# Patient Record
Sex: FEMALE | Race: OTHER | NOT HISPANIC OR LATINO
[De-identification: names, ages, dates, MRNs, and addresses within clinical notes are randomized per-mention and may not be internally consistent; named-entity substitution may affect disease eponyms.]

---

## 2021-07-09 PROBLEM — Z00.00 ENCOUNTER FOR PREVENTIVE HEALTH EXAMINATION: Status: ACTIVE | Noted: 2021-07-09

## 2021-07-13 ENCOUNTER — APPOINTMENT (OUTPATIENT)
Dept: NEUROSURGERY | Facility: CLINIC | Age: 41
End: 2021-07-13
Payer: MEDICAID

## 2021-07-13 VITALS
HEIGHT: 60 IN | HEART RATE: 80 BPM | TEMPERATURE: 97.2 F | BODY MASS INDEX: 23.95 KG/M2 | DIASTOLIC BLOOD PRESSURE: 85 MMHG | WEIGHT: 122 LBS | SYSTOLIC BLOOD PRESSURE: 121 MMHG

## 2021-07-13 PROCEDURE — 99205 OFFICE O/P NEW HI 60 MIN: CPT

## 2021-07-13 NOTE — ASSESSMENT
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 I explained the indications for observation, conservative management, medical management, physical therapy, pain management approaches and surgery. I explained the different types and surgical approaches including anterior and posterior approaches as well as decompression only procedures and instrumented fusions. I discussed the risks, benefits, possible complications and expected outcome related to each treatment option. The risks of surgery were discussed in detail including but not limited to postoperative infection at the surgical site, hospital acquired pneumonia, hospital acquired urinary tract infection, postoperative meningitis, wound dehiscence, CSF leak, stroke (ischemic and hemorrhagic), postoperative seizures, worsening motor function due to spinal cord or nerve injury, postoperative visual deficit which could be permanent (blindness) when surgery is performed in a prone position, cardiovascular complications (MI, PE, DVT) and I also explained that some of these complications could lead to sepsis, coma or even death. I discussed the fact that some of these complications may require subsequent surgical procedure(s) to correct them.\par \par In the end, I recommend she get a CT thoracic spine non contrast, Flex/ext cervical spine, full standing xray scoliosis protocol, flex/ext xray of lumbosacral region. I also mentioned her case will be discussed at the spine conference. She will follow up in our clinic with updated imaging in two weeks.\par \par The patient understands the plan of care and is in agreement.  All questions answered to patient satisfaction.\par \par \par \par

## 2021-07-13 NOTE — HISTORY OF PRESENT ILLNESS
[de-identified] : 42 yo F with no pmhx, presents to neurosurgery clinic due to numbness in b/l extremities that began last month. Patient recently gave birth on June 14th, 2021 and when she initially got pregnant nine months ago, began developing numbness/tingling in extremities and developing an unsteady gait. After she gave birth last month, her numbness in extremities got worse. She went called her obgyn who referred her to visit the ED and when she went there, they got an MRI cervical/thoracic spine which showed severe kyphosis and cord compression specifically at the level of T3/T4. She denies headaches, chest pain, shortness of breath, weakness in extremities.\par \par no allergies\par no pmhx\par no medications\par no AC/antiplatelet\par \par Social hx: lives alone, not , She denies alcohol use, drug use, and tobacco smoking\par \par

## 2021-07-13 NOTE — END OF VISIT
[FreeTextEntry3] : I have seen the patient and reviewed the case together with PA and I agree with the final recommendations and plan of care.\par \par Frank Ponce MD\par Neurosurgery\par \par  [Time Spent: ___ minutes] : I have spent [unfilled] minutes of time on the encounter. [>50% of the face to face encounter time was spent on counseling and/or coordination of care for ___] : Greater than 50% of the face to face encounter time was spent on counseling and/or coordination of care for [unfilled]

## 2021-07-13 NOTE — PHYSICAL EXAM
[General Appearance - Alert] : alert [General Appearance - In No Acute Distress] : in no acute distress [Oriented To Time, Place, And Person] : oriented to person, place, and time [Impaired Insight] : insight and judgment were intact [Affect] : the affect was normal [Person] : oriented to person [Place] : oriented to place [Time] : oriented to time [Short Term Intact] : short term memory intact [Remote Intact] : remote memory intact [Span Intact] : the attention span was normal [Concentration Intact] : normal concentrating ability [Fluency] : fluency intact [Comprehension] : comprehension intact [Current Events] : adequate knowledge of current events [Past History] : adequate knowledge of personal past history [Vocabulary] : adequate range of vocabulary [Cranial Nerves Optic (II)] : visual acuity intact bilaterally,  pupils equal round and reactive to light [Cranial Nerves Oculomotor (III)] : extraocular motion intact [Cranial Nerves Trigeminal (V)] : facial sensation intact symmetrically [Cranial Nerves Facial (VII)] : face symmetrical [Cranial Nerves Vestibulocochlear (VIII)] : hearing was intact bilaterally [Cranial Nerves Glossopharyngeal (IX)] : tongue and palate midline [Cranial Nerves Accessory (XI - Cranial And Spinal)] : head turning and shoulder shrug symmetric [Cranial Nerves Hypoglossal (XII)] : there was no tongue deviation with protrusion [Motor Tone] : muscle tone was normal in all four extremities [Motor Strength] : muscle strength was normal in all four extremities [No Muscle Atrophy] : normal bulk in all four extremities [Sensation Tactile Decrease] : light touch was intact [Past-pointing] : there was no past-pointing [Tremor] : no tremor present [FreeTextEntry8] : unsteady, spastic gait requiring use of rolling walker

## 2021-07-26 ENCOUNTER — RESULT REVIEW (OUTPATIENT)
Age: 41
End: 2021-07-26

## 2021-07-28 ENCOUNTER — APPOINTMENT (OUTPATIENT)
Dept: NEUROSURGERY | Facility: CLINIC | Age: 41
End: 2021-07-28
Payer: MEDICAID

## 2021-07-28 PROCEDURE — 99215 OFFICE O/P EST HI 40 MIN: CPT

## 2021-07-28 NOTE — REASON FOR VISIT
[FreeTextEntry1] : 7/28/2021: Ms. Dial returns to neurosurgery clinic as a two week follow up on updated imaging: CT thoracic spine non contrast, Flex/ext cervical spine, full standing xray scoliosis protocol, flex/ext xray of lumbosacral region. \par \par 7/13/2021: 42 yo F with no pmhx, presents to neurosurgery clinic due to numbness in b/l extremities that began last month. Patient recently gave birth on June 14th, 2021 and when she initially got pregnant nine months ago, began developing numbness/tingling in extremities and developing an unsteady gait. After she gave birth last month, her numbness in extremities got worse. She went called her obgyn who referred her to visit the ED and when she went there, they got an MRI cervical/thoracic spine which showed severe kyphosis and cord compression specifically at the level of T3/T4. She denies headaches, chest pain, shortness of breath, weakness in extremities.\par \par no allergies\par no pmhx\par no medications\par no AC/antiplatelet\par \par Social hx: lives alone, not , She denies alcohol use, drug use, and tobacco smoking\par \par

## 2021-07-28 NOTE — PHYSICAL EXAM
[Oriented To Time, Place, And Person] : oriented to person, place, and time [Impaired Insight] : insight and judgment were intact [Affect] : the affect was normal [Person] : oriented to person [Place] : oriented to place [Time] : oriented to time [Short Term Intact] : short term memory intact [Remote Intact] : remote memory intact [Span Intact] : the attention span was normal [Concentration Intact] : normal concentrating ability [Fluency] : fluency intact [Comprehension] : comprehension intact [Current Events] : adequate knowledge of current events [Past History] : adequate knowledge of personal past history [Vocabulary] : adequate range of vocabulary [Cranial Nerves Optic (II)] : visual acuity intact bilaterally,  pupils equal round and reactive to light [Cranial Nerves Oculomotor (III)] : extraocular motion intact [Cranial Nerves Trigeminal (V)] : facial sensation intact symmetrically [Cranial Nerves Facial (VII)] : face symmetrical [Cranial Nerves Vestibulocochlear (VIII)] : hearing was intact bilaterally [Cranial Nerves Glossopharyngeal (IX)] : tongue and palate midline [Cranial Nerves Accessory (XI - Cranial And Spinal)] : head turning and shoulder shrug symmetric [Cranial Nerves Hypoglossal (XII)] : there was no tongue deviation with protrusion [Motor Tone] : muscle tone was normal in all four extremities [Motor Strength] : muscle strength was normal in all four extremities [No Muscle Atrophy] : normal bulk in all four extremities [Sensation Tactile Decrease] : light touch was intact [FreeTextEntry1] : unsteady, spastic gait requiring use of rolling walker.  \par  [Tremor] : no tremor present

## 2021-07-28 NOTE — ASSESSMENT
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 I explained the indications for observation, conservative management, medical management, physical therapy, pain management approaches and surgery. I explained the different types and surgical approaches including anterior and posterior approaches as well as decompression only procedures and instrumented fusions. I discussed the risks, benefits, possible complications and expected outcome related to each treatment option. The risks of surgery were discussed in detail including but not limited to postoperative infection at the surgical site, hospital acquired pneumonia, hospital acquired urinary tract infection, postoperative meningitis, wound dehiscence, CSF leak, stroke (ischemic and hemorrhagic), postoperative seizures, worsening motor function due to spinal cord or nerve injury, postoperative visual deficit which could be permanent (blindness) when surgery is performed in a prone position, cardiovascular complications (MI, PE, DVT) and I also explained that some of these complications could lead to sepsis, coma or even death. I discussed the fact that some of these complications may require subsequent surgical procedure(s) to correct them.\par \par In the end, I recommend that she is a surgical candidate. I provided her with requisitions to get standing xrays of her thoracic and lumbar spine. I also spoke with Dr. Vieira at [facility] who will be following up with her in clinic to discuss surgical planning.\par \par The patient understands the plan of care and is in agreement. All questions answered to patient satisfaction.\par

## 2021-07-29 ENCOUNTER — APPOINTMENT (OUTPATIENT)
Dept: SPINE | Facility: CLINIC | Age: 41
End: 2021-07-29
Payer: MEDICAID

## 2021-07-29 PROCEDURE — 99203 OFFICE O/P NEW LOW 30 MIN: CPT | Mod: 95

## 2021-07-29 NOTE — REASON FOR VISIT
[Home] : at home, [unfilled] , at the time of the visit. [Medical Office: ([facility])___] : at the medical office located in  [Verbal consent obtained from patient] : the patient, [unfilled] [Initial Visit] : an initial visit for [Cervical Myelopathy/Myopathy] : cervical myelopathy/myopathy [Neck Pain] : neck pain

## 2021-07-29 NOTE — DISCUSSION/SUMMARY
[de-identified] : I had a long discussion with her regarding the natural history of thoracic myelopathy.  The surgical correction for this is very involved would require 3 to 6 months recovery and be associated with a high risk of intraoperative and postoperative paraplegia.'s very important meet her in person the able to examine her.  She has some social issues which are complicating the issue but I would like to see her soon as possible

## 2021-08-05 ENCOUNTER — NON-APPOINTMENT (OUTPATIENT)
Age: 41
End: 2021-08-05

## 2021-08-05 ENCOUNTER — APPOINTMENT (OUTPATIENT)
Dept: SPINE | Facility: CLINIC | Age: 41
End: 2021-08-05
Payer: MEDICAID

## 2021-08-05 VITALS
DIASTOLIC BLOOD PRESSURE: 71 MMHG | HEART RATE: 66 BPM | HEIGHT: 60 IN | SYSTOLIC BLOOD PRESSURE: 122 MMHG | WEIGHT: 122 LBS | OXYGEN SATURATION: 98 % | TEMPERATURE: 98 F | BODY MASS INDEX: 23.95 KG/M2 | RESPIRATION RATE: 18 BRPM

## 2021-08-05 DIAGNOSIS — M40.209 UNSPECIFIED KYPHOSIS, SITE UNSPECIFIED: ICD-10-CM

## 2021-08-05 DIAGNOSIS — M48.04 SPINAL STENOSIS, THORACIC REGION: ICD-10-CM

## 2021-08-05 DIAGNOSIS — S22.009A UNSPECIFIED FRACTURE OF UNSPECIFIED THORACIC VERTEBRA, INITIAL ENCOUNTER FOR CLOSED FRACTURE: ICD-10-CM

## 2021-08-05 PROCEDURE — 99215 OFFICE O/P EST HI 40 MIN: CPT

## 2021-08-05 NOTE — DISCUSSION/SUMMARY
[de-identified] : This patient is a very bad problem.  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  She requires a multilevel vertebral column resection with reconstruction and posterior instrumentation explained to her the risk benefits and alternatives to surgery.  I explained to her that the risk of a complication is 100% should be in the hospital for minimum 10 to 14 days and likely will require stay in rehab after surgery.  I quoted her a 25 to 50% chance of new weakness or even paralysis after surgery not for mechanical complications but from vascular or stretch from the spinal cord.  We will continue everything we can to try and facilitate getting her  here from [place] as they have a new baby and we also will work on getting a 3D model of her spine including a CT of her cervical thoracic and lumbar Feroz protocol this will require a significant amount of effort and so I will book her for two consecutive days September 27 the 29th to be done in the operating room here with a stay in the ICU afterwards for strict blood pressure control.  She and her family had several excellent questions all which I answered to the best my abilities.  She wishes to proceed.

## 2021-08-05 NOTE — PHYSICAL EXAM
[Wide-Based] : wide-based [UE/LE] : Sensory: Intact in bilateral upper & lower extremities [2+] : left brachioradialis 2+ [Plantar Reflex Right Only] : present on the right [Plantar Reflex Left Only] : present on the left [DTR Reflexes Clonus Of Right Ankle (___ Beats)] : present on the right [DTR Reflexes Clonus Of Left Ankle (___ Beats)] : present on the left [3+] : left abdominal skin reflexes 3+

## 2023-03-28 ENCOUNTER — NON-APPOINTMENT (OUTPATIENT)
Age: 43
End: 2023-03-28

## 2023-03-28 DIAGNOSIS — G95.9 DISEASE OF SPINAL CORD, UNSPECIFIED: ICD-10-CM

## 2023-03-28 NOTE — REASON FOR VISIT
[Cervical Myelopathy/Myopathy] : cervical myelopathy/myopathy [Spinal Stenosis] : spinal stenosis [FreeTextEntry2] : severe cervicothoracic junctional kyphosis with spinal cord compression

## 2023-03-28 NOTE — ASSESSMENT
[FreeTextEntry1] : Given worsening and new neuro deficits, need updated images prior to large complex spinal reconstruction surgery from cervical to lumbar level.\par \par PLAN\par - MRI C/T/L spine wo\par - CT C/T/L spine wo\par - Xray scoliosis\par - RTC after images to discuss with Dr. Vieira

## 2023-03-28 NOTE — HISTORY OF PRESENT ILLNESS
[de-identified] : 44 yo F with known history of severe cervicothoracic kyphosis with cord compression, thoracic myelopathy. Of last visit in 2021, surgical intervention was recommended for complex spinal reconstruction but she was unable to proceed due to family issue.\par Today she returns c/o worsening back pain with balance problem and past one year of urinary incontinence.\par Pain is described as severe constant on her mid to lower back worsening by walking and balance problem causing multiple near falls. \par \par \par INITIAL history\par 40 yo F with no pmhx, presents to neurosurgery clinic due to numbness in b/l extremities that began last month. Patient recently gave birth on June 14th, 2021 and when she initially got pregnant nine months ago, began developing numbness/tingling in extremities and developing an unsteady gait. After she gave birth last month, her numbness in extremities got worse. She went called her obgyn who referred her to visit the ED and when she went there, they got an MRI cervical/thoracic spine which showed severe kyphosis and cord compression specifically at the level of T3/T4. She denies headaches, chest pain, shortness of breath, weakness in extremities.\par

## 2023-03-29 ENCOUNTER — APPOINTMENT (OUTPATIENT)
Dept: SPINE | Facility: CLINIC | Age: 43
End: 2023-03-29
Payer: MEDICAID

## 2023-03-29 PROCEDURE — XXXXX: CPT | Mod: 1L

## 2023-05-27 ENCOUNTER — APPOINTMENT (OUTPATIENT)
Dept: MRI IMAGING | Facility: CLINIC | Age: 43
End: 2023-05-27
Payer: MEDICAID

## 2023-05-27 ENCOUNTER — TRANSCRIPTION ENCOUNTER (OUTPATIENT)
Age: 43
End: 2023-05-27

## 2023-05-27 ENCOUNTER — OUTPATIENT (OUTPATIENT)
Dept: OUTPATIENT SERVICES | Facility: HOSPITAL | Age: 43
LOS: 1 days | End: 2023-05-27

## 2023-05-27 ENCOUNTER — APPOINTMENT (OUTPATIENT)
Dept: CT IMAGING | Facility: CLINIC | Age: 43
End: 2023-05-27
Payer: MEDICAID

## 2023-05-27 PROCEDURE — 72141 MRI NECK SPINE W/O DYE: CPT | Mod: 26

## 2023-05-27 PROCEDURE — 72128 CT CHEST SPINE W/O DYE: CPT | Mod: 26

## 2023-05-27 PROCEDURE — 72148 MRI LUMBAR SPINE W/O DYE: CPT | Mod: 26

## 2023-05-27 PROCEDURE — 72146 MRI CHEST SPINE W/O DYE: CPT | Mod: 26

## 2023-07-24 ENCOUNTER — OUTPATIENT (OUTPATIENT)
Dept: OUTPATIENT SERVICES | Facility: HOSPITAL | Age: 43
LOS: 1 days | End: 2023-07-24
Payer: COMMERCIAL

## 2023-07-24 ENCOUNTER — APPOINTMENT (OUTPATIENT)
Dept: CT IMAGING | Facility: HOSPITAL | Age: 43
End: 2023-07-24

## 2023-07-24 PROCEDURE — 72131 CT LUMBAR SPINE W/O DYE: CPT

## 2023-07-24 PROCEDURE — 72131 CT LUMBAR SPINE W/O DYE: CPT | Mod: 26

## 2023-07-27 ENCOUNTER — NON-APPOINTMENT (OUTPATIENT)
Age: 43
End: 2023-07-27

## 2023-08-08 ENCOUNTER — APPOINTMENT (OUTPATIENT)
Dept: SPINE | Facility: CLINIC | Age: 43
End: 2023-08-08
Payer: MEDICAID

## 2023-08-08 DIAGNOSIS — G95.20 UNSPECIFIED CORD COMPRESSION: ICD-10-CM

## 2023-08-08 DIAGNOSIS — M40.202 UNSPECIFIED KYPHOSIS, CERVICAL REGION: ICD-10-CM

## 2023-08-08 PROCEDURE — 99215 OFFICE O/P EST HI 40 MIN: CPT | Mod: 95

## 2023-08-16 ENCOUNTER — APPOINTMENT (OUTPATIENT)
Dept: CT IMAGING | Facility: CLINIC | Age: 43
End: 2023-08-16

## 2023-08-21 ENCOUNTER — OUTPATIENT (OUTPATIENT)
Dept: OUTPATIENT SERVICES | Facility: HOSPITAL | Age: 43
LOS: 1 days | End: 2023-08-21

## 2023-08-21 ENCOUNTER — APPOINTMENT (OUTPATIENT)
Dept: CT IMAGING | Facility: CLINIC | Age: 43
End: 2023-08-21
Payer: MEDICAID

## 2023-08-21 PROCEDURE — 72125 CT NECK SPINE W/O DYE: CPT | Mod: 26

## 2023-09-13 PROBLEM — M40.202 CERVICAL KYPHOSIS: Status: ACTIVE | Noted: 2021-08-05

## 2023-09-13 PROBLEM — G95.20 CERVICAL CORD COMPRESSION WITH MYELOPATHY: Status: ACTIVE | Noted: 2023-09-13

## 2023-09-22 VITALS
HEART RATE: 91 BPM | OXYGEN SATURATION: 98 % | HEIGHT: 60 IN | RESPIRATION RATE: 16 BRPM | DIASTOLIC BLOOD PRESSURE: 75 MMHG | SYSTOLIC BLOOD PRESSURE: 114 MMHG | TEMPERATURE: 98 F | WEIGHT: 151.24 LBS

## 2023-09-22 RX ORDER — INFLUENZA VIRUS VACCINE 15; 15; 15; 15 UG/.5ML; UG/.5ML; UG/.5ML; UG/.5ML
0.5 SUSPENSION INTRAMUSCULAR ONCE
Refills: 0 | Status: DISCONTINUED | OUTPATIENT
Start: 2023-09-25 | End: 2023-10-20

## 2023-09-22 NOTE — PATIENT PROFILE ADULT - FLU SEASON?
Identified pt with two pt identifiers(name and ). Reviewed record in preparation for visit and have obtained necessary documentation. Chief Complaint   Patient presents with    Fatty Liver     FS 3month f/u with April      Vitals:    22 1144   BP: (!) 151/79   Pulse: 79   Temp: 97.2 °F (36.2 °C)   TempSrc: Temporal   SpO2: 99%   Weight: 182 lb (82.6 kg)   Height: 5' 7\" (1.702 m)   PainSc:   0 - No pain       Health Maintenance Review: Patient reminded of \"due or due soon\" health maintenance. I have asked the patient to contact his/her primary care provider (PCP) for follow-up on his/her health maintenance. Coordination of Care Questionnaire:  :   1) Have you been to an emergency room, urgent care, or hospitalized since your last visit? If yes, where when, and reason for visit? no       2. Have seen or consulted any other health care provider since your last visit? If yes, where when, and reason for visit? NO      Patient is accompanied by self I have received verbal consent from Raegan Jaramillo to discuss any/all medical information while they are present in the room. Yes...

## 2023-09-24 ENCOUNTER — TRANSCRIPTION ENCOUNTER (OUTPATIENT)
Age: 43
End: 2023-09-24

## 2023-09-25 ENCOUNTER — INPATIENT (INPATIENT)
Facility: HOSPITAL | Age: 43
LOS: 24 days | Discharge: NURSING, REHAB OR LONG-TERM CARE | DRG: 457 | End: 2023-10-20
Attending: NEUROLOGICAL SURGERY | Admitting: NEUROLOGICAL SURGERY
Payer: COMMERCIAL

## 2023-09-25 ENCOUNTER — TRANSCRIPTION ENCOUNTER (OUTPATIENT)
Age: 43
End: 2023-09-25

## 2023-09-25 DIAGNOSIS — E86.1 HYPOVOLEMIA: ICD-10-CM

## 2023-09-25 DIAGNOSIS — E87.1 HYPO-OSMOLALITY AND HYPONATREMIA: ICD-10-CM

## 2023-09-25 DIAGNOSIS — T84.226A DISPLACEMENT OF INTERNAL FIXATION DEVICE OF VERTEBRAE, INITIAL ENCOUNTER: ICD-10-CM
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
## 2023-09-25 DIAGNOSIS — R32 UNSPECIFIED URINARY INCONTINENCE: ICD-10-CM

## 2023-09-25 DIAGNOSIS — M48.04 SPINAL STENOSIS, THORACIC REGION: ICD-10-CM

## 2023-09-25 DIAGNOSIS — G97.41 ACCIDENTAL PUNCTURE OR LACERATION OF DURA DURING A PROCEDURE: ICD-10-CM

## 2023-09-25 DIAGNOSIS — I95.89 OTHER HYPOTENSION: ICD-10-CM

## 2023-09-25 DIAGNOSIS — I47.10 SUPRAVENTRICULAR TACHYCARDIA, UNSPECIFIED: ICD-10-CM

## 2023-09-25 DIAGNOSIS — M40.204 UNSPECIFIED KYPHOSIS, THORACIC REGION: ICD-10-CM
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
## 2023-09-25 DIAGNOSIS — S22.009A UNSPECIFIED FRACTURE OF UNSPECIFIED THORACIC VERTEBRA, INITIAL ENCOUNTER FOR CLOSED FRACTURE: ICD-10-CM

## 2023-09-25 DIAGNOSIS — S14.109A UNSPECIFIED INJURY AT UNSPECIFIED LEVEL OF CERVICAL SPINAL CORD, INITIAL ENCOUNTER: ICD-10-CM

## 2023-09-25 DIAGNOSIS — M40.294 OTHER KYPHOSIS, THORACIC REGION: ICD-10-CM
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
## 2023-09-25 DIAGNOSIS — Z91.81 HISTORY OF FALLING: ICD-10-CM
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
## 2023-09-25 DIAGNOSIS — R82.71 BACTERIURIA: ICD-10-CM

## 2023-09-25 DIAGNOSIS — E88.2 LIPOMATOSIS, NOT ELSEWHERE CLASSIFIED: ICD-10-CM

## 2023-09-25 DIAGNOSIS — R15.9 FULL INCONTINENCE OF FECES: ICD-10-CM

## 2023-09-25 DIAGNOSIS — D72.829 ELEVATED WHITE BLOOD CELL COUNT, UNSPECIFIED: ICD-10-CM

## 2023-09-25 DIAGNOSIS — I80.8 PHLEBITIS AND THROMBOPHLEBITIS OF OTHER SITES: ICD-10-CM

## 2023-09-25 DIAGNOSIS — R50.82 POSTPROCEDURAL FEVER: ICD-10-CM

## 2023-09-25 DIAGNOSIS — D62 ACUTE POSTHEMORRHAGIC ANEMIA: ICD-10-CM
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
## 2023-09-25 DIAGNOSIS — G99.2 MYELOPATHY IN DISEASES CLASSIFIED ELSEWHERE: ICD-10-CM

## 2023-09-25 DIAGNOSIS — R79.89 OTHER SPECIFIED ABNORMAL FINDINGS OF BLOOD CHEMISTRY: ICD-10-CM

## 2023-09-25 DIAGNOSIS — J90 PLEURAL EFFUSION, NOT ELSEWHERE CLASSIFIED: ICD-10-CM

## 2023-09-25 DIAGNOSIS — Z78.1 PHYSICAL RESTRAINT STATUS: ICD-10-CM

## 2023-09-25 LAB
ANION GAP SERPL CALC-SCNC: 9 MMOL/L — SIGNIFICANT CHANGE UP (ref 5–17)
APTT BLD: 27.4 SEC — SIGNIFICANT CHANGE UP (ref 24.5–35.6)
APTT BLD: 35.6 SEC — SIGNIFICANT CHANGE UP (ref 24.5–35.6)
BASE EXCESS BLDA CALC-SCNC: -1.5 MMOL/L — SIGNIFICANT CHANGE UP (ref -2–3)
BASE EXCESS BLDA CALC-SCNC: -2.5 MMOL/L — LOW (ref -2–3)
BASE EXCESS BLDA CALC-SCNC: -4.7 MMOL/L — LOW (ref -2–3)
BASE EXCESS BLDA CALC-SCNC: -6.6 MMOL/L — LOW (ref -2–3)
BASE EXCESS BLDA CALC-SCNC: -7.2 MMOL/L — LOW (ref -2–3)
BASE EXCESS BLDA CALC-SCNC: -7.8 MMOL/L — LOW (ref -2–3)
BASOPHILS # BLD AUTO: 0 K/UL — SIGNIFICANT CHANGE UP (ref 0–0.2)
BASOPHILS NFR BLD AUTO: 0 % — SIGNIFICANT CHANGE UP (ref 0–2)
BLD GP AB SCN SERPL QL: NEGATIVE — SIGNIFICANT CHANGE UP
BUN SERPL-MCNC: 7 MG/DL — SIGNIFICANT CHANGE UP (ref 7–23)
CA-I BLDA-SCNC: 1.09 MMOL/L — LOW (ref 1.15–1.33)
CA-I BLDA-SCNC: 1.11 MMOL/L — LOW (ref 1.15–1.33)
CA-I BLDA-SCNC: 1.12 MMOL/L — LOW (ref 1.15–1.33)
CA-I BLDA-SCNC: 1.15 MMOL/L — SIGNIFICANT CHANGE UP (ref 1.15–1.33)
CA-I BLDA-SCNC: 1.25 MMOL/L — SIGNIFICANT CHANGE UP (ref 1.15–1.33)
CALCIUM SERPL-MCNC: 7.6 MG/DL — LOW (ref 8.4–10.5)
CHLORIDE SERPL-SCNC: 108 MMOL/L — SIGNIFICANT CHANGE UP (ref 96–108)
CO2 BLDA-SCNC: 18 MMOL/L — LOW (ref 19–24)
CO2 BLDA-SCNC: 19 MMOL/L — SIGNIFICANT CHANGE UP (ref 19–24)
CO2 BLDA-SCNC: 19 MMOL/L — SIGNIFICANT CHANGE UP (ref 19–24)
CO2 BLDA-SCNC: 22 MMOL/L — SIGNIFICANT CHANGE UP (ref 19–24)
CO2 BLDA-SCNC: 22 MMOL/L — SIGNIFICANT CHANGE UP (ref 19–24)
CO2 BLDA-SCNC: 24 MMOL/L — SIGNIFICANT CHANGE UP (ref 19–24)
CO2 SERPL-SCNC: 20 MMOL/L — LOW (ref 22–31)
COHGB MFR BLDA: 0 % — SIGNIFICANT CHANGE UP
COHGB MFR BLDA: 0.7 % — SIGNIFICANT CHANGE UP
COHGB MFR BLDA: 1.2 % — SIGNIFICANT CHANGE UP
COHGB MFR BLDA: 1.3 % — SIGNIFICANT CHANGE UP
COHGB MFR BLDA: 1.3 % — SIGNIFICANT CHANGE UP
COHGB MFR BLDA: 1.7 % — SIGNIFICANT CHANGE UP
CREAT SERPL-MCNC: 0.56 MG/DL — SIGNIFICANT CHANGE UP (ref 0.5–1.3)
EGFR: 118 ML/MIN/1.73M2 — SIGNIFICANT CHANGE UP
EOSINOPHIL # BLD AUTO: 0 K/UL — SIGNIFICANT CHANGE UP (ref 0–0.5)
EOSINOPHIL NFR BLD AUTO: 0 % — SIGNIFICANT CHANGE UP (ref 0–6)
GLUCOSE BLDA-MCNC: 100 MG/DL — HIGH (ref 70–99)
GLUCOSE BLDA-MCNC: 101 MG/DL — HIGH (ref 70–99)
GLUCOSE BLDA-MCNC: 104 MG/DL — HIGH (ref 70–99)
GLUCOSE BLDA-MCNC: 116 MG/DL — HIGH (ref 70–99)
GLUCOSE BLDA-MCNC: 118 MG/DL — HIGH (ref 70–99)
GLUCOSE BLDC GLUCOMTR-MCNC: 141 MG/DL — HIGH (ref 70–99)
GLUCOSE SERPL-MCNC: 141 MG/DL — HIGH (ref 70–99)
HCO3 BLDA-SCNC: 18 MMOL/L — LOW (ref 21–28)
HCO3 BLDA-SCNC: 20 MMOL/L — LOW (ref 21–28)
HCO3 BLDA-SCNC: 21 MMOL/L — SIGNIFICANT CHANGE UP (ref 21–28)
HCO3 BLDA-SCNC: 23 MMOL/L — SIGNIFICANT CHANGE UP (ref 21–28)
HCT VFR BLD CALC: 21 % — CRITICAL LOW (ref 34.5–45)
HGB BLD-MCNC: 6.8 G/DL — CRITICAL LOW (ref 11.5–15.5)
HGB BLDA-MCNC: 10.6 G/DL — LOW (ref 11.7–16.1)
HGB BLDA-MCNC: 10.6 G/DL — LOW (ref 11.7–16.1)
HGB BLDA-MCNC: 7.3 G/DL — LOW (ref 11.7–16.1)
HGB BLDA-MCNC: 7.8 G/DL — LOW (ref 11.7–16.1)
HGB BLDA-MCNC: 8.3 G/DL — LOW (ref 11.7–16.1)
HGB BLDA-MCNC: 8.8 G/DL — LOW (ref 11.7–16.1)
INR BLD: 1.27 — HIGH (ref 0.85–1.18)
LACTATE SERPL-SCNC: 0.8 MMOL/L — SIGNIFICANT CHANGE UP (ref 0.5–2)
LYMPHOCYTES # BLD AUTO: 0.08 K/UL — LOW (ref 1–3.3)
LYMPHOCYTES # BLD AUTO: 0.9 % — LOW (ref 13–44)
MAGNESIUM SERPL-MCNC: 1.2 MG/DL — LOW (ref 1.6–2.6)
MCHC RBC-ENTMCNC: 27.2 PG — SIGNIFICANT CHANGE UP (ref 27–34)
MCHC RBC-ENTMCNC: 32.4 GM/DL — SIGNIFICANT CHANGE UP (ref 32–36)
MCV RBC AUTO: 84 FL — SIGNIFICANT CHANGE UP (ref 80–100)
METHGB MFR BLDA: 0.7 % — SIGNIFICANT CHANGE UP
METHGB MFR BLDA: 0.7 % — SIGNIFICANT CHANGE UP
METHGB MFR BLDA: 0.8 % — SIGNIFICANT CHANGE UP
MONOCYTES # BLD AUTO: 0 K/UL — SIGNIFICANT CHANGE UP (ref 0–0.9)
MONOCYTES NFR BLD AUTO: 0 % — LOW (ref 2–14)
NEUTROPHILS # BLD AUTO: 8.31 K/UL — HIGH (ref 1.8–7.4)
NEUTROPHILS NFR BLD AUTO: 98.2 % — HIGH (ref 43–77)
OXYHGB MFR BLDA: 97.6 % — HIGH (ref 90–95)
OXYHGB MFR BLDA: 97.9 % — HIGH (ref 90–95)
OXYHGB MFR BLDA: 97.9 % — HIGH (ref 90–95)
OXYHGB MFR BLDA: 98 % — HIGH (ref 90–95)
OXYHGB MFR BLDA: 98.3 % — HIGH (ref 90–95)
OXYHGB MFR BLDA: 99 % — HIGH (ref 90–95)
PCO2 BLDA: 32 MMHG — SIGNIFICANT CHANGE UP (ref 32–45)
PCO2 BLDA: 32 MMHG — SIGNIFICANT CHANGE UP (ref 32–45)
PCO2 BLDA: 33 MMHG — SIGNIFICANT CHANGE UP (ref 32–45)
PCO2 BLDA: 34 MMHG — SIGNIFICANT CHANGE UP (ref 32–45)
PCO2 BLDA: 35 MMHG — SIGNIFICANT CHANGE UP (ref 32–45)
PCO2 BLDA: 37 MMHG — SIGNIFICANT CHANGE UP (ref 32–45)
PH BLDA: 7.32 — LOW (ref 7.35–7.45)
PH BLDA: 7.35 — SIGNIFICANT CHANGE UP (ref 7.35–7.45)
PH BLDA: 7.35 — SIGNIFICANT CHANGE UP (ref 7.35–7.45)
PH BLDA: 7.36 — SIGNIFICANT CHANGE UP (ref 7.35–7.45)
PH BLDA: 7.42 — SIGNIFICANT CHANGE UP (ref 7.35–7.45)
PH BLDA: 7.42 — SIGNIFICANT CHANGE UP (ref 7.35–7.45)
PHOSPHATE SERPL-MCNC: 3.2 MG/DL — SIGNIFICANT CHANGE UP (ref 2.5–4.5)
PLATELET # BLD AUTO: 85 K/UL — LOW (ref 150–400)
PO2 BLDA: 275 MMHG — HIGH (ref 83–108)
PO2 BLDA: 285 MMHG — HIGH (ref 83–108)
PO2 BLDA: 314 MMHG — HIGH (ref 83–108)
PO2 BLDA: 323 MMHG — HIGH (ref 83–108)
PO2 BLDA: 329 MMHG — HIGH (ref 83–108)
PO2 BLDA: 335 MMHG — HIGH (ref 83–108)
POTASSIUM BLDA-SCNC: 3.3 MMOL/L — LOW (ref 3.5–5.1)
POTASSIUM BLDA-SCNC: 3.4 MMOL/L — LOW (ref 3.5–5.1)
POTASSIUM BLDA-SCNC: 3.4 MMOL/L — LOW (ref 3.5–5.1)
POTASSIUM BLDA-SCNC: 3.8 MMOL/L — SIGNIFICANT CHANGE UP (ref 3.5–5.1)
POTASSIUM BLDA-SCNC: 3.8 MMOL/L — SIGNIFICANT CHANGE UP (ref 3.5–5.1)
POTASSIUM SERPL-MCNC: 4.1 MMOL/L — SIGNIFICANT CHANGE UP (ref 3.5–5.3)
POTASSIUM SERPL-SCNC: 4.1 MMOL/L — SIGNIFICANT CHANGE UP (ref 3.5–5.3)
PROTHROM AB SERPL-ACNC: 14.4 SEC — HIGH (ref 9.5–13)
RBC # BLD: 2.5 M/UL — LOW (ref 3.8–5.2)
RBC # FLD: 13.5 % — SIGNIFICANT CHANGE UP (ref 10.3–14.5)
RH IG SCN BLD-IMP: POSITIVE — SIGNIFICANT CHANGE UP
SAO2 % BLDA: 100 % — HIGH (ref 94–98)
SAO2 % BLDA: 99.7 % — HIGH (ref 94–98)
SAO2 % BLDA: 99.8 % — HIGH (ref 94–98)
SODIUM BLDA-SCNC: 128 MMOL/L — LOW (ref 136–145)
SODIUM BLDA-SCNC: 130 MMOL/L — LOW (ref 136–145)
SODIUM BLDA-SCNC: 131 MMOL/L — LOW (ref 136–145)
SODIUM BLDA-SCNC: 135 MMOL/L — LOW (ref 136–145)
SODIUM BLDA-SCNC: 138 MMOL/L — SIGNIFICANT CHANGE UP (ref 136–145)
SODIUM SERPL-SCNC: 137 MMOL/L — SIGNIFICANT CHANGE UP (ref 135–145)
WBC # BLD: 8.46 K/UL — SIGNIFICANT CHANGE UP (ref 3.8–10.5)
WBC # FLD AUTO: 8.46 K/UL — SIGNIFICANT CHANGE UP (ref 3.8–10.5)

## 2023-09-25 PROCEDURE — 99291 CRITICAL CARE FIRST HOUR: CPT

## 2023-09-25 PROCEDURE — 61783 SCAN PROC SPINAL: CPT

## 2023-09-25 PROCEDURE — 63101 REMOVE VERT BODY DCMPRN THRC: CPT

## 2023-09-25 PROCEDURE — 63103 REMOVE VERTEBRAL BODY ADD-ON: CPT | Mod: 1L

## 2023-09-25 PROCEDURE — 22810 ARTHRD ANT DFRM 4-7 VRT SGM: CPT

## 2023-09-25 PROCEDURE — 71045 X-RAY EXAM CHEST 1 VIEW: CPT | Mod: 26

## 2023-09-25 PROCEDURE — 22854 INSJ BIOMECHANICAL DEVICE: CPT

## 2023-09-25 PROCEDURE — 22844 INSERT SPINE FIXATION DEVICE: CPT

## 2023-09-25 DEVICE — SET SCREW EVEREST CUSTOM: Type: IMPLANTABLE DEVICE | Status: FUNCTIONAL

## 2023-09-25 DEVICE — FLOSEAL NT 5ML: Type: IMPLANTABLE DEVICE | Status: FUNCTIONAL

## 2023-09-25 DEVICE — CELLERATE SURGICAL POWDER RX 5GM: Type: IMPLANTABLE DEVICE | Status: FUNCTIONAL

## 2023-09-25 DEVICE — IMPLANTABLE DEVICE: Type: IMPLANTABLE DEVICE | Status: FUNCTIONAL

## 2023-09-25 DEVICE — SURGIFLO HEMOSTATIC MATRIX KIT: Type: IMPLANTABLE DEVICE | Status: FUNCTIONAL

## 2023-09-25 DEVICE — SCREW EVEREST POLY 6.5X40MM: Type: IMPLANTABLE DEVICE | Status: FUNCTIONAL

## 2023-09-25 DEVICE — SCREW POLY 6.5X45MM: Type: IMPLANTABLE DEVICE | Status: FUNCTIONAL

## 2023-09-25 DEVICE — ROD STR 5.5X500MM: Type: IMPLANTABLE DEVICE | Status: FUNCTIONAL

## 2023-09-25 DEVICE — SURGIFOAM PAD 8CM X 12.5CM X 10MM (100): Type: IMPLANTABLE DEVICE | Status: FUNCTIONAL

## 2023-09-25 DEVICE — GRAFT VITOSIS BIMODAL 20CC 25X100X8MM: Type: IMPLANTABLE DEVICE | Status: FUNCTIONAL

## 2023-09-25 DEVICE — MAYFIELD SKULL PIN ADULT PLASTIC: Type: IMPLANTABLE DEVICE | Status: FUNCTIONAL

## 2023-09-25 RX ORDER — DEXTROSE 50 % IN WATER 50 %
25 SYRINGE (ML) INTRAVENOUS ONCE
Refills: 0 | Status: DISCONTINUED | OUTPATIENT
Start: 2023-09-25 | End: 2023-09-26

## 2023-09-25 RX ORDER — SODIUM CHLORIDE 9 MG/ML
1000 INJECTION INTRAMUSCULAR; INTRAVENOUS; SUBCUTANEOUS
Refills: 0 | Status: DISCONTINUED | OUTPATIENT
Start: 2023-09-25 | End: 2023-09-26

## 2023-09-25 RX ORDER — SODIUM CHLORIDE 9 MG/ML
1000 INJECTION, SOLUTION INTRAVENOUS
Refills: 0 | Status: DISCONTINUED | OUTPATIENT
Start: 2023-09-25 | End: 2023-09-26

## 2023-09-25 RX ORDER — CHLORHEXIDINE GLUCONATE 213 G/1000ML
1 SOLUTION TOPICAL
Refills: 0 | Status: DISCONTINUED | OUTPATIENT
Start: 2023-09-25 | End: 2023-09-29

## 2023-09-25 RX ORDER — SODIUM CHLORIDE 9 MG/ML
10 INJECTION INTRAMUSCULAR; INTRAVENOUS; SUBCUTANEOUS
Refills: 0 | Status: DISCONTINUED | OUTPATIENT
Start: 2023-09-25 | End: 2023-10-06

## 2023-09-25 RX ORDER — CEFAZOLIN SODIUM 1 G
2000 VIAL (EA) INJECTION EVERY 8 HOURS
Refills: 0 | Status: COMPLETED | OUTPATIENT
Start: 2023-09-26 | End: 2023-09-26

## 2023-09-25 RX ORDER — GLUCAGON INJECTION, SOLUTION 0.5 MG/.1ML
1 INJECTION, SOLUTION SUBCUTANEOUS ONCE
Refills: 0 | Status: DISCONTINUED | OUTPATIENT
Start: 2023-09-25 | End: 2023-09-26

## 2023-09-25 RX ORDER — INSULIN LISPRO 100/ML
VIAL (ML) SUBCUTANEOUS EVERY 6 HOURS
Refills: 0 | Status: DISCONTINUED | OUTPATIENT
Start: 2023-09-25 | End: 2023-09-26

## 2023-09-25 RX ORDER — ACETAMINOPHEN 500 MG
1000 TABLET ORAL ONCE
Refills: 0 | Status: COMPLETED | OUTPATIENT
Start: 2023-09-25 | End: 2023-09-25

## 2023-09-25 RX ORDER — POVIDONE-IODINE 5 %
1 AEROSOL (ML) TOPICAL ONCE
Refills: 0 | Status: COMPLETED | OUTPATIENT
Start: 2023-09-25 | End: 2023-09-25

## 2023-09-25 RX ORDER — OXYCODONE HYDROCHLORIDE 5 MG/1
5 TABLET ORAL EVERY 4 HOURS
Refills: 0 | Status: DISCONTINUED | OUTPATIENT
Start: 2023-09-25 | End: 2023-09-26

## 2023-09-25 RX ORDER — DEXTROSE 50 % IN WATER 50 %
12.5 SYRINGE (ML) INTRAVENOUS ONCE
Refills: 0 | Status: DISCONTINUED | OUTPATIENT
Start: 2023-09-25 | End: 2023-09-26

## 2023-09-25 RX ORDER — POLYETHYLENE GLYCOL 3350 17 G/17G
17 POWDER, FOR SOLUTION ORAL DAILY
Refills: 0 | Status: DISCONTINUED | OUTPATIENT
Start: 2023-09-25 | End: 2023-09-28

## 2023-09-25 RX ORDER — SENNA PLUS 8.6 MG/1
2 TABLET ORAL AT BEDTIME
Refills: 0 | Status: DISCONTINUED | OUTPATIENT
Start: 2023-09-25 | End: 2023-10-06

## 2023-09-25 RX ORDER — DEXTROSE 50 % IN WATER 50 %
15 SYRINGE (ML) INTRAVENOUS ONCE
Refills: 0 | Status: DISCONTINUED | OUTPATIENT
Start: 2023-09-25 | End: 2023-09-26

## 2023-09-25 RX ORDER — PANTOPRAZOLE SODIUM 20 MG/1
40 TABLET, DELAYED RELEASE ORAL DAILY
Refills: 0 | Status: DISCONTINUED | OUTPATIENT
Start: 2023-09-25 | End: 2023-09-26

## 2023-09-25 RX ORDER — ACETAMINOPHEN 500 MG
1000 TABLET ORAL ONCE
Refills: 0 | Status: DISCONTINUED | OUTPATIENT
Start: 2023-09-25 | End: 2023-09-25

## 2023-09-25 RX ORDER — ONDANSETRON 8 MG/1
4 TABLET, FILM COATED ORAL EVERY 6 HOURS
Refills: 0 | Status: COMPLETED | OUTPATIENT
Start: 2023-09-25 | End: 2023-09-27

## 2023-09-25 RX ORDER — ACETAMINOPHEN 500 MG
1000 TABLET ORAL EVERY 8 HOURS
Refills: 0 | Status: DISCONTINUED | OUTPATIENT
Start: 2023-09-25 | End: 2023-09-27

## 2023-09-25 RX ORDER — MAGNESIUM SULFATE 500 MG/ML
4 VIAL (ML) INJECTION ONCE
Refills: 0 | Status: COMPLETED | OUTPATIENT
Start: 2023-09-25 | End: 2023-09-25

## 2023-09-25 RX ORDER — APREPITANT 80 MG/1
40 CAPSULE ORAL ONCE
Refills: 0 | Status: DISCONTINUED | OUTPATIENT
Start: 2023-09-25 | End: 2023-09-25

## 2023-09-25 RX ORDER — METHOCARBAMOL 500 MG/1
500 TABLET, FILM COATED ORAL EVERY 8 HOURS
Refills: 0 | Status: DISCONTINUED | OUTPATIENT
Start: 2023-09-25 | End: 2023-09-27

## 2023-09-25 RX ORDER — CELECOXIB 200 MG/1
200 CAPSULE ORAL ONCE
Refills: 0 | Status: DISCONTINUED | OUTPATIENT
Start: 2023-09-25 | End: 2023-09-25

## 2023-09-25 RX ORDER — CHLORHEXIDINE GLUCONATE 213 G/1000ML
1 SOLUTION TOPICAL ONCE
Refills: 0 | Status: COMPLETED | OUTPATIENT
Start: 2023-09-25 | End: 2023-09-25

## 2023-09-25 RX ORDER — DEXMEDETOMIDINE HYDROCHLORIDE IN 0.9% SODIUM CHLORIDE 4 UG/ML
0.2 INJECTION INTRAVENOUS
Qty: 400 | Refills: 0 | Status: DISCONTINUED | OUTPATIENT
Start: 2023-09-25 | End: 2023-09-26

## 2023-09-25 RX ADMIN — Medication 400 MILLIGRAM(S): at 23:15

## 2023-09-25 RX ADMIN — Medication 1000 MILLIGRAM(S): at 23:22

## 2023-09-25 RX ADMIN — Medication 25 GRAM(S): at 23:27

## 2023-09-25 NOTE — H&P ADULT - NSHPSOURCEINFORD_GEN_ALL_CORE
Patient continues to slowly lose weight.  He is now down to 114.9 kg which is 253 and last time we saw him it was 254.13.  Organ have him just keep working on what he is doing.  
Patient utilizing vitamin D 5000 international units daily.  He is done a great job getting this under control.  His vitamin D level is up to 37.  We will have him continue at that dose long-term.  
This is a chronic health issue for this patient we had a long discussion regarding the fact that his LDL is slightly high all of the time.  His HDL came up a point which is fabulous but he needs to get his LDL less than 100.  His total cholesterol is 184, triglycerides 88, HDL 41 .  He has hypertension which puts him at a higher risk for heart disease.  Were going to start rosuvastatin 5 mg daily and recheck in 3 months.  
This is a chronic health problem that is well controlled with current medications and lifestyle measures.  Patient's blood pressure was initially slightly elevated at 140/82 but after 5 minutes of sitting in the room it was down to 122/78.  His meds have him well controlled.  
Chart(s)/Patient

## 2023-09-25 NOTE — H&P ADULT - HISTORY OF PRESENT ILLNESS
Taken from outpatient neurosurgery note on 9/13/2023 " The patient, a long-standing patient of my practice, reports a history of spinal issues that dates back to a childhood injury. Over the years she's experienced developing issues with walking in a straight line, overactive reflexes in her lower extremities, and unique difficulty with her lower extremities referred to as cloneness. Recent upturn in these symptoms has limited her ability to carry out her daily routines. The patient also reports no significant improvement in her condition since our last clinical encounter two years ago.    Objective: An examination of the patient's most recent scans from May 27, 2023, revealed 6 damaged vertebrae that have resulted in a 90 degree kyphosis in her thoracic spine. Identifying the vertebrae based on the pedicles, this complication involves T3-T8. Her physical evaluation, including her inability to perform tandem gait, hyperreflexia and instances of clonus confirms neurological dysfunction that is clinically aligned with thoracic myelopathy.    Assessment: Given the degree of curvature, the underlying cause of her inability to move straight and other neurological dysfunction is thoracic myelopathy. There is an urgent need to reduce the level of spinal cord compression in order to alleviate the ongoing neurological degeneration.    Plan: The proposed course of treatment involves undertaking a vertebral column resection in order to ease the spinal cord compression. Implementing C2 to L1 instrumented fusion will be required for the restructuring and stability of the spine post surgery. This is a complex procedure with multiple adjustments needed but it's essential as the patient's condition has worsened across a two-year span. Paperwork has been submitted for the procedure to be carried out on September 25th. Risks, benefit and alternative to the surgery was discussed in detail with the patient. She verbalizes understanding and would like to proceed surgery. "    44 y/o female with no PMHx >>> Taken from outpatient neurosurgery note on 9/13/2023 " The patient, a long-standing patient of my practice, reports a history of spinal issues that dates back to a childhood injury. Over the years she's experienced developing issues with walking in a straight line, overactive reflexes in her lower extremities, and unique difficulty with her lower extremities referred to as cloneness. Recent upturn in these symptoms has limited her ability to carry out her daily routines. The patient also reports no significant improvement in her condition since our last clinical encounter two years ago."    44 y/o female with no PMHx or PSHx presents for surgery today.  She reports ambulating with walker or baby stroller at home for years.  She reports intermittent falls.  She has right greater than left leg tingling, low back and mid back pain.  She denies arm or leg pain.  She reports urinary incontinence since having a baby 2 years ago where if she doesn't get to the bathroom fast enough, she has incontinence, but she is able to hold it for a short while.  She takes no pain medications.  She has taken ibuprofen in the past.  She denies saddle anesthesia, bowel incontinence.

## 2023-09-25 NOTE — BRIEF OPERATIVE NOTE - OPERATION/FINDINGS
T3-8 VCR, C7-L1 fusion, correction of deformity, placement of anterior cage.  STAGE 1. plastics closure.

## 2023-09-25 NOTE — H&P ADULT - NSHPSOCIALHISTORY_GEN_ALL_CORE
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

## 2023-09-25 NOTE — H&P ADULT - ASSESSMENT
43F w/ hx of 6 damaged vertebrae that have resulted in a 90 degree kyphosis in her thoracic spine.     Plan: multilevel VCR with posterior fusion.    43F with no PMHx or PSHx, w/ hx of 6 damaged vertebrae that have resulted in a 90 degree kyphosis in her thoracic spine.     Plan: multilevel VCR with posterior fusion and plastics closure.   - npo  - spine eras  - perioperative antibiotics  - scds  - admit to [facility] icu postop    All above d/w Dr. Vieira who formed above plan.

## 2023-09-25 NOTE — PROGRESS NOTE ADULT - SUBJECTIVE AND OBJECTIVE BOX
NEUROSURGERY POST OP NOTE:    POD# 0 s/p C7-L1 fusion, T3-8 vertebral column resection, placement of anterior cage     S: Pt seen and examined at bedside in NSICU. Pt slow to wake up.      T(C): 36.4 (09-25-23 @ 20:45), Max: 36.7 (09-25-23 @ 07:32)  HR: 90 (09-25-23 @ 21:15) (81 - 91)  BP: 103/65 (09-25-23 @ 21:00) (103/65 - 114/75)  RR: 15 (09-25-23 @ 21:15) (15 - 17)  SpO2: 100% (09-25-23 @ 21:15) (98% - 100%)        acetaminophen     Tablet .. 1000 milliGRAM(s) Oral every 8 hours  chlorhexidine 2% Cloths 1 Application(s) Topical once  dexMEDEtomidine Infusion 0.2 MICROgram(s)/kG/Hr IV Continuous <Continuous>  dextrose 5%. 1000 milliLiter(s) IV Continuous <Continuous>  dextrose 5%. 1000 milliLiter(s) IV Continuous <Continuous>  dextrose 50% Injectable 25 Gram(s) IV Push once  dextrose 50% Injectable 25 Gram(s) IV Push once  dextrose 50% Injectable 12.5 Gram(s) IV Push once  dextrose Oral Gel 15 Gram(s) Oral once PRN  glucagon  Injectable 1 milliGRAM(s) IntraMuscular once  influenza   Vaccine 0.5 milliLiter(s) IntraMuscular once  insulin lispro (ADMELOG) corrective regimen sliding scale   SubCutaneous Before meals and at bedtime  methocarbamol 500 milliGRAM(s) Oral every 8 hours  ondansetron   Disintegrating Tablet 4 milliGRAM(s) Oral every 6 hours  oxyCODONE    IR 5 milliGRAM(s) Oral every 4 hours PRN  pantoprazole  Injectable 40 milliGRAM(s) IV Push daily  polyethylene glycol 3350 17 Gram(s) Oral daily  povidone iodine 5% Nasal Swab 1 Application(s) Both Nostrils once  pregabalin 50 milliGRAM(s) Oral three times a day  senna 2 Tablet(s) Oral at bedtime  sodium chloride 0.9%. 1000 milliLiter(s) IV Continuous <Continuous>      RADIOLOGY:     Exam:  General: NAD, pt is comfortably sitting up in bed, intubated  Cardiovascular: RRR, normal S1 and S2   Respiratory: lungs CTAB, no wheezing, rhonchi, or crackles   GI: normoactive BS to auscultation, abd soft, NTND   Neuro: OE to noxious itermittently, not FC  CNII-CXII: PERRL 3mm briskly reactive, unable to assess EOM and facial symmetry  Motor: trace withdrawal to noxious x4 extremities  Grimaces to painful stimuli to all 4 extremities  Extremities: distal pulses 2+ x4 symmetric  Wound/incision: back incision site C/D/I with aquacel dressing in place    Lines/Drains:  [ ] coronado (9/25- )  [ ] olimpia (9/25- )  [ ] HMX 1 deep (9/25- )  [ ] DANIELE x1 super (9/25- )    Assessment: 43F no PMHx suffered childhood injury resulting in spine injury and residual gait disturbance, hyperreflexia of BL LE. She has right greater than left leg tingling, low back and mid back pain. 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 S/p C7-L1 fusion, T3-8 vertebral column resection, placement of anterior cage (9/25/23).       Plan:  NEURO:  - Neuro/spine checks/vitals q1hrs  - Pain control with spine ERAS  - Post-op CT full spine pending  - DANIELE x1 superficial and HMV x1 deep - monitor outputs  - precedex RASS 0 to -1    Cardio:  - SBP goal 100-140, MAP>65    Pulm:  - intubated- full vent support 40/400/12/5  - f/u CXR    GI:  - NPO for now  - Bowel regimen   - PPI while intubated    Renal:  - IVF while NPO  - + Coronado, DC in AM    Endo:  - F/U A1c  - ISS while NPO    Heme:  - SCDs for DVT ppx  - 1.5L EBL, 1.5L albumin and cell saver intraop    ID:  - Post-op Ancef  - Afebrile     Discussed with Dr. Vieira and Dr. Franco

## 2023-09-25 NOTE — PROGRESS NOTE ADULT - SUBJECTIVE AND OBJECTIVE BOX
=================================  NEUROCRITICAL CARE ATTENDING NOTE  =================================    NGUYEN GROVER   MRN-[id number]  Summary:  43y/F  with no PMH, traumatic spinal fracture --> resulted in severe thoracic kyphosis with progressing myelopathy. Baseline exam difficulty ambulating, but with good strength on all 4s.  Now admitted for elective spine surgery.    Taken from outpatient neurosurgery note on 9/13/2023 " The patient, a long-standing patient of my practice, reports a history of spinal issues that dates back to a childhood injury. Over the years she's experienced developing issues with walking in a straight line, overactive reflexes in her lower extremities, and unique difficulty with her lower extremities referred to as cloneness. Recent upturn in these symptoms has limited her ability to carry out her daily routines. The patient also reports no significant improvement in her condition since our last clinical encounter two years ago."    44 y/o female with no PMHx or PSHx presents for surgery today.  She reports ambulating with walker or baby stroller at home for years.  She reports intermittent falls.  She has right greater than left leg tingling, low back and mid back pain.  She denies arm or leg pain.  She reports urinary incontinence since having a baby 2 years ago where if she doesn't get to the bathroom fast enough, she has incontinence, but she is able to hold it for a short while.  She takes no pain medications.  She has taken ibuprofen in the past.  She denies saddle anesthesia, bowel incontinence.   (25 Sep 2023 06:51)      COURSE IN THE HOSPITAL:  09/25 POD#0 5-level vertebral column resection with fusion of C7-L1, put in a cage; stage 1; stable introp monitoring; pleura violated, repaired;     Past Medical History: No pertinent past medical history History of acute illness  Allergies:  No Known Allergies  Home meds:     PHYSICAL EXAMINATION  T(C): 36.7 (09-25 @ 07:32), Max: 36.7 (09-25 @ 07:32) HR: 91 (09-25 @ 07:32) (91 - 91) BP: 114/75 (09-25 @ 07:32) (114/75 - 114/75) RR: 16 (09-25 @ 07:32) (16 - 16) SpO2: 98% (09-25 @ 07:32) (98% - 98%)    NEUROLOGIC EXAMINATION:  Patient still waking up from sedation, not FC, not OE, trace movements B UE/LE  GENERAL:  40% 12 +5  EENT:  anicteric  CARDIOVASCULAR: (+) S1 S2, normal rate and regular rhythm  PULMONARY: clear to auscultation bilaterally  ABDOMEN: soft, nontender with normoactive bowel sounds  EXTREMITIES: no edema  SKIN: no rash    LABS: pending    Bacteriology:  CSF studies:  EEG:  Neuroimaging:  Other imaging:    MEDICATIONS: 09-25    ·	acetaminophen     Tablet .. 1000 Oral every 8 hours  ·	aprepitant 40 Oral once  ·	celecoxib 200 Oral once  ·	methocarbamol 500 Oral every 8 hours  ·	ondansetron   Disintegrating Tablet 4 Oral every 6 hours  ·	pregabalin 50 Oral three times a day  ·	pantoprazole  Injectable 40 IV Push daily  ·	polyethylene glycol 3350 17 Oral daily  ·	senna 2 Oral at bedtime  ·	oxyCODONE    IR 5 Oral every 4 hours PRN    IV FLUIDS: IVL  DRIPS:   DIET: NPO  Lines: R IJ, Seth  Drains:   Chin  Wounds:    CODE STATUS:  Full Code                       GOALS OF CARE:  aggressive                      DISPOSITION:  ICU

## 2023-09-25 NOTE — H&P ADULT - NSHPPHYSICALEXAM_GEN_ALL_CORE
Constitutional:  42 y/o female awake, alert in no acute distress.  Eyes:  Sclera anicteric, conjunctiva noninjected.  ENMT: Oropharyngeal mucosa moist, pink. Tongue midline.    Neck: Neck supple, FROM.  No appreciable lymphadenopathy.  Back:  No pain to palpation/percussion of low back. No CVA tenderness.  Respiratory: Clear to auscultation bilaterally.  No rales, rhonchi, wheezes.  Cardiovascular: Regular rate and rhythm.  S1, S2 heard.  Gastrointestinal:  Soft, nontender, nondistended.  +BS.  Genitourinary:  Deferred.  Rectal: Deferred.  Vascular: Extremities warm, no ulcers, no discoloration of skin.   Neurological: Gen: AA&O x 3, conversant, appropriate.      CN II-XII grossly intact.    Motor: BARRAGAN x 4, 5/5 throughout UE/LE.    Sens: Sensation intact to light touch throughout.    DTRs: 3+ symmetric throughout LE.    Hoffmans negative bilaterally.  Plantar UPgoing bilaterally.  B/L 3 beat clonus.      No pronator drift, no dysmetria.  Skin: Warm, dry, no erythema. Constitutional:  44 y/o female awake, alert in no acute distress.  Eyes:  Sclera anicteric, conjunctiva noninjected.  ENMT: Oropharyngeal mucosa moist, pink. Tongue midline.    Neck: Neck supple, FROM.  No appreciable lymphadenopathy.  Back:  No pain to palpation/percussion of low back. No CVA tenderness.  Respiratory: Clear to auscultation bilaterally.  No rales, rhonchi, wheezes.  Cardiovascular: Regular rate and rhythm.  S1, S2 heard.  Gastrointestinal:  Soft, nontender, nondistended.  +BS.  Genitourinary:  Deferred.  Rectal: Deferred.  Vascular: Extremities warm, no ulcers, no discoloration of skin.   Neurological: Gen: AA&O x 3, conversant, appropriate.      CN II-XII grossly intact.    Motor: BARRAGAN x 4, 5/5 throughout UE. 4/5 throughout LE.    Sens: Sensation intact to light touch throughout.    DTRs: 3+ symmetric throughout LE. 2+ UE throughout.    Hoffmans negative bilaterally.  Plantar UPgoing bilaterally.  B/L 1 beat clonus.      No pronator drift, no dysmetria.  Skin: Warm, dry, no erythema.

## 2023-09-25 NOTE — H&P ADULT - VTE RISK ASSESSMENT
1545  Patient still somnolent with minimal responsiveness with stimulation. Anesthesiologist aware. Dr. Jame Pérez present and aware as well. VSS.    1605  Patient evaluated by Anesthesiologist and was given small amount of narcan. Patient following commands but still not alert. Will continue to monitor. 1610  Patient alert and looking around room. Moving all extremities. Reports pain. VSS    1630  Family updated on patient's status. Will update again if patient is not transferred to floor in 30 minutes. <<--- Click to launch

## 2023-09-25 NOTE — PROGRESS NOTE ADULT - ASSESSMENT
43y/F with h/o traumatic spinal cord fracture, severe thoracic kyphosis, s/p 5-level vertebral resection, fusion C7-L1 (09/25/2023, Dr. Vieira)    PLAN:   NEURO: spine checks q1h, PRN pain meds tylenol, switch propofol to precedex, ERAS protocol  spine ERAS protocol  CTL spine CT scan  REHAB:  physical therapy evaluation and management    EARLY MOB:  HOB up, bed rest    PULM:  full vent support, wean to extubate once more awake; CXR now   CARDIO:  MAP 65, -140mm Hg; central line  ENDO:  Blood sugar goals 140-180 mg/dL, continue insulin sliding scale  GI:  PPI for GI prophylaxis  DIET: NPO  RENAL:  NS@70cc/hr  HEM/ONC: repeat CBC - given 700cc cell saver, 1.5 L EBL; 1500 5% albumin, given TXA   VTE Prophylaxis: SCDs, no DVT chemoprophylaxis for now as patient is high risk for bleed (fresh pos-0op)  ID: afebrile, 3 doses given intraoperatively  Social: will update family    Active issues:  What's keeping patient in the ICU? close neurochecks and still intubated.  What is this patient's dispo plan? stepdown once extubated and spine checks stable    ATTENDING ATTESTATION:  I was physically present for the key portions of the evaluation and management (E/M) service provided.  I agree with the above history, physical and plan, which I have reviewed and edited where appropriate.    Patient at high risk for neurological deterioration or death due to:  ICU delirium, aspiration PNA, DVT / PE.  Critical care time:  I have personally provided 60 minutes of critical care time, excluding time spent on separate procedures.      Plan discussed with RN, house staff. 43y/F with h/o traumatic spinal cord fracture, severe thoracic kyphosis, s/p 5-level vertebral resection, fusion C7-L1 (09/25/2023, Dr. Vieira)    PLAN:   NEURO: spine checks q1h, PRN pain meds tylenol, switch propofol to precedex, ERAS protocol  spine ERAS protocol  CTL spine CT scan  REHAB:  physical therapy evaluation and management    EARLY MOB:  HOB up, bed rest    PULM:  full vent support, wean to extubate once more awake; CXR now   CARDIO:  MAP 65, -140mm Hg; central line  ENDO:  Blood sugar goals 140-180 mg/dL, continue insulin sliding scale  GI:  PPI for GI prophylaxis  DIET: NPO  RENAL:  NS@70cc/hr  HEM/ONC: repeat CBC - given 700cc cell saver, 1.5 L EBL; 1500 5% albumin, given TXA;  post-op Hb 6.8 - give 2 units pRBC, repeat CBC in AM  VTE Prophylaxis: SCDs, no DVT chemoprophylaxis for now as patient is high risk for bleed (fresh pos-0op)  ID: afebrile, 3 doses given intraoperatively  Social: will update family    Active issues:  What's keeping patient in the ICU? close neurochecks and still intubated.  What is this patient's dispo plan? stepdown once extubated and spine checks stable    ATTENDING ATTESTATION:  I was physically present for the key portions of the evaluation and management (E/M) service provided.  I agree with the above history, physical and plan, which I have reviewed and edited where appropriate.    Patient at high risk for neurological deterioration or death due to:  ICU delirium, aspiration PNA, DVT / PE.  Critical care time:  I have personally provided 60 minutes of critical care time, excluding time spent on separate procedures.      Plan discussed with RN, house staff. 43y/F with h/o traumatic spinal cord fracture, severe thoracic kyphosis, s/p 5-level vertebral resection, fusion C7-L1 (09/25/2023, Dr. Vieira)    PLAN:   NEURO: spine checks q1h, PRN pain meds tylenol, switch propofol to precedex, ERAS protocol  spine ERAS protocol  CTL spine CT scan  REHAB:  physical therapy evaluation and management    EARLY MOB:  HOB up, bed rest    PULM:  full vent support, wean to extubate once more awake; CXR now   CARDIO:  MAP 65, -140mm Hg; central line  ENDO:  Blood sugar goals 140-180 mg/dL, continue insulin sliding scale  GI:  PPI for GI prophylaxis  DIET: NPO  RENAL:  NS@70cc/hr  HEM/ONC: repeat CBC - given 700cc cell saver, 1.5 L EBL; 1500 5% albumin, given TXA;  post-op Hb 6.8 - give 2 units pRBC, repeat CBC in AM  VTE Prophylaxis: SCDs, no DVT chemoprophylaxis for now as patient is high risk for bleed (fresh pos-0op)  ID: afebrile, 3 doses given intraoperatively  Social: will update family    Active issues:  What's keeping patient in the ICU? close neurochecks and still intubated.  What is this patient's dispo plan? stepdown once extubated and spine checks stable    ATTENDING ATTESTATION:  I was physically present for the key portions of the evaluation and management (E/M) service provided.  I agree with the above history, physical and plan, which I have reviewed and edited where appropriate.    Patient at high risk for neurological deterioration or death due to:  ICU delirium, aspiration PNA, DVT / PE.  Critical care time:  I have personally provided 60 minutes of critical care time, excluding time spent on separate procedures.      Plan discussed with RN, house staff.    ADDENDUM:  patient extubated once more fully awake; on serial exams, patient awake, alert, oriented, [place], moving B UE with good strength, R>L; but B LE with only trace withdrawal to noxious; neurosurgery informed, MAP goals kept at 100mm Hg, given 2 units pRBC for Hb 6.8; started on neosynephrine

## 2023-09-26 LAB
A1C WITH ESTIMATED AVERAGE GLUCOSE RESULT: 5.6 % — SIGNIFICANT CHANGE UP (ref 4–5.6)
ALBUMIN SERPL ELPH-MCNC: 4 G/DL — SIGNIFICANT CHANGE UP (ref 3.3–5)
ALP SERPL-CCNC: 48 U/L — SIGNIFICANT CHANGE UP (ref 40–120)
ALT FLD-CCNC: 52 U/L — HIGH (ref 10–45)
ANION GAP SERPL CALC-SCNC: 10 MMOL/L — SIGNIFICANT CHANGE UP (ref 5–17)
ANION GAP SERPL CALC-SCNC: 11 MMOL/L — SIGNIFICANT CHANGE UP (ref 5–17)
AST SERPL-CCNC: 110 U/L — HIGH (ref 10–40)
BILIRUB SERPL-MCNC: 0.7 MG/DL — SIGNIFICANT CHANGE UP (ref 0.2–1.2)
BUN SERPL-MCNC: 5 MG/DL — LOW (ref 7–23)
BUN SERPL-MCNC: 6 MG/DL — LOW (ref 7–23)
CALCIUM SERPL-MCNC: 7.2 MG/DL — LOW (ref 8.4–10.5)
CALCIUM SERPL-MCNC: 7.3 MG/DL — LOW (ref 8.4–10.5)
CALCIUM SERPL-MCNC: 7.9 MG/DL — LOW (ref 8.4–10.5)
CALCIUM SERPL-MCNC: 8.1 MG/DL — LOW (ref 8.4–10.5)
CHLORIDE SERPL-SCNC: 108 MMOL/L — SIGNIFICANT CHANGE UP (ref 96–108)
CHLORIDE SERPL-SCNC: 110 MMOL/L — HIGH (ref 96–108)
CHLORIDE SERPL-SCNC: 111 MMOL/L — HIGH (ref 96–108)
CHLORIDE SERPL-SCNC: 111 MMOL/L — HIGH (ref 96–108)
CO2 SERPL-SCNC: 19 MMOL/L — LOW (ref 22–31)
CO2 SERPL-SCNC: 20 MMOL/L — LOW (ref 22–31)
CO2 SERPL-SCNC: 20 MMOL/L — LOW (ref 22–31)
CO2 SERPL-SCNC: 21 MMOL/L — LOW (ref 22–31)
CREAT SERPL-MCNC: 0.43 MG/DL — LOW (ref 0.5–1.3)
CREAT SERPL-MCNC: 0.45 MG/DL — LOW (ref 0.5–1.3)
CREAT SERPL-MCNC: 0.45 MG/DL — LOW (ref 0.5–1.3)
CREAT SERPL-MCNC: 0.49 MG/DL — LOW (ref 0.5–1.3)
EGFR: 121 ML/MIN/1.73M2 — SIGNIFICANT CHANGE UP
EGFR: 124 ML/MIN/1.73M2 — SIGNIFICANT CHANGE UP
EGFR: 124 ML/MIN/1.73M2 — SIGNIFICANT CHANGE UP
EGFR: 125 ML/MIN/1.73M2 — SIGNIFICANT CHANGE UP
ESTIMATED AVERAGE GLUCOSE: 114 MG/DL — SIGNIFICANT CHANGE UP (ref 68–114)
GLUCOSE BLDC GLUCOMTR-MCNC: 105 MG/DL — HIGH (ref 70–99)
GLUCOSE BLDC GLUCOMTR-MCNC: 119 MG/DL — HIGH (ref 70–99)
GLUCOSE BLDC GLUCOMTR-MCNC: 95 MG/DL — SIGNIFICANT CHANGE UP (ref 70–99)
GLUCOSE SERPL-MCNC: 103 MG/DL — HIGH (ref 70–99)
GLUCOSE SERPL-MCNC: 110 MG/DL — HIGH (ref 70–99)
GLUCOSE SERPL-MCNC: 122 MG/DL — HIGH (ref 70–99)
GLUCOSE SERPL-MCNC: 144 MG/DL — HIGH (ref 70–99)
HCT VFR BLD CALC: 30.2 % — LOW (ref 34.5–45)
HCT VFR BLD CALC: 32.6 % — LOW (ref 34.5–45)
HGB BLD-MCNC: 10.4 G/DL — LOW (ref 11.5–15.5)
HGB BLD-MCNC: 11.1 G/DL — LOW (ref 11.5–15.5)
LACTATE SERPL-SCNC: 1.2 MMOL/L — SIGNIFICANT CHANGE UP (ref 0.5–2)
MAGNESIUM SERPL-MCNC: 3.6 MG/DL — HIGH (ref 1.6–2.6)
MCHC RBC-ENTMCNC: 27.5 PG — SIGNIFICANT CHANGE UP (ref 27–34)
MCHC RBC-ENTMCNC: 29 PG — SIGNIFICANT CHANGE UP (ref 27–34)
MCHC RBC-ENTMCNC: 34 GM/DL — SIGNIFICANT CHANGE UP (ref 32–36)
MCHC RBC-ENTMCNC: 34.4 GM/DL — SIGNIFICANT CHANGE UP (ref 32–36)
MCV RBC AUTO: 80.7 FL — SIGNIFICANT CHANGE UP (ref 80–100)
MCV RBC AUTO: 84.1 FL — SIGNIFICANT CHANGE UP (ref 80–100)
NRBC # BLD: 0 /100 WBCS — SIGNIFICANT CHANGE UP (ref 0–0)
NRBC # BLD: 0 /100 WBCS — SIGNIFICANT CHANGE UP (ref 0–0)
NT-PROBNP SERPL-SCNC: 489 PG/ML — HIGH (ref 0–300)
OSMOLALITY SERPL: 285 MOSM/KG — SIGNIFICANT CHANGE UP (ref 275–300)
PHOSPHATE SERPL-MCNC: 3.1 MG/DL — SIGNIFICANT CHANGE UP (ref 2.5–4.5)
PLATELET # BLD AUTO: 131 K/UL — LOW (ref 150–400)
PLATELET # BLD AUTO: 153 K/UL — SIGNIFICANT CHANGE UP (ref 150–400)
POTASSIUM SERPL-MCNC: 3.2 MMOL/L — LOW (ref 3.5–5.3)
POTASSIUM SERPL-MCNC: 3.3 MMOL/L — LOW (ref 3.5–5.3)
POTASSIUM SERPL-MCNC: 4.1 MMOL/L — SIGNIFICANT CHANGE UP (ref 3.5–5.3)
POTASSIUM SERPL-MCNC: 4.4 MMOL/L — SIGNIFICANT CHANGE UP (ref 3.5–5.3)
POTASSIUM SERPL-SCNC: 3.2 MMOL/L — LOW (ref 3.5–5.3)
POTASSIUM SERPL-SCNC: 3.3 MMOL/L — LOW (ref 3.5–5.3)
POTASSIUM SERPL-SCNC: 4.1 MMOL/L — SIGNIFICANT CHANGE UP (ref 3.5–5.3)
POTASSIUM SERPL-SCNC: 4.4 MMOL/L — SIGNIFICANT CHANGE UP (ref 3.5–5.3)
PROT SERPL-MCNC: 6.7 G/DL — SIGNIFICANT CHANGE UP (ref 6–8.3)
RBC # BLD: 3.59 M/UL — LOW (ref 3.8–5.2)
RBC # BLD: 4.04 M/UL — SIGNIFICANT CHANGE UP (ref 3.8–5.2)
RBC # FLD: 13.6 % — SIGNIFICANT CHANGE UP (ref 10.3–14.5)
RBC # FLD: 13.8 % — SIGNIFICANT CHANGE UP (ref 10.3–14.5)
SODIUM SERPL-SCNC: 140 MMOL/L — SIGNIFICANT CHANGE UP (ref 135–145)
SODIUM SERPL-SCNC: 141 MMOL/L — SIGNIFICANT CHANGE UP (ref 135–145)
SP GR SPEC: 1.01 — SIGNIFICANT CHANGE UP (ref 1–1.03)
SP GR SPEC: 1.01 — SIGNIFICANT CHANGE UP (ref 1–1.03)
T4 AB SER-ACNC: 6.65 UG/DL — SIGNIFICANT CHANGE UP (ref 4.5–11.7)
TROPONIN T, HIGH SENSITIVITY RESULT: 16 NG/L — SIGNIFICANT CHANGE UP (ref 0–51)
TSH SERPL-MCNC: 4.09 UIU/ML — SIGNIFICANT CHANGE UP (ref 0.27–4.2)
WBC # BLD: 16.12 K/UL — HIGH (ref 3.8–10.5)
WBC # BLD: 19.17 K/UL — HIGH (ref 3.8–10.5)
WBC # FLD AUTO: 16.12 K/UL — HIGH (ref 3.8–10.5)
WBC # FLD AUTO: 19.17 K/UL — HIGH (ref 3.8–10.5)

## 2023-09-26 PROCEDURE — 99292 CRITICAL CARE ADDL 30 MIN: CPT

## 2023-09-26 PROCEDURE — 99291 CRITICAL CARE FIRST HOUR: CPT

## 2023-09-26 PROCEDURE — 72128 CT CHEST SPINE W/O DYE: CPT | Mod: 26

## 2023-09-26 PROCEDURE — 72131 CT LUMBAR SPINE W/O DYE: CPT | Mod: 26

## 2023-09-26 PROCEDURE — 72125 CT NECK SPINE W/O DYE: CPT | Mod: 26

## 2023-09-26 RX ORDER — NOREPINEPHRINE BITARTRATE/D5W 8 MG/250ML
0.05 PLASTIC BAG, INJECTION (ML) INTRAVENOUS
Qty: 8 | Refills: 0 | Status: DISCONTINUED | OUTPATIENT
Start: 2023-09-26 | End: 2023-09-26

## 2023-09-26 RX ORDER — SODIUM CHLORIDE 9 MG/ML
1000 INJECTION INTRAMUSCULAR; INTRAVENOUS; SUBCUTANEOUS ONCE
Refills: 0 | Status: COMPLETED | OUTPATIENT
Start: 2023-09-26 | End: 2023-09-26

## 2023-09-26 RX ORDER — HYDROMORPHONE HYDROCHLORIDE 2 MG/ML
0.5 INJECTION INTRAMUSCULAR; INTRAVENOUS; SUBCUTANEOUS ONCE
Refills: 0 | Status: DISCONTINUED | OUTPATIENT
Start: 2023-09-26 | End: 2023-09-26

## 2023-09-26 RX ORDER — PHENYLEPHRINE HYDROCHLORIDE 10 MG/ML
0.2 INJECTION INTRAVENOUS
Qty: 40 | Refills: 0 | Status: DISCONTINUED | OUTPATIENT
Start: 2023-09-26 | End: 2023-09-27

## 2023-09-26 RX ORDER — OXYCODONE HYDROCHLORIDE 5 MG/1
10 TABLET ORAL ONCE
Refills: 0 | Status: DISCONTINUED | OUTPATIENT
Start: 2023-09-26 | End: 2023-09-26

## 2023-09-26 RX ORDER — SODIUM CHLORIDE 9 MG/ML
500 INJECTION INTRAMUSCULAR; INTRAVENOUS; SUBCUTANEOUS ONCE
Refills: 0 | Status: COMPLETED | OUTPATIENT
Start: 2023-09-26 | End: 2023-09-26

## 2023-09-26 RX ORDER — HYDROMORPHONE HYDROCHLORIDE 2 MG/ML
0.5 INJECTION INTRAMUSCULAR; INTRAVENOUS; SUBCUTANEOUS ONCE
Refills: 0 | Status: DISCONTINUED | OUTPATIENT
Start: 2023-09-26 | End: 2023-09-27

## 2023-09-26 RX ORDER — LANOLIN ALCOHOL/MO/W.PET/CERES
5 CREAM (GRAM) TOPICAL AT BEDTIME
Refills: 0 | Status: DISCONTINUED | OUTPATIENT
Start: 2023-09-26 | End: 2023-10-06

## 2023-09-26 RX ORDER — POTASSIUM CHLORIDE 20 MEQ
40 PACKET (EA) ORAL EVERY 4 HOURS
Refills: 0 | Status: COMPLETED | OUTPATIENT
Start: 2023-09-26 | End: 2023-09-26

## 2023-09-26 RX ORDER — OXYCODONE HYDROCHLORIDE 5 MG/1
10 TABLET ORAL EVERY 4 HOURS
Refills: 0 | Status: DISCONTINUED | OUTPATIENT
Start: 2023-09-26 | End: 2023-09-27

## 2023-09-26 RX ORDER — OXYCODONE HYDROCHLORIDE 5 MG/1
5 TABLET ORAL EVERY 4 HOURS
Refills: 0 | Status: DISCONTINUED | OUTPATIENT
Start: 2023-09-26 | End: 2023-09-27

## 2023-09-26 RX ADMIN — POLYETHYLENE GLYCOL 3350 17 GRAM(S): 17 POWDER, FOR SOLUTION ORAL at 12:32

## 2023-09-26 RX ADMIN — OXYCODONE HYDROCHLORIDE 10 MILLIGRAM(S): 5 TABLET ORAL at 19:41

## 2023-09-26 RX ADMIN — OXYCODONE HYDROCHLORIDE 5 MILLIGRAM(S): 5 TABLET ORAL at 11:15

## 2023-09-26 RX ADMIN — Medication 100 MILLIGRAM(S): at 01:20

## 2023-09-26 RX ADMIN — HYDROMORPHONE HYDROCHLORIDE 0.5 MILLIGRAM(S): 2 INJECTION INTRAMUSCULAR; INTRAVENOUS; SUBCUTANEOUS at 07:01

## 2023-09-26 RX ADMIN — Medication 100 MILLIGRAM(S): at 09:53

## 2023-09-26 RX ADMIN — Medication 1000 MILLIGRAM(S): at 21:24

## 2023-09-26 RX ADMIN — OXYCODONE HYDROCHLORIDE 10 MILLIGRAM(S): 5 TABLET ORAL at 22:44

## 2023-09-26 RX ADMIN — METHOCARBAMOL 500 MILLIGRAM(S): 500 TABLET, FILM COATED ORAL at 21:12

## 2023-09-26 RX ADMIN — CHLORHEXIDINE GLUCONATE 1 APPLICATION(S): 213 SOLUTION TOPICAL at 06:53

## 2023-09-26 RX ADMIN — METHOCARBAMOL 500 MILLIGRAM(S): 500 TABLET, FILM COATED ORAL at 13:04

## 2023-09-26 RX ADMIN — OXYCODONE HYDROCHLORIDE 5 MILLIGRAM(S): 5 TABLET ORAL at 10:44

## 2023-09-26 RX ADMIN — Medication 1000 MILLIGRAM(S): at 21:12

## 2023-09-26 RX ADMIN — SODIUM CHLORIDE 1000 MILLILITER(S): 9 INJECTION INTRAMUSCULAR; INTRAVENOUS; SUBCUTANEOUS at 03:02

## 2023-09-26 RX ADMIN — Medication 1000 MILLIGRAM(S): at 13:04

## 2023-09-26 RX ADMIN — OXYCODONE HYDROCHLORIDE 10 MILLIGRAM(S): 5 TABLET ORAL at 19:48

## 2023-09-26 RX ADMIN — Medication 1000 MILLIGRAM(S): at 13:55

## 2023-09-26 RX ADMIN — HYDROMORPHONE HYDROCHLORIDE 0.5 MILLIGRAM(S): 2 INJECTION INTRAMUSCULAR; INTRAVENOUS; SUBCUTANEOUS at 20:42

## 2023-09-26 RX ADMIN — SODIUM CHLORIDE 1000 MILLILITER(S): 9 INJECTION INTRAMUSCULAR; INTRAVENOUS; SUBCUTANEOUS at 16:32

## 2023-09-26 RX ADMIN — HYDROMORPHONE HYDROCHLORIDE 0.5 MILLIGRAM(S): 2 INJECTION INTRAMUSCULAR; INTRAVENOUS; SUBCUTANEOUS at 02:40

## 2023-09-26 RX ADMIN — HYDROMORPHONE HYDROCHLORIDE 0.5 MILLIGRAM(S): 2 INJECTION INTRAMUSCULAR; INTRAVENOUS; SUBCUTANEOUS at 02:21

## 2023-09-26 RX ADMIN — OXYCODONE HYDROCHLORIDE 5 MILLIGRAM(S): 5 TABLET ORAL at 18:56

## 2023-09-26 RX ADMIN — PHENYLEPHRINE HYDROCHLORIDE 5.15 MICROGRAM(S)/KG/MIN: 10 INJECTION INTRAVENOUS at 15:38

## 2023-09-26 RX ADMIN — HYDROMORPHONE HYDROCHLORIDE 0.5 MILLIGRAM(S): 2 INJECTION INTRAMUSCULAR; INTRAVENOUS; SUBCUTANEOUS at 20:43

## 2023-09-26 RX ADMIN — Medication 50 MILLIGRAM(S): at 13:04

## 2023-09-26 RX ADMIN — SENNA PLUS 2 TABLET(S): 8.6 TABLET ORAL at 21:12

## 2023-09-26 RX ADMIN — OXYCODONE HYDROCHLORIDE 10 MILLIGRAM(S): 5 TABLET ORAL at 16:15

## 2023-09-26 RX ADMIN — OXYCODONE HYDROCHLORIDE 5 MILLIGRAM(S): 5 TABLET ORAL at 17:31

## 2023-09-26 RX ADMIN — Medication 100 MILLIGRAM(S): at 21:12

## 2023-09-26 RX ADMIN — Medication 40 MILLIEQUIVALENT(S): at 21:12

## 2023-09-26 RX ADMIN — OXYCODONE HYDROCHLORIDE 10 MILLIGRAM(S): 5 TABLET ORAL at 23:50

## 2023-09-26 RX ADMIN — SODIUM CHLORIDE 500 MILLILITER(S): 9 INJECTION INTRAMUSCULAR; INTRAVENOUS; SUBCUTANEOUS at 17:49

## 2023-09-26 RX ADMIN — OXYCODONE HYDROCHLORIDE 10 MILLIGRAM(S): 5 TABLET ORAL at 15:38

## 2023-09-26 RX ADMIN — PHENYLEPHRINE HYDROCHLORIDE 5.15 MICROGRAM(S)/KG/MIN: 10 INJECTION INTRAVENOUS at 01:49

## 2023-09-26 RX ADMIN — ONDANSETRON 4 MILLIGRAM(S): 8 TABLET, FILM COATED ORAL at 12:32

## 2023-09-26 RX ADMIN — Medication 40 MILLIEQUIVALENT(S): at 19:42

## 2023-09-26 RX ADMIN — Medication 5 MILLIGRAM(S): at 21:17

## 2023-09-26 NOTE — PROGRESS NOTE ADULT - SUBJECTIVE AND OBJECTIVE BOX
=================================  NEUROCRITICAL CARE ATTENDING NOTE  =================================    NGUYEN GROVER   MRN-[id number]  Summary:  43y/F  with no PMH, traumatic spinal fracture --> resulted in severe thoracic kyphosis with progressing myelopathy. Baseline exam difficulty ambulating, but with good strength on all 4s.  Now admitted for elective spine surgery.    Taken from outpatient neurosurgery note on 9/13/2023 " The patient, a long-standing patient of my practice, reports a history of spinal issues that dates back to a childhood injury. Over the years she's experienced developing issues with walking in a straight line, overactive reflexes in her lower extremities, and unique difficulty with her lower extremities referred to as cloneness. Recent upturn in these symptoms has limited her ability to carry out her daily routines. The patient also reports no significant improvement in her condition since our last clinical encounter two years ago."    44 y/o female with no PMHx or PSHx presents for surgery today.  She reports ambulating with walker or baby stroller at home for years.  She reports intermittent falls.  She has right greater than left leg tingling, low back and mid back pain.  She denies arm or leg pain.  She reports urinary incontinence since having a baby 2 years ago where if she doesn't get to the bathroom fast enough, she has incontinence, but she is able to hold it for a short while.  She takes no pain medications.  She has taken ibuprofen in the past.  She denies saddle anesthesia, bowel incontinence.   (25 Sep 2023 06:51)      COURSE IN THE HOSPITAL:  09/25 POD#0 5-level vertebral column resection with fusion of C7-L1, put in a cage; stage 1; stable introp monitoring; pleura violated, repaired; extubated  09/26 POD#1     Past Medical History: No pertinent past medical history History of acute illness  Allergies:  No Known Allergies  Home meds:     PHYSICAL EXAMINATION  T(C): 37.2 (09-26 @ 18:00), Max: 37.2 (09-26 @ 18:00) HR: 61 (09-26 @ 19:00) (47 - 90) BP: 113/78 (09-25 @ 21:30) (103/65 - 113/78) RR: 18 (09-26 @ 19:00) (12 - 27) SpO2: 100% (09-26 @ 19:00) (99% - 100%)  NEUROLOGIC EXAMINATION:  Patient    GENERAL:    EENT:  anicteric  CARDIOVASCULAR: (+) S1 S2, normal rate and regular rhythm  PULMONARY: clear to auscultation bilaterally  ABDOMEN: soft, nontender with normoactive bowel sounds  EXTREMITIES: no edema  SKIN: no rash    LABS: 09-26  CAPILLARY BLOOD GLUCOSE 105 95 119 141    (16.12) 11.1 (10.4)  19.17 )-----------( 153  (131)    ( 26 Sep 2023 14:28 )             32.6     140  |  110<H>  |  5<L>  ----------------------------<  110<H>  3.3<L>   |  20<L>  |  0.45<L>    Ca    7.3<L>      26 Sep 2023 17:24  Phos  3.1     09-26  Mg     3.6     09-26 09-25 @ 07:01  -  09-26 @ 07:00  --------------------------------------------------------  IN: 2620.6 mL / OUT: 3160 mL / NET: -539.4 mL    09-26 @ 07:01  -  09-26 @ 19:32  --------------------------------------------------------  IN: 2758 mL / OUT: 4895 mL / NET: -2137 mL    Bacteriology:  CSF studies:  EEG:  Neuroimaging:  Other imaging:    MEDICATIONS: 09-26    ·	acetaminophen     Tablet .. 1000 Oral every 8 hours  ·	methocarbamol 500 Oral every 8 hours  ·	ondansetron   Disintegrating Tablet 4 Oral every 6 hours  ·	pregabalin 100 Oral every 8 hours  ·	polyethylene glycol 3350 17 Oral daily  ·	senna 2 Oral at bedtime  ·	insulin lispro (ADMELOG) corrective regimen sliding scale  SubCutaneous every 6 hours  ·	oxyCODONE    IR 10 Oral every 4 hours PRN  ·	oxyCODONE    IR 5 Oral every 4 hours PRN    IV FLUIDS: IVL  DRIPS:   ·	phenylephrine    Infusion 0.2 MICROgram(s)/kG/Min IV Continuous <Continuous>  DIET: NPO  Lines: Rosa Maria RICHARDS  Drains:   Chin  Wounds:    CODE STATUS:  Full Code                       GOALS OF CARE:  aggressive                      DISPOSITION:  ICU =================================  NEUROCRITICAL CARE ATTENDING NOTE  =================================    NGUYEN GROVER   MRN-[id number]  Summary:  43y/F  with no PMH, traumatic spinal fracture --> resulted in severe thoracic kyphosis with progressing myelopathy. Baseline exam difficulty ambulating, but with good strength on all 4s.  Now admitted for elective spine surgery.    Taken from outpatient neurosurgery note on 9/13/2023 " The patient, a long-standing patient of my practice, reports a history of spinal issues that dates back to a childhood injury. Over the years she's experienced developing issues with walking in a straight line, overactive reflexes in her lower extremities, and unique difficulty with her lower extremities referred to as cloneness. Recent upturn in these symptoms has limited her ability to carry out her daily routines. The patient also reports no significant improvement in her condition since our last clinical encounter two years ago."    44 y/o female with no PMHx or PSHx presents for surgery today.  She reports ambulating with walker or baby stroller at home for years.  She reports intermittent falls.  She has right greater than left leg tingling, low back and mid back pain.  She denies arm or leg pain.  She reports urinary incontinence since having a baby 2 years ago where if she doesn't get to the bathroom fast enough, she has incontinence, but she is able to hold it for a short while.  She takes no pain medications.  She has taken ibuprofen in the past.  She denies saddle anesthesia, bowel incontinence.   (25 Sep 2023 06:51)    COURSE IN THE HOSPITAL:  09/25 POD#0 5-level vertebral column resection with fusion of C7-L1, put in a cage; stage 1; stable introp monitoring; pleura violated, repaired; extubated  09/26 POD#1     Past Medical History: No pertinent past medical history History of acute illness  Allergies:  No Known Allergies  Home meds:     PHYSICAL EXAMINATION  T(C): 37.2 (09-26 @ 18:00), Max: 37.2 (09-26 @ 18:00) HR: 61 (09-26 @ 19:00) (47 - 90) BP: 113/78 (09-25 @ 21:30) (103/65 - 113/78) RR: 18 (09-26 @ 19:00) (12 - 27) SpO2: 100% (09-26 @ 19:00) (99% - 100%)  NEUROLOGIC EXAMINATION:  Patient is awake alert oriented x3, pupils equal and reactive, B UE 5/5 B LE 1/5 trace contraction R>L, sensory decreased T10 down worse on R  GENERAL:  not intubated  EENT:  anicteric  CARDIOVASCULAR: (+) S1 S2, normal rate and regular rhythm  PULMONARY: clear to auscultation bilaterally  ABDOMEN: soft, nontender with normoactive bowel sounds  EXTREMITIES: no edema  SKIN: no rash    LABS: 09-26  CAPILLARY BLOOD GLUCOSE 105 95 119 141    (16.12) 11.1 (10.4)  19.17 )-----------( 153  (131)    ( 26 Sep 2023 14:28 )             32.6     140  |  110<H>  |  5<L>  ----------------------------<  110<H>  3.3<L>   |  20<L>  |  0.45<L>    Ca    7.3<L>      26 Sep 2023 17:24  Phos  3.1     09-26  Mg     3.6     09-26 09-25 @ 07:01  -  09-26 @ 07:00  --------------------------------------------------------  IN: 2620.6 mL / OUT: 3160 mL / NET: -539.4 mL    09-26 @ 07:01  -  09-26 @ 19:32  --------------------------------------------------------  IN: 2758 mL / OUT: 4895 mL / NET: -2137 mL    Bacteriology:  CSF studies:  EEG:  Neuroimaging:  Other imaging:    MEDICATIONS: 09-26    ·	acetaminophen     Tablet .. 1000 Oral every 8 hours  ·	methocarbamol 500 Oral every 8 hours  ·	ondansetron   Disintegrating Tablet 4 Oral every 6 hours  ·	pregabalin 100 Oral every 8 hours  ·	polyethylene glycol 3350 17 Oral daily  ·	senna 2 Oral at bedtime  ·	insulin lispro (ADMELOG) corrective regimen sliding scale  SubCutaneous every 6 hours  ·	oxyCODONE    IR 10 Oral every 4 hours PRN  ·	oxyCODONE    IR 5 Oral every 4 hours PRN    IV FLUIDS: IVL  DRIPS:   ·	phenylephrine    Infusion 0.2 MICROgram(s)/kG/Min IV Continuous <Continuous> - 3  DIET: regular   Lines: R IJ, Rosa Maria  Drains:   Chin  Wounds:    CODE STATUS:  Full Code                       GOALS OF CARE:  aggressive                      DISPOSITION:  ICU

## 2023-09-26 NOTE — PROGRESS NOTE ADULT - SUBJECTIVE AND OBJECTIVE BOX
HPI:  Taken from outpatient neurosurgery note on 9/13/2023 " The patient, a long-standing patient of my practice, reports a history of spinal issues that dates back to a childhood injury. Over the years she's experienced developing issues with walking in a straight line, overactive reflexes in her lower extremities, and unique difficulty with her lower extremities referred to as cloneness. Recent upturn in these symptoms has limited her ability to carry out her daily routines. The patient also reports no significant improvement in her condition since our last clinical encounter two years ago."    44 y/o female with no PMHx or PSHx presents for surgery today.  She reports ambulating with walker or baby stroller at home for years.  She reports intermittent falls.  She has right greater than left leg tingling, low back and mid back pain.  She denies arm or leg pain.  She reports urinary incontinence since having a baby 2 years ago where if she doesn't get to the bathroom fast enough, she has incontinence, but she is able to hold it for a short while.  She takes no pain medications.  She has taken ibuprofen in the past.  She denies saddle anesthesia, bowel incontinence.   (25 Sep 2023 06:51)    Hospital Course:  9/25: POD 0 s/p C7-L1 fusion, T3-8 vertebral column resection, placement of anterior cage.   9/26; POD1 H/H 6.8 postop and PLT 85. Given 2u PRBC and 1u PLT. Switched propofol to precedex gtt. Pt extubated to face mask. Pt noted to only be withdrawing to noxious stimuli on bilateral lower extremities with sensation decreased RLE per patient. Dr. Vieira notified and plan is to keep MAP>100 and obtain CT full spine in the morning. Phenylephrine started to maintain MAP>100.    Vital Signs Last 24 Hrs  T(C): 36.9 (26 Sep 2023 01:00), Max: 36.9 (25 Sep 2023 21:48)  T(F): 98.4 (26 Sep 2023 01:00), Max: 98.4 (25 Sep 2023 21:48)  HR: 83 (26 Sep 2023 01:00) (47 - 91)  BP: 113/78 (25 Sep 2023 21:30) (103/65 - 114/75)  BP(mean): 91 (25 Sep 2023 21:30) (79 - 91)  RR: 27 (26 Sep 2023 01:00) (12 - 27)  SpO2: 100% (26 Sep 2023 01:00) (98% - 100%)    Parameters below as of 26 Sep 2023 02:00  Patient On (Oxygen Delivery Method): nasal cannula  O2 Flow (L/min): 4      I&O's Summary    25 Sep 2023 07:01  -  26 Sep 2023 01:43  --------------------------------------------------------  IN: 638 mL / OUT: 1035 mL / NET: -397 mL        PHYSICAL EXAM:  General: NAD, pt is comfortably sitting up in bed  Cardiovascular: RRR, normal S1 and S2   Respiratory: lungs CTAB, no wheezing, rhonchi, or crackles   GI: normoactive BS to auscultation, abd soft, NTND   Neuro: AAOx3, FC, speech fluent and clear  CNII-CXII: PERRL 3mm briskly reactive, EOM intact, PERRL  Motor: 5/5 strength throughout UE B/L, LE briskly withdrawing to noxious stimuli B/L  Decreased sensation throughout RLE, sensation intact LLE and UE B/L   Extremities: distal pulses 2+ x4 symmetric  Wound/incision: upper back incision site C/D/I with aquacel dressing in place    Lines/Drains:  [ ] coronado (9/25- )  [ ] olimpia (9/25- )  [ ] HMX 1 deep (9/25- )  [ ] DANIELE x1 super (9/25- )    DIET:  [x] NPO  [] Mechanical  [] Tube feeds    LABS:                        6.8    8.46  )-----------( 85       ( 25 Sep 2023 21:16 )             21.0     09-25    137  |  108  |  7   ----------------------------<  141<H>  4.1   |  20<L>  |  0.56    Ca    7.6<L>      25 Sep 2023 21:16  Phos  3.2     09-25  Mg     1.2     09-25      PT/INR - ( 25 Sep 2023 21:16 )   PT: 14.4 sec;   INR: 1.27          PTT - ( 25 Sep 2023 21:16 )  PTT:27.4 sec  Urinalysis Basic - ( 25 Sep 2023 21:16 )    Color: x / Appearance: x / SG: x / pH: x  Gluc: 141 mg/dL / Ketone: x  / Bili: x / Urobili: x   Blood: x / Protein: x / Nitrite: x   Leuk Esterase: x / RBC: x / WBC x   Sq Epi: x / Non Sq Epi: x / Bacteria: x          CAPILLARY BLOOD GLUCOSE      POCT Blood Glucose.: 141 mg/dL (25 Sep 2023 22:03)      Drug Levels: [] N/A    CSF Analysis: [] N/A      Allergies    No Known Allergies    Intolerances      MEDICATIONS:  Antibiotics:  ceFAZolin   IVPB 2000 milliGRAM(s) IV Intermittent every 8 hours    Neuro:  acetaminophen     Tablet .. 1000 milliGRAM(s) Oral every 8 hours  dexMEDEtomidine Infusion 0.2 MICROgram(s)/kG/Hr IV Continuous <Continuous>  methocarbamol 500 milliGRAM(s) Oral every 8 hours  ondansetron   Disintegrating Tablet 4 milliGRAM(s) Oral every 6 hours  oxyCODONE    IR 5 milliGRAM(s) Oral every 4 hours PRN  pregabalin 50 milliGRAM(s) Oral three times a day    Anticoagulation:    OTHER:  chlorhexidine 4% Liquid 1 Application(s) Topical <User Schedule>  dextrose 50% Injectable 25 Gram(s) IV Push once  dextrose 50% Injectable 25 Gram(s) IV Push once  dextrose 50% Injectable 12.5 Gram(s) IV Push once  dextrose Oral Gel 15 Gram(s) Oral once PRN  glucagon  Injectable 1 milliGRAM(s) IntraMuscular once  influenza   Vaccine 0.5 milliLiter(s) IntraMuscular once  insulin lispro (ADMELOG) corrective regimen sliding scale   SubCutaneous Before meals and at bedtime  pantoprazole  Injectable 40 milliGRAM(s) IV Push daily  phenylephrine    Infusion 0.2 MICROgram(s)/kG/Min IV Continuous <Continuous>  polyethylene glycol 3350 17 Gram(s) Oral daily  senna 2 Tablet(s) Oral at bedtime    IVF:  dextrose 5%. 1000 milliLiter(s) IV Continuous <Continuous>  dextrose 5%. 1000 milliLiter(s) IV Continuous <Continuous>  sodium chloride 0.9%. 1000 milliLiter(s) IV Continuous <Continuous>    CULTURES:    RADIOLOGY & ADDITIONAL TESTS:      ASSESSMENT:  43F no PMHx suffered childhood injury resulting in spine injury and residual gait disturbance, hyperreflexia of BL LE. She has right greater than left leg tingling, low back and mid back pain. 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 S/p C7-L1 fusion, T3-8 vertebral column resection, placement of anterior cage (9/25/23).     M40.205    S31.000A    S31.000A M40.205 S22.047IP38.009G    S31.000A M40.205 S22.009S22.009G    No pertinent family history in first degree relatives    Handoff    No pertinent past medical history    History of acute illness    Deformity of thoracic vertebra    Deformity of thoracic vertebra    Corpectomy, spine, lumbar, 4 levels    No significant past surgical history    [email or web address]    PLAN:  NEURO:  - Neuro/spine checks/vitals q1hrs  - Pain control with spine ERAS  - Post-op CT full spine pending in AM  - DANIELE x1 superficial and HMV x1 deep - monitor outputs    Cardio:  - MAP>100  - Phenylephrine gtt    Pulm:  - extubated 9/26  - f/u CXR    GI:  - NPO for now  - Bowel regimen   - PPI while intubated    Renal:  - IVF while NPO  - + Coronado, DC in AM    Endo:  - F/U A1c  - ISS while NPO    Heme:  - SCDs for DVT ppx  - 1.5L EBL, 1.5L albumin and cell saver intraop  - 2u PRBC and 1u PLT postop 9/25    ID:  - Post-op Ancef  - Afebrile     Discussed with Dr. Vieira and Dr. Franco

## 2023-09-26 NOTE — PROGRESS NOTE ADULT - SUBJECTIVE AND OBJECTIVE BOX
NSCU Progress Note    Assessment/Hospital Course:    43y/F  with no PMH, traumatic spinal fracture --> resulted in severe thoracic kyphosis with progressing myelopathy. Baseline exam difficulty ambulating, but with good strength on all 4s.  Now admitted for elective spine surgery s/p 5-level vertebral column resection with fusion of C7-L1, put in a cage; stage 1; stable introp monitoring; pleura violated, repaired.      24 Hour Events/Subjective:  - POD1  - overnight with BLE weakness, augmented MAP>100, plan for imaging per NSG today  - s/p transfusion 1uprbc and plt    REVIEW OF SYSTEMS:  - negative except as above    VITALS:   - Reviewed      IMAGING/DATA:   - Reviewed          PHYSICAL EXAM:    General: calm  CVS: RRR  Pulm: CTAB  GI: Soft, NTND  Extremities: No LE Edema  Neuro: AOx3, FC, BUE 5/5, RLE briskly WD, LLE trace WD, T10 sensory R>L

## 2023-09-26 NOTE — PROGRESS NOTE ADULT - ASSESSMENT
ASSESSMENT/PLAN:     s/p 5-level vertebral column resection with fusion of C7-L1, put in a cage; stage 1; stable introp monitoring; pleura violated, repaired; post op with BLE weakness requring pressors to augment MAP>100, now pending additional imaging    NEURO:  - Neurochecks q1h  - CT TLS spine am  - Surgical drains per NSGY  - Augment map as per below  - Tentative Stge 2 on Friday  - Pain control, ERAS  - Activity: bed rest for now, PT/OT when able    PULM:  - Incentive spirometry  - mobilize as tolerated  - Aspiration Precautions    CV:  - MAP>100  - trop, ekg while on pressors    RENAL:  - Fluids: IVF while on pressors  - trend renal function  - d/c coronado     GI:  - Diet: NPo for possible intervention pending imaging  - Bowel regimen standing    ENDO:   - Goal euglycemia (-180)    HEME/ONC:  s/p 700cc cell saver, 1.5 L EBL; 1500 5% albumin, given TXA;  post-op Hb 6.8 - give 2 units pRBC, repeat   - monitor H/H  - repeat cbc    VTE prophylaxis   - SCDs   - hold chemoprophylaxis due to: fresh post op      ID:  - Ting-op antibiotics        Dispo: ICU pending OR plan, BP augmentation

## 2023-09-26 NOTE — PROGRESS NOTE ADULT - ASSESSMENT
43y/F with h/o traumatic spinal cord fracture, severe thoracic kyphosis, s/p 5-level vertebral resection, fusion C7-L1 (09/25/2023, Dr. Vieira)    PLAN:   NEURO: spine checks q1h, PRN pain meds tylenol, switch propofol to precedex, ERAS protocol  spine ERAS protocol  CTL spine CT scan  REHAB:  physical therapy evaluation and management    EARLY MOB:  HOB up, bed rest    PULM:  full vent support, wean to extubate once more awake; CXR now   CARDIO:  MAP 65, -140mm Hg; central line  ENDO:  Blood sugar goals 140-180 mg/dL, continue insulin sliding scale  GI:  PPI for GI prophylaxis  DIET: NPO  RENAL:  NS@70cc/hr  HEM/ONC: repeat CBC - given 700cc cell saver, 1.5 L EBL; 1500 5% albumin, given TXA;  post-op Hb 6.8 - give 2 units pRBC, repeat CBC in AM  VTE Prophylaxis: SCDs, no DVT chemoprophylaxis for now as patient is high risk for bleed (fresh pos-0op)  ID: afebrile, 3 doses given intraoperatively  Social: will update family    Active issues:  What's keeping patient in the ICU? close neurochecks and still intubated.  What is this patient's dispo plan? stepdown once extubated and spine checks stable    ATTENDING ATTESTATION:  I was physically present for the key portions of the evaluation and management (E/M) service provided.  I agree with the above history, physical and plan, which I have reviewed and edited where appropriate.    Patient at high risk for neurological deterioration or death due to:  ICU delirium, aspiration PNA, DVT / PE.  Critical care time:  I have personally provided 60 minutes of critical care time, excluding time spent on separate procedures.      Plan discussed with RN, house staff.    ADDENDUM:  patient extubated once more fully awake; on serial exams, patient awake, alert, oriented, [place], moving B UE with good strength, R>L; but B LE with only trace withdrawal to noxious; neurosurgery informed, MAP goals kept at 100mm Hg, given 2 units pRBC for Hb 6.8; started on neosynephrine 43y/F with h/o traumatic spinal cord fracture, severe thoracic kyphosis, s/p 5-level vertebral resection, fusion C7-L1 (09/25/2023, Dr. Vieira)    PLAN:   NEURO: spine checks q1h, PRN pain meds tylenol, switch propofol to precedex, ERAS protocol  hemodynamic augmentation MAP >100  second stage surgery on Friday  REHAB:  physical therapy evaluation and management    EARLY MOB:  HOB up, bed rest    PULM:  PRN O2 support to keep sats >/=92%  CARDIO:  , central line, check EKG, echo, troponin, aurora - switch to levo if increasing requirements  ENDO:  Blood sugar goals 140-180 mg/dL, d/c insulin sliding scale  GI:  bowel regimen  DIET: regular diet  RENAL:  IVL  HEM/ONC: stable  VTE Prophylaxis: SCDs, no DVT chemoprophylaxis for now as patient is high risk for bleed (fresh pos-op) - re-evaluate tomorrow  ID: afebrile, WBC increase likely reactive from surgery  Social: will update family    Active issues:  What's keeping patient in the ICU? pressors  What is this patient's dispo plan? stepdown after stage 2 surgery    ATTENDING ATTESTATION:  I was physically present for the key portions of the evaluation and management (E/M) service provided.  I agree with the above history, physical and plan, which I have reviewed and edited where appropriate.    Patient at high risk for neurological deterioration or death due to:  ICU delirium, aspiration PNA, DVT / PE.  Critical care time:  I have personally provided 60 minutes of critical care time, excluding time spent on separate procedures.      Plan discussed with RN, house staff.    ADDENDUM:  patient extubated once more fully awake; on serial exams, patient awake, alert, oriented, [place], moving B UE with good strength, R>L; but B LE with only trace withdrawal to noxious; neurosurgery informed, MAP goals kept at 100mm Hg, given 2 units pRBC for Hb 6.8; started on neosynephrine

## 2023-09-26 NOTE — CONSULT NOTE ADULT - SUBJECTIVE AND OBJECTIVE BOX
NEUROSURGERY PAIN MANAGEMENT CONSULT NOTE    Chief Complaint:    HPI:  Taken from outpatient neurosurgery note on 9/13/2023 " The patient, a long-standing patient of my practice, reports a history of spinal issues that dates back to a childhood injury. Over the years she's experienced developing issues with walking in a straight line, overactive reflexes in her lower extremities, and unique difficulty with her lower extremities referred to as cloneness. Recent upturn in these symptoms has limited her ability to carry out her daily routines. The patient also reports no significant improvement in her condition since our last clinical encounter two years ago."    42 y/o female with no PMHx or PSHx presents for surgery today.  She reports ambulating with walker or baby stroller at home for years.  She reports intermittent falls.  She has right greater than left leg tingling, low back and mid back pain.  She denies arm or leg pain.  She reports urinary incontinence since having a baby 2 years ago where if she doesn't get to the bathroom fast enough, she has incontinence, but she is able to hold it for a short while.  She takes no pain medications.  She has taken ibuprofen in the past.  She denies saddle anesthesia, bowel incontinence.   (25 Sep 2023 06:51)      PAST MEDICAL & SURGICAL HISTORY:  History of acute illness  childhood      No significant past surgical history          FAMILY HISTORY:  No pertinent family history in first degree relatives        SOCIAL HISTORY:  [ ] Denies Smoking, Alcohol, or Drug Use    HOME MEDICATIONS:   Please refer to initial HNP    PAIN HOME MEDICATIONS:    Allergies    No Known Allergies    Intolerances        PAIN MEDICATIONS:  acetaminophen     Tablet .. 1000 milliGRAM(s) Oral every 8 hours  methocarbamol 500 milliGRAM(s) Oral every 8 hours  ondansetron   Disintegrating Tablet 4 milliGRAM(s) Oral every 6 hours  oxyCODONE    IR 5 milliGRAM(s) Oral every 4 hours PRN  pregabalin 50 milliGRAM(s) Oral three times a day    Heme:    Antibiotics:    Cardiovascular:  phenylephrine    Infusion 0.2 MICROgram(s)/kG/Min IV Continuous <Continuous>    GI:  polyethylene glycol 3350 17 Gram(s) Oral daily  senna 2 Tablet(s) Oral at bedtime    Endocrine:  dextrose 50% Injectable 25 Gram(s) IV Push once  dextrose 50% Injectable 25 Gram(s) IV Push once  dextrose 50% Injectable 12.5 Gram(s) IV Push once  dextrose Oral Gel 15 Gram(s) Oral once PRN  glucagon  Injectable 1 milliGRAM(s) IntraMuscular once  insulin lispro (ADMELOG) corrective regimen sliding scale   SubCutaneous every 6 hours    All Other Medications:  chlorhexidine 4% Liquid 1 Application(s) Topical <User Schedule>  dextrose 5%. 1000 milliLiter(s) IV Continuous <Continuous>  dextrose 5%. 1000 milliLiter(s) IV Continuous <Continuous>  influenza   Vaccine 0.5 milliLiter(s) IntraMuscular once  sodium chloride 0.9% lock flush 10 milliLiter(s) IV Push every 1 hour PRN  sodium chloride 0.9%. 1000 milliLiter(s) IV Continuous <Continuous>      Vital Signs Last 24 Hrs  T(C): 36.9 (26 Sep 2023 09:10), Max: 36.9 (25 Sep 2023 21:48)  T(F): 98.5 (26 Sep 2023 09:10), Max: 98.5 (26 Sep 2023 09:10)  HR: 58 (26 Sep 2023 13:00) (47 - 90)  BP: 113/78 (25 Sep 2023 21:30) (103/65 - 113/78)  BP(mean): 91 (25 Sep 2023 21:30) (79 - 91)  RR: 18 (26 Sep 2023 13:00) (12 - 27)  SpO2: 100% (26 Sep 2023 13:00) (99% - 100%)    Parameters below as of 26 Sep 2023 13:00  Patient On (Oxygen Delivery Method): room air        LABS:                        10.4   16.12 )-----------( 131      ( 26 Sep 2023 03:17 )             30.2     09-26    141  |  111<H>  |  6<L>  ----------------------------<  144<H>  4.1   |  20<L>  |  0.49<L>    Ca    7.9<L>      26 Sep 2023 03:17  Phos  3.1     09-26  Mg     3.6     09-26      PT/INR - ( 25 Sep 2023 21:16 )   PT: 14.4 sec;   INR: 1.27          PTT - ( 25 Sep 2023 21:16 )  PTT:27.4 sec  Urinalysis Basic - ( 26 Sep 2023 03:17 )    Color: x / Appearance: x / SG: x / pH: x  Gluc: 144 mg/dL / Ketone: x  / Bili: x / Urobili: x   Blood: x / Protein: x / Nitrite: x   Leuk Esterase: x / RBC: x / WBC x   Sq Epi: x / Non Sq Epi: x / Bacteria: x        RADIOLOGY:    Drug Screen:        REVIEW OF SYSTEMS:  CONSTITUTIONAL: No fever or fatigue O/N.   EYES: No eye pain, visual disturbances  ENMT:  No difficulty hearing. No throat pain  NECK: No pain or stiffness  RESPIRATORY: No cough, wheezing; No shortness of breath  CARDIOVASCULAR: No chest pain, palpitations.   GASTROINTESTINAL: Pt reports ___ passing gas. No bowel movements. No abdominal or epigastric pain. No nausea, vomiting. GENITOURINARY: No dysuria, frequency, or incontinence  NEUROLOGICAL: No headaches, loss of strength, numbness, or tremors. No dizzinesss or lightheadedness with pain medications.   MUSCULOSKELETAL: Incisional back pain. No joint pain or swelling; No muscle, or extremity pain      FUNCTIONAL ASSESSMENT:  PAIN SCORE AT REST:         SCALE USED: (1-10 VNRS)  PAIN SCORE WITH ACTIVITY:         SCALE USED: (1-10 VNRS)    PAIN ASSESSMENT:    PHYSICAL EXAM  GENERAL: Laying in bed, NAD  Neuro: CN II-XII PERRRL, EOMI  Cranial nerves grossly intact  Motor exam:  Sensation intact to LT in UE/LE in 3 dermatomes  CHEST/LUNG: Clear to auscultation bilaterally; No rales, rhonchi, wheezing, or rubs  HEART: Regular rate and rhythm; No murmurs, rubs, or gallops  ABDOMEN: Soft, Nontender, Nondistended; Bowel sounds present  EXTREMITIES:  2+ Peripheral Pulses, No clubbing, cyanosis, or edema  SKIN: No rashes or lesions      ASSESSMENT:       PLAN:   - Pain:    Since yesterday 6am, pt has required:     PCA Setting:   - Opioids:   - Initial Bolus:   - Initial Demand:   - Lockout:   - Continuous Rate:   - 4 hour limit:     - Transition to . LA not required. Pt receiving adequete coverage. First step. For rescue dosing, will order .      - Home pain regiment:    - Bowel regimen: Senna    - Nausea ppx: Zofran standing  - Functional Goals: Pt will get OOB with PT today. Pt will resume previous level of activity without impairment from surgery.   - Additional Consults: None recommended.   - Additional Labs/Imaging:  None recommended.     - Follow up, Discharge Planning:     Patient is set for discharge to:     Discharge is pending:   - Pain Management follow up plan:

## 2023-09-27 LAB
ALBUMIN SERPL ELPH-MCNC: 3.2 G/DL — LOW (ref 3.3–5)
ALBUMIN SERPL ELPH-MCNC: 4 G/DL — SIGNIFICANT CHANGE UP (ref 3.3–5)
ALP SERPL-CCNC: 42 U/L — SIGNIFICANT CHANGE UP (ref 40–120)
ALP SERPL-CCNC: 49 U/L — SIGNIFICANT CHANGE UP (ref 40–120)
ALT FLD-CCNC: 34 U/L — SIGNIFICANT CHANGE UP (ref 10–45)
ALT FLD-CCNC: 48 U/L — HIGH (ref 10–45)
ANION GAP SERPL CALC-SCNC: 8 MMOL/L — SIGNIFICANT CHANGE UP (ref 5–17)
ANION GAP SERPL CALC-SCNC: 9 MMOL/L — SIGNIFICANT CHANGE UP (ref 5–17)
APPEARANCE UR: CLEAR — SIGNIFICANT CHANGE UP
AST SERPL-CCNC: 64 U/L — HIGH (ref 10–40)
AST SERPL-CCNC: 96 U/L — HIGH (ref 10–40)
BACTERIA # UR AUTO: SIGNIFICANT CHANGE UP /HPF
BILIRUB DIRECT SERPL-MCNC: < 0.2 MG/DL — SIGNIFICANT CHANGE UP (ref 0–0.3)
BILIRUB DIRECT SERPL-MCNC: <0.2 MG/DL — SIGNIFICANT CHANGE UP (ref 0–0.3)
BILIRUB INDIRECT FLD-MCNC: SIGNIFICANT CHANGE UP (ref 0.2–1)
BILIRUB INDIRECT FLD-MCNC: SIGNIFICANT CHANGE UP MG/DL (ref 0.2–1)
BILIRUB SERPL-MCNC: 0.5 MG/DL — SIGNIFICANT CHANGE UP (ref 0.2–1.2)
BILIRUB SERPL-MCNC: 0.6 MG/DL — SIGNIFICANT CHANGE UP (ref 0.2–1.2)
BILIRUB UR-MCNC: NEGATIVE — SIGNIFICANT CHANGE UP
BUN SERPL-MCNC: 4 MG/DL — LOW (ref 7–23)
BUN SERPL-MCNC: 5 MG/DL — LOW (ref 7–23)
CALCIUM SERPL-MCNC: 7.4 MG/DL — LOW (ref 8.4–10.5)
CALCIUM SERPL-MCNC: 8.3 MG/DL — LOW (ref 8.4–10.5)
CHLORIDE SERPL-SCNC: 105 MMOL/L — SIGNIFICANT CHANGE UP (ref 96–108)
CHLORIDE SERPL-SCNC: 110 MMOL/L — HIGH (ref 96–108)
CK SERPL-CCNC: 5780 U/L — HIGH (ref 25–170)
CO2 SERPL-SCNC: 17 MMOL/L — LOW (ref 22–31)
CO2 SERPL-SCNC: 21 MMOL/L — LOW (ref 22–31)
COLOR SPEC: YELLOW — SIGNIFICANT CHANGE UP
CORTIS AM PEAK SERPL-MCNC: 28.78 UG/DL — HIGH (ref 6.02–18.4)
CREAT SERPL-MCNC: 0.46 MG/DL — LOW (ref 0.5–1.3)
CREAT SERPL-MCNC: 0.48 MG/DL — LOW (ref 0.5–1.3)
DIFF PNL FLD: ABNORMAL
EGFR: 122 ML/MIN/1.73M2 — SIGNIFICANT CHANGE UP
EGFR: 123 ML/MIN/1.73M2 — SIGNIFICANT CHANGE UP
EPI CELLS # UR: SIGNIFICANT CHANGE UP /HPF (ref 0–5)
GLUCOSE SERPL-MCNC: 127 MG/DL — HIGH (ref 70–99)
GLUCOSE SERPL-MCNC: 156 MG/DL — HIGH (ref 70–99)
GLUCOSE UR QL: 250
HAV IGM SER-ACNC: SIGNIFICANT CHANGE UP
HBV CORE IGM SER-ACNC: SIGNIFICANT CHANGE UP
HBV SURFACE AG SER-ACNC: SIGNIFICANT CHANGE UP
HCT VFR BLD CALC: 30.3 % — LOW (ref 34.5–45)
HCT VFR BLD CALC: 37.7 % — SIGNIFICANT CHANGE UP (ref 34.5–45)
HCV AB S/CO SERPL IA: 0.05 S/CO — SIGNIFICANT CHANGE UP
HCV AB SERPL-IMP: SIGNIFICANT CHANGE UP
HGB BLD-MCNC: 10.2 G/DL — LOW (ref 11.5–15.5)
HGB BLD-MCNC: 12.9 G/DL — SIGNIFICANT CHANGE UP (ref 11.5–15.5)
KETONES UR-MCNC: 15 MG/DL
LACTATE SERPL-SCNC: 1.4 MMOL/L — SIGNIFICANT CHANGE UP (ref 0.5–2)
LACTATE SERPL-SCNC: 1.5 MMOL/L — SIGNIFICANT CHANGE UP (ref 0.5–2)
LEUKOCYTE ESTERASE UR-ACNC: NEGATIVE — SIGNIFICANT CHANGE UP
MAGNESIUM SERPL-MCNC: 1.5 MG/DL — LOW (ref 1.6–2.6)
MAGNESIUM SERPL-MCNC: 1.8 MG/DL — SIGNIFICANT CHANGE UP (ref 1.6–2.6)
MCHC RBC-ENTMCNC: 28.2 PG — SIGNIFICANT CHANGE UP (ref 27–34)
MCHC RBC-ENTMCNC: 28.3 PG — SIGNIFICANT CHANGE UP (ref 27–34)
MCHC RBC-ENTMCNC: 33.7 GM/DL — SIGNIFICANT CHANGE UP (ref 32–36)
MCHC RBC-ENTMCNC: 34.2 GM/DL — SIGNIFICANT CHANGE UP (ref 32–36)
MCV RBC AUTO: 82.3 FL — SIGNIFICANT CHANGE UP (ref 80–100)
MCV RBC AUTO: 83.9 FL — SIGNIFICANT CHANGE UP (ref 80–100)
NITRITE UR-MCNC: NEGATIVE — SIGNIFICANT CHANGE UP
NRBC # BLD: 0 /100 WBCS — SIGNIFICANT CHANGE UP (ref 0–0)
NRBC # BLD: 0 /100 WBCS — SIGNIFICANT CHANGE UP (ref 0–0)
NT-PROBNP SERPL-SCNC: 1302 PG/ML — HIGH (ref 0–300)
OSMOLALITY SERPL: 294 MOSM/KG — SIGNIFICANT CHANGE UP (ref 275–300)
PH UR: 6 — SIGNIFICANT CHANGE UP (ref 5–8)
PHOSPHATE SERPL-MCNC: 1.4 MG/DL — LOW (ref 2.5–4.5)
PHOSPHATE SERPL-MCNC: 1.7 MG/DL — LOW (ref 2.5–4.5)
PLATELET # BLD AUTO: 162 K/UL — SIGNIFICANT CHANGE UP (ref 150–400)
PLATELET # BLD AUTO: 99 K/UL — LOW (ref 150–400)
POTASSIUM SERPL-MCNC: 3.9 MMOL/L — SIGNIFICANT CHANGE UP (ref 3.5–5.3)
POTASSIUM SERPL-MCNC: 4.3 MMOL/L — SIGNIFICANT CHANGE UP (ref 3.5–5.3)
POTASSIUM SERPL-SCNC: 3.9 MMOL/L — SIGNIFICANT CHANGE UP (ref 3.5–5.3)
POTASSIUM SERPL-SCNC: 4.3 MMOL/L — SIGNIFICANT CHANGE UP (ref 3.5–5.3)
PROCALCITONIN SERPL-MCNC: 0.38 NG/ML — HIGH (ref 0.02–0.1)
PROT SERPL-MCNC: 5.4 G/DL — LOW (ref 6–8.3)
PROT SERPL-MCNC: 6.7 G/DL — SIGNIFICANT CHANGE UP (ref 6–8.3)
PROT UR-MCNC: NEGATIVE MG/DL — SIGNIFICANT CHANGE UP
RBC # BLD: 3.61 M/UL — LOW (ref 3.8–5.2)
RBC # BLD: 4.58 M/UL — SIGNIFICANT CHANGE UP (ref 3.8–5.2)
RBC # FLD: 14 % — SIGNIFICANT CHANGE UP (ref 10.3–14.5)
RBC # FLD: 14.1 % — SIGNIFICANT CHANGE UP (ref 10.3–14.5)
RBC CASTS # UR COMP ASSIST: ABNORMAL /HPF
SODIUM SERPL-SCNC: 135 MMOL/L — SIGNIFICANT CHANGE UP (ref 135–145)
SODIUM SERPL-SCNC: 135 MMOL/L — SIGNIFICANT CHANGE UP (ref 135–145)
SP GR SPEC: 1.02 — SIGNIFICANT CHANGE UP (ref 1–1.03)
SP GR SPEC: 1.02 — SIGNIFICANT CHANGE UP (ref 1–1.03)
TROPONIN T, HIGH SENSITIVITY RESULT: 18 NG/L — SIGNIFICANT CHANGE UP (ref 0–51)
UROBILINOGEN FLD QL: 0.2 E.U./DL — SIGNIFICANT CHANGE UP
WBC # BLD: 16.44 K/UL — HIGH (ref 3.8–10.5)
WBC # BLD: 22.61 K/UL — HIGH (ref 3.8–10.5)
WBC # FLD AUTO: 16.44 K/UL — HIGH (ref 3.8–10.5)
WBC # FLD AUTO: 22.61 K/UL — HIGH (ref 3.8–10.5)
WBC UR QL: < 5 /HPF — SIGNIFICANT CHANGE UP

## 2023-09-27 PROCEDURE — 99291 CRITICAL CARE FIRST HOUR: CPT

## 2023-09-27 PROCEDURE — 76937 US GUIDE VASCULAR ACCESS: CPT | Mod: 26,59

## 2023-09-27 PROCEDURE — 71045 X-RAY EXAM CHEST 1 VIEW: CPT | Mod: 26,77

## 2023-09-27 PROCEDURE — 71045 X-RAY EXAM CHEST 1 VIEW: CPT | Mod: 26

## 2023-09-27 PROCEDURE — 93306 TTE W/DOPPLER COMPLETE: CPT | Mod: 26

## 2023-09-27 PROCEDURE — 36569 INSJ PICC 5 YR+ W/O IMAGING: CPT

## 2023-09-27 RX ORDER — METHOCARBAMOL 500 MG/1
750 TABLET, FILM COATED ORAL EVERY 8 HOURS
Refills: 0 | Status: DISCONTINUED | OUTPATIENT
Start: 2023-09-27 | End: 2023-09-29

## 2023-09-27 RX ORDER — ALBUMIN HUMAN 25 %
250 VIAL (ML) INTRAVENOUS ONCE
Refills: 0 | Status: COMPLETED | OUTPATIENT
Start: 2023-09-27 | End: 2023-09-27

## 2023-09-27 RX ORDER — OXYCODONE HYDROCHLORIDE 5 MG/1
5 TABLET ORAL EVERY 4 HOURS
Refills: 0 | Status: DISCONTINUED | OUTPATIENT
Start: 2023-09-27 | End: 2023-09-27

## 2023-09-27 RX ORDER — SODIUM CHLORIDE 9 MG/ML
1000 INJECTION INTRAMUSCULAR; INTRAVENOUS; SUBCUTANEOUS ONCE
Refills: 0 | Status: DISCONTINUED | OUTPATIENT
Start: 2023-09-27 | End: 2023-09-27

## 2023-09-27 RX ORDER — NOREPINEPHRINE BITARTRATE/D5W 8 MG/250ML
0.05 PLASTIC BAG, INJECTION (ML) INTRAVENOUS
Qty: 8 | Refills: 0 | Status: DISCONTINUED | OUTPATIENT
Start: 2023-09-27 | End: 2023-09-28

## 2023-09-27 RX ORDER — SODIUM CHLORIDE 9 MG/ML
500 INJECTION INTRAMUSCULAR; INTRAVENOUS; SUBCUTANEOUS ONCE
Refills: 0 | Status: COMPLETED | OUTPATIENT
Start: 2023-09-27 | End: 2023-09-27

## 2023-09-27 RX ORDER — HYDROMORPHONE HYDROCHLORIDE 2 MG/ML
4 INJECTION INTRAMUSCULAR; INTRAVENOUS; SUBCUTANEOUS EVERY 4 HOURS
Refills: 0 | Status: DISCONTINUED | OUTPATIENT
Start: 2023-09-27 | End: 2023-09-29

## 2023-09-27 RX ORDER — HYDROMORPHONE HYDROCHLORIDE 2 MG/ML
0.5 INJECTION INTRAMUSCULAR; INTRAVENOUS; SUBCUTANEOUS ONCE
Refills: 0 | Status: DISCONTINUED | OUTPATIENT
Start: 2023-09-27 | End: 2023-09-27

## 2023-09-27 RX ORDER — CHLORHEXIDINE GLUCONATE 213 G/1000ML
1 SOLUTION TOPICAL
Refills: 0 | Status: DISCONTINUED | OUTPATIENT
Start: 2023-09-27 | End: 2023-10-06

## 2023-09-27 RX ORDER — SODIUM CHLORIDE 9 MG/ML
1000 INJECTION INTRAMUSCULAR; INTRAVENOUS; SUBCUTANEOUS
Refills: 0 | Status: DISCONTINUED | OUTPATIENT
Start: 2023-09-27 | End: 2023-09-28

## 2023-09-27 RX ORDER — ACETAMINOPHEN 500 MG
650 TABLET ORAL EVERY 6 HOURS
Refills: 0 | Status: DISCONTINUED | OUTPATIENT
Start: 2023-09-27 | End: 2023-09-29

## 2023-09-27 RX ORDER — POTASSIUM PHOSPHATE, MONOBASIC POTASSIUM PHOSPHATE, DIBASIC 236; 224 MG/ML; MG/ML
30 INJECTION, SOLUTION INTRAVENOUS ONCE
Refills: 0 | Status: COMPLETED | OUTPATIENT
Start: 2023-09-27 | End: 2023-09-27

## 2023-09-27 RX ORDER — NOREPINEPHRINE BITARTRATE/D5W 8 MG/250ML
0.05 PLASTIC BAG, INJECTION (ML) INTRAVENOUS
Qty: 32 | Refills: 0 | Status: DISCONTINUED | OUTPATIENT
Start: 2023-09-27 | End: 2023-09-27

## 2023-09-27 RX ORDER — SODIUM CHLORIDE 9 MG/ML
1000 INJECTION INTRAMUSCULAR; INTRAVENOUS; SUBCUTANEOUS ONCE
Refills: 0 | Status: COMPLETED | OUTPATIENT
Start: 2023-09-27 | End: 2023-09-27

## 2023-09-27 RX ORDER — MAGNESIUM SULFATE 500 MG/ML
1 VIAL (ML) INJECTION DAILY
Refills: 0 | Status: DISCONTINUED | OUTPATIENT
Start: 2023-09-27 | End: 2023-09-29

## 2023-09-27 RX ORDER — NOREPINEPHRINE BITARTRATE/D5W 8 MG/250ML
0.05 PLASTIC BAG, INJECTION (ML) INTRAVENOUS
Qty: 16 | Refills: 0 | Status: DISCONTINUED | OUTPATIENT
Start: 2023-09-27 | End: 2023-09-27

## 2023-09-27 RX ORDER — PHENYLEPHRINE HYDROCHLORIDE 10 MG/ML
0.2 INJECTION INTRAVENOUS
Qty: 40 | Refills: 0 | Status: DISCONTINUED | OUTPATIENT
Start: 2023-09-27 | End: 2023-09-29

## 2023-09-27 RX ORDER — MIDODRINE HYDROCHLORIDE 2.5 MG/1
10 TABLET ORAL EVERY 8 HOURS
Refills: 0 | Status: DISCONTINUED | OUTPATIENT
Start: 2023-09-27 | End: 2023-09-30

## 2023-09-27 RX ORDER — OXYCODONE HYDROCHLORIDE 5 MG/1
2 TABLET ORAL EVERY 4 HOURS
Refills: 0 | Status: DISCONTINUED | OUTPATIENT
Start: 2023-09-27 | End: 2023-09-27

## 2023-09-27 RX ORDER — HYDROMORPHONE HYDROCHLORIDE 2 MG/ML
2 INJECTION INTRAMUSCULAR; INTRAVENOUS; SUBCUTANEOUS EVERY 4 HOURS
Refills: 0 | Status: DISCONTINUED | OUTPATIENT
Start: 2023-09-27 | End: 2023-09-29

## 2023-09-27 RX ORDER — ENOXAPARIN SODIUM 100 MG/ML
40 INJECTION SUBCUTANEOUS EVERY 24 HOURS
Refills: 0 | Status: DISCONTINUED | OUTPATIENT
Start: 2023-09-27 | End: 2023-10-05

## 2023-09-27 RX ORDER — MAGNESIUM SULFATE 500 MG/ML
4 VIAL (ML) INJECTION ONCE
Refills: 0 | Status: COMPLETED | OUTPATIENT
Start: 2023-09-27 | End: 2023-09-27

## 2023-09-27 RX ORDER — SODIUM CHLORIDE 9 MG/ML
1000 INJECTION, SOLUTION INTRAVENOUS ONCE
Refills: 0 | Status: COMPLETED | OUTPATIENT
Start: 2023-09-27 | End: 2023-09-27

## 2023-09-27 RX ADMIN — SENNA PLUS 2 TABLET(S): 8.6 TABLET ORAL at 21:15

## 2023-09-27 RX ADMIN — HYDROMORPHONE HYDROCHLORIDE 0.5 MILLIGRAM(S): 2 INJECTION INTRAMUSCULAR; INTRAVENOUS; SUBCUTANEOUS at 12:34

## 2023-09-27 RX ADMIN — OXYCODONE HYDROCHLORIDE 10 MILLIGRAM(S): 5 TABLET ORAL at 02:39

## 2023-09-27 RX ADMIN — OXYCODONE HYDROCHLORIDE 10 MILLIGRAM(S): 5 TABLET ORAL at 04:00

## 2023-09-27 RX ADMIN — CHLORHEXIDINE GLUCONATE 1 APPLICATION(S): 213 SOLUTION TOPICAL at 05:11

## 2023-09-27 RX ADMIN — SODIUM CHLORIDE 1000 MILLILITER(S): 9 INJECTION INTRAMUSCULAR; INTRAVENOUS; SUBCUTANEOUS at 17:48

## 2023-09-27 RX ADMIN — Medication 1000 MILLIGRAM(S): at 06:40

## 2023-09-27 RX ADMIN — POTASSIUM PHOSPHATE, MONOBASIC POTASSIUM PHOSPHATE, DIBASIC 83.33 MILLIMOLE(S): 236; 224 INJECTION, SOLUTION INTRAVENOUS at 19:50

## 2023-09-27 RX ADMIN — PHENYLEPHRINE HYDROCHLORIDE 5.15 MICROGRAM(S)/KG/MIN: 10 INJECTION INTRAVENOUS at 22:43

## 2023-09-27 RX ADMIN — HYDROMORPHONE HYDROCHLORIDE 4 MILLIGRAM(S): 2 INJECTION INTRAMUSCULAR; INTRAVENOUS; SUBCUTANEOUS at 17:59

## 2023-09-27 RX ADMIN — OXYCODONE HYDROCHLORIDE 10 MILLIGRAM(S): 5 TABLET ORAL at 07:46

## 2023-09-27 RX ADMIN — Medication 100 MILLIGRAM(S): at 13:59

## 2023-09-27 RX ADMIN — SODIUM CHLORIDE 500 MILLILITER(S): 9 INJECTION INTRAMUSCULAR; INTRAVENOUS; SUBCUTANEOUS at 07:30

## 2023-09-27 RX ADMIN — POTASSIUM PHOSPHATE, MONOBASIC POTASSIUM PHOSPHATE, DIBASIC 83.33 MILLIMOLE(S): 236; 224 INJECTION, SOLUTION INTRAVENOUS at 09:01

## 2023-09-27 RX ADMIN — SODIUM CHLORIDE 1000 MILLILITER(S): 9 INJECTION INTRAMUSCULAR; INTRAVENOUS; SUBCUTANEOUS at 17:00

## 2023-09-27 RX ADMIN — Medication 650 MILLIGRAM(S): at 19:19

## 2023-09-27 RX ADMIN — HYDROMORPHONE HYDROCHLORIDE 0.5 MILLIGRAM(S): 2 INJECTION INTRAMUSCULAR; INTRAVENOUS; SUBCUTANEOUS at 19:04

## 2023-09-27 RX ADMIN — HYDROMORPHONE HYDROCHLORIDE 4 MILLIGRAM(S): 2 INJECTION INTRAMUSCULAR; INTRAVENOUS; SUBCUTANEOUS at 21:15

## 2023-09-27 RX ADMIN — Medication 6.43 MICROGRAM(S)/KG/MIN: at 21:15

## 2023-09-27 RX ADMIN — Medication 650 MILLIGRAM(S): at 10:58

## 2023-09-27 RX ADMIN — Medication 100 MILLIGRAM(S): at 05:10

## 2023-09-27 RX ADMIN — Medication 650 MILLIGRAM(S): at 10:29

## 2023-09-27 RX ADMIN — HYDROMORPHONE HYDROCHLORIDE 0.5 MILLIGRAM(S): 2 INJECTION INTRAMUSCULAR; INTRAVENOUS; SUBCUTANEOUS at 01:18

## 2023-09-27 RX ADMIN — HYDROMORPHONE HYDROCHLORIDE 0.5 MILLIGRAM(S): 2 INJECTION INTRAMUSCULAR; INTRAVENOUS; SUBCUTANEOUS at 11:39

## 2023-09-27 RX ADMIN — Medication 25 GRAM(S): at 18:20

## 2023-09-27 RX ADMIN — METHOCARBAMOL 750 MILLIGRAM(S): 500 TABLET, FILM COATED ORAL at 13:58

## 2023-09-27 RX ADMIN — Medication 100 GRAM(S): at 07:03

## 2023-09-27 RX ADMIN — OXYCODONE HYDROCHLORIDE 10 MILLIGRAM(S): 5 TABLET ORAL at 07:54

## 2023-09-27 RX ADMIN — HYDROMORPHONE HYDROCHLORIDE 4 MILLIGRAM(S): 2 INJECTION INTRAMUSCULAR; INTRAVENOUS; SUBCUTANEOUS at 22:00

## 2023-09-27 RX ADMIN — ENOXAPARIN SODIUM 40 MILLIGRAM(S): 100 INJECTION SUBCUTANEOUS at 21:15

## 2023-09-27 RX ADMIN — MIDODRINE HYDROCHLORIDE 10 MILLIGRAM(S): 2.5 TABLET ORAL at 13:59

## 2023-09-27 RX ADMIN — PHENYLEPHRINE HYDROCHLORIDE 5.15 MICROGRAM(S)/KG/MIN: 10 INJECTION INTRAVENOUS at 04:07

## 2023-09-27 RX ADMIN — HYDROMORPHONE HYDROCHLORIDE 0.5 MILLIGRAM(S): 2 INJECTION INTRAMUSCULAR; INTRAVENOUS; SUBCUTANEOUS at 19:50

## 2023-09-27 RX ADMIN — MIDODRINE HYDROCHLORIDE 10 MILLIGRAM(S): 2.5 TABLET ORAL at 21:14

## 2023-09-27 RX ADMIN — HYDROMORPHONE HYDROCHLORIDE 4 MILLIGRAM(S): 2 INJECTION INTRAMUSCULAR; INTRAVENOUS; SUBCUTANEOUS at 17:13

## 2023-09-27 RX ADMIN — METHOCARBAMOL 500 MILLIGRAM(S): 500 TABLET, FILM COATED ORAL at 05:10

## 2023-09-27 RX ADMIN — PHENYLEPHRINE HYDROCHLORIDE 5.15 MICROGRAM(S)/KG/MIN: 10 INJECTION INTRAVENOUS at 06:39

## 2023-09-27 RX ADMIN — Medication 125 MILLILITER(S): at 14:26

## 2023-09-27 RX ADMIN — METHOCARBAMOL 750 MILLIGRAM(S): 500 TABLET, FILM COATED ORAL at 21:15

## 2023-09-27 RX ADMIN — Medication 100 MILLIGRAM(S): at 21:15

## 2023-09-27 RX ADMIN — Medication 1000 MILLIGRAM(S): at 05:10

## 2023-09-27 RX ADMIN — SODIUM CHLORIDE 1000 MILLILITER(S): 9 INJECTION, SOLUTION INTRAVENOUS at 22:21

## 2023-09-27 RX ADMIN — HYDROMORPHONE HYDROCHLORIDE 0.5 MILLIGRAM(S): 2 INJECTION INTRAMUSCULAR; INTRAVENOUS; SUBCUTANEOUS at 00:04

## 2023-09-27 RX ADMIN — SODIUM CHLORIDE 1000 MILLILITER(S): 9 INJECTION INTRAMUSCULAR; INTRAVENOUS; SUBCUTANEOUS at 06:39

## 2023-09-27 RX ADMIN — Medication 650 MILLIGRAM(S): at 18:41

## 2023-09-27 RX ADMIN — Medication 6.43 MICROGRAM(S)/KG/MIN: at 07:33

## 2023-09-27 RX ADMIN — CHLORHEXIDINE GLUCONATE 1 APPLICATION(S): 213 SOLUTION TOPICAL at 06:00

## 2023-09-27 NOTE — DIETITIAN INITIAL EVALUATION ADULT - NS FNS DIET ORDER
Diet, Regular:   Supplement Feeding Modality:  Oral  Ensure Plus High Protein Cans or Servings Per Day:  1       Frequency:  Three Times a day (09-27-23 @ 09:21)

## 2023-09-27 NOTE — PROGRESS NOTE ADULT - SUBJECTIVE AND OBJECTIVE BOX
S/Overnight events:    Pt seen and examined at bedside at NSICU. A bit slow to wake up. Complaints of headache and pain at surgical sites. Decreased sensation on the right side below buttocks.     Hospital Course:   POD#2 s/p C7-L1 fusion, T3-8 vertebral column resection, placement of anterior cage.     9/25: POD 0 s/p C7-L1 fusion, T3-8 vertebral column resection, placement of anterior cage.   9/26; POD1 H/H 6.8 postop and PLT 85. Given 2u PRBC and 1u PLT. Switched propofol to precedex gtt. Pt extubated to face mask. Pt noted to only be withdrawing to noxious stimuli on bilateral lower extremities with sensation decreased RLE per patient. Dr. Vieira notified and plan is to keep MAP>100 and obtain CT full spine in the morning. Phenylephrine started to maintain MAP>100. Advanced diet to regular. Passed TOV. Increased need for aurora, UO high.      Vital Signs Last 24 Hrs  T(C): 37.1 (26 Sep 2023 22:00), Max: 37.2 (26 Sep 2023 18:00)  T(F): 98.7 (26 Sep 2023 22:00), Max: 98.9 (26 Sep 2023 18:00)  HR: 67 (27 Sep 2023 00:00) (51 - 83)  BP: --  BP(mean): --  RR: 18 (27 Sep 2023 00:00) (13 - 27)  SpO2: 100% (27 Sep 2023 00:00) (95% - 100%)    Parameters below as of 27 Sep 2023 00:00  Patient On (Oxygen Delivery Method): room air        I&O's Detail    25 Sep 2023 07:01  -  26 Sep 2023 07:00  --------------------------------------------------------  IN:    Dexmedetomidine: 28 mL    IV PiggyBack: 150 mL    Phenylephrine: 57.6 mL    Platelets - Single Donor: 225 mL    PRBCs (Packed Red Blood Cells): 600 mL    sodium chloride 0.9%: 560 mL    Sodium Chloride 0.9% Bolus: 1000 mL  Total IN: 2620.6 mL    OUT:    Accordian (mL): 250 mL    Bulb (mL): 50 mL    Voided (mL): 2860 mL  Total OUT: 3160 mL    Total NET: -539.4 mL      26 Sep 2023 07:01  -  27 Sep 2023 00:16  --------------------------------------------------------  IN:    IV PiggyBack: 50 mL    Phenylephrine: 694.6 mL    sodium chloride 0.9%: 840 mL    Sodium Chloride 0.9% Bolus: 1500 mL  Total IN: 3084.6 mL    OUT:    Accordian (mL): 500 mL    Bulb (mL): 210 mL    Voided (mL): 5510 mL  Total OUT: 6220 mL    Total NET: -3135.4 mL        I&O's Summary    25 Sep 2023 07:01  -  26 Sep 2023 07:00  --------------------------------------------------------  IN: 2620.6 mL / OUT: 3160 mL / NET: -539.4 mL    26 Sep 2023 07:01  -  27 Sep 2023 00:16  --------------------------------------------------------  IN: 3084.6 mL / OUT: 6220 mL / NET: -3135.4 mL        PHYSICAL EXAM:  General: Patient lying comfortably in bed. Not in acute distress.  Eyes: PERRL, EOMI bilaterally.   Neurological: CN III-XII: grossly intact. Upper extremities 5/5 strength bilaterally. R lower extremity 1/5 for hip flexion and dorsiflexion. L lower extremity trace movement 1/5. Decreased sensation on the R side below the buttocks.  Cardiovascular: Regular rate and rhythm. S1 and S2 normal.  Respiratory: Nonlabored breathing, symmetrical chest rise, bilaterally.   Gastrointestinal: Normoactive bowel sounds to auscultation. Soft. Nontender, Nondistended.   Peripheral Vascular: distal pulses 2+ x4 symmetric    Incision/Wound: back incision site is clean, dry, and intact w/ aquacel dressing in place    DEVICE/DRAIN DRESSING: HMX 1 deep since 9/25. DANIELE x1 superficial since 9/25    TUBES/LINES:  [] coronado (9/25- )  [] A-line (9/25- )  [] R IJ placed intraop (9/25- )    LABS:                        11.1   19.17 )-----------( 153      ( 26 Sep 2023 14:28 )             32.6     09-26    140  |  108  |  5<L>  ----------------------------<  122<H>  4.4   |  21<L>  |  0.45<L>    Ca    8.1<L>      26 Sep 2023 22:51  Phos  3.1     09-26  Mg     3.6     09-26    TPro  6.7  /  Alb  4.0  /  TBili  0.7  /  DBili  x   /  AST  110<H>  /  ALT  52<H>  /  AlkPhos  48  09-26    PT/INR - ( 25 Sep 2023 21:16 )   PT: 14.4 sec;   INR: 1.27          PTT - ( 25 Sep 2023 21:16 )  PTT:27.4 sec  Urinalysis Basic - ( 26 Sep 2023 22:51 )    Color: x / Appearance: x / SG: x / pH: x  Gluc: 122 mg/dL / Ketone: x  / Bili: x / Urobili: x   Blood: x / Protein: x / Nitrite: x   Leuk Esterase: x / RBC: x / WBC x   Sq Epi: x / Non Sq Epi: x / Bacteria: x      CAPILLARY BLOOD GLUCOSE      POCT Blood Glucose.: 105 mg/dL (26 Sep 2023 16:36)  POCT Blood Glucose.: 95 mg/dL (26 Sep 2023 11:17)  POCT Blood Glucose.: 119 mg/dL (26 Sep 2023 07:03)      Drug Levels: [] N/A    CSF Analysis: [] N/A      Allergies    No Known Allergies    Intolerances      MEDICATIONS:  Antibiotics:    Neuro:  acetaminophen     Tablet .. 1000 milliGRAM(s) Oral every 8 hours  melatonin 5 milliGRAM(s) Oral at bedtime PRN  methocarbamol 500 milliGRAM(s) Oral every 8 hours  ondansetron   Disintegrating Tablet 4 milliGRAM(s) Oral every 6 hours  oxyCODONE    IR 10 milliGRAM(s) Oral every 4 hours PRN  oxyCODONE    IR 5 milliGRAM(s) Oral every 4 hours PRN  pregabalin 100 milliGRAM(s) Oral every 8 hours    Anticoagulation:    OTHER:  chlorhexidine 4% Liquid 1 Application(s) Topical <User Schedule>  influenza   Vaccine 0.5 milliLiter(s) IntraMuscular once  phenylephrine    Infusion 0.2 MICROgram(s)/kG/Min IV Continuous <Continuous>  polyethylene glycol 3350 17 Gram(s) Oral daily  senna 2 Tablet(s) Oral at bedtime      ASSESSMENT:  43y Female s/p    M40.205    S31.000A    S31.000A M40.205 S22.936LI31.009G    S31.000A M40.205 S22.009S22.009G    No pertinent family history in first degree relatives    Handoff    No pertinent past medical history    History of acute illness    Deformity of thoracic vertebra    Deformity of thoracic vertebra    Corpectomy, spine, lumbar, 4 levels    No significant past surgical history    [email or web address]    Assessment/Plan:    43F no PMHx suffered childhood injury resulting in spine injury and residual gait disturbance, hyperreflexia of BL LE. She has right greater than left leg tingling, low back and mid back pain. 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 S/p C7-L1 fusion, T3-8 vertebral column resection, placement of anterior cage (9/25/23).     NEURO:  - Neuro/spine checks/vitals q1hrs  - Pain control with spine ERAS  - Post-op CT full spine: L1 screw not attached to the vertical yoli, epidural lipomatosis L2-3  - DANIELE x1 superficial and HMV x1 deep - monitor outputs    Cardio:  - MAP>100  - Phenylephrine gtt, switch to levo if requirements increase    Pulm:  - extubated 9/26, incentive spirometry  - f/u CXR    GI:  - Regular diet   - Bowel regimen   - PPI     Renal:  - IVL  - Voiding    Endo:  - a1c 5.6  - ISS while NPO    Heme:  - SCDs for DVT ppx  - 1.5L EBL, 1.5L albumin and cell saver intraop  - 2u PRBC and 1u PLT postop 9/25    ID:  - Post-op Ancef  - Afebrile     Discussed with Dr. Vieira and Dr. Franco

## 2023-09-27 NOTE — PROGRESS NOTE ADULT - SUBJECTIVE AND OBJECTIVE BOX
NSCU Progress Note    Assessment/Hospital Course:    43y/F  with no PMH, traumatic spinal fracture --> resulted in severe thoracic kyphosis with progressing myelopathy. Baseline exam difficulty ambulating, but with good strength on all 4s.  Now admitted for elective spine surgery s/p 5-level vertebral column resection with fusion of C7-L1, put in a cage; stage 1; stable introp monitoring; pleura violated, repaired.      24 Hour Events/Subjective:  - POD2  - patient exhibiting sxs of shock, likely neurogenic shock requiring increasing pressors overnight aurora 4.5 and transitioned to levophed this am, unresponsive to IVF, h/h stable  - leukocytosis, afebrile, will continue to monitor      REVIEW OF SYSTEMS:  - negative except as above    VITALS:   - Reviewed      IMAGING/DATA:   - Reviewed          PHYSICAL EXAM:    General: calm  CVS: RRR  Pulm: CTAB  GI: Soft, NTND  Extremities: No LE Edema  Neuro: AOx3, FC, BUE 5/5, RLE briskly WD, LLE trace WD, L2 sensory R>L

## 2023-09-27 NOTE — ADVANCED PRACTICE NURSE CONSULT - ASSESSMENT
43y/F  with no PMH, traumatic spinal fracture --> resulted in severe thoracic kyphosis with progressing myelopathy. Baseline exam difficulty ambulating, but with good strength on all 4s.  Now s/p  elective spine surgery s/p 5-level vertebral column resection with fusion of C7-L1, put in a cage; stage 1; stable introp monitoring; pleura violated, repaired. 2 small skin tears to inside of right thigh, unknown origin. Adaptic touch left at bedside.

## 2023-09-27 NOTE — PROGRESS NOTE ADULT - SUBJECTIVE AND OBJECTIVE BOX
=================================  NEUROCRITICAL CARE ATTENDING NOTE  =================================    NGUYEN GROVER   MRN-[id number]  Summary:  43y/F  with no PMH, traumatic spinal fracture --> resulted in severe thoracic kyphosis with progressing myelopathy. Baseline exam difficulty ambulating, but with good strength on all 4s.  Now admitted for elective spine surgery.    Taken from outpatient neurosurgery note on 9/13/2023 " The patient, a long-standing patient of my practice, reports a history of spinal issues that dates back to a childhood injury. Over the years she's experienced developing issues with walking in a straight line, overactive reflexes in her lower extremities, and unique difficulty with her lower extremities referred to as cloneness. Recent upturn in these symptoms has limited her ability to carry out her daily routines. The patient also reports no significant improvement in her condition since our last clinical encounter two years ago."    42 y/o female with no PMHx or PSHx presents for surgery today.  She reports ambulating with walker or baby stroller at home for years.  She reports intermittent falls.  She has right greater than left leg tingling, low back and mid back pain.  She denies arm or leg pain.  She reports urinary incontinence since having a baby 2 years ago where if she doesn't get to the bathroom fast enough, she has incontinence, but she is able to hold it for a short while.  She takes no pain medications.  She has taken ibuprofen in the past.  She denies saddle anesthesia, bowel incontinence.   (25 Sep 2023 06:51)    COURSE IN THE HOSPITAL:  09/25 POD#0 5-level vertebral column resection with fusion of C7-L1, put in a cage; stage 1; stable introp monitoring; pleura violated, repaired; extubated  09/26 POD#1   09/27 POD#2 Tmax 38.4    Past Medical History: No pertinent past medical history History of acute illness  Allergies:  No Known Allergies  Home meds:     PHYSICAL EXAMINATION  T(C): 38.4 (09-27 @ 16:30), Max: 38.4 (09-27 @ 16:30) HR: 106 (09-27 @ 19:00) (61 - 161) BP: 153/80 (09-27 @ 18:00) (124/64 - 156/85) RR: 17 (09-27 @ 19:00) (16 - 18) SpO2: 99% (09-27 @ 19:00) (92% - 100%)   NEUROLOGIC EXAMINATION:  Patient is awake alert oriented x3, pupils equal and reactive, B UE 5/5 B LE 1/5 trace contraction R>L, sensory decreased T10 down worse on R  GENERAL:  not intubated  EENT:  anicteric  CARDIOVASCULAR: (+) S1 S2, normal rate and regular rhythm  PULMONARY: clear to auscultation bilaterally  ABDOMEN: soft, nontender with normoactive bowel sounds  EXTREMITIES: no edema  SKIN: no rash    LABS: 09-27             (22.6)  10.2 (12.9)  16.44 )-----------( 99 (162)      ( 27 Sep 2023 17:04 )             30.3   (135_   135  |  110<H>  |  4<L>  ----------------------------<  156<H>  3.9   |  17<L>  |  0.48<L>    Ca    7.4<L>      27 Sep 2023 17:04  Phos  1.7     09-27  Mg     1.5     09-27    TPro  5.4<L>  /  Alb  3.2<L>  /  TBili  0.5  /  DBili  < 0.2  /  AST  64<H>  /  ALT  34  /  AlkPhos  42  09-27 09-26 @ 07:01  -  09-27 @ 07:00  IN: 5794.6 mL / OUT: 7835 mL / NET: -2040.4 mL    Bacteriology:  CSF studies:  EEG:  Neuroimaging:  Other imaging:    MEDICATIONS: 09-27    ·	enoxaparin Injectable 40 SubCutaneous every 24 hours  ·	methocarbamol 750 Oral every 8 hours  ·	pregabalin 100 Oral every 8 hours  ·	midodrine 10 milliGRAM(s) Oral every 8 hours  ·	polyethylene glycol 3350 17 Oral daily  ·	senna 2 Oral at bedtime  ·	acetaminophen     Tablet .. 650 Oral every 6 hours PRN  ·	HYDROmorphone   Tablet 2 Oral every 4 hours PRN  ·	HYDROmorphone   Tablet 4 Oral every 4 hours PRN  ·	melatonin 5 Oral at bedtime PRN    IV FLUIDS: IVL  DRIPS:   ·	norepinephrine Infusion 0.05 MICROgram(s)/kG/Min IV Continuous <Continuous>  ·	phenylephrine    Infusion 0.2 MICROgram(s)/kG/Min IV Continuous <Continuous>  DIET: regular   Lines: R IJ, Rosa Maria  Drains:   Chin  Wounds:    CODE STATUS:  Full Code                       GOALS OF CARE:  aggressive                      DISPOSITION:  ICU =================================  NEUROCRITICAL CARE ATTENDING NOTE  =================================    NGUYEN GROVER   MRN-[id number]  Summary:  43y/F  with no PMH, traumatic spinal fracture --> resulted in severe thoracic kyphosis with progressing myelopathy. Baseline exam difficulty ambulating, but with good strength on all 4s.  Now admitted for elective spine surgery.    Taken from outpatient neurosurgery note on 9/13/2023 " The patient, a long-standing patient of my practice, reports a history of spinal issues that dates back to a childhood injury. Over the years she's experienced developing issues with walking in a straight line, overactive reflexes in her lower extremities, and unique difficulty with her lower extremities referred to as cloneness. Recent upturn in these symptoms has limited her ability to carry out her daily routines. The patient also reports no significant improvement in her condition since our last clinical encounter two years ago."    42 y/o female with no PMHx or PSHx presents for surgery today.  She reports ambulating with walker or baby stroller at home for years.  She reports intermittent falls.  She has right greater than left leg tingling, low back and mid back pain.  She denies arm or leg pain.  She reports urinary incontinence since having a baby 2 years ago where if she doesn't get to the bathroom fast enough, she has incontinence, but she is able to hold it for a short while.  She takes no pain medications.  She has taken ibuprofen in the past.  She denies saddle anesthesia, bowel incontinence.   (25 Sep 2023 06:51)    COURSE IN THE HOSPITAL:  09/25 POD#0 5-level vertebral column resection with fusion of C7-L1, put in a cage; stage 1; stable introp monitoring; pleura violated, repaired; extubated  09/26 POD#1   09/27 POD#2 Tmax 38.4    Past Medical History: No pertinent past medical history History of acute illness  Allergies:  No Known Allergies  Home meds:     PHYSICAL EXAMINATION  T(C): 38.4 (09-27 @ 16:30), Max: 38.4 (09-27 @ 16:30) HR: 106 (09-27 @ 19:00) (61 - 161) BP: 153/80 (09-27 @ 18:00) (124/64 - 156/85) RR: 17 (09-27 @ 19:00) (16 - 18) SpO2: 99% (09-27 @ 19:00) (92% - 100%)   NEUROLOGIC EXAMINATION:  Patient is awake alert oriented x3, pupils equal and reactive, B UE 5/5 B LE 1/5 trace contraction R>L, sensory decreased T10 down worse on R  GENERAL:  not intubated  EENT:  anicteric  CARDIOVASCULAR: (+) S1 S2, normal rate and regular rhythm  PULMONARY: clear to auscultation bilaterally  ABDOMEN: soft, nontender with normoactive bowel sounds  EXTREMITIES: no edema  SKIN: no rash    LABS: 09-27             (22.6)  10.2 (12.9)  16.44 )-----------( 99 (162)      ( 27 Sep 2023 17:04 )             30.3   (135)  135  |  110<H>  |  4<L>  ----------------------------<  156<H>  3.9   |  17<L>  |  0.48<L>    Ca    7.4<L>      27 Sep 2023 17:04  Phos  1.7     09-27  Mg     1.5     09-27    TPro  5.4<L>  /  Alb  3.2<L>  /  TBili  0.5  /  DBili  < 0.2  /  AST  64<H>  /  ALT  34  /  AlkPhos  42  09-27 09-26 @ 07:01  -  09-27 @ 07:00  IN: 5794.6 mL / OUT: 7835 mL / NET: -2040.4 mL    Bacteriology: 09/27 UA  NEG  CSF studies:  EEG:  Neuroimaging:  Other imaging:    MEDICATIONS: 09-27    ·	enoxaparin Injectable 40 SubCutaneous every 24 hours  ·	methocarbamol 750 Oral every 8 hours  ·	pregabalin 100 Oral every 8 hours  ·	midodrine 10 milliGRAM(s) Oral every 8 hours  ·	polyethylene glycol 3350 17 Oral daily  ·	senna 2 Oral at bedtime  ·	acetaminophen     Tablet .. 650 Oral every 6 hours PRN  ·	HYDROmorphone   Tablet 2 Oral every 4 hours PRN  ·	HYDROmorphone   Tablet 4 Oral every 4 hours PRN  ·	melatonin 5 Oral at bedtime PRN    IV FLUIDS: NS@50  DRIPS:   ·	norepinephrine Infusion 0.05 MICROgram(s)/kG/Min IV Continuous <Continuous>0.35  ·	phenylephrine    Infusion 0.2 MICROgram(s)/kG/Min IV Continuous <Continuous>1.7  DIET: regular   Lines: R IJ, [place] L PICC  Drains:   Chin  Wounds:    CODE STATUS:  Full Code                       GOALS OF CARE:  aggressive                      DISPOSITION:  ICU =================================  NEUROCRITICAL CARE ATTENDING NOTE  =================================    NGUYEN GROVER   MRN-[id number]  Summary:  43y/F  with no PMH, traumatic spinal fracture --> resulted in severe thoracic kyphosis with progressing myelopathy. Baseline exam difficulty ambulating, but with good strength on all 4s.  Now admitted for elective spine surgery.    Taken from outpatient neurosurgery note on 9/13/2023 " The patient, a long-standing patient of my practice, reports a history of spinal issues that dates back to a childhood injury. Over the years she's experienced developing issues with walking in a straight line, overactive reflexes in her lower extremities, and unique difficulty with her lower extremities referred to as cloneness. Recent upturn in these symptoms has limited her ability to carry out her daily routines. The patient also reports no significant improvement in her condition since our last clinical encounter two years ago."    42 y/o female with no PMHx or PSHx presents for surgery today.  She reports ambulating with walker or baby stroller at home for years.  She reports intermittent falls.  She has right greater than left leg tingling, low back and mid back pain.  She denies arm or leg pain.  She reports urinary incontinence since having a baby 2 years ago where if she doesn't get to the bathroom fast enough, she has incontinence, but she is able to hold it for a short while.  She takes no pain medications.  She has taken ibuprofen in the past.  She denies saddle anesthesia, bowel incontinence.   (25 Sep 2023 06:51)    COURSE IN THE HOSPITAL:  09/25 POD#0 5-level vertebral column resection with fusion of C7-L1, put in a cage; stage 1; stable introp monitoring; pleura violated, repaired; extubated  09/26 POD#1   09/27 POD#2 Tmax 38.4    Past Medical History: No pertinent past medical history History of acute illness  Allergies:  No Known Allergies  Home meds:     PHYSICAL EXAMINATION  T(C): 38.4 (09-27 @ 16:30), Max: 38.4 (09-27 @ 16:30) HR: 106 (09-27 @ 19:00) (61 - 161) BP: 153/80 (09-27 @ 18:00) (124/64 - 156/85) RR: 17 (09-27 @ 19:00) (16 - 18) SpO2: 99% (09-27 @ 19:00) (92% - 100%)   NEUROLOGIC EXAMINATION:  Patient is awake alert oriented x3, pupils equal and reactive, B UE 5/5 B LE 0/5  GENERAL:  not intubated  EENT:  anicteric  CARDIOVASCULAR: (+) S1 S2, normal rate and regular rhythm  PULMONARY: clear to auscultation bilaterally  ABDOMEN: soft, nontender with normoactive bowel sounds  EXTREMITIES: no edema  SKIN: no rash    LABS: 09-27             (22.6)  10.2 (12.9)  16.44 )-----------( 99 (162)      ( 27 Sep 2023 17:04 )             30.3   (135)  135  |  110<H>  |  4<L>  ----------------------------<  156<H>  3.9   |  17<L>  |  0.48<L>    Ca    7.4<L>      27 Sep 2023 17:04  Phos  1.7     09-27  Mg     1.5     09-27    TPro  5.4<L>  /  Alb  3.2<L>  /  TBili  0.5  /  DBili  < 0.2  /  AST  64<H>  /  ALT  34  /  AlkPhos  42  09-27 09-26 @ 07:01  -  09-27 @ 07:00  IN: 5794.6 mL / OUT: 7835 mL / NET: -2040.4 mL    Bacteriology: 09/27 UA  NEG  CSF studies:  EEG:  Neuroimaging:  Other imaging:    MEDICATIONS: 09-27    ·	enoxaparin Injectable 40 SubCutaneous every 24 hours  ·	methocarbamol 750 Oral every 8 hours  ·	pregabalin 100 Oral every 8 hours  ·	midodrine 10 milliGRAM(s) Oral every 8 hours  ·	polyethylene glycol 3350 17 Oral daily  ·	senna 2 Oral at bedtime  ·	acetaminophen     Tablet .. 650 Oral every 6 hours PRN  ·	HYDROmorphone   Tablet 2 Oral every 4 hours PRN  ·	HYDROmorphone   Tablet 4 Oral every 4 hours PRN  ·	melatonin 5 Oral at bedtime PRN    IV FLUIDS: NS@50  DRIPS:   ·	norepinephrine Infusion 0.05 MICROgram(s)/kG/Min IV Continuous <Continuous>0.35  ·	phenylephrine    Infusion 0.2 MICROgram(s)/kG/Min IV Continuous <Continuous>1.7  DIET: regular   Lines: R IJ, Rosa Maria L PICC  Drains:   Chin  Wounds:    CODE STATUS:  Full Code                       GOALS OF CARE:  aggressive                      DISPOSITION:  ICU

## 2023-09-27 NOTE — DIETITIAN INITIAL EVALUATION ADULT - OTHER CALCULATIONS
IBW 45.5kg (150% IBW); estimated needs based on ABW; adjusted for obesity, clinical course, increased protein needs for healing

## 2023-09-27 NOTE — DIETITIAN INITIAL EVALUATION ADULT - PERTINENT MEDS FT
MEDICATIONS  (STANDING):  chlorhexidine 2% Cloths 1 Application(s) Topical <User Schedule>  chlorhexidine 4% Liquid 1 Application(s) Topical <User Schedule>  enoxaparin Injectable 40 milliGRAM(s) SubCutaneous every 24 hours  influenza   Vaccine 0.5 milliLiter(s) IntraMuscular once  magnesium sulfate  IVPB 1 Gram(s) IV Intermittent daily  methocarbamol 750 milliGRAM(s) Oral every 8 hours  midodrine 10 milliGRAM(s) Oral every 8 hours  norepinephrine Infusion 0.05 MICROgram(s)/kG/Min (6.43 mL/Hr) IV Continuous <Continuous>  ondansetron   Disintegrating Tablet 4 milliGRAM(s) Oral every 6 hours  phenylephrine    Infusion 0.2 MICROgram(s)/kG/Min (5.15 mL/Hr) IV Continuous <Continuous>  polyethylene glycol 3350 17 Gram(s) Oral daily  pregabalin 100 milliGRAM(s) Oral every 8 hours  senna 2 Tablet(s) Oral at bedtime  sodium chloride 0.9%. 1000 milliLiter(s) (50 mL/Hr) IV Continuous <Continuous>    MEDICATIONS  (PRN):  acetaminophen     Tablet .. 650 milliGRAM(s) Oral every 6 hours PRN Temp greater or equal to 38C (100.4F), Mild Pain (1 - 3)  HYDROmorphone   Tablet 2 milliGRAM(s) Oral every 4 hours PRN Moderate Pain (4 - 6)  HYDROmorphone   Tablet 4 milliGRAM(s) Oral every 4 hours PRN Severe Pain (7 - 10)  melatonin 5 milliGRAM(s) Oral at bedtime PRN Insomnia  sodium chloride 0.9% lock flush 10 milliLiter(s) IV Push every 1 hour PRN Pre/post blood products, medications, blood draw, and to maintain line patency

## 2023-09-27 NOTE — DIETITIAN INITIAL EVALUATION ADULT - OTHER INFO
43F no PMHx suffered childhood injury resulting in spine injury and residual gait disturbance, hyperreflexia of BL LE. She has right greater than left leg tingling, low back and mid back pain. 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 S/p C7-L1 fusion, T3-8 vertebral column resection, placement of anterior cage (9/25/23).  43F no PMHx suffered childhood injury resulting in spine injury and residual gait disturbance, hyperreflexia of BL LE. She has right greater than left leg tingling, low back and mid back pain. 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 S/p C7-L1 fusion, T3-8 vertebral column resection, placement of anterior cage (9/25/23).     Pt assessed at bedside. Resting in bed. Started on regular diet + ensure HP TID to supplement PO intake. Reports fair PO intake post-op. Tolerating diet well, no N/V/D/C. No reports of weight or intake changes PTA. Reviewed adequate intake parameters, increased needs. Diet preferences reviewed. No pain noted. Kameron score 18. RD to follow, nutrition recommendations below.

## 2023-09-27 NOTE — PROGRESS NOTE ADULT - ASSESSMENT
43y/F with h/o traumatic spinal cord fracture, severe thoracic kyphosis, s/p 5-level vertebral resection, fusion C7-L1 (09/25/2023, Dr. Vieira)    PLAN:   NEURO: spine checks q1h, PRN pain meds tylenol, switch propofol to precedex, ERAS protocol  hemodynamic augmentation MAP >100  second stage surgery on Friday  REHAB:  physical therapy evaluation and management    EARLY MOB:  HOB up, bed rest    PULM:  PRN O2 support to keep sats >/=92%  CARDIO:  , central line, check EKG, echo, troponin, aurora - switch to levo if increasing requirements  ENDO:  Blood sugar goals 140-180 mg/dL, d/c insulin sliding scale  GI:  bowel regimen  DIET: regular diet  RENAL:  IVL  HEM/ONC: stable  VTE Prophylaxis: SCDs, no DVT chemoprophylaxis for now as patient is high risk for bleed (fresh pos-op) - re-evaluate tomorrow  ID: afebrile, WBC increase likely reactive from surgery  Social: will update family    Active issues:  What's keeping patient in the ICU? pressors  What is this patient's dispo plan? stepdown after stage 2 surgery    ATTENDING ATTESTATION:  I was physically present for the key portions of the evaluation and management (E/M) service provided.  I agree with the above history, physical and plan, which I have reviewed and edited where appropriate.    Patient at high risk for neurological deterioration or death due to:  ICU delirium, aspiration PNA, DVT / PE.  Critical care time:  I have personally provided 60 minutes of critical care time, excluding time spent on separate procedures.      Plan discussed with RN, house staff.    ADDENDUM:  patient extubated once more fully awake; on serial exams, patient awake, alert, oriented, [place], moving B UE with good strength, R>L; but B LE with only trace withdrawal to noxious; neurosurgery informed, MAP goals kept at 100mm Hg, given 2 units pRBC for Hb 6.8; started on neosynephrine 43y/F with h/o traumatic spinal cord fracture, severe thoracic kyphosis, s/p 5-level vertebral resection, fusion C7-L1 (09/25/2023, Dr. Vieira)    PLAN:   NEURO: spine checks q1h, PRN pain meds tylenol  hemodynamic augmentation MAP >100; as per NSG  second stage surgery on Friday  d/c pregabalin  REHAB:  physical therapy evaluation and management    EARLY MOB:  HOB up, bed rest    PULM:  RA  CARDIO:  , central line, check EKG, echo, (EF 60-65) troponin, on neosynephrine / levophed  ENDO:  Blood sugar goals 140-180 mg/dL  GI:  bowel regimen  DIET: regular diet  RENAL:  IVL  HEM/ONC: stable  VTE Prophylaxis: SCDs, SQL  ID: febrile, WBC increase. CXR NEG, pancultured, UA NEG; no ABx for now  Social: will update family    Active issues:  What's keeping patient in the ICU? pressors  What is this patient's dispo plan? stepdown after stage 2 surgery    ATTENDING ATTESTATION:  I was physically present for the key portions of the evaluation and management (E/M) service provided.  I agree with the above history, physical and plan, which I have reviewed and edited where appropriate.    Patient at high risk for neurological deterioration or death due to:  ICU delirium, aspiration PNA, DVT / PE.  Critical care time:  I have personally provided 60 minutes of critical care time, excluding time spent on separate procedures.      Plan discussed with RN, house staff.    ADDENDUM:  patient extubated once more fully awake; on serial exams, patient awake, alert, oriented, [place], moving B UE with good strength, R>L; but B LE with only trace withdrawal to noxious; neurosurgery informed, MAP goals kept at 100mm Hg, given 2 units pRBC for Hb 6.8; started on neosynephrine 43y/F with h/o traumatic spinal cord fracture, severe thoracic kyphosis, s/p 5-level vertebral resection, fusion C7-L1 (09/25/2023, Dr. Vieira)    PLAN:   NEURO: spine checks q1h, PRN pain meds tylenol  hemodynamic augmentation MAP >100; as per NSG  second stage surgery on Friday  d/c pregabalin  REHAB:  physical therapy evaluation and management    EARLY MOB:  HOB up, bed rest    PULM:  RA  CARDIO:  , central line, check EKG, echo, (EF 60-65) troponin, on neosynephrine / levophed  ENDO:  Blood sugar goals 140-180 mg/dL  GI:  bowel regimen  DIET: regular diet  RENAL:  IVL  HEM/ONC: stable  VTE Prophylaxis: SCDs, SQL  ID: febrile, WBC increase. CXR NEG, pancultured, UA NEG; no ABx for now  Social: will update family    Active issues:  What's keeping patient in the ICU? pressors  What is this patient's dispo plan? stepdown after stage 2 surgery    ATTENDING ATTESTATION:  I was physically present for the key portions of the evaluation and management (E/M) service provided.  I agree with the above history, physical and plan, which I have reviewed and edited where appropriate.    Patient at high risk for neurological deterioration or death due to:  ICU delirium, aspiration PNA, DVT / PE.  Critical care time:  I have personally provided 45 minutes of critical care time, excluding time spent on separate procedures.      Plan discussed with RN, house staff.

## 2023-09-27 NOTE — DIETITIAN INITIAL EVALUATION ADULT - ADD RECOMMEND
1. Continue regular diet + ensure high protein TID (follow intake, decrease to BID prn)  2. Follow intake closely, adjust prn  3. Monitor electrolytes, adjust and replete prn  4. Pain and bowel regimen per team   5. RD diet edu prn   1. Continue regular diet + ensure high protein TID  2. Follow intake closely, adjust prn  3. Monitor electrolytes, adjust and replete prn  4. Pain and bowel regimen per team   5. RD diet edu prn

## 2023-09-27 NOTE — PROGRESS NOTE ADULT - ASSESSMENT
ASSESSMENT/PLAN:     s/p 5-level vertebral column resection with fusion of C7-L1, put in a cage; stage 1; stable introp monitoring; pleura violated, repaired; post op with BLE weakness requring pressors to augment MAP>100, now pending additional imaging    NEURO:  - Neurochecks q1h  - Surgical drains per NSGY  - Augment map as per below  - Tentative Stage 2 on Friday  - Pain control, ERAS  - Activity: bed rest for now, PT/OT when able    PULM:  - Incentive spirometry  - mobilize as tolerated  - Aspiration Precautions    CV:  - MAP>100, will discuss MAP >90 d/t cardiac stress  - trop, ekg while on pressors  - exhibiting sxs of neurogenic shock given LE findings, less likely cardiogenic given normal cardiac markers and POCUS with good cardiac contractility will order official TTE, septic shock remains in diagnosis, however given recent spinal surgery with new LE weakness and increasing pressor requirements, would favor neurogenic  - transition to levophed  - place midline  - will consider starting midodrine for prolonged pressor requirements  - lactate, cpk    RENAL:  - Fluids: IVF while on pressors; given 1.5L IVF, gently hydration to avoid hypervolemia   - trend renal function  - coronado for strict IOs    GI:  - Diet: Regular  - Bowel regimen standing    ENDO:   - Goal euglycemia (-180)    HEME/ONC:  s/p 700cc cell saver, 1.5 L EBL; 1500 5% albumin, given TXA;  post-op Hb 6.8 - give 2 units pRBC, repeat   - monitor H/H  - repeat cbc, stable post transfusion    VTE prophylaxis   - SCDs   - hold chemoprophylaxis due to: fresh post op pending nsg clearance      ID:  - Ting-op antibiotics        Dispo: ICU pending OR plan, BP augmentation

## 2023-09-28 LAB
ALBUMIN SERPL ELPH-MCNC: 3.2 G/DL — LOW (ref 3.3–5)
ALP SERPL-CCNC: 58 U/L — SIGNIFICANT CHANGE UP (ref 40–120)
ALT FLD-CCNC: 34 U/L — SIGNIFICANT CHANGE UP (ref 10–45)
ANION GAP SERPL CALC-SCNC: 10 MMOL/L — SIGNIFICANT CHANGE UP (ref 5–17)
ANION GAP SERPL CALC-SCNC: 10 MMOL/L — SIGNIFICANT CHANGE UP (ref 5–17)
ANION GAP SERPL CALC-SCNC: 8 MMOL/L — SIGNIFICANT CHANGE UP (ref 5–17)
AST SERPL-CCNC: 62 U/L — HIGH (ref 10–40)
BILIRUB SERPL-MCNC: 0.4 MG/DL — SIGNIFICANT CHANGE UP (ref 0.2–1.2)
BUN SERPL-MCNC: 3 MG/DL — LOW (ref 7–23)
BUN SERPL-MCNC: 3 MG/DL — LOW (ref 7–23)
BUN SERPL-MCNC: 4 MG/DL — LOW (ref 7–23)
CA-I BLD-SCNC: 4.6 MG/DL — SIGNIFICANT CHANGE UP (ref 4.5–5.6)
CALCIUM SERPL-MCNC: 7.6 MG/DL — LOW (ref 8.4–10.5)
CALCIUM SERPL-MCNC: 7.8 MG/DL — LOW (ref 8.4–10.5)
CALCIUM SERPL-MCNC: 8.6 MG/DL — SIGNIFICANT CHANGE UP (ref 8.4–10.5)
CHLORIDE SERPL-SCNC: 100 MMOL/L — SIGNIFICANT CHANGE UP (ref 96–108)
CHLORIDE SERPL-SCNC: 105 MMOL/L — SIGNIFICANT CHANGE UP (ref 96–108)
CHLORIDE SERPL-SCNC: 106 MMOL/L — SIGNIFICANT CHANGE UP (ref 96–108)
CHOLEST SERPL-MCNC: 132 MG/DL — SIGNIFICANT CHANGE UP
CK SERPL-CCNC: 3028 U/L — HIGH (ref 25–170)
CO2 SERPL-SCNC: 17 MMOL/L — LOW (ref 22–31)
CO2 SERPL-SCNC: 21 MMOL/L — LOW (ref 22–31)
CO2 SERPL-SCNC: 24 MMOL/L — SIGNIFICANT CHANGE UP (ref 22–31)
CREAT SERPL-MCNC: 0.37 MG/DL — LOW (ref 0.5–1.3)
CREAT SERPL-MCNC: 0.4 MG/DL — LOW (ref 0.5–1.3)
CREAT SERPL-MCNC: 0.48 MG/DL — LOW (ref 0.5–1.3)
EGFR: 122 ML/MIN/1.73M2 — SIGNIFICANT CHANGE UP
EGFR: 127 ML/MIN/1.73M2 — SIGNIFICANT CHANGE UP
EGFR: 130 ML/MIN/1.73M2 — SIGNIFICANT CHANGE UP
GLUCOSE SERPL-MCNC: 132 MG/DL — HIGH (ref 70–99)
GLUCOSE SERPL-MCNC: 172 MG/DL — HIGH (ref 70–99)
GLUCOSE SERPL-MCNC: 180 MG/DL — HIGH (ref 70–99)
HCT VFR BLD CALC: 31.9 % — LOW (ref 34.5–45)
HDLC SERPL-MCNC: 27 MG/DL — LOW
HGB BLD-MCNC: 10.9 G/DL — LOW (ref 11.5–15.5)
LIPID PNL WITH DIRECT LDL SERPL: 82 MG/DL — SIGNIFICANT CHANGE UP
MAGNESIUM SERPL-MCNC: 1.7 MG/DL — SIGNIFICANT CHANGE UP (ref 1.6–2.6)
MAGNESIUM SERPL-MCNC: 1.8 MG/DL — SIGNIFICANT CHANGE UP (ref 1.6–2.6)
MAGNESIUM SERPL-MCNC: 1.9 MG/DL — SIGNIFICANT CHANGE UP (ref 1.6–2.6)
MCHC RBC-ENTMCNC: 28.4 PG — SIGNIFICANT CHANGE UP (ref 27–34)
MCHC RBC-ENTMCNC: 34.2 GM/DL — SIGNIFICANT CHANGE UP (ref 32–36)
MCV RBC AUTO: 83.1 FL — SIGNIFICANT CHANGE UP (ref 80–100)
NON HDL CHOLESTEROL: 105 MG/DL — SIGNIFICANT CHANGE UP
NRBC # BLD: 0 /100 WBCS — SIGNIFICANT CHANGE UP (ref 0–0)
OSMOLALITY UR: 366 MOSM/KG — SIGNIFICANT CHANGE UP (ref 300–900)
PHOSPHATE SERPL-MCNC: 1.2 MG/DL — LOW (ref 2.5–4.5)
PHOSPHATE SERPL-MCNC: 1.8 MG/DL — LOW (ref 2.5–4.5)
PHOSPHATE SERPL-MCNC: 2.2 MG/DL — LOW (ref 2.5–4.5)
PLATELET # BLD AUTO: 110 K/UL — LOW (ref 150–400)
POTASSIUM SERPL-MCNC: 3.9 MMOL/L — SIGNIFICANT CHANGE UP (ref 3.5–5.3)
POTASSIUM SERPL-SCNC: 3.9 MMOL/L — SIGNIFICANT CHANGE UP (ref 3.5–5.3)
PROT SERPL-MCNC: 5.9 G/DL — LOW (ref 6–8.3)
RBC # BLD: 3.84 M/UL — SIGNIFICANT CHANGE UP (ref 3.8–5.2)
RBC # FLD: 14.2 % — SIGNIFICANT CHANGE UP (ref 10.3–14.5)
SODIUM SERPL-SCNC: 132 MMOL/L — LOW (ref 135–145)
SODIUM SERPL-SCNC: 132 MMOL/L — LOW (ref 135–145)
SODIUM SERPL-SCNC: 137 MMOL/L — SIGNIFICANT CHANGE UP (ref 135–145)
TRIGL SERPL-MCNC: 116 MG/DL — SIGNIFICANT CHANGE UP
TROPONIN T, HIGH SENSITIVITY RESULT: 16 NG/L — SIGNIFICANT CHANGE UP (ref 0–51)
TROPONIN T, HIGH SENSITIVITY RESULT: 16 NG/L — SIGNIFICANT CHANGE UP (ref 0–51)
WBC # BLD: 18.7 K/UL — HIGH (ref 3.8–10.5)
WBC # FLD AUTO: 18.7 K/UL — HIGH (ref 3.8–10.5)

## 2023-09-28 PROCEDURE — 36620 INSERTION CATHETER ARTERY: CPT

## 2023-09-28 PROCEDURE — 99291 CRITICAL CARE FIRST HOUR: CPT

## 2023-09-28 PROCEDURE — 99222 1ST HOSP IP/OBS MODERATE 55: CPT

## 2023-09-28 RX ORDER — SODIUM CHLORIDE 5 G/100ML
500 INJECTION, SOLUTION INTRAVENOUS
Refills: 0 | Status: DISCONTINUED | OUTPATIENT
Start: 2023-09-28 | End: 2023-09-29

## 2023-09-28 RX ORDER — HYDROMORPHONE HYDROCHLORIDE 2 MG/ML
0.5 INJECTION INTRAMUSCULAR; INTRAVENOUS; SUBCUTANEOUS EVERY 6 HOURS
Refills: 0 | Status: DISCONTINUED | OUTPATIENT
Start: 2023-09-28 | End: 2023-09-29

## 2023-09-28 RX ORDER — MAGNESIUM SULFATE 500 MG/ML
2 VIAL (ML) INJECTION ONCE
Refills: 0 | Status: DISCONTINUED | OUTPATIENT
Start: 2023-09-28 | End: 2023-09-28

## 2023-09-28 RX ORDER — NOREPINEPHRINE BITARTRATE/D5W 8 MG/250ML
0.05 PLASTIC BAG, INJECTION (ML) INTRAVENOUS
Qty: 16 | Refills: 0 | Status: DISCONTINUED | OUTPATIENT
Start: 2023-09-28 | End: 2023-09-30

## 2023-09-28 RX ORDER — POTASSIUM PHOSPHATE, MONOBASIC POTASSIUM PHOSPHATE, DIBASIC 236; 224 MG/ML; MG/ML
15 INJECTION, SOLUTION INTRAVENOUS ONCE
Refills: 0 | Status: COMPLETED | OUTPATIENT
Start: 2023-09-28 | End: 2023-09-28

## 2023-09-28 RX ORDER — MAGNESIUM SULFATE 500 MG/ML
2 VIAL (ML) INJECTION ONCE
Refills: 0 | Status: COMPLETED | OUTPATIENT
Start: 2023-09-28 | End: 2023-09-28

## 2023-09-28 RX ORDER — SODIUM CHLORIDE 5 G/100ML
500 INJECTION, SOLUTION INTRAVENOUS
Refills: 0 | Status: DISCONTINUED | OUTPATIENT
Start: 2023-09-28 | End: 2023-09-28

## 2023-09-28 RX ORDER — HYDROMORPHONE HYDROCHLORIDE 2 MG/ML
0.5 INJECTION INTRAMUSCULAR; INTRAVENOUS; SUBCUTANEOUS ONCE
Refills: 0 | Status: DISCONTINUED | OUTPATIENT
Start: 2023-09-28 | End: 2023-09-28

## 2023-09-28 RX ORDER — SODIUM CHLORIDE 9 MG/ML
1000 INJECTION INTRAMUSCULAR; INTRAVENOUS; SUBCUTANEOUS ONCE
Refills: 0 | Status: COMPLETED | OUTPATIENT
Start: 2023-09-28 | End: 2023-09-28

## 2023-09-28 RX ORDER — POLYETHYLENE GLYCOL 3350 17 G/17G
17 POWDER, FOR SOLUTION ORAL
Refills: 0 | Status: DISCONTINUED | OUTPATIENT
Start: 2023-09-28 | End: 2023-10-06

## 2023-09-28 RX ORDER — MAGNESIUM SULFATE 500 MG/ML
1 VIAL (ML) INJECTION ONCE
Refills: 0 | Status: COMPLETED | OUTPATIENT
Start: 2023-09-28 | End: 2023-09-28

## 2023-09-28 RX ADMIN — Medication 85 MILLIMOLE(S): at 07:19

## 2023-09-28 RX ADMIN — SODIUM CHLORIDE 30 MILLILITER(S): 5 INJECTION, SOLUTION INTRAVENOUS at 23:16

## 2023-09-28 RX ADMIN — SODIUM CHLORIDE 50 MILLILITER(S): 5 INJECTION, SOLUTION INTRAVENOUS at 07:19

## 2023-09-28 RX ADMIN — HYDROMORPHONE HYDROCHLORIDE 0.5 MILLIGRAM(S): 2 INJECTION INTRAMUSCULAR; INTRAVENOUS; SUBCUTANEOUS at 20:56

## 2023-09-28 RX ADMIN — HYDROMORPHONE HYDROCHLORIDE 4 MILLIGRAM(S): 2 INJECTION INTRAMUSCULAR; INTRAVENOUS; SUBCUTANEOUS at 17:12

## 2023-09-28 RX ADMIN — Medication 650 MILLIGRAM(S): at 21:13

## 2023-09-28 RX ADMIN — HYDROMORPHONE HYDROCHLORIDE 4 MILLIGRAM(S): 2 INJECTION INTRAMUSCULAR; INTRAVENOUS; SUBCUTANEOUS at 17:59

## 2023-09-28 RX ADMIN — SODIUM CHLORIDE 50 MILLILITER(S): 9 INJECTION INTRAMUSCULAR; INTRAVENOUS; SUBCUTANEOUS at 05:54

## 2023-09-28 RX ADMIN — POTASSIUM PHOSPHATE, MONOBASIC POTASSIUM PHOSPHATE, DIBASIC 62.5 MILLIMOLE(S): 236; 224 INJECTION, SOLUTION INTRAVENOUS at 16:59

## 2023-09-28 RX ADMIN — HYDROMORPHONE HYDROCHLORIDE 0.5 MILLIGRAM(S): 2 INJECTION INTRAMUSCULAR; INTRAVENOUS; SUBCUTANEOUS at 09:22

## 2023-09-28 RX ADMIN — CHLORHEXIDINE GLUCONATE 1 APPLICATION(S): 213 SOLUTION TOPICAL at 05:54

## 2023-09-28 RX ADMIN — MIDODRINE HYDROCHLORIDE 10 MILLIGRAM(S): 2.5 TABLET ORAL at 21:13

## 2023-09-28 RX ADMIN — MIDODRINE HYDROCHLORIDE 10 MILLIGRAM(S): 2.5 TABLET ORAL at 05:54

## 2023-09-28 RX ADMIN — MIDODRINE HYDROCHLORIDE 10 MILLIGRAM(S): 2.5 TABLET ORAL at 13:00

## 2023-09-28 RX ADMIN — HYDROMORPHONE HYDROCHLORIDE 4 MILLIGRAM(S): 2 INJECTION INTRAMUSCULAR; INTRAVENOUS; SUBCUTANEOUS at 08:56

## 2023-09-28 RX ADMIN — METHOCARBAMOL 750 MILLIGRAM(S): 500 TABLET, FILM COATED ORAL at 05:53

## 2023-09-28 RX ADMIN — HYDROMORPHONE HYDROCHLORIDE 0.5 MILLIGRAM(S): 2 INJECTION INTRAMUSCULAR; INTRAVENOUS; SUBCUTANEOUS at 14:04

## 2023-09-28 RX ADMIN — Medication 25 GRAM(S): at 06:38

## 2023-09-28 RX ADMIN — Medication 650 MILLIGRAM(S): at 04:30

## 2023-09-28 RX ADMIN — Medication 3.22 MICROGRAM(S)/KG/MIN: at 22:49

## 2023-09-28 RX ADMIN — POLYETHYLENE GLYCOL 3350 17 GRAM(S): 17 POWDER, FOR SOLUTION ORAL at 21:13

## 2023-09-28 RX ADMIN — HYDROMORPHONE HYDROCHLORIDE 4 MILLIGRAM(S): 2 INJECTION INTRAMUSCULAR; INTRAVENOUS; SUBCUTANEOUS at 07:54

## 2023-09-28 RX ADMIN — Medication 650 MILLIGRAM(S): at 22:13

## 2023-09-28 RX ADMIN — HYDROMORPHONE HYDROCHLORIDE 0.5 MILLIGRAM(S): 2 INJECTION INTRAMUSCULAR; INTRAVENOUS; SUBCUTANEOUS at 10:12

## 2023-09-28 RX ADMIN — Medication 100 GRAM(S): at 11:03

## 2023-09-28 RX ADMIN — METHOCARBAMOL 750 MILLIGRAM(S): 500 TABLET, FILM COATED ORAL at 21:14

## 2023-09-28 RX ADMIN — HYDROMORPHONE HYDROCHLORIDE 4 MILLIGRAM(S): 2 INJECTION INTRAMUSCULAR; INTRAVENOUS; SUBCUTANEOUS at 01:24

## 2023-09-28 RX ADMIN — METHOCARBAMOL 750 MILLIGRAM(S): 500 TABLET, FILM COATED ORAL at 13:01

## 2023-09-28 RX ADMIN — SODIUM CHLORIDE 1000 MILLILITER(S): 9 INJECTION INTRAMUSCULAR; INTRAVENOUS; SUBCUTANEOUS at 10:08

## 2023-09-28 RX ADMIN — HYDROMORPHONE HYDROCHLORIDE 0.5 MILLIGRAM(S): 2 INJECTION INTRAMUSCULAR; INTRAVENOUS; SUBCUTANEOUS at 19:56

## 2023-09-28 RX ADMIN — Medication 650 MILLIGRAM(S): at 03:09

## 2023-09-28 RX ADMIN — SENNA PLUS 2 TABLET(S): 8.6 TABLET ORAL at 21:13

## 2023-09-28 RX ADMIN — HYDROMORPHONE HYDROCHLORIDE 4 MILLIGRAM(S): 2 INJECTION INTRAMUSCULAR; INTRAVENOUS; SUBCUTANEOUS at 22:47

## 2023-09-28 RX ADMIN — POLYETHYLENE GLYCOL 3350 17 GRAM(S): 17 POWDER, FOR SOLUTION ORAL at 11:03

## 2023-09-28 RX ADMIN — HYDROMORPHONE HYDROCHLORIDE 4 MILLIGRAM(S): 2 INJECTION INTRAMUSCULAR; INTRAVENOUS; SUBCUTANEOUS at 23:47

## 2023-09-28 RX ADMIN — Medication 75 MILLIGRAM(S): at 13:00

## 2023-09-28 RX ADMIN — HYDROMORPHONE HYDROCHLORIDE 0.5 MILLIGRAM(S): 2 INJECTION INTRAMUSCULAR; INTRAVENOUS; SUBCUTANEOUS at 14:59

## 2023-09-28 RX ADMIN — HYDROMORPHONE HYDROCHLORIDE 0.5 MILLIGRAM(S): 2 INJECTION INTRAMUSCULAR; INTRAVENOUS; SUBCUTANEOUS at 03:00

## 2023-09-28 RX ADMIN — ENOXAPARIN SODIUM 40 MILLIGRAM(S): 100 INJECTION SUBCUTANEOUS at 21:13

## 2023-09-28 RX ADMIN — Medication 5 MILLIGRAM(S): at 22:48

## 2023-09-28 RX ADMIN — HYDROMORPHONE HYDROCHLORIDE 4 MILLIGRAM(S): 2 INJECTION INTRAMUSCULAR; INTRAVENOUS; SUBCUTANEOUS at 02:00

## 2023-09-28 RX ADMIN — Medication 100 GRAM(S): at 15:59

## 2023-09-28 NOTE — PROGRESS NOTE ADULT - ASSESSMENT
Taken from outpatient neurosurgery note on 9/13/2023 " The patient, a long-standing patient of my practice, reports a history of spinal issues that dates back to a childhood injury. Over the years she's experienced developing issues with walking in a straight line, overactive reflexes in her lower extremities, and unique difficulty with her lower extremities referred to as cloneness. Recent upturn in these symptoms has limited her ability to carry out her daily routines. The patient also reports no significant improvement in her condition since our last clinical encounter two years ago."    44 y/o female with no PMHx or PSHx presented for surgery 9/25/23.  She reports ambulating with walker or baby stroller at home for years.  She reports intermittent falls.  She has right greater than left leg tingling, low back and mid back pain.  She denies arm or leg pain.  She reports urinary incontinence since having a baby 2 years ago where if she doesn't get to the bathroom fast enough, she has incontinence, but she is able to hold it for a short while.  She takes no pain medications.  She has taken ibuprofen in the past.  She denies saddle anesthesia, bowel incontinence.    Patient states that she has shortness of breath since her surgery and a cough that started yesterday. She states that she feels like she has to cough up mucus. She states that her mucus is "off white". She admits to chest pain when she coughs but she does not have chest pain at rest. She also admits to feeling chills and excessive sweating. Patient states that she has not had a bowel movement since 9/23/23. Patient denies dysuria.      Recommendation:  # 44 y/o female with hx of spinal injury p/w for spinal surgery.  S/p C7-L1 fusion, T3-8 vertebral column resection, placement of anterior cage (9/25/23). With fevers leukocytosis after surgery. ID consulted for concern for infectious etiology of fevers and leukocytosis.      Recommendation:  Patient's fevers after surgery appear more likely to be post-op related inflammatory fevers rather than infectious.  Has a mild cough but seems to be clearing mucus from throat rather than coughing up sputum. Other infectious ROS negative.  Has been off antibiotics and has subjectively been stable other than post op pain (on pressors for neurogenic vs less likely septic shock).  UA negative. Blood cultures neg.  - monitor off antibiotics  - if possible collect sputum sample  - monitor daily CBC with differential  - monitor blood cultures    Team 1 will follow    Discussed with Attending, not final until signed by Attending.

## 2023-09-28 NOTE — PROGRESS NOTE ADULT - ASSESSMENT
ASSESSMENT/PLAN:     s/p 5-level vertebral column resection with fusion of C7-L1, put in a cage; stage 1; stable introp monitoring; pleura violated, repaired; post op with BLE weakness requring pressors to augment MAP>100, now pending additional imaging    NEURO:  - Neurochecks q1h  - Surgical drains per NSGY  - Augment map as per below  - Tentative Stage 2 next Wednesday   - Pain control, ERAS  - Activity: bed rest for now, PT/OT when able    PULM:  - Incentive spirometry  - mobilize as tolerated  - Aspiration Precautions    CV:  - MAP>100 per nsg  - trop, ekg while on pressors  - exhibiting sxs of neurogenic shock given LE findings, less likely cardiogenic given normal cardiac markers and POCUS with good cardiac contractility will order official TTE, septic shock remains in diagnosis, however given recent spinal surgery with new LE weakness and increasing pressor requirements, would favor neurogenic  - c/w levophed  - wean aurora  - midodrine 10q8h    RENAL:  - Fluids: IVF while on pressors  - trend renal function  - high UOP, replete as needed    GI:  - Diet: Regular  - Bowel regimen standing    ENDO:   - Goal euglycemia (-180)    HEME/ONC:  s/p 700cc cell saver, 1.5 L EBL; 1500 5% albumin, given TXA;  post-op Hb 6.8 - give 2 units pRBC, repeat   - monitor H/H  - repeat cbc, stable post transfusion    VTE prophylaxis   - SCDs   - hold chemoprophylaxis due to: fresh post op pending nsg clearance      ID:  - febrile, pan cultured 9/27, UA negative, will continue to monitor  - ID c/s        Dispo: ICU pending OR plan, BP augmentation

## 2023-09-28 NOTE — PROGRESS NOTE ADULT - SUBJECTIVE AND OBJECTIVE BOX
NSCU Progress Note    Assessment/Hospital Course:    43y/F  with no PMH, traumatic spinal fracture --> resulted in severe thoracic kyphosis with progressing myelopathy. Baseline exam difficulty ambulating, but with good strength on all 4s.  Now admitted for elective spine surgery s/p 5-level vertebral column resection with fusion of C7-L1, put in a cage; stage 1; stable introp monitoring; pleura violated, repaired.      24 Hour Events/Subjective:  - POD3  - weaning off aurora, remains on high dose levo + midodrine to maintain MAP>100  - intermittently sinus tachy to 140s folowed after high UOP, improves with volume resuscitation  - off HTS      REVIEW OF SYSTEMS:  - negative except as above    VITALS:   - Reviewed      IMAGING/DATA:   - Reviewed          PHYSICAL EXAM:    General: calm  CVS: RRR  Pulm: CTAB  GI: Soft, NTND  Extremities: No LE Edema  Neuro: AOx3, FC, BUE 5/5,BLE 0/5, L2 sensory R>L

## 2023-09-28 NOTE — CHART NOTE - NSCHARTNOTEFT_GEN_A_CORE
Lyrica restarted. Weaning phenylephrine. Serum Na uptrending, 3% saline discontinued. Pt febrile with uptrending WBC, ID consulted recommend monitoring for now off antibiotics. Neuro exam stable. No

## 2023-09-28 NOTE — PROGRESS NOTE ADULT - SUBJECTIVE AND OBJECTIVE BOX
S/Overnight events:      Pt complained of pain from her neck and below. Was given a push of 4mg Dilaudid. R radial A-line removed. L radial A-line placed bedside.     Hospital Course:   POD#3 s/p C7-L1 fusion, T3-8 vertebral column resection, placement of anterior cage.    : POD 0 s/p C7-L1 fusion, T3-8 vertebral column resection, placement of anterior cage.   ; POD1 H/H 6.8 postop and PLT 85. Given 2u PRBC and 1u PLT. Switched propofol to precedex gtt. Pt extubated to face mask. Pt noted to only be withdrawing to noxious stimuli on bilateral lower extremities with sensation decreased RLE per patient. Dr. Vieira notified and plan is to keep MAP>100 and obtain CT full spine in the morning. Phenylephrine started to maintain MAP>100. Advanced diet to regular. Passed TOV. Increased need for aurora, UO high.   : POD2 C7-L1 fusion T3-8 vertebral column resection. increased pressor requirements o/n, started on levo additionally. lactate 1.5, BNP 1400 from 800, CK elevated. echo: EF 60-65%. given 250cc bolus albumin, started on midodrine 10q8 to wean off pressors. PICC placed by Alli.     Vital Signs Last 24 Hrs  T(C): 38.2 (28 Sep 2023 01:34), Max: 38.4 (27 Sep 2023 16:30)  T(F): 100.8 (28 Sep 2023 01:34), Max: 101.1 (27 Sep 2023 16:30)  HR: 107 (28 Sep 2023 01:00) (67 - 161)  BP: 137/84 (28 Sep 2023 01:00) (124/64 - 162/82)  BP(mean): 106 (28 Sep 2023 01:00) (87 - 116)  RR: 16 (28 Sep 2023 01:00) (15 - 18)  SpO2: 100% (28 Sep 2023 01:00) (92% - 100%)    Parameters below as of 28 Sep 2023 01:00  Patient On (Oxygen Delivery Method): room air      I&O's Detail    26 Sep 2023 07:01  -  27 Sep 2023 07:00  --------------------------------------------------------  IN:    IV PiggyBack: 150 mL    Norepinephrine: 77.2 mL    Oral Fluid: 400 mL    Phenylephrine: 51.5 mL    Phenylephrine: 1276 mL    sodium chloride 0.9%: 840 mL    Sodium Chloride 0.9% Bolus: 3000 mL  Total IN: 5794.6 mL    OUT:    Accordian (mL): 555 mL    Bulb (mL): 445 mL    Voided (mL): 6835 mL  Total OUT: 7835 mL    Total NET: -2040.4 mL      27 Sep 2023 07:01  -  28 Sep 2023 02:51  --------------------------------------------------------  IN:    IV PiggyBack: 598 mL    IV PiggyBack: 416.5 mL    Lactated Ringers Bolus: 999 mL    Norepinephrine: 640.8 mL    Oral Fluid: 200 mL    Phenylephrine: 648.8 mL    sodium chloride 0.9%: 400 mL    Sodium Chloride 0.9% Bolus: 2000 mL  Total IN: 5903.1 mL    OUT:    Accordian (mL): 100 mL    Bulb (mL): 195 mL    Indwelling Catheter - Urethral (mL): 1025 mL    Voided (mL): 3825 mL  Total OUT: 5145 mL    Total NET: 758.1 mL        I&O's Summary    26 Sep 2023 07:01  -  27 Sep 2023 07:00  --------------------------------------------------------  IN: 5794.6 mL / OUT: 7835 mL / NET: -2040.4 mL    27 Sep 2023 07:01  -  28 Sep 2023 02:51  --------------------------------------------------------  IN: 5903.1 mL / OUT: 5145 mL / NET: 758.1 mL        PHYSICAL EXAM:  General: Patient sitting comfortably in bed. Not in acute distress.   Skin: Cold, but symmetric bilaterally.   Neurological: A&Ox3. CN II-XII grossly intact. B/L upper extremities 5/5 strength. B/L lower extremities 0/5. Decreased sensation T10 dermatome, R deficit > L, otherwise sensation to light touch bilaterally.  Cardiovascular: Regular rate and rhythm. Normal S1 and S2.   Respiratory: Nonlabored breathing. Symmetric chest rise bilaterally.  Gastrointestinal: Abdomen soft. Nontender, nondistended.  Extremities: DP/TP/Radial Pulses 2+.     Incision/Wound: back incision site is clean, dry, and intact w/ aquacel dressing in place    DEVICE/DRAIN DRESSING: coronado (- ), HMX deep (- ), DANIELE superficial ( )    TUBES/LINES:  [x] R IJ (placed intraop) (- )  [x] L Radial A-line (- )  [x] PICC line L brachial vein (- )    DIET:  [] NPO  [x] Mechanical  [] Tube feeds    LABS:                        10.2   16.44 )-----------( 99       ( 27 Sep 2023 17:04 )             30.3         135  |  110<H>  |  4<L>  ----------------------------<  156<H>  3.9   |  17<L>  |  0.48<L>    Ca    7.4<L>      27 Sep 2023 17:04  Phos  1.7       Mg     1.5         TPro  5.4<L>  /  Alb  3.2<L>  /  TBili  0.5  /  DBili  < 0.2  /  AST  64<H>  /  ALT  34  /  AlkPhos  42        Urinalysis Basic - ( 27 Sep 2023 22:44 )    Color: x / Appearance: x / S.020 / pH: x  Gluc: x / Ketone: x  / Bili: x / Urobili: x   Blood: x / Protein: x / Nitrite: x   Leuk Esterase: x / RBC: x / WBC x   Sq Epi: x / Non Sq Epi: x / Bacteria: x      CARDIAC MARKERS ( 27 Sep 2023 08:49 )  x     / x     / 5780 U/L / x     / x          CAPILLARY BLOOD GLUCOSE      Drug Levels: [] N/A    CSF Analysis: [] N/A      Allergies    No Known Allergies    Intolerances      MEDICATIONS:  Antibiotics:    Neuro:  acetaminophen     Tablet .. 650 milliGRAM(s) Oral every 6 hours PRN  HYDROmorphone   Tablet 4 milliGRAM(s) Oral every 4 hours PRN  HYDROmorphone   Tablet 2 milliGRAM(s) Oral every 4 hours PRN  melatonin 5 milliGRAM(s) Oral at bedtime PRN  methocarbamol 750 milliGRAM(s) Oral every 8 hours    Anticoagulation:  enoxaparin Injectable 40 milliGRAM(s) SubCutaneous every 24 hours    OTHER:  chlorhexidine 2% Cloths 1 Application(s) Topical <User Schedule>  chlorhexidine 4% Liquid 1 Application(s) Topical <User Schedule>  influenza   Vaccine 0.5 milliLiter(s) IntraMuscular once  midodrine 10 milliGRAM(s) Oral every 8 hours  norepinephrine Infusion 0.05 MICROgram(s)/kG/Min IV Continuous <Continuous>  phenylephrine    Infusion 0.2 MICROgram(s)/kG/Min IV Continuous <Continuous>  polyethylene glycol 3350 17 Gram(s) Oral daily  senna 2 Tablet(s) Oral at bedtime    IVF:  magnesium sulfate  IVPB 1 Gram(s) IV Intermittent daily  sodium chloride 0.9%. 1000 milliLiter(s) IV Continuous <Continuous>    CULTURES:    RADIOLOGY & ADDITIONAL TESTS:      ASSESSMENT:  43F no PMHx suffered childhood injury resulting in spine injury and residual gait disturbance, hyperreflexia of BL LE. She has right greater than left leg tingling, low back and mid back pain. MRI 2023 showed 90 degree kyphosis of T3-8 and thoracic myelopathy. S/p C7-L1 fusion, T3-8 vertebral column resection, placement of anterior cage (23).     M40.205    S31.000A    S31.000A M40.205 [id number]    S31.000A M40.205 S22.009S22.009G    No pertinent family history in first degree relatives    Handoff    No pertinent past medical history    History of acute illness    Deformity of thoracic vertebra    Deformity of thoracic vertebra    Corpectomy, spine, lumbar, 4 levels    No significant past surgical history    [email or web address]        PLAN:  NEURO:  - Neuro/spine checks/vitals q1hrs  - Pain control with spine ERAS  - Post-op CT full spine: L1 screw not attached to the vertical yoli, epidural lipomatosis L2-3  - DANIELE x1 superficial and HMV x1 deep - monitor outputs    Cardio:  - MAP>100  - Phenylephrine gtt and levo gtt  - midodrine 10q8 to wean off pressors  - echo : EF 60-65%    Pulm:  - extubated   - f/u CXR    GI:  - Regular diet   - Bowel regimen   - PPI     Renal:  - NS @ 50cc/hr   - +coronado    Endo:  - a1c 5.6  - ISS     Heme:  - SCDs for DVT ppx  - 1.5L EBL, 1.5L albumin and cell saver intraop  - 2u PRBC and 1u PLT postop     ID:  - febrile  f/u panculture      Discussed with Dr. Vieira and Dr. Franco

## 2023-09-28 NOTE — CONSULT NOTE ADULT - SUBJECTIVE AND OBJECTIVE BOX
NEUROSURGERY PAIN MANAGEMENT CONSULT NOTE    Chief Complaint: back pain    HPI:  Taken from outpatient neurosurgery note on 9/13/2023 " The patient, a long-standing patient of my practice, reports a history of spinal issues that dates back to a childhood injury. Over the years she's experienced developing issues with walking in a straight line, overactive reflexes in her lower extremities, and unique difficulty with her lower extremities referred to as cloneness. Recent upturn in these symptoms has limited her ability to carry out her daily routines. The patient also reports no significant improvement in her condition since our last clinical encounter two years ago."    44 y/o female with no PMHx or PSHx presents for surgery today.  She reports ambulating with walker or baby stroller at home for years.  She reports intermittent falls.  She has right greater than left leg tingling, low back and mid back pain.  She denies arm or leg pain.  She reports urinary incontinence since having a baby 2 years ago where if she doesn't get to the bathroom fast enough, she has incontinence, but she is able to hold it for a short while.  She takes no pain medications.  She has taken ibuprofen in the past.  She denies saddle anesthesia, bowel incontinence.   (25 Sep 2023 06:51)    Today, patient reports pain is not improved on current regimen. She reports no relief with dilaudid 4 mg PO but says her pain reduces after IV dilaudid.     PAST MEDICAL & SURGICAL HISTORY:  History of acute illness  childhood      No significant past surgical history      FAMILY HISTORY:  No pertinent family history in first degree relatives      SOCIAL HISTORY:  [ ] Denies Smoking, Alcohol, or Drug Use    HOME MEDICATIONS:   Please refer to initial HNP    PAIN HOME MEDICATIONS:    Allergies    No Known Allergies    Intolerances    PAIN MEDICATIONS:  acetaminophen     Tablet .. 650 milliGRAM(s) Oral every 6 hours PRN  HYDROmorphone   Tablet 4 milliGRAM(s) Oral every 4 hours PRN  HYDROmorphone   Tablet 2 milliGRAM(s) Oral every 4 hours PRN  melatonin 5 milliGRAM(s) Oral at bedtime PRN  methocarbamol 750 milliGRAM(s) Oral every 8 hours  pregabalin 75 milliGRAM(s) Oral every 8 hours    Heme:  enoxaparin Injectable 40 milliGRAM(s) SubCutaneous every 24 hours    Antibiotics:    Cardiovascular:  midodrine 10 milliGRAM(s) Oral every 8 hours  norepinephrine Infusion 0.05 MICROgram(s)/kG/Min IV Continuous <Continuous>  phenylephrine    Infusion 0.2 MICROgram(s)/kG/Min IV Continuous <Continuous>    GI:  polyethylene glycol 3350 17 Gram(s) Oral daily  senna 2 Tablet(s) Oral at bedtime    Endocrine:    All Other Medications:  chlorhexidine 2% Cloths 1 Application(s) Topical <User Schedule>  chlorhexidine 4% Liquid 1 Application(s) Topical <User Schedule>  influenza   Vaccine 0.5 milliLiter(s) IntraMuscular once  magnesium sulfate  IVPB 1 Gram(s) IV Intermittent daily  sodium chloride 0.9% lock flush 10 milliLiter(s) IV Push every 1 hour PRN      Vital Signs Last 24 Hrs  T(C): 37.7 (28 Sep 2023 09:55), Max: 38.5 (28 Sep 2023 03:00)  T(F): 99.9 (28 Sep 2023 09:55), Max: 101.3 (28 Sep 2023 03:00)  HR: 83 (28 Sep 2023 12:00) (78 - 161)  BP: 164/83 (28 Sep 2023 12:00) (124/64 - 173/84)  BP(mean): 117 (28 Sep 2023 12:00) (87 - 123)  RR: 17 (28 Sep 2023 12:00) (15 - 18)  SpO2: 100% (28 Sep 2023 12:00) (92% - 100%)    Parameters below as of 28 Sep 2023 12:00  Patient On (Oxygen Delivery Method): room air      LABS:                        10.9   18.70 )-----------( 110      ( 28 Sep 2023 05:23 )             31.9     09-28    137  |  106  |  3<L>  ----------------------------<  172<H>  3.9   |  21<L>  |  0.37<L>    Ca    7.6<L>      28 Sep 2023 11:11  Phos  2.2     09-28  Mg     1.8     09-28    TPro  5.9<L>  /  Alb  3.2<L>  /  TBili  0.4  /  DBili  x   /  AST  62<H>  /  ALT  34  /  AlkPhos  58  09-28      Urinalysis Basic - ( 28 Sep 2023 11:11 )    Color: x / Appearance: x / SG: x / pH: x  Gluc: 172 mg/dL / Ketone: x  / Bili: x / Urobili: x   Blood: x / Protein: x / Nitrite: x   Leuk Esterase: x / RBC: x / WBC x   Sq Epi: x / Non Sq Epi: x / Bacteria: x      REVIEW OF SYSTEMS:  CONSTITUTIONAL: No fever or fatigue O/N.   EYES: No eye pain, visual disturbances  ENMT:  No difficulty hearing. No throat pain  NECK: No pain or stiffness  RESPIRATORY: No cough, wheezing; No shortness of breath  CARDIOVASCULAR: No chest pain, palpitations.   GASTROINTESTINAL: Pt reports passing gas. No bowel movements. No abdominal or epigastric pain. No nausea, vomiting. GENITOURINARY: No dysuria, frequency, or incontinence  NEUROLOGICAL: No headaches, loss of strength, numbness, or tremors. No dizzinesss or lightheadedness with pain medications.   MUSCULOSKELETAL: Incisional back pain. No joint pain or swelling; No muscle, or extremity pain    Pain assessment: 10/10 today    PHYSICAL EXAM  General: Patient sitting comfortably in bed. Not in acute distress.   Skin: Cold, but symmetric bilaterally.   Neurological: A&Ox3. CN II-XII grossly intact. B/L upper extremities 5/5 strength. B/L lower extremities 0/5. Decreased sensation T10 dermatome, R deficit > L, otherwise sensation to light touch bilaterally.  Cardiovascular: Regular rate and rhythm. Normal S1 and S2.   Respiratory: Nonlabored breathing. Symmetric chest rise bilaterally.  Gastrointestinal: Abdomen soft. Nontender, nondistended.  Extremities: DP/TP/Radial Pulses 2+.     Incision/Wound: back incision site is clean, dry, and intact w/ aquacel dressing in place      ASSESSMENT:   43F no PMHx suffered childhood injury resulting in spine injury and residual gait disturbance, hyperreflexia of BL LE. She has right greater than left leg tingling, low back and mid back pain. 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 S/p C7-L1 fusion, T3-8 vertebral column resection, placement of anterior cage (9/25/23).     PLAN:   - Pain: uncontrolled  methocarbamol 750 mg PO q8h   acetaminophen 1000mg PO q8h  increase to dilaudid 4mg to 6mg PO q4h prn for moderate to severe pain  dilaudid 0.5 mg IV q4h for breakthrough    - Bowel regimen: Senna    - Nausea ppx: Zofran standing  - Functional Goals: Pt will get OOB with PT today. Pt will resume previous level of activity without impairment from surgery.   - Additional Consults: None recommended.   - Additional Labs/Imaging:  None recommended.     - Follow up, Discharge Planning:     Discharge is pending: medical clearance  - Pain Management follow up plan: Dr. Birmingham and pain team will continue to follow while in-patient      Plan discussed with Dr. Birmingham NEUROSURGERY PAIN MANAGEMENT CONSULT NOTE    Chief Complaint: back pain    HPI:  Taken from outpatient neurosurgery note on 9/13/2023 " The patient, a long-standing patient of my practice, reports a history of spinal issues that dates back to a childhood injury. Over the years she's experienced developing issues with walking in a straight line, overactive reflexes in her lower extremities, and unique difficulty with her lower extremities referred to as cloneness. Recent upturn in these symptoms has limited her ability to carry out her daily routines. The patient also reports no significant improvement in her condition since our last clinical encounter two years ago."    44 y/o female with no PMHx or PSHx presents for surgery today.  She reports ambulating with walker or baby stroller at home for years.  She reports intermittent falls.  She has right greater than left leg tingling, low back and mid back pain.  She denies arm or leg pain.  She reports urinary incontinence since having a baby 2 years ago where if she doesn't get to the bathroom fast enough, she has incontinence, but she is able to hold it for a short while.  She takes no pain medications.  She has taken ibuprofen in the past.  She denies saddle anesthesia, bowel incontinence.   (25 Sep 2023 06:51)    Today, patient reports pain is not improved on current regimen. She reports no relief with dilaudid 4 mg PO but says her pain reduces after IV dilaudid.     PAST MEDICAL & SURGICAL HISTORY:  History of acute illness  childhood      No significant past surgical history      FAMILY HISTORY:  No pertinent family history in first degree relatives      SOCIAL HISTORY:  [ ] Denies Smoking, Alcohol, or Drug Use    HOME MEDICATIONS:   Please refer to initial HNP    PAIN HOME MEDICATIONS:    Allergies    No Known Allergies    Intolerances    PAIN MEDICATIONS:  acetaminophen     Tablet .. 650 milliGRAM(s) Oral every 6 hours PRN  HYDROmorphone   Tablet 4 milliGRAM(s) Oral every 4 hours PRN  HYDROmorphone   Tablet 2 milliGRAM(s) Oral every 4 hours PRN  melatonin 5 milliGRAM(s) Oral at bedtime PRN  methocarbamol 750 milliGRAM(s) Oral every 8 hours  pregabalin 75 milliGRAM(s) Oral every 8 hours    Heme:  enoxaparin Injectable 40 milliGRAM(s) SubCutaneous every 24 hours    Antibiotics:    Cardiovascular:  midodrine 10 milliGRAM(s) Oral every 8 hours  norepinephrine Infusion 0.05 MICROgram(s)/kG/Min IV Continuous <Continuous>  phenylephrine    Infusion 0.2 MICROgram(s)/kG/Min IV Continuous <Continuous>    GI:  polyethylene glycol 3350 17 Gram(s) Oral daily  senna 2 Tablet(s) Oral at bedtime    Endocrine:    All Other Medications:  chlorhexidine 2% Cloths 1 Application(s) Topical <User Schedule>  chlorhexidine 4% Liquid 1 Application(s) Topical <User Schedule>  influenza   Vaccine 0.5 milliLiter(s) IntraMuscular once  magnesium sulfate  IVPB 1 Gram(s) IV Intermittent daily  sodium chloride 0.9% lock flush 10 milliLiter(s) IV Push every 1 hour PRN      Vital Signs Last 24 Hrs  T(C): 37.7 (28 Sep 2023 09:55), Max: 38.5 (28 Sep 2023 03:00)  T(F): 99.9 (28 Sep 2023 09:55), Max: 101.3 (28 Sep 2023 03:00)  HR: 83 (28 Sep 2023 12:00) (78 - 161)  BP: 164/83 (28 Sep 2023 12:00) (124/64 - 173/84)  BP(mean): 117 (28 Sep 2023 12:00) (87 - 123)  RR: 17 (28 Sep 2023 12:00) (15 - 18)  SpO2: 100% (28 Sep 2023 12:00) (92% - 100%)    Parameters below as of 28 Sep 2023 12:00  Patient On (Oxygen Delivery Method): room air      LABS:                        10.9   18.70 )-----------( 110      ( 28 Sep 2023 05:23 )             31.9     09-28    137  |  106  |  3<L>  ----------------------------<  172<H>  3.9   |  21<L>  |  0.37<L>    Ca    7.6<L>      28 Sep 2023 11:11  Phos  2.2     09-28  Mg     1.8     09-28    TPro  5.9<L>  /  Alb  3.2<L>  /  TBili  0.4  /  DBili  x   /  AST  62<H>  /  ALT  34  /  AlkPhos  58  09-28      Urinalysis Basic - ( 28 Sep 2023 11:11 )    Color: x / Appearance: x / SG: x / pH: x  Gluc: 172 mg/dL / Ketone: x  / Bili: x / Urobili: x   Blood: x / Protein: x / Nitrite: x   Leuk Esterase: x / RBC: x / WBC x   Sq Epi: x / Non Sq Epi: x / Bacteria: x      REVIEW OF SYSTEMS:  CONSTITUTIONAL: No fever or fatigue O/N.   EYES: No eye pain, visual disturbances  ENMT:  No difficulty hearing. No throat pain  NECK: No pain or stiffness  RESPIRATORY: No cough, wheezing; No shortness of breath  CARDIOVASCULAR: No chest pain, palpitations.   GASTROINTESTINAL: Pt reports passing gas. No bowel movements. No abdominal or epigastric pain. No nausea, vomiting. GENITOURINARY: No dysuria, frequency, or incontinence  NEUROLOGICAL: No headaches, loss of strength, numbness, or tremors. No dizzinesss or lightheadedness with pain medications.   MUSCULOSKELETAL: Incisional back pain. No joint pain or swelling; No muscle, or extremity pain    Pain assessment: 10/10 today    PHYSICAL EXAM  General: Patient sitting comfortably in bed. Not in acute distress.   Skin: Cold, but symmetric bilaterally.   Neurological: A&Ox3. CN II-XII grossly intact. B/L upper extremities 5/5 strength. B/L lower extremities 0/5. Decreased sensation T10 dermatome, R deficit > L, otherwise sensation to light touch bilaterally.  Cardiovascular: Regular rate and rhythm. Normal S1 and S2.   Respiratory: Nonlabored breathing. Symmetric chest rise bilaterally.  Gastrointestinal: Abdomen soft. Nontender, nondistended.  Extremities: DP/TP/Radial Pulses 2+.     Incision/Wound: back incision site is clean, dry, and intact w/ aquacel dressing in place      ASSESSMENT:   43F no PMHx suffered childhood injury resulting in spine injury and residual gait disturbance, hyperreflexia of BL LE. She has right greater than left leg tingling, low back and mid back pain. 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 S/p C7-L1 fusion, T3-8 vertebral column resection, placement of anterior cage (9/25/23).     PLAN:   - Pain: uncontrolled  Discontinue methocarbamol. Start Flexeril 5mg PO q8h  acetaminophen 1000mg PO q8h  increase to dilaudid 4mg to 6mg PO q4h prn for moderate to severe pain  dilaudid 0.5 mg IV q4h for breakthrough    - Bowel regimen: Senna    - Nausea ppx: Zofran standing  - Functional Goals: Pt will get OOB with PT today. Pt will resume previous level of activity without impairment from surgery.   - Additional Consults: None recommended.   - Additional Labs/Imaging:  None recommended.     - Follow up, Discharge Planning:     Discharge is pending: medical clearance  - Pain Management follow up plan: Dr. Birmingham and pain team will continue to follow while in-patient      Plan discussed with Dr. Birmingham

## 2023-09-28 NOTE — PROGRESS NOTE ADULT - SUBJECTIVE AND OBJECTIVE BOX
INFECTIOUS DISEASES CONSULT NOTE  Taken from outpatient neurosurgery note on 9/13/2023 " The patient, a long-standing patient of my practice, reports a history of spinal issues that dates back to a childhood injury. Over the years she's experienced developing issues with walking in a straight line, overactive reflexes in her lower extremities, and unique difficulty with her lower extremities referred to as cloneness. Recent upturn in these symptoms has limited her ability to carry out her daily routines. The patient also reports no significant improvement in her condition since our last clinical encounter two years ago."    44 y/o female with no PMHx or PSHx presented for surgery 9/25/23.  She reports ambulating with walker or baby stroller at home for years.  She reports intermittent falls.  She has right greater than left leg tingling, low back and mid back pain.  She denies arm or leg pain.  She reports urinary incontinence since having a baby 2 years ago where if she doesn't get to the bathroom fast enough, she has incontinence, but she is able to hold it for a short while.  She takes no pain medications.  She has taken ibuprofen in the past.  She denies saddle anesthesia, bowel incontinence.    Patient states that she has shortness of breath since her surgery and a cough that started yesterday. She states that she feels like she has to cough up mucus. She states that her mucus is "off white". She admits to chest pain when she coughs but she does not have chest pain at rest. She also admits to feeling chills and excessive sweating. Patient states that she has not had a bowel movement since 9/23/23. Patient denies dysuria.      ROS:   Constitutional, eyes, ENT, cardiovascular, respiratory, gastrointestinal, genitourinary, integumentary, neurological, psychiatric and heme/lymph are otherwise negative other than noted above       ANTIBIOTICS/RELEVANT:    MEDICATIONS  (STANDING):  chlorhexidine 2% Cloths 1 Application(s) Topical <User Schedule>  chlorhexidine 4% Liquid 1 Application(s) Topical <User Schedule>  enoxaparin Injectable 40 milliGRAM(s) SubCutaneous every 24 hours  influenza   Vaccine 0.5 milliLiter(s) IntraMuscular once  magnesium sulfate  IVPB 1 Gram(s) IV Intermittent daily  methocarbamol 750 milliGRAM(s) Oral every 8 hours  midodrine 10 milliGRAM(s) Oral every 8 hours  norepinephrine Infusion 0.05 MICROgram(s)/kG/Min (3.22 mL/Hr) IV Continuous <Continuous>  phenylephrine    Infusion 0.2 MICROgram(s)/kG/Min (5.15 mL/Hr) IV Continuous <Continuous>  polyethylene glycol 3350 17 Gram(s) Oral daily  potassium phosphate IVPB 15 milliMole(s) IV Intermittent once  pregabalin 75 milliGRAM(s) Oral every 8 hours  senna 2 Tablet(s) Oral at bedtime    MEDICATIONS  (PRN):  acetaminophen     Tablet .. 650 milliGRAM(s) Oral every 6 hours PRN Temp greater or equal to 38C (100.4F), Mild Pain (1 - 3)  HYDROmorphone   Tablet 2 milliGRAM(s) Oral every 4 hours PRN Moderate Pain (4 - 6)  HYDROmorphone   Tablet 4 milliGRAM(s) Oral every 4 hours PRN Severe Pain (7 - 10)  HYDROmorphone  Injectable 0.5 milliGRAM(s) IV Push every 6 hours PRN breaktrhough pain  melatonin 5 milliGRAM(s) Oral at bedtime PRN Insomnia  sodium chloride 0.9% lock flush 10 milliLiter(s) IV Push every 1 hour PRN Pre/post blood products, medications, blood draw, and to maintain line patency        Vital Signs Last 24 Hrs  T(C): 38.1 (28 Sep 2023 15:00), Max: 38.5 (28 Sep 2023 03:00)  T(F): 100.6 (28 Sep 2023 15:00), Max: 101.3 (28 Sep 2023 03:00)  HR: 94 (28 Sep 2023 15:00) (78 - 109)  BP: 141/90 (28 Sep 2023 15:00) (124/64 - 173/84)  BP(mean): 110 (28 Sep 2023 15:00) (87 - 123)  RR: 17 (28 Sep 2023 14:00) (15 - 18)  SpO2: 100% (28 Sep 2023 15:00) (98% - 100%)    Parameters below as of 28 Sep 2023 14:00  Patient On (Oxygen Delivery Method): room air        09-27-23 @ 07:01  -  09-28-23 @ 07:00  --------------------------------------------------------  IN: 7271.1 mL / OUT: 7600 mL / NET: -328.9 mL    09-28-23 @ 07:01  -  09-28-23 @ 16:22  --------------------------------------------------------  IN: 2213 mL / OUT: 2650 mL / NET: -437 mL      PHYSICAL EXAM:  Constitutional: alert, NAD  Eyes: the sclera and conjunctiva were normal.   ENT: the ears and nose were normal in appearance. ulcer on MM of lower lip likely from intubation in surgery  Neck: the appearance of the neck was normal and the neck was supple.   Pulmonary: no respiratory distress and lungs were clear to auscultation bilaterally.   Heart: heart rate was normal and rhythm regular, normal S1 and S2  Vascular:. there was no peripheral edema  Abdomen: normal bowel sounds, soft, non-tender, distended  Neurological: no focal deficits.   Psychiatric: the affect was normal        LABS:                        10.9   18.70 )-----------( 110      ( 28 Sep 2023 05:23 )             31.9     09-28    137  |  106  |  3<L>  ----------------------------<  172<H>  3.9   |  21<L>  |  0.37<L>    Ca    7.6<L>      28 Sep 2023 11:11  Phos  2.2     09-28  Mg     1.8     09-28    TPro  5.9<L>  /  Alb  3.2<L>  /  TBili  0.4  /  DBili  x   /  AST  62<H>  /  ALT  34  /  AlkPhos  58  09-28      Urinalysis Basic - ( 28 Sep 2023 11:11 )    Color: x / Appearance: x / SG: x / pH: x  Gluc: 172 mg/dL / Ketone: x  / Bili: x / Urobili: x   Blood: x / Protein: x / Nitrite: x   Leuk Esterase: x / RBC: x / WBC x   Sq Epi: x / Non Sq Epi: x / Bacteria: x        MICROBIOLOGY:      RADIOLOGY & ADDITIONAL STUDIES:  Reviewed

## 2023-09-28 NOTE — PROGRESS NOTE ADULT - SUBJECTIVE AND OBJECTIVE BOX
=================================  NEUROCRITICAL CARE ATTENDING NOTE  =================================    NGUYEN GROVER   MRN-[id number]  Summary:  43y/F  with no PMH, traumatic spinal fracture --> resulted in severe thoracic kyphosis with progressing myelopathy. Baseline exam difficulty ambulating, but with good strength on all 4s.  Now admitted for elective spine surgery.    Taken from outpatient neurosurgery note on 9/13/2023 " The patient, a long-standing patient of my practice, reports a history of spinal issues that dates back to a childhood injury. Over the years she's experienced developing issues with walking in a straight line, overactive reflexes in her lower extremities, and unique difficulty with her lower extremities referred to as cloneness. Recent upturn in these symptoms has limited her ability to carry out her daily routines. The patient also reports no significant improvement in her condition since our last clinical encounter two years ago."    42 y/o female with no PMHx or PSHx presents for surgery today.  She reports ambulating with walker or baby stroller at home for years.  She reports intermittent falls.  She has right greater than left leg tingling, low back and mid back pain.  She denies arm or leg pain.  She reports urinary incontinence since having a baby 2 years ago where if she doesn't get to the bathroom fast enough, she has incontinence, but she is able to hold it for a short while.  She takes no pain medications.  She has taken ibuprofen in the past.  She denies saddle anesthesia, bowel incontinence.   (25 Sep 2023 06:51)    COURSE IN THE HOSPITAL:  09/25 POD#0 5-level vertebral column resection with fusion of C7-L1, put in a cage; stage 1; stable introp monitoring; pleura violated, repaired; extubated  09/26 POD#1   09/27 POD#2 Tmax 38.4  09/28 POD#3 Tmax 38.5    Past Medical History: No pertinent past medical history History of acute illness  Allergies:  No Known Allergies  Home meds:     PHYSICAL EXAMINATION  T(C): 37.1 (09-28 @ 17:49), Max: 38.5 (09-28 @ 03:00) HR: 79 (09-28 @ 18:00) (78 - 109) BP: 122/58 (09-28 @ 18:00) (122/58 - 173/84) RR: 17 (09-28 @ 18:00) (15 - 18) SpO2: 100% (09-28 @ 18:00) (98% - 100%)   NEUROLOGIC EXAMINATION:  Patient is awake alert oriented x3, pupils equal and reactive, B UE 5/5 B LE 0/5  GENERAL:  not intubated  EENT:  anicteric  CARDIOVASCULAR: (+) S1 S2, normal rate and regular rhythm  PULMONARY: clear to auscultation bilaterally  ABDOMEN: soft, nontender with normoactive bowel sounds  EXTREMITIES: no edema  SKIN: no rash    LABS: 09-28    (16.44)  10.9 (10.2)  18.70 )-----------( 110  (99)    ( 28 Sep 2023 05:23 )             31.9   (132)  137  |  106  |  3<L>  ----------------------------<  172<H>  3.9   |  21<L>  |  0.37<L>    Ca    7.6<L>      28 Sep 2023 11:11  Phos  2.2     09-28  Mg     1.8     09-28    TPro  5.9<L>  /  Alb  3.2<L>  /  TBili  0.4  /  DBili  x   /  AST  62<H>  /  ALT  34  /  AlkPhos  58  09-28 09-27 @ 07:01  -  09-28 @ 07:00  --------------------------------------------------------  IN: 7271.1 mL / OUT: 7600 mL / NET: -328.9 mL    09-28 @ 07:01  -  09-28 @ 18:34  --------------------------------------------------------  IN: 2666 mL / OUT: 3225 mL / NET: -559 mL    Bacteriology: 09/27 UA  NEG  09/28 Blood CS NG12h x1  09/27 Blood CS NG12h x1    CSF studies:  EEG:  Neuroimaging:  Other imaging:    MEDICATIONS: 09-28    ·	enoxaparin Injectable 40 SubCutaneous every 24 hours  ·	methocarbamol 750 Oral every 8 hours  ·	pregabalin 75 Oral every 8 hours  ·	midodrine 10 milliGRAM(s) Oral every 8 hours  ·	polyethylene glycol 3350 17 Oral daily  ·	senna 2 Oral at bedtime  ·	magnesium sulfate  IVPB 1 IV Intermittent daily  ·	acetaminophen     Tablet .. 650 Oral every 6 hours PRN  ·	HYDROmorphone   Tablet 2 Oral every 4 hours PRN  ·	HYDROmorphone   Tablet 4 Oral every 4 hours PRN  ·	HYDROmorphone  Injectable 0.5 IV Push every 6 hours PRN  ·	melatonin 5 Oral at bedtime PRN    IV FLUIDS: NS@50  DRIPS:   ·	norepinephrine Infusion 0.05 MICROgram(s)/kG/Min IV Continuous <Continuous>0.4  phenylephrine    Infusion 0.2 MICROgram(s)/kG/Min IV Continuous <Continuous> - OFF  DIET: regular   Lines: R IJ, Fields L PICC  Drains:   Chin  Wounds:    CODE STATUS:  Full Code                       GOALS OF CARE:  aggressive                      DISPOSITION:  ICU =================================  NEUROCRITICAL CARE ATTENDING NOTE  =================================    NGUYEN GROVER   MRN-[id number]  Summary:  43y/F  with no PMH, traumatic spinal fracture --> resulted in severe thoracic kyphosis with progressing myelopathy. Baseline exam difficulty ambulating, but with good strength on all 4s.  Now admitted for elective spine surgery.    Taken from outpatient neurosurgery note on 9/13/2023 " The patient, a long-standing patient of my practice, reports a history of spinal issues that dates back to a childhood injury. Over the years she's experienced developing issues with walking in a straight line, overactive reflexes in her lower extremities, and unique difficulty with her lower extremities referred to as cloneness. Recent upturn in these symptoms has limited her ability to carry out her daily routines. The patient also reports no significant improvement in her condition since our last clinical encounter two years ago."    44 y/o female with no PMHx or PSHx presents for surgery today.  She reports ambulating with walker or baby stroller at home for years.  She reports intermittent falls.  She has right greater than left leg tingling, low back and mid back pain.  She denies arm or leg pain.  She reports urinary incontinence since having a baby 2 years ago where if she doesn't get to the bathroom fast enough, she has incontinence, but she is able to hold it for a short while.  She takes no pain medications.  She has taken ibuprofen in the past.  She denies saddle anesthesia, bowel incontinence.   (25 Sep 2023 06:51)    COURSE IN THE HOSPITAL:  09/25 POD#0 5-level vertebral column resection with fusion of C7-L1, put in a cage; stage 1; stable introp monitoring; pleura violated, repaired; extubated  09/26 POD#1   09/27 POD#2 Tmax 38.4  09/28 POD#3 Tmax 38.5 central line (RIJ) removed    Past Medical History: No pertinent past medical history History of acute illness  Allergies:  No Known Allergies  Home meds:     PHYSICAL EXAMINATION  T(C): 37.1 (09-28 @ 17:49), Max: 38.5 (09-28 @ 03:00) HR: 79 (09-28 @ 18:00) (78 - 109) BP: 122/58 (09-28 @ 18:00) (122/58 - 173/84) RR: 17 (09-28 @ 18:00) (15 - 18) SpO2: 100% (09-28 @ 18:00) (98% - 100%)   NEUROLOGIC EXAMINATION:  Patient is awake alert oriented x3, pupils equal and reactive, B UE 5/5 B LE 0/5  GENERAL:  not intubated  EENT:  anicteric  CARDIOVASCULAR: (+) S1 S2, normal rate and regular rhythm  PULMONARY: clear to auscultation bilaterally  ABDOMEN: soft, nontender with normoactive bowel sounds  EXTREMITIES: no edema  SKIN: no rash    LABS: 09-28    (16.44)  10.9 (10.2)  18.70 )-----------( 110  (99)    ( 28 Sep 2023 05:23 )             31.9   (132)  137  |  106  |  3<L>  ----------------------------<  172<H>  3.9   |  21<L>  |  0.37<L>    Ca    7.6<L>      28 Sep 2023 11:11  Phos  2.2     09-28  Mg     1.8     09-28    TPro  5.9<L>  /  Alb  3.2<L>  /  TBili  0.4  /  DBili  x   /  AST  62<H>  /  ALT  34  /  AlkPhos  58  09-28 09-27 @ 07:01  -  09-28 @ 07:00  --------------------------------------------------------  IN: 7271.1 mL / OUT: 7600 mL / NET: -328.9 mL    09-28 @ 07:01  -  09-28 @ 18:34  --------------------------------------------------------  IN: 2666 mL / OUT: 3225 mL / NET: -559 mL    Bacteriology: 09/27 UA  NEG  09/28 Blood CS NG12h x1  09/27 Blood CS NG12h x1    CSF studies:  EEG:  Neuroimaging:  Other imaging:    MEDICATIONS: 09-28    ·	enoxaparin Injectable 40 SubCutaneous every 24 hours  ·	methocarbamol 750 Oral every 8 hours  ·	pregabalin 75 Oral every 8 hours  ·	midodrine 10 milliGRAM(s) Oral every 8 hours  ·	polyethylene glycol 3350 17 Oral daily  ·	senna 2 Oral at bedtime  ·	acetaminophen     Tablet .. 650 Oral every 6 hours PRN  ·	HYDROmorphone   Tablet 2 Oral every 4 hours PRN  ·	HYDROmorphone   Tablet 4 Oral every 4 hours PRN  ·	HYDROmorphone  Injectable 0.5 IV Push every 6 hours PRN  ·	melatonin 5 Oral at bedtime PRN    IV FLUIDS:   DRIPS:   ·	norepinephrine Infusion 0.05 MICROgram(s)/kG/Min IV Continuous <Continuous>0.3  ·	phenylephrine    Infusion 0.2 MICROgram(s)/kG/Min IV Continuous <Continuous> - 0.2  DIET: regular   Lines: Rosa Maria L PICC  Drains:   Chin  Wounds:    CODE STATUS:  Full Code                       GOALS OF CARE:  aggressive                      DISPOSITION:  ICU

## 2023-09-28 NOTE — PROGRESS NOTE ADULT - ASSESSMENT
43y/F with h/o traumatic spinal cord fracture, severe thoracic kyphosis, s/p 5-level vertebral resection, fusion C7-L1 (09/25/2023, Dr. Vieira)    PLAN:   NEURO: spine checks q1h, PRN pain meds tylenol  hemodynamic augmentation MAP >100; as per NSG  second stage surgery on Friday  d/c pregabalin  REHAB:  physical therapy evaluation and management    EARLY MOB:  HOB up, bed rest    PULM:  RA  CARDIO:  , central line, check EKG, echo, (EF 60-65) troponin, on neosynephrine / levophed  ENDO:  Blood sugar goals 140-180 mg/dL  GI:  bowel regimen  DIET: regular diet  RENAL:  IVL  HEM/ONC: stable  VTE Prophylaxis: SCDs, SQL  ID: febrile, WBC increase. CXR NEG, pancultured, UA NEG; no ABx for now  Social: will update family    Active issues:  What's keeping patient in the ICU? pressors  What is this patient's dispo plan? stepdown after stage 2 surgery    ATTENDING ATTESTATION:  I was physically present for the key portions of the evaluation and management (E/M) service provided.  I agree with the above history, physical and plan, which I have reviewed and edited where appropriate.    Patient at high risk for neurological deterioration or death due to:  ICU delirium, aspiration PNA, DVT / PE.  Critical care time:  I have personally provided 45 minutes of critical care time, excluding time spent on separate procedures.      Plan discussed with RN, house staff.    43y/F with h/o traumatic spinal cord fracture, severe thoracic kyphosis, s/p 5-level vertebral resection, fusion C7-L1 (09/25/2023, Dr. Vieira)    PLAN:   NEURO: spine checks q2h, VSq1, PRN pain meds tylenol, ERAS protocol, hydromorphone  hemodynamic augmentation MAP >100; as per NSG  second stage surgery next week  d/c pregabalin  REHAB:  physical therapy evaluation and management    EARLY MOB:  HOB up, bed rest    PULM:  RA  CARDIO:  , echo, (EF 60-65) troponin, on neosynephrine / levophed - taper aurora  ENDO:  Blood sugar goals 140-180 mg/dL  GI:  bowel regimen - increase miralax  DIET: regular diet  RENAL:  IVL  HEM/ONC: stable  VTE Prophylaxis: SCDs, SQL  ID: febrile, WBC increase. CXR NEG, pancultured, UA NEG; no ABx for now  Social: will update family    Active issues:  What's keeping patient in the ICU? pressors  What is this patient's dispo plan? stepdown after stage 2 surgery    ATTENDING ATTESTATION:  I was physically present for the key portions of the evaluation and management (E/M) service provided.  I agree with the above history, physical and plan, which I have reviewed and edited where appropriate.    Patient at high risk for neurological deterioration or death due to:  ICU delirium, aspiration PNA, DVT / PE.  Critical care time:  I have personally provided 45 minutes of critical care time, excluding time spent on separate procedures.      Plan discussed with RN, house staff.

## 2023-09-29 LAB
ANION GAP SERPL CALC-SCNC: 11 MMOL/L — SIGNIFICANT CHANGE UP (ref 5–17)
ANION GAP SERPL CALC-SCNC: 8 MMOL/L — SIGNIFICANT CHANGE UP (ref 5–17)
ANION GAP SERPL CALC-SCNC: 9 MMOL/L — SIGNIFICANT CHANGE UP (ref 5–17)
APTT BLD: 23.6 SEC — LOW (ref 24.5–35.6)
BASOPHILS # BLD AUTO: 0.03 K/UL — SIGNIFICANT CHANGE UP (ref 0–0.2)
BASOPHILS NFR BLD AUTO: 0.2 % — SIGNIFICANT CHANGE UP (ref 0–2)
BLD GP AB SCN SERPL QL: NEGATIVE — SIGNIFICANT CHANGE UP
BUN SERPL-MCNC: 3 MG/DL — LOW (ref 7–23)
CALCIUM SERPL-MCNC: 7.7 MG/DL — LOW (ref 8.4–10.5)
CALCIUM SERPL-MCNC: 8.1 MG/DL — LOW (ref 8.4–10.5)
CALCIUM SERPL-MCNC: 8.2 MG/DL — LOW (ref 8.4–10.5)
CHLORIDE SERPL-SCNC: 104 MMOL/L — SIGNIFICANT CHANGE UP (ref 96–108)
CHLORIDE SERPL-SCNC: 104 MMOL/L — SIGNIFICANT CHANGE UP (ref 96–108)
CHLORIDE SERPL-SCNC: 106 MMOL/L — SIGNIFICANT CHANGE UP (ref 96–108)
CO2 SERPL-SCNC: 21 MMOL/L — LOW (ref 22–31)
CO2 SERPL-SCNC: 23 MMOL/L — SIGNIFICANT CHANGE UP (ref 22–31)
CO2 SERPL-SCNC: 24 MMOL/L — SIGNIFICANT CHANGE UP (ref 22–31)
CREAT SERPL-MCNC: 0.38 MG/DL — LOW (ref 0.5–1.3)
CREAT SERPL-MCNC: 0.39 MG/DL — LOW (ref 0.5–1.3)
CREAT SERPL-MCNC: 0.4 MG/DL — LOW (ref 0.5–1.3)
EGFR: 127 ML/MIN/1.73M2 — SIGNIFICANT CHANGE UP
EGFR: 128 ML/MIN/1.73M2 — SIGNIFICANT CHANGE UP
EGFR: 129 ML/MIN/1.73M2 — SIGNIFICANT CHANGE UP
EOSINOPHIL # BLD AUTO: 0.37 K/UL — SIGNIFICANT CHANGE UP (ref 0–0.5)
EOSINOPHIL NFR BLD AUTO: 2.8 % — SIGNIFICANT CHANGE UP (ref 0–6)
GLUCOSE SERPL-MCNC: 158 MG/DL — HIGH (ref 70–99)
GLUCOSE SERPL-MCNC: 159 MG/DL — HIGH (ref 70–99)
GLUCOSE SERPL-MCNC: 177 MG/DL — HIGH (ref 70–99)
HCT VFR BLD CALC: 26.6 % — LOW (ref 34.5–45)
HCT VFR BLD CALC: 28.2 % — LOW (ref 34.5–45)
HGB BLD-MCNC: 9.3 G/DL — LOW (ref 11.5–15.5)
HGB BLD-MCNC: 9.7 G/DL — LOW (ref 11.5–15.5)
IMM GRANULOCYTES NFR BLD AUTO: 0.5 % — SIGNIFICANT CHANGE UP (ref 0–0.9)
INR BLD: 1.02 — SIGNIFICANT CHANGE UP (ref 0.85–1.18)
LYMPHOCYTES # BLD AUTO: 1.07 K/UL — SIGNIFICANT CHANGE UP (ref 1–3.3)
LYMPHOCYTES # BLD AUTO: 8.1 % — LOW (ref 13–44)
MAGNESIUM SERPL-MCNC: 1.6 MG/DL — SIGNIFICANT CHANGE UP (ref 1.6–2.6)
MCHC RBC-ENTMCNC: 28 PG — SIGNIFICANT CHANGE UP (ref 27–34)
MCHC RBC-ENTMCNC: 28.1 PG — SIGNIFICANT CHANGE UP (ref 27–34)
MCHC RBC-ENTMCNC: 34.4 GM/DL — SIGNIFICANT CHANGE UP (ref 32–36)
MCHC RBC-ENTMCNC: 35 GM/DL — SIGNIFICANT CHANGE UP (ref 32–36)
MCV RBC AUTO: 80.1 FL — SIGNIFICANT CHANGE UP (ref 80–100)
MCV RBC AUTO: 81.7 FL — SIGNIFICANT CHANGE UP (ref 80–100)
MONOCYTES # BLD AUTO: 0.72 K/UL — SIGNIFICANT CHANGE UP (ref 0–0.9)
MONOCYTES NFR BLD AUTO: 5.5 % — SIGNIFICANT CHANGE UP (ref 2–14)
NEUTROPHILS # BLD AUTO: 10.92 K/UL — HIGH (ref 1.8–7.4)
NEUTROPHILS NFR BLD AUTO: 82.9 % — HIGH (ref 43–77)
NRBC # BLD: 0 /100 WBCS — SIGNIFICANT CHANGE UP (ref 0–0)
NRBC # BLD: 0 /100 WBCS — SIGNIFICANT CHANGE UP (ref 0–0)
PHOSPHATE SERPL-MCNC: 2.1 MG/DL — LOW (ref 2.5–4.5)
PLATELET # BLD AUTO: 107 K/UL — LOW (ref 150–400)
PLATELET # BLD AUTO: 144 K/UL — LOW (ref 150–400)
POTASSIUM SERPL-MCNC: 3.8 MMOL/L — SIGNIFICANT CHANGE UP (ref 3.5–5.3)
POTASSIUM SERPL-MCNC: 3.9 MMOL/L — SIGNIFICANT CHANGE UP (ref 3.5–5.3)
POTASSIUM SERPL-MCNC: 4.1 MMOL/L — SIGNIFICANT CHANGE UP (ref 3.5–5.3)
POTASSIUM SERPL-SCNC: 3.8 MMOL/L — SIGNIFICANT CHANGE UP (ref 3.5–5.3)
POTASSIUM SERPL-SCNC: 3.9 MMOL/L — SIGNIFICANT CHANGE UP (ref 3.5–5.3)
POTASSIUM SERPL-SCNC: 4.1 MMOL/L — SIGNIFICANT CHANGE UP (ref 3.5–5.3)
PROTHROM AB SERPL-ACNC: 11.6 SEC — SIGNIFICANT CHANGE UP (ref 9.5–13)
RBC # BLD: 3.32 M/UL — LOW (ref 3.8–5.2)
RBC # BLD: 3.45 M/UL — LOW (ref 3.8–5.2)
RBC # FLD: 14.1 % — SIGNIFICANT CHANGE UP (ref 10.3–14.5)
RBC # FLD: 14.2 % — SIGNIFICANT CHANGE UP (ref 10.3–14.5)
RH IG SCN BLD-IMP: POSITIVE — SIGNIFICANT CHANGE UP
SODIUM SERPL-SCNC: 136 MMOL/L — SIGNIFICANT CHANGE UP (ref 135–145)
SODIUM SERPL-SCNC: 136 MMOL/L — SIGNIFICANT CHANGE UP (ref 135–145)
SODIUM SERPL-SCNC: 138 MMOL/L — SIGNIFICANT CHANGE UP (ref 135–145)
TROPONIN T, HIGH SENSITIVITY RESULT: 10 NG/L — SIGNIFICANT CHANGE UP (ref 0–51)
WBC # BLD: 11.37 K/UL — HIGH (ref 3.8–10.5)
WBC # BLD: 13.17 K/UL — HIGH (ref 3.8–10.5)
WBC # FLD AUTO: 11.37 K/UL — HIGH (ref 3.8–10.5)
WBC # FLD AUTO: 13.17 K/UL — HIGH (ref 3.8–10.5)

## 2023-09-29 PROCEDURE — 99232 SBSQ HOSP IP/OBS MODERATE 35: CPT

## 2023-09-29 PROCEDURE — 99291 CRITICAL CARE FIRST HOUR: CPT

## 2023-09-29 PROCEDURE — 36600 WITHDRAWAL OF ARTERIAL BLOOD: CPT

## 2023-09-29 PROCEDURE — 71045 X-RAY EXAM CHEST 1 VIEW: CPT | Mod: 26

## 2023-09-29 RX ORDER — CYCLOBENZAPRINE HYDROCHLORIDE 10 MG/1
5 TABLET, FILM COATED ORAL EVERY 8 HOURS
Refills: 0 | Status: DISCONTINUED | OUTPATIENT
Start: 2023-09-29 | End: 2023-10-02

## 2023-09-29 RX ORDER — HYDROMORPHONE HYDROCHLORIDE 2 MG/ML
4 INJECTION INTRAMUSCULAR; INTRAVENOUS; SUBCUTANEOUS EVERY 4 HOURS
Refills: 0 | Status: DISCONTINUED | OUTPATIENT
Start: 2023-09-29 | End: 2023-10-02

## 2023-09-29 RX ORDER — POTASSIUM CHLORIDE 20 MEQ
40 PACKET (EA) ORAL ONCE
Refills: 0 | Status: COMPLETED | OUTPATIENT
Start: 2023-09-29 | End: 2023-09-29

## 2023-09-29 RX ORDER — HYDROMORPHONE HYDROCHLORIDE 2 MG/ML
6 INJECTION INTRAMUSCULAR; INTRAVENOUS; SUBCUTANEOUS EVERY 4 HOURS
Refills: 0 | Status: DISCONTINUED | OUTPATIENT
Start: 2023-09-29 | End: 2023-10-02

## 2023-09-29 RX ORDER — POTASSIUM PHOSPHATE, MONOBASIC POTASSIUM PHOSPHATE, DIBASIC 236; 224 MG/ML; MG/ML
30 INJECTION, SOLUTION INTRAVENOUS ONCE
Refills: 0 | Status: DISCONTINUED | OUTPATIENT
Start: 2023-09-29 | End: 2023-09-29

## 2023-09-29 RX ORDER — ACETAMINOPHEN 500 MG
1000 TABLET ORAL EVERY 8 HOURS
Refills: 0 | Status: DISCONTINUED | OUTPATIENT
Start: 2023-09-29 | End: 2023-10-06

## 2023-09-29 RX ORDER — HYDROMORPHONE HYDROCHLORIDE 2 MG/ML
0.25 INJECTION INTRAMUSCULAR; INTRAVENOUS; SUBCUTANEOUS ONCE
Refills: 0 | Status: DISCONTINUED | OUTPATIENT
Start: 2023-09-29 | End: 2023-09-29

## 2023-09-29 RX ORDER — ACETAMINOPHEN 500 MG
1000 TABLET ORAL EVERY 8 HOURS
Refills: 0 | Status: DISCONTINUED | OUTPATIENT
Start: 2023-09-29 | End: 2023-09-29

## 2023-09-29 RX ORDER — MAGNESIUM SULFATE 500 MG/ML
2 VIAL (ML) INJECTION ONCE
Refills: 0 | Status: COMPLETED | OUTPATIENT
Start: 2023-09-29 | End: 2023-09-29

## 2023-09-29 RX ORDER — SODIUM CHLORIDE 9 MG/ML
1000 INJECTION INTRAMUSCULAR; INTRAVENOUS; SUBCUTANEOUS ONCE
Refills: 0 | Status: COMPLETED | OUTPATIENT
Start: 2023-09-29 | End: 2023-09-29

## 2023-09-29 RX ORDER — SODIUM CHLORIDE 9 MG/ML
3 INJECTION INTRAMUSCULAR; INTRAVENOUS; SUBCUTANEOUS EVERY 6 HOURS
Refills: 0 | Status: DISCONTINUED | OUTPATIENT
Start: 2023-09-29 | End: 2023-10-03

## 2023-09-29 RX ORDER — HYDROMORPHONE HYDROCHLORIDE 2 MG/ML
0.5 INJECTION INTRAMUSCULAR; INTRAVENOUS; SUBCUTANEOUS EVERY 4 HOURS
Refills: 0 | Status: DISCONTINUED | OUTPATIENT
Start: 2023-09-29 | End: 2023-10-02

## 2023-09-29 RX ORDER — SODIUM,POTASSIUM PHOSPHATES 278-250MG
1 POWDER IN PACKET (EA) ORAL ONCE
Refills: 0 | Status: COMPLETED | OUTPATIENT
Start: 2023-09-29 | End: 2023-09-29

## 2023-09-29 RX ADMIN — CHLORHEXIDINE GLUCONATE 1 APPLICATION(S): 213 SOLUTION TOPICAL at 05:37

## 2023-09-29 RX ADMIN — METHOCARBAMOL 750 MILLIGRAM(S): 500 TABLET, FILM COATED ORAL at 05:35

## 2023-09-29 RX ADMIN — HYDROMORPHONE HYDROCHLORIDE 0.5 MILLIGRAM(S): 2 INJECTION INTRAMUSCULAR; INTRAVENOUS; SUBCUTANEOUS at 02:40

## 2023-09-29 RX ADMIN — MIDODRINE HYDROCHLORIDE 10 MILLIGRAM(S): 2.5 TABLET ORAL at 21:18

## 2023-09-29 RX ADMIN — HYDROMORPHONE HYDROCHLORIDE 0.5 MILLIGRAM(S): 2 INJECTION INTRAMUSCULAR; INTRAVENOUS; SUBCUTANEOUS at 09:31

## 2023-09-29 RX ADMIN — Medication 100 GRAM(S): at 11:28

## 2023-09-29 RX ADMIN — HYDROMORPHONE HYDROCHLORIDE 0.5 MILLIGRAM(S): 2 INJECTION INTRAMUSCULAR; INTRAVENOUS; SUBCUTANEOUS at 20:52

## 2023-09-29 RX ADMIN — CYCLOBENZAPRINE HYDROCHLORIDE 5 MILLIGRAM(S): 10 TABLET, FILM COATED ORAL at 14:36

## 2023-09-29 RX ADMIN — HYDROMORPHONE HYDROCHLORIDE 6 MILLIGRAM(S): 2 INJECTION INTRAMUSCULAR; INTRAVENOUS; SUBCUTANEOUS at 23:00

## 2023-09-29 RX ADMIN — Medication 1000 MILLIGRAM(S): at 15:16

## 2023-09-29 RX ADMIN — CHLORHEXIDINE GLUCONATE 1 APPLICATION(S): 213 SOLUTION TOPICAL at 05:35

## 2023-09-29 RX ADMIN — SENNA PLUS 2 TABLET(S): 8.6 TABLET ORAL at 21:18

## 2023-09-29 RX ADMIN — HYDROMORPHONE HYDROCHLORIDE 6 MILLIGRAM(S): 2 INJECTION INTRAMUSCULAR; INTRAVENOUS; SUBCUTANEOUS at 22:00

## 2023-09-29 RX ADMIN — CYCLOBENZAPRINE HYDROCHLORIDE 5 MILLIGRAM(S): 10 TABLET, FILM COATED ORAL at 21:17

## 2023-09-29 RX ADMIN — POLYETHYLENE GLYCOL 3350 17 GRAM(S): 17 POWDER, FOR SOLUTION ORAL at 18:09

## 2023-09-29 RX ADMIN — HYDROMORPHONE HYDROCHLORIDE 6 MILLIGRAM(S): 2 INJECTION INTRAMUSCULAR; INTRAVENOUS; SUBCUTANEOUS at 16:00

## 2023-09-29 RX ADMIN — SODIUM CHLORIDE 3 GRAM(S): 9 INJECTION INTRAMUSCULAR; INTRAVENOUS; SUBCUTANEOUS at 23:14

## 2023-09-29 RX ADMIN — Medication 40 MILLIEQUIVALENT(S): at 15:36

## 2023-09-29 RX ADMIN — HYDROMORPHONE HYDROCHLORIDE 0.25 MILLIGRAM(S): 2 INJECTION INTRAMUSCULAR; INTRAVENOUS; SUBCUTANEOUS at 12:35

## 2023-09-29 RX ADMIN — HYDROMORPHONE HYDROCHLORIDE 0.5 MILLIGRAM(S): 2 INJECTION INTRAMUSCULAR; INTRAVENOUS; SUBCUTANEOUS at 08:36

## 2023-09-29 RX ADMIN — SODIUM CHLORIDE 1000 MILLILITER(S): 9 INJECTION INTRAMUSCULAR; INTRAVENOUS; SUBCUTANEOUS at 10:02

## 2023-09-29 RX ADMIN — Medication 5 MILLIGRAM(S): at 21:18

## 2023-09-29 RX ADMIN — Medication 5 MILLIGRAM(S): at 18:09

## 2023-09-29 RX ADMIN — HYDROMORPHONE HYDROCHLORIDE 0.5 MILLIGRAM(S): 2 INJECTION INTRAMUSCULAR; INTRAVENOUS; SUBCUTANEOUS at 19:52

## 2023-09-29 RX ADMIN — SODIUM CHLORIDE 3 GRAM(S): 9 INJECTION INTRAMUSCULAR; INTRAVENOUS; SUBCUTANEOUS at 12:37

## 2023-09-29 RX ADMIN — Medication 3.22 MICROGRAM(S)/KG/MIN: at 22:03

## 2023-09-29 RX ADMIN — Medication 1000 MILLIGRAM(S): at 22:18

## 2023-09-29 RX ADMIN — Medication 25 GRAM(S): at 07:07

## 2023-09-29 RX ADMIN — HYDROMORPHONE HYDROCHLORIDE 6 MILLIGRAM(S): 2 INJECTION INTRAMUSCULAR; INTRAVENOUS; SUBCUTANEOUS at 15:18

## 2023-09-29 RX ADMIN — Medication 1 PACKET(S): at 08:00

## 2023-09-29 RX ADMIN — POLYETHYLENE GLYCOL 3350 17 GRAM(S): 17 POWDER, FOR SOLUTION ORAL at 05:34

## 2023-09-29 RX ADMIN — Medication 3.22 MICROGRAM(S)/KG/MIN: at 09:53

## 2023-09-29 RX ADMIN — HYDROMORPHONE HYDROCHLORIDE 0.25 MILLIGRAM(S): 2 INJECTION INTRAMUSCULAR; INTRAVENOUS; SUBCUTANEOUS at 11:34

## 2023-09-29 RX ADMIN — SODIUM CHLORIDE 3 GRAM(S): 9 INJECTION INTRAMUSCULAR; INTRAVENOUS; SUBCUTANEOUS at 18:09

## 2023-09-29 RX ADMIN — Medication 1000 MILLIGRAM(S): at 21:18

## 2023-09-29 RX ADMIN — HYDROMORPHONE HYDROCHLORIDE 0.5 MILLIGRAM(S): 2 INJECTION INTRAMUSCULAR; INTRAVENOUS; SUBCUTANEOUS at 03:00

## 2023-09-29 RX ADMIN — ENOXAPARIN SODIUM 40 MILLIGRAM(S): 100 INJECTION SUBCUTANEOUS at 21:18

## 2023-09-29 RX ADMIN — MIDODRINE HYDROCHLORIDE 10 MILLIGRAM(S): 2.5 TABLET ORAL at 05:35

## 2023-09-29 RX ADMIN — MIDODRINE HYDROCHLORIDE 10 MILLIGRAM(S): 2.5 TABLET ORAL at 14:36

## 2023-09-29 RX ADMIN — Medication 1000 MILLIGRAM(S): at 14:36

## 2023-09-29 NOTE — PROGRESS NOTE ADULT - SUBJECTIVE AND OBJECTIVE BOX
HPI:  Taken from outpatient neurosurgery note on 9/13/2023 " The patient, a long-standing patient of my practice, reports a history of spinal issues that dates back to a childhood injury. Over the years she's experienced developing issues with walking in a straight line, overactive reflexes in her lower extremities, and unique difficulty with her lower extremities referred to as cloneness. Recent upturn in these symptoms has limited her ability to carry out her daily routines. The patient also reports no significant improvement in her condition since our last clinical encounter two years ago."    44 y/o female with no PMHx or PSHx presents for surgery today.  She reports ambulating with walker or baby stroller at home for years.  She reports intermittent falls.  She has right greater than left leg tingling, low back and mid back pain.  She denies arm or leg pain.  She reports urinary incontinence since having a baby 2 years ago where if she doesn't get to the bathroom fast enough, she has incontinence, but she is able to hold it for a short while.  She takes no pain medications.  She has taken ibuprofen in the past.  She denies saddle anesthesia, bowel incontinence.   (25 Sep 2023 06:51)    OVERNIGHT EVENTS: [facility]    Hospital Course:   9/25: POD 0 s/p C7-L1 fusion, T3-8 vertebral column resection, placement of anterior cage.   9/26; POD1 H/H 6.8 postop and PLT 85. Given 2u PRBC and 1u PLT. Switched propofol to precedex gtt. Pt extubated to face mask. Pt noted to only be withdrawing to noxious stimuli on bilateral lower extremities with sensation decreased RLE per patient. Dr. Vieira notified and plan is to keep MAP>100 and obtain CT full spine in the morning. Phenylephrine started to maintain MAP>100. Advanced diet to regular. Passed TOV. Increased need for aurora, UO high.   9/27: POD2 C7-L1 fusion T3-8 vertebral column resection. increased pressor requirements o/n, started on levo additionally. lactate 1.5, BNP 1400 from 800, CK elevated. echo: EF 60-65%. given 250cc bolus albumin, started on midodrine 10q8 to wean off pressors. PICC placed by Alli.   9/28: POD3 s/p C7-L1 fusion, T3-8 vertebral column resection, placement of anterior cage. R radial A-line removed and replaced on the L, trops and EKG done for chest discomfort, WNL, resolved with treatment of overall pain, continues to be febrile, junerica dc'd to help.   Lyrica restarted. Weaning phenylephrine. Serum Na uptrending, 3% saline discontinued. Pt febrile with uptrending WBC, ID consulted recommend monitoring for now off antibiotics.  9/29: POD4. started on 3% o/n for Na 132. remains on aurora and levo for MAP>100.    Vital Signs Last 24 Hrs  T(C): 38.2 (28 Sep 2023 21:49), Max: 38.5 (28 Sep 2023 03:00)  T(F): 100.8 (28 Sep 2023 21:49), Max: 101.3 (28 Sep 2023 03:00)  HR: 101 (28 Sep 2023 23:00) (74 - 109)  BP: 147/86 (28 Sep 2023 23:00) (121/63 - 173/84)  BP(mean): 111 (28 Sep 2023 23:00) (83 - 123)  RR: 18 (28 Sep 2023 23:00) (15 - 20)  SpO2: 100% (28 Sep 2023 23:00) (96% - 100%)    Parameters below as of 28 Sep 2023 23:00  Patient On (Oxygen Delivery Method): room air        I&O's Summary    27 Sep 2023 07:01  -  28 Sep 2023 07:00  --------------------------------------------------------  IN: 7271.1 mL / OUT: 7600 mL / NET: -328.9 mL    28 Sep 2023 07:01  -  29 Sep 2023 00:14  --------------------------------------------------------  IN: 3133.9 mL / OUT: 4285 mL / NET: -1151.1 mL        PHYSICAL EXAM:  GEN: laying in bed, NAD  NEURO: AOx3. FC, OE spont, speech intact, face symmetric. CNII-XII intact. PERRL, EOMI. No pronator drift. b/l UE 5/5, b/l LE 0. decreased sensation T10/below  CV: RRR +S1/S2  PULM: CTAB  GI: Abd soft, NT/ND  EXT: ext warm, dry, nontender  WOUND: posterior incision c/d/i    TUBES/LINES:  [x] Chin  [] Lumbar Drain  [] Wound Drains  [] Others      DIET:  [] NPO  [x] Mechanical  [] Tube feeds    LABS:                        10.9   18.70 )-----------( 110      ( 28 Sep 2023 05:23 )             31.9     09-28    132<L>  |  100  |  4<L>  ----------------------------<  180<H>  3.9   |  24  |  0.48<L>    Ca    8.6      28 Sep 2023 21:55  Phos  1.8     09-28  Mg     1.9     09-28    TPro  5.9<L>  /  Alb  3.2<L>  /  TBili  0.4  /  DBili  x   /  AST  62<H>  /  ALT  34  /  AlkPhos  58  09-28      Urinalysis Basic - ( 28 Sep 2023 21:55 )    Color: x / Appearance: x / SG: x / pH: x  Gluc: 180 mg/dL / Ketone: x  / Bili: x / Urobili: x   Blood: x / Protein: x / Nitrite: x   Leuk Esterase: x / RBC: x / WBC x   Sq Epi: x / Non Sq Epi: x / Bacteria: x      CARDIAC MARKERS ( 28 Sep 2023 05:23 )  x     / x     / 3028 U/L / x     / x      CARDIAC MARKERS ( 27 Sep 2023 08:49 )  x     / x     / 5780 U/L / x     / x          CAPILLARY BLOOD GLUCOSE          Drug Levels: [] N/A    CSF Analysis: [] N/A      Allergies    No Known Allergies    Intolerances      MEDICATIONS:  Antibiotics:    Neuro:  acetaminophen     Tablet .. 650 milliGRAM(s) Oral every 6 hours PRN  HYDROmorphone   Tablet 2 milliGRAM(s) Oral every 4 hours PRN  HYDROmorphone   Tablet 4 milliGRAM(s) Oral every 4 hours PRN  HYDROmorphone  Injectable 0.5 milliGRAM(s) IV Push every 6 hours PRN  melatonin 5 milliGRAM(s) Oral at bedtime PRN  methocarbamol 750 milliGRAM(s) Oral every 8 hours    Anticoagulation:  enoxaparin Injectable 40 milliGRAM(s) SubCutaneous every 24 hours    OTHER:  chlorhexidine 2% Cloths 1 Application(s) Topical <User Schedule>  chlorhexidine 4% Liquid 1 Application(s) Topical <User Schedule>  influenza   Vaccine 0.5 milliLiter(s) IntraMuscular once  midodrine 10 milliGRAM(s) Oral every 8 hours  norepinephrine Infusion 0.05 MICROgram(s)/kG/Min IV Continuous <Continuous>  phenylephrine    Infusion 0.2 MICROgram(s)/kG/Min IV Continuous <Continuous>  polyethylene glycol 3350 17 Gram(s) Oral two times a day  senna 2 Tablet(s) Oral at bedtime    IVF:  magnesium sulfate  IVPB 1 Gram(s) IV Intermittent daily  sodium chloride 3%. 500 milliLiter(s) IV Continuous <Continuous>    CULTURES:  Culture Results:   No growth at 12 hours (09-28 @ 02:35)  Culture Results:   No growth at 1 day. (09-27 @ 20:32)    RADIOLOGY & ADDITIONAL TESTS:      ASSESSMENT:  43F no PMHx suffered childhood injury resulting in spine injury and residual gait disturbance, hyperreflexia of BL LE. She has right greater than left leg tingling, low back and mid back pain. 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 S/p C7-L1 fusion, T3-8 vertebral column resection, placement of anterior cage (9/25/23).     M40.205    S31.000A    S31.000A M40.205 S22.570KT79.009G    S31.000A M40.205 S22.009S22.009G    No pertinent family history in first degree relatives    Handoff    No pertinent past medical history    History of acute illness    Deformity of thoracic vertebra    Deformity of thoracic vertebra    Corpectomy, spine, lumbar, 4 levels    No significant past surgical history    Room Service Assist    [id number]        PLAN:  9/25: POD 0 s/p C7-L1 fusion, T3-8 vertebral column resection, placement of anterior cage.   9/26; POD1 H/H 6.8 postop and PLT 85. Given 2u PRBC and 1u PLT. Switched propofol to precedex gtt. Pt extubated to face mask. Pt noted to only be withdrawing to noxious stimuli on bilateral lower extremities with sensation decreased RLE per patient. Dr. Vieira notified and plan is to keep MAP>100 and obtain CT full spine in the morning. Phenylephrine started to maintain MAP>100. Advanced diet to regular. Passed TOV. Increased need for aurora, UO high.   9/27: POD2 C7-L1 fusion T3-8 vertebral column resection. increased pressor requirements o/n, started on levo additionally. lactate 1.5, BNP 1400 from 800, CK elevated. echo: EF 60-65%. given 250cc bolus albumin, started on midodrine 10q8 to wean off pressors. PICC placed by Alli.   9/28: POD3 s/p C7-L1 fusion, T3-8 vertebral column resection, placement of anterior cage. R radial A-line removed and replaced on the L, trops and EKG done for chest discomfort, WNL, resolved with treatment of overall pain, continues to be febrile, lyrica dc'd to help.   Lyrica restarted. Weaning phenylephrine. Serum Na uptrending, 3% saline discontinued. Pt febrile with uptrending WBC, ID consulted recommend monitoring for now off antibiotics.  9/29: POD4. started on 3% o/n for Na 132. remains on aurora and levo for MAP>100.    Assessment:  Present when checked    []  GCS  E   V  M     Heart Failure: []Acute, [] acute on chronic , []chronic  Heart Failure:  [] Diastolic (HFpEF), [] Systolic (HFrEF), []Combined (HFpEF and HFrEF), [] RHF, [] Pulm HTN, [] Other    [] CELE, [] ATN, [] AIN, [] other  [] CKD1, [] CKD2, [] CKD 3, [] CKD 4, [] CKD 5, []ESRD    Encephalopathy: [] Metabolic, [] Hepatic, [] toxic, [] Neurological, [] Other    Abnormal Nurtitional Status: [] malnurtition (see nutrition note), [ ]underweight: BMI < 19, [] morbid obesity: BMI >40, [] Cachexia    [] Sepsis  [] hypovolemic shock,[] cardiogenic shock, [] hemorrhagic shock, [] neuogenic shock  [] Acute Respiratory Failure  []Cerebral edema, [] Brain compression/ herniation,   [] Functional quadriplegia  [] Acute blood loss anemia

## 2023-09-29 NOTE — PROGRESS NOTE ADULT - ASSESSMENT
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 On pressors since 9/26 for likely neurogenic shock, was hypotensive and also undergoing MAP pushes on levo/phenyl . Developed fever on 9/27 (POD 2) which persisted for ~12 hours and then resolved, although may be trending up again this afternoon. She has leukocytosis that developed after surgery, peaked at 22.6k on 9/27 and is now overall trending downward. UA unremarkable, blood cx x1 ngtd, CXR without consolidation but +bilateral pleural effusions. CT spine post-op day 1 with post-surgical changes. Patient is on room air. Not on abx since khang-op ancef.     Patient is clinically very well appearing. Mild cough has resolved. Noted prior presence of central line (since replaced) and also pleural violation intra-op. At this time, I think fever is most likely related to post-op inflammation/fusion fever, and hypotension most likely neurogenic shock. However will monitor closely and follow up pending blood cultures. Recommend obtaining sputum culture as well. If patient has significant increase in pressor requirement, vanc/zosyn would be a reasonable empiric antibiotic regimen.       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 On pressors since 9/26 for likely neurogenic shock, was hypotensive and also undergoing MAP pushes on levo/phenyl . Developed fever on 9/27 (POD 2) which persisted for ~12 hours and then resolved. She has leukocytosis that developed after surgery, peaked at 22.6k on 9/27 and is now overall trending downward. UA unremarkable, blood cx x1 ngtd, CXR without consolidation but +bilateral pleural effusions. CT spine post-op day 1 with post-surgical changes. Patient is on room air. Not on abx since khang-op ancef. ID following for workup of fevers.      Recommendation:  Patient's cough is gone today. Leukocytosis downtrended and overall pressors for neurogenic shock are decreasing. UA negative. Blood cultures neg.  Has been off antibiotics and has been improving nonetheless.  Patient's fevers after surgery appear more likely to be post-op related inflammatory fevers rather than infectious.  - monitor off antibiotics  - monitor daily CBC with differential  - monitor blood cultures      Team 1 will follow    Discussed with Attending, not final until signed by Attending.

## 2023-09-29 NOTE — PROGRESS NOTE ADULT - ASSESSMENT
43y/F with h/o traumatic spinal cord fracture, severe thoracic kyphosis, s/p 5-level vertebral resection, fusion C7-L1 (09/25/2023, Dr. Vieira)    PLAN:   NEURO: spine checks q2h, VSq1, PRN pain meds tylenol, ERAS protocol, hydromorphone  hemodynamic augmentation MAP >100; as per NSG  second stage surgery next week  d/c pregabalin  REHAB:  physical therapy evaluation and management    EARLY MOB:  HOB up, bed rest    PULM:  RA  CARDIO:  , echo, (EF 60-65) troponin, on neosynephrine / levophed - taper aurora  ENDO:  Blood sugar goals 140-180 mg/dL  GI:  bowel regimen - increase miralax  DIET: regular diet  RENAL:  IVL  HEM/ONC: stable  VTE Prophylaxis: SCDs, SQL  ID: febrile, WBC increase. CXR NEG, pancultured, UA NEG; no ABx for now  Social: will update family    Active issues:  What's keeping patient in the ICU? pressors  What is this patient's dispo plan? stepdown after stage 2 surgery    ATTENDING ATTESTATION:  I was physically present for the key portions of the evaluation and management (E/M) service provided.  I agree with the above history, physical and plan, which I have reviewed and edited where appropriate.    Patient at high risk for neurological deterioration or death due to:  ICU delirium, aspiration PNA, DVT / PE.  Critical care time:  I have personally provided 45 minutes of critical care time, excluding time spent on separate procedures.      Plan discussed with RN, house staff.    43y/F with h/o traumatic spinal cord fracture, severe thoracic kyphosis, s/p 5-level vertebral resection, fusion C7-L1 (09/25/2023, Dr. Vieira)    PLAN:   NEURO: spine checks q4h, VSq1, PRN pain meds tylenol, ERAS protocol, hydromorphone; d/c standing tylenol  hemodynamic augmentation MAP >100 as per NSG  second stage surgery next week  REHAB:  physical therapy evaluation and management    EARLY MOB:  HOB up, bed rest    PULM:  RA  CARDIO:  , echo, (EF 60-65) troponin, on levophed, titrate to MAP goal; continue midodrine  ENDO:  Blood sugar goals 140-180 mg/dL  GI:  bowel regimen - add supp  DIET: regular diet  RENAL:  IVL  HEM/ONC: stable  VTE Prophylaxis: SCDs, SQL  ID: febrile low grade, WBC significantly down, CXR NEG, pancultured, UA NEG; no ABx for now  Social: will update family    Active issues:  What's keeping patient in the ICU? pressors  What is this patient's dispo plan? stepdown after stage 2 surgery    ATTENDING ATTESTATION:  I was physically present for the key portions of the evaluation and management (E/M) service provided.  I agree with the above history, physical and plan, which I have reviewed and edited where appropriate.    Patient at high risk for neurological deterioration or death due to:  ICU delirium, aspiration PNA, DVT / PE.  Critical care time:  I have personally provided 45 minutes of critical care time, excluding time spent on separate procedures.      Plan discussed with RN, house staff.

## 2023-09-29 NOTE — CONSULT NOTE ADULT - SUBJECTIVE AND OBJECTIVE BOX
NEUROSURGERY PAIN MANAGEMENT CONSULT NOTE    Chief Complaint: back pain     HPI:  Taken from outpatient neurosurgery note on 9/13/2023 " The patient, a long-standing patient of my practice, reports a history of spinal issues that dates back to a childhood injury. Over the years she's experienced developing issues with walking in a straight line, overactive reflexes in her lower extremities, and unique difficulty with her lower extremities referred to as cloneness. Recent upturn in these symptoms has limited her ability to carry out her daily routines. The patient also reports no significant improvement in her condition since our last clinical encounter two years ago."    44 y/o female with no PMHx or PSHx presents for surgery today.  She reports ambulating with walker or baby stroller at home for years.  She reports intermittent falls.  She has right greater than left leg tingling, low back and mid back pain.  She denies arm or leg pain.  She reports urinary incontinence since having a baby 2 years ago where if she doesn't get to the bathroom fast enough, she has incontinence, but she is able to hold it for a short while.  She takes no pain medications.  She has taken ibuprofen in the past.  She denies saddle anesthesia, bowel incontinence.   (25 Sep 2023 06:51)      PAST MEDICAL & SURGICAL HISTORY:  History of acute illness  childhood      No significant past surgical history          FAMILY HISTORY:  No pertinent family history in first degree relatives        SOCIAL HISTORY:  [ ] Denies Smoking, Alcohol, or Drug Use    HOME MEDICATIONS:   Please refer to initial HNP    PAIN HOME MEDICATIONS:    Allergies    No Known Allergies    Intolerances        PAIN MEDICATIONS:  acetaminophen     Tablet .. 1000 milliGRAM(s) Oral every 8 hours  cyclobenzaprine 5 milliGRAM(s) Oral every 8 hours  HYDROmorphone   Tablet 4 milliGRAM(s) Oral every 4 hours PRN  HYDROmorphone   Tablet 6 milliGRAM(s) Oral every 4 hours PRN  HYDROmorphone  Injectable 0.5 milliGRAM(s) IV Push every 4 hours PRN  melatonin 5 milliGRAM(s) Oral at bedtime PRN    Heme:  enoxaparin Injectable 40 milliGRAM(s) SubCutaneous every 24 hours      Cardiovascular:  midodrine 10 milliGRAM(s) Oral every 8 hours  norepinephrine Infusion 0.05 MICROgram(s)/kG/Min IV Continuous <Continuous>    GI:  bisacodyl 5 milliGRAM(s) Oral every 12 hours  polyethylene glycol 3350 17 Gram(s) Oral two times a day  senna 2 Tablet(s) Oral at bedtime        All Other Medications:  chlorhexidine 2% Cloths 1 Application(s) Topical <User Schedule>  influenza   Vaccine 0.5 milliLiter(s) IntraMuscular once  magnesium sulfate  IVPB 1 Gram(s) IV Intermittent daily  sodium chloride 3 Gram(s) Oral every 6 hours  sodium chloride 0.9% lock flush 10 milliLiter(s) IV Push every 1 hour PRN      Vital Signs Last 24 Hrs  T(C): 37.7 (29 Sep 2023 15:04), Max: 38.2 (28 Sep 2023 21:49)  T(F): 99.9 (29 Sep 2023 15:04), Max: 100.8 (28 Sep 2023 21:49)  HR: 100 (29 Sep 2023 15:00) (69 - 137)  BP: 133/78 (29 Sep 2023 10:00) (121/63 - 159/84)  BP(mean): 99 (29 Sep 2023 10:00) (83 - 116)  RR: 17 (29 Sep 2023 15:00) (15 - 20)  SpO2: 100% (29 Sep 2023 15:00) (96% - 100%)    Parameters below as of 29 Sep 2023 15:00  Patient On (Oxygen Delivery Method): room air        LABS:                        9.7    13.17 )-----------( 107      ( 29 Sep 2023 04:43 )             28.2     09-29    138  |  106  |  3<L>  ----------------------------<  177<H>  3.8   |  23  |  0.38<L>    Ca    7.7<L>      29 Sep 2023 12:08  Phos  2.1     09-29  Mg     1.6     09-29    TPro  5.9<L>  /  Alb  3.2<L>  /  TBili  0.4  /  DBili  x   /  AST  62<H>  /  ALT  34  /  AlkPhos  58  09-28    PT/INR - ( 29 Sep 2023 04:43 )   PT: 11.6 sec;   INR: 1.02         REVIEW OF SYSTEMS:  CONSTITUTIONAL: No fever or fatigue O/N.   EYES: No eye pain, visual disturbances  ENMT:  No difficulty hearing. No throat pain  NECK: No pain or stiffness  RESPIRATORY: No cough, wheezing; No shortness of breath  CARDIOVASCULAR: No chest pain, palpitations.   GASTROINTESTINAL: Pt reports passing gas. No bowel movements. No abdominal or epigastric pain. No nausea, vomiting. GENITOURINARY: No dysuria, frequency, or incontinence  NEUROLOGICAL: No headaches, loss of strength, numbness, or tremors. No dizzinesss or lightheadedness with pain medications.   MUSCULOSKELETAL: Incisional back pain. No joint pain or swelling; No muscle, or extremity pain        PAIN ASSESSMENT: 7/10    PHYSICAL EXAM  General: Patient sitting comfortably in bed. Not in acute distress.   Skin: Cold, but symmetric bilaterally.   Neurological: A&Ox3. CN II-XII grossly intact. B/L upper extremities 5/5 strength. B/L lower extremities 0/5. Decreased sensation T10 dermatome, R deficit > L, otherwise sensation to light touch bilaterally.  Cardiovascular: Regular rate and rhythm. Normal S1 and S2.   Respiratory: Nonlabored breathing. Symmetric chest rise bilaterally.  Gastrointestinal: Abdomen soft. Nontender, nondistended.  Extremities: DP/TP/Radial Pulses 2+.     Incision/Wound: back incision site is clean, dry, and intact w/ aquacel dressing in place      ASSESSMENT:   43F no PMHx suffered childhood injury resulting in spine injury and residual gait disturbance, hyperreflexia of BL LE. She has right greater than left leg tingling, low back and mid back pain. 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 S/p C7-L1 fusion, T3-8 vertebral column resection, placement of anterior cage (9/25/23).     PLAN:   - Pain: uncontrolled  Flexeril 5mg PO q8h  acetaminophen 1000mg PO q8h  dilaudid 4mg to 6mg PO q4h prn for moderate to severe pain  dilaudid 0.5 mg IV q4h for breakthrough    - Bowel regimen: Senna    - Nausea ppx: Zofran standing  - Functional Goals: Pt will get OOB with PT today. Pt will resume previous level of activity without impairment from surgery.   - Additional Consults: None recommended.   - Additional Labs/Imaging:  None recommended.     - Follow up, Discharge Planning:     Discharge is pending: medical clearance  - Pain Management follow up plan: Dr. Birmingham and pain team will continue to follow while in-patient      Plan discussed with Dr. Birmingham

## 2023-09-29 NOTE — PROGRESS NOTE ADULT - SUBJECTIVE AND OBJECTIVE BOX
=================================  NEUROCRITICAL CARE ATTENDING NOTE  =================================    NGUYEN GROVER   MRN-[id number]  Summary:  43y/F  with no PMH, traumatic spinal fracture --> resulted in severe thoracic kyphosis with progressing myelopathy. Baseline exam difficulty ambulating, but with good strength on all 4s.  Now admitted for elective spine surgery.    Taken from outpatient neurosurgery note on 9/13/2023 " The patient, a long-standing patient of my practice, reports a history of spinal issues that dates back to a childhood injury. Over the years she's experienced developing issues with walking in a straight line, overactive reflexes in her lower extremities, and unique difficulty with her lower extremities referred to as cloneness. Recent upturn in these symptoms has limited her ability to carry out her daily routines. The patient also reports no significant improvement in her condition since our last clinical encounter two years ago."    44 y/o female with no PMHx or PSHx presents for surgery today.  She reports ambulating with walker or baby stroller at home for years.  She reports intermittent falls.  She has right greater than left leg tingling, low back and mid back pain.  She denies arm or leg pain.  She reports urinary incontinence since having a baby 2 years ago where if she doesn't get to the bathroom fast enough, she has incontinence, but she is able to hold it for a short while.  She takes no pain medications.  She has taken ibuprofen in the past.  She denies saddle anesthesia, bowel incontinence.   (25 Sep 2023 06:51)    COURSE IN THE HOSPITAL:  09/25 POD#0 5-level vertebral column resection with fusion of C7-L1, put in a cage; stage 1; stable introp monitoring; pleura violated, repaired; extubated  09/26 POD#1   09/27 POD#2 Tmax 38.4  09/28 POD#3 Tmax 38.5 central line (RIJ) removed  09/29 POD#4 Tmax 38.2    Past Medical History: No pertinent past medical history History of acute illness  Allergies:  No Known Allergies  Home meds:     PHYSICAL EXAMINATION  T(C): 37.9 (09-29 @ 17:47), Max: 38.2 (09-28 @ 21:49) HR: 82 (09-29 @ 19:00) (69 - 137) BP: 133/78 (09-29 @ 10:00) (121/63 - 159/84) RR: 18 (09-29 @ 19:00) (15 - 20) SpO2: 100% (09-29 @ 19:00) (96% - 100%)   NEUROLOGIC EXAMINATION:  Patient is awake alert oriented x3, pupils equal and reactive, B UE 5/5 B LE 0/5  GENERAL:  not intubated  EENT:  anicteric  CARDIOVASCULAR: (+) S1 S2, normal rate and regular rhythm  PULMONARY: clear to auscultation bilaterally  ABDOMEN: soft, nontender with normoactive bowel sounds  EXTREMITIES: no edema  SKIN: no rash    LABS: 09-29    (13.17) 9.3  (9.7)  11.37 )-----------( 144  (107)    ( 29 Sep 2023 17:08 )             26.6      136  |  104  |  3<L>  ----------------------------<  158<H>  4.1   |  24  |  0.39<L>    Ca    8.2<L>      29 Sep 2023 17:08  Phos  2.1     09-29  Mg     1.6     09-29    TPro  5.9<L>  /  Alb  3.2<L>  /  TBili  0.4  /  DBili  x   /  AST  62<H>  /  ALT  34  /  AlkPhos  58  09-28 09-28 @ 07:01  -  09-29 @ 07:00  --------------------------------------------------------  IN: 4205.2 mL / OUT: 5930 mL / NET: -1724.8 mL    09-29 @ 07:01  -  09-29 @ 19:44  --------------------------------------------------------  IN: 1541 mL / OUT: 1425 mL / NET: 116 mL     Bacteriology: 09/27 UA  NEG  09/28 Blood CS NG1D x1  09/27 Blood CS NG1D x1     CSF studies:  EEG:  Neuroimaging:  Other imaging:    MEDICATIONS: 09-29    ·	enoxaparin Injectable 40 SubCutaneous every 24 hours  ·	acetaminophen     Tablet .. 1000 Oral every 8 hours  ·	cyclobenzaprine 5 Oral every 8 hours  ·	midodrine 10 milliGRAM(s) Oral every 8 hours  ·	bisacodyl 5 Oral every 12 hours  ·	polyethylene glycol 3350 17 Oral two times a day  ·	senna 2 Oral at bedtime  ·	magnesium sulfate  IVPB 1 IV Intermittent daily  ·	sodium chloride 3 Oral every 6 hours  ·	HYDROmorphone   Tablet 6 Oral every 4 hours PRN  ·	HYDROmorphone   Tablet 4 Oral every 4 hours PRN  ·	HYDROmorphone  Injectable 0.5 IV Push every 4 hours PRN  ·	melatonin 5 Oral at bedtime PRN     IV FLUIDS:   DRIPS:   ·	norepinephrine Infusion 0.05 MICROgram(s)/kG/Min IV Continuous <Continuous>0.3  ·	phenylephrine    Infusion 0.2 MICROgram(s)/kG/Min IV Continuous <Continuous> - 0.2  DIET: regular   Lines: Ne[place] L PICC  Drains:   Chin  Wounds:    CODE STATUS:  Full Code                       GOALS OF CARE:  aggressive                      DISPOSITION:  ICU =================================  NEUROCRITICAL CARE ATTENDING NOTE  =================================    NGUYEN GROVER   MRN-[id number]  Summary:  43y/F  with no PMH, traumatic spinal fracture --> resulted in severe thoracic kyphosis with progressing myelopathy. Baseline exam difficulty ambulating, but with good strength on all 4s.  Now admitted for elective spine surgery.    Taken from outpatient neurosurgery note on 9/13/2023 " The patient, a long-standing patient of my practice, reports a history of spinal issues that dates back to a childhood injury. Over the years she's experienced developing issues with walking in a straight line, overactive reflexes in her lower extremities, and unique difficulty with her lower extremities referred to as cloneness. Recent upturn in these symptoms has limited her ability to carry out her daily routines. The patient also reports no significant improvement in her condition since our last clinical encounter two years ago."    42 y/o female with no PMHx or PSHx presents for surgery today.  She reports ambulating with walker or baby stroller at home for years.  She reports intermittent falls.  She has right greater than left leg tingling, low back and mid back pain.  She denies arm or leg pain.  She reports urinary incontinence since having a baby 2 years ago where if she doesn't get to the bathroom fast enough, she has incontinence, but she is able to hold it for a short while.  She takes no pain medications.  She has taken ibuprofen in the past.  She denies saddle anesthesia, bowel incontinence.   (25 Sep 2023 06:51)    COURSE IN THE HOSPITAL:  09/25 POD#0 5-level vertebral column resection with fusion of C7-L1, put in a cage; stage 1; stable introp monitoring; pleura violated, repaired; extubated  09/26 POD#1   09/27 POD#2 Tmax 38.4  09/28 POD#3 Tmax 38.5 central line (RIJ) removed  09/29 POD#4 Tmax 38.2 pain control better, Chin out, passed TOV    Past Medical History: No pertinent past medical history History of acute illness  Allergies:  No Known Allergies  Home meds:     PHYSICAL EXAMINATION  T(C): 37.9 (09-29 @ 17:47), Max: 38.2 (09-28 @ 21:49) HR: 82 (09-29 @ 19:00) (69 - 137) BP: 133/78 (09-29 @ 10:00) (121/63 - 159/84) RR: 18 (09-29 @ 19:00) (15 - 20) SpO2: 100% (09-29 @ 19:00) (96% - 100%)   NEUROLOGIC EXAMINATION:  Patient is awake alert oriented x3, pupils equal and reactive, B UE 5/5 B LE 1/5  GENERAL:  not intubated  EENT:  anicteric  CARDIOVASCULAR: (+) S1 S2, normal rate and regular rhythm  PULMONARY: clear to auscultation bilaterally  ABDOMEN: soft, nontender with normoactive bowel sounds  EXTREMITIES: no edema  SKIN: no rash    LABS: 09-29    (13.17) 9.3  (9.7)  11.37 )-----------( 144  (107)    ( 29 Sep 2023 17:08 )             26.6      136  |  104  |  3<L>  ----------------------------<  158<H>  4.1   |  24  |  0.39<L>    Ca    8.2<L>      29 Sep 2023 17:08  Phos  2.1     09-29  Mg     1.6     09-29    TPro  5.9<L>  /  Alb  3.2<L>  /  TBili  0.4  /  DBili  x   /  AST  62<H>  /  ALT  34  /  AlkPhos  58  09-28 09-28 @ 07:01  -  09-29 @ 07:00  --------------------------------------------------------  IN: 4205.2 mL / OUT: 5930 mL / NET: -1724.8 mL    09-29 @ 07:01  -  09-29 @ 19:44  --------------------------------------------------------  IN: 1541 mL / OUT: 1425 mL / NET: 116 mL     Bacteriology: 09/27 UA  NEG  09/28 Blood CS NG1D x1  09/27 Blood CS NG1D x1     CSF studies:  EEG:  Neuroimaging:  Other imaging:    MEDICATIONS: 09-29    ·	enoxaparin Injectable 40 SubCutaneous every 24 hours  ·	acetaminophen     Tablet .. 1000 Oral every 8 hours  ·	cyclobenzaprine 5 Oral every 8 hours  ·	midodrine 10 milliGRAM(s) Oral every 8 hours  ·	bisacodyl 5 Oral every 12 hours  ·	polyethylene glycol 3350 17 Oral two times a day  ·	senna 2 Oral at bedtime  ·	magnesium sulfate  IVPB 1 IV Intermittent daily  ·	sodium chloride 3 Oral every 6 hours  ·	HYDROmorphone   Tablet 6 Oral every 4 hours PRN  ·	HYDROmorphone   Tablet 4 Oral every 4 hours PRN  ·	HYDROmorphone  Injectable 0.5 IV Push every 4 hours PRN  ·	melatonin 5 Oral at bedtime PRN     IV FLUIDS:   DRIPS:   ·	norepinephrine Infusion 0.05 MICROgram(s)/kG/Min IV Continuous <Continuous>0.32  DIET: regular   Lines: [place] L PICC  Drains:     Wounds:    CODE STATUS:  Full Code                       GOALS OF CARE:  aggressive                      DISPOSITION:  ICU

## 2023-09-29 NOTE — PROGRESS NOTE ADULT - SUBJECTIVE AND OBJECTIVE BOX
NSCU Progress Note    Assessment/Hospital Course:    43y/F  with no PMH, traumatic spinal fracture --> resulted in severe thoracic kyphosis with progressing myelopathy. Baseline exam difficulty ambulating, but with good strength on all 4s.  Now admitted for elective spine surgery s/p 5-level vertebral column resection with fusion of C7-L1, put in a cage; stage 1; stable introp monitoring; pleura violated, repaired.      24 Hour Events/Subjective:  - POD4  - woff aurora, on levo stable rate  - axillary olimpia placed  - febrile today, cultured      REVIEW OF SYSTEMS:  - negative except as above    VITALS:   - Reviewed      IMAGING/DATA:   - Reviewed          PHYSICAL EXAM:    General: calm  CVS: RRR  Pulm: CTAB  GI: Soft, NTND  Extremities: No LE Edema  Neuro: AOx3, FC, BUE 5/5,BLE 0/5, L2 sensory R>L

## 2023-09-29 NOTE — PROGRESS NOTE ADULT - ASSESSMENT
ASSESSMENT/PLAN:     s/p 5-level vertebral column resection with fusion of C7-L1, put in a cage; stage 1; stable introp monitoring; pleura violated, repaired; post op with BLE weakness requring pressors to augment MAP>100, now pending additional imaging    NEURO:  - Neurochecks q1h  - Surgical drains per NSGY  - Augment map as per below  - Tentative Stage 2 next Wednesday   - Pain control, ERAS  - Activity: bed rest for now, PT/OT when able    PULM:  - Incentive spirometry  - mobilize as tolerated  - Aspiration Precautions    CV:  - MAP>100 per nsg  - trop, ekg while on pressors  - exhibiting sxs of neurogenic shock given LE findings, less likely cardiogenic given normal cardiac markers and POCUS with good cardiac contractility will order official TTE, septic shock remains in diagnosis, however given recent spinal surgery with new LE weakness and increasing pressor requirements, would favor neurogenic  - c/w levophed  - midodrine 10q8h  - Rt axillary olimpia placed 9/29    RENAL:  - Fluids: IVL  - trend renal function  - high UOP, replete as needed  - DC coronado  - salt tabs    GI:  - Diet: Regular  - Bowel regimen standing    ENDO:   - Goal euglycemia (-180)    HEME/ONC:  s/p 700cc cell saver, 1.5 L EBL; 1500 5% albumin, given TXA;  post-op Hb 6.8 - give 2 units pRBC, repeat   - monitor H/H  - repeat cbc, stable post transfusion    VTE prophylaxis   - SCDs   - hold chemoprophylaxis due to: SQL      ID:  - febrile, pan cultured 9/27, UA negative, will continue to monitor  - ID following  - if spikes, low threshold to empirically cover with vanc/zosyn        Dispo: ICU pending OR plan, BP augmentation Yes

## 2023-09-29 NOTE — PROGRESS NOTE ADULT - SUBJECTIVE AND OBJECTIVE BOX
INFECTIOUS DISEASES CONSULT FOLLOW-UP NOTE    INTERVAL HPI/OVERNIGHT EVENTS:  Patient states that her cough has resolved. She also states that her chills have improved and that she only feels hot when she is in pain. No events.    ROS:   Constitutional, eyes, ENT, cardiovascular, respiratory, gastrointestinal, genitourinary, integumentary, neurological, psychiatric and heme/lymph are otherwise negative other than noted above       ANTIBIOTICS/RELEVANT:    MEDICATIONS  (STANDING):  acetaminophen     Tablet .. 1000 milliGRAM(s) Oral every 8 hours  bisacodyl 5 milliGRAM(s) Oral every 12 hours  chlorhexidine 2% Cloths 1 Application(s) Topical <User Schedule>  cyclobenzaprine 5 milliGRAM(s) Oral every 8 hours  enoxaparin Injectable 40 milliGRAM(s) SubCutaneous every 24 hours  influenza   Vaccine 0.5 milliLiter(s) IntraMuscular once  magnesium sulfate  IVPB 1 Gram(s) IV Intermittent daily  midodrine 10 milliGRAM(s) Oral every 8 hours  norepinephrine Infusion 0.05 MICROgram(s)/kG/Min (3.22 mL/Hr) IV Continuous <Continuous>  polyethylene glycol 3350 17 Gram(s) Oral two times a day  senna 2 Tablet(s) Oral at bedtime  sodium chloride 3 Gram(s) Oral every 6 hours  sodium chloride 3%. 500 milliLiter(s) (30 mL/Hr) IV Continuous <Continuous>    MEDICATIONS  (PRN):  HYDROmorphone   Tablet 6 milliGRAM(s) Oral every 4 hours PRN Severe Pain (7 - 10)  HYDROmorphone   Tablet 4 milliGRAM(s) Oral every 4 hours PRN Moderate Pain (4 - 6)  HYDROmorphone  Injectable 0.5 milliGRAM(s) IV Push every 4 hours PRN breaktrhough pain  melatonin 5 milliGRAM(s) Oral at bedtime PRN Insomnia  sodium chloride 0.9% lock flush 10 milliLiter(s) IV Push every 1 hour PRN Pre/post blood products, medications, blood draw, and to maintain line patency        Vital Signs Last 24 Hrs  T(C): 37.3 (29 Sep 2023 09:25), Max: 38.2 (28 Sep 2023 21:49)  T(F): 99.1 (29 Sep 2023 09:25), Max: 100.8 (28 Sep 2023 21:49)  HR: 94 (29 Sep 2023 12:00) (69 - 137)  BP: 133/78 (29 Sep 2023 10:00) (121/63 - 159/84)  BP(mean): 99 (29 Sep 2023 10:00) (83 - 116)  RR: 17 (29 Sep 2023 12:00) (15 - 20)  SpO2: 100% (29 Sep 2023 12:00) (96% - 100%)    Parameters below as of 29 Sep 2023 12:00  Patient On (Oxygen Delivery Method): room air        09-28-23 @ 07:01  -  09-29-23 @ 07:00  --------------------------------------------------------  IN: 4205.2 mL / OUT: 5930 mL / NET: -1724.8 mL    09-29-23 @ 07:01  -  09-29-23 @ 12:09  --------------------------------------------------------  IN: 1271.5 mL / OUT: 700 mL / NET: 571.5 mL      PHYSICAL EXAM:  Constitutional: alert, NAD, patient laying in bed comfortably  Eyes: the sclera and conjunctiva were normal.   ENT: the ears and nose were normal in appearance. ulcer on MM of lower lip likely from intubation in surgery  Neck: the appearance of the neck was normal and the neck was supple.   Pulmonary: no respiratory distress and lungs were clear to auscultation bilaterally.   Heart: heart rate was normal and rhythm regular, normal S1 and S2  Vascular:. there was no peripheral edema  Abdomen: normal bowel sounds, soft, non-tender, distended  Neurological: no focal deficits.   Psychiatric: the affect was normal      LABS:                        9.7    13.17 )-----------( 107      ( 29 Sep 2023 04:43 )             28.2     09-29    136  |  104  |  3<L>  ----------------------------<  159<H>  3.9   |  21<L>  |  0.40<L>    Ca    8.1<L>      29 Sep 2023 04:43  Phos  2.1     09-29  Mg     1.6     09-29    TPro  5.9<L>  /  Alb  3.2<L>  /  TBili  0.4  /  DBili  x   /  AST  62<H>  /  ALT  34  /  AlkPhos  58  09-28    PT/INR - ( 29 Sep 2023 04:43 )   PT: 11.6 sec;   INR: 1.02          PTT - ( 29 Sep 2023 04:43 )  PTT:23.6 sec  Urinalysis Basic - ( 29 Sep 2023 04:43 )    Color: x / Appearance: x / SG: x / pH: x  Gluc: 159 mg/dL / Ketone: x  / Bili: x / Urobili: x   Blood: x / Protein: x / Nitrite: x   Leuk Esterase: x / RBC: x / WBC x   Sq Epi: x / Non Sq Epi: x / Bacteria: x        MICROBIOLOGY:      RADIOLOGY & ADDITIONAL STUDIES:  Reviewed

## 2023-09-30 LAB
ANION GAP SERPL CALC-SCNC: 9 MMOL/L — SIGNIFICANT CHANGE UP (ref 5–17)
BUN SERPL-MCNC: 5 MG/DL — LOW (ref 7–23)
CALCIUM SERPL-MCNC: 8.4 MG/DL — SIGNIFICANT CHANGE UP (ref 8.4–10.5)
CHLORIDE SERPL-SCNC: 103 MMOL/L — SIGNIFICANT CHANGE UP (ref 96–108)
CO2 SERPL-SCNC: 23 MMOL/L — SIGNIFICANT CHANGE UP (ref 22–31)
CREAT SERPL-MCNC: 0.39 MG/DL — LOW (ref 0.5–1.3)
EGFR: 128 ML/MIN/1.73M2 — SIGNIFICANT CHANGE UP
GLUCOSE SERPL-MCNC: 141 MG/DL — HIGH (ref 70–99)
HCT VFR BLD CALC: 26.1 % — LOW (ref 34.5–45)
HCT VFR BLD CALC: 27 % — LOW (ref 34.5–45)
HGB BLD-MCNC: 8.9 G/DL — LOW (ref 11.5–15.5)
HGB BLD-MCNC: 9.2 G/DL — LOW (ref 11.5–15.5)
MAGNESIUM SERPL-MCNC: 1.7 MG/DL — SIGNIFICANT CHANGE UP (ref 1.6–2.6)
MCHC RBC-ENTMCNC: 28 PG — SIGNIFICANT CHANGE UP (ref 27–34)
MCHC RBC-ENTMCNC: 28.1 PG — SIGNIFICANT CHANGE UP (ref 27–34)
MCHC RBC-ENTMCNC: 34.1 GM/DL — SIGNIFICANT CHANGE UP (ref 32–36)
MCHC RBC-ENTMCNC: 34.1 GM/DL — SIGNIFICANT CHANGE UP (ref 32–36)
MCV RBC AUTO: 82.3 FL — SIGNIFICANT CHANGE UP (ref 80–100)
MCV RBC AUTO: 82.3 FL — SIGNIFICANT CHANGE UP (ref 80–100)
NRBC # BLD: 0 /100 WBCS — SIGNIFICANT CHANGE UP (ref 0–0)
NRBC # BLD: 0 /100 WBCS — SIGNIFICANT CHANGE UP (ref 0–0)
PHOSPHATE SERPL-MCNC: 2.1 MG/DL — LOW (ref 2.5–4.5)
PLATELET # BLD AUTO: 156 K/UL — SIGNIFICANT CHANGE UP (ref 150–400)
PLATELET # BLD AUTO: 168 K/UL — SIGNIFICANT CHANGE UP (ref 150–400)
POTASSIUM SERPL-MCNC: 3.9 MMOL/L — SIGNIFICANT CHANGE UP (ref 3.5–5.3)
POTASSIUM SERPL-SCNC: 3.9 MMOL/L — SIGNIFICANT CHANGE UP (ref 3.5–5.3)
RBC # BLD: 3.17 M/UL — LOW (ref 3.8–5.2)
RBC # BLD: 3.28 M/UL — LOW (ref 3.8–5.2)
RBC # FLD: 14.5 % — SIGNIFICANT CHANGE UP (ref 10.3–14.5)
RBC # FLD: 14.5 % — SIGNIFICANT CHANGE UP (ref 10.3–14.5)
SODIUM SERPL-SCNC: 135 MMOL/L — SIGNIFICANT CHANGE UP (ref 135–145)
TROPONIN T, HIGH SENSITIVITY RESULT: 8 NG/L — SIGNIFICANT CHANGE UP (ref 0–51)
WBC # BLD: 8.24 K/UL — SIGNIFICANT CHANGE UP (ref 3.8–10.5)
WBC # BLD: 9.44 K/UL — SIGNIFICANT CHANGE UP (ref 3.8–10.5)
WBC # FLD AUTO: 8.24 K/UL — SIGNIFICANT CHANGE UP (ref 3.8–10.5)
WBC # FLD AUTO: 9.44 K/UL — SIGNIFICANT CHANGE UP (ref 3.8–10.5)

## 2023-09-30 PROCEDURE — 99291 CRITICAL CARE FIRST HOUR: CPT

## 2023-09-30 PROCEDURE — 99024 POSTOP FOLLOW-UP VISIT: CPT

## 2023-09-30 RX ORDER — MAGNESIUM SULFATE 500 MG/ML
1 VIAL (ML) INJECTION ONCE
Refills: 0 | Status: COMPLETED | OUTPATIENT
Start: 2023-09-30 | End: 2023-09-30

## 2023-09-30 RX ORDER — HYDROMORPHONE HYDROCHLORIDE 2 MG/ML
0.5 INJECTION INTRAMUSCULAR; INTRAVENOUS; SUBCUTANEOUS ONCE
Refills: 0 | Status: DISCONTINUED | OUTPATIENT
Start: 2023-09-30 | End: 2023-09-30

## 2023-09-30 RX ORDER — SODIUM CHLORIDE 9 MG/ML
1000 INJECTION INTRAMUSCULAR; INTRAVENOUS; SUBCUTANEOUS
Refills: 0 | Status: DISCONTINUED | OUTPATIENT
Start: 2023-09-30 | End: 2023-10-01

## 2023-09-30 RX ORDER — SODIUM CHLORIDE 9 MG/ML
1000 INJECTION INTRAMUSCULAR; INTRAVENOUS; SUBCUTANEOUS
Refills: 0 | Status: DISCONTINUED | OUTPATIENT
Start: 2023-09-30 | End: 2023-09-30

## 2023-09-30 RX ORDER — POTASSIUM PHOSPHATE, MONOBASIC POTASSIUM PHOSPHATE, DIBASIC 236; 224 MG/ML; MG/ML
30 INJECTION, SOLUTION INTRAVENOUS ONCE
Refills: 0 | Status: COMPLETED | OUTPATIENT
Start: 2023-09-30 | End: 2023-09-30

## 2023-09-30 RX ORDER — SODIUM CHLORIDE 9 MG/ML
1000 INJECTION INTRAMUSCULAR; INTRAVENOUS; SUBCUTANEOUS ONCE
Refills: 0 | Status: COMPLETED | OUTPATIENT
Start: 2023-09-30 | End: 2023-09-30

## 2023-09-30 RX ORDER — LACTULOSE 10 G/15ML
20 SOLUTION ORAL ONCE
Refills: 0 | Status: COMPLETED | OUTPATIENT
Start: 2023-09-30 | End: 2023-09-30

## 2023-09-30 RX ORDER — MIDODRINE HYDROCHLORIDE 2.5 MG/1
15 TABLET ORAL EVERY 8 HOURS
Refills: 0 | Status: DISCONTINUED | OUTPATIENT
Start: 2023-09-30 | End: 2023-10-06

## 2023-09-30 RX ORDER — NOREPINEPHRINE BITARTRATE/D5W 8 MG/250ML
0.05 PLASTIC BAG, INJECTION (ML) INTRAVENOUS
Qty: 16 | Refills: 0 | Status: DISCONTINUED | OUTPATIENT
Start: 2023-09-30 | End: 2023-10-01

## 2023-09-30 RX ADMIN — LACTULOSE 20 GRAM(S): 10 SOLUTION ORAL at 08:03

## 2023-09-30 RX ADMIN — HYDROMORPHONE HYDROCHLORIDE 0.5 MILLIGRAM(S): 2 INJECTION INTRAMUSCULAR; INTRAVENOUS; SUBCUTANEOUS at 20:16

## 2023-09-30 RX ADMIN — HYDROMORPHONE HYDROCHLORIDE 6 MILLIGRAM(S): 2 INJECTION INTRAMUSCULAR; INTRAVENOUS; SUBCUTANEOUS at 05:00

## 2023-09-30 RX ADMIN — SODIUM CHLORIDE 3 GRAM(S): 9 INJECTION INTRAMUSCULAR; INTRAVENOUS; SUBCUTANEOUS at 17:43

## 2023-09-30 RX ADMIN — SODIUM CHLORIDE 3 GRAM(S): 9 INJECTION INTRAMUSCULAR; INTRAVENOUS; SUBCUTANEOUS at 23:16

## 2023-09-30 RX ADMIN — HYDROMORPHONE HYDROCHLORIDE 6 MILLIGRAM(S): 2 INJECTION INTRAMUSCULAR; INTRAVENOUS; SUBCUTANEOUS at 19:37

## 2023-09-30 RX ADMIN — SODIUM CHLORIDE 1000 MILLILITER(S): 9 INJECTION INTRAMUSCULAR; INTRAVENOUS; SUBCUTANEOUS at 20:17

## 2023-09-30 RX ADMIN — Medication 100 GRAM(S): at 07:08

## 2023-09-30 RX ADMIN — Medication 5 MILLIGRAM(S): at 17:44

## 2023-09-30 RX ADMIN — POLYETHYLENE GLYCOL 3350 17 GRAM(S): 17 POWDER, FOR SOLUTION ORAL at 05:17

## 2023-09-30 RX ADMIN — SODIUM CHLORIDE 1000 MILLILITER(S): 9 INJECTION INTRAMUSCULAR; INTRAVENOUS; SUBCUTANEOUS at 08:15

## 2023-09-30 RX ADMIN — HYDROMORPHONE HYDROCHLORIDE 0.5 MILLIGRAM(S): 2 INJECTION INTRAMUSCULAR; INTRAVENOUS; SUBCUTANEOUS at 11:06

## 2023-09-30 RX ADMIN — MIDODRINE HYDROCHLORIDE 15 MILLIGRAM(S): 2.5 TABLET ORAL at 13:01

## 2023-09-30 RX ADMIN — Medication 1000 MILLIGRAM(S): at 10:04

## 2023-09-30 RX ADMIN — Medication 10 MILLIGRAM(S): at 05:30

## 2023-09-30 RX ADMIN — Medication 10 MILLIGRAM(S): at 13:03

## 2023-09-30 RX ADMIN — HYDROMORPHONE HYDROCHLORIDE 6 MILLIGRAM(S): 2 INJECTION INTRAMUSCULAR; INTRAVENOUS; SUBCUTANEOUS at 14:06

## 2023-09-30 RX ADMIN — HYDROMORPHONE HYDROCHLORIDE 0.5 MILLIGRAM(S): 2 INJECTION INTRAMUSCULAR; INTRAVENOUS; SUBCUTANEOUS at 18:44

## 2023-09-30 RX ADMIN — HYDROMORPHONE HYDROCHLORIDE 0.5 MILLIGRAM(S): 2 INJECTION INTRAMUSCULAR; INTRAVENOUS; SUBCUTANEOUS at 10:39

## 2023-09-30 RX ADMIN — HYDROMORPHONE HYDROCHLORIDE 0.5 MILLIGRAM(S): 2 INJECTION INTRAMUSCULAR; INTRAVENOUS; SUBCUTANEOUS at 02:00

## 2023-09-30 RX ADMIN — POTASSIUM PHOSPHATE, MONOBASIC POTASSIUM PHOSPHATE, DIBASIC 83.33 MILLIMOLE(S): 236; 224 INJECTION, SOLUTION INTRAVENOUS at 07:24

## 2023-09-30 RX ADMIN — HYDROMORPHONE HYDROCHLORIDE 6 MILLIGRAM(S): 2 INJECTION INTRAMUSCULAR; INTRAVENOUS; SUBCUTANEOUS at 10:04

## 2023-09-30 RX ADMIN — HYDROMORPHONE HYDROCHLORIDE 6 MILLIGRAM(S): 2 INJECTION INTRAMUSCULAR; INTRAVENOUS; SUBCUTANEOUS at 04:27

## 2023-09-30 RX ADMIN — Medication 1000 MILLIGRAM(S): at 11:06

## 2023-09-30 RX ADMIN — SODIUM CHLORIDE 3 GRAM(S): 9 INJECTION INTRAMUSCULAR; INTRAVENOUS; SUBCUTANEOUS at 13:01

## 2023-09-30 RX ADMIN — MIDODRINE HYDROCHLORIDE 15 MILLIGRAM(S): 2.5 TABLET ORAL at 21:27

## 2023-09-30 RX ADMIN — HYDROMORPHONE HYDROCHLORIDE 0.5 MILLIGRAM(S): 2 INJECTION INTRAMUSCULAR; INTRAVENOUS; SUBCUTANEOUS at 18:21

## 2023-09-30 RX ADMIN — Medication 1 ENEMA: at 21:42

## 2023-09-30 RX ADMIN — SENNA PLUS 2 TABLET(S): 8.6 TABLET ORAL at 21:26

## 2023-09-30 RX ADMIN — ENOXAPARIN SODIUM 40 MILLIGRAM(S): 100 INJECTION SUBCUTANEOUS at 21:26

## 2023-09-30 RX ADMIN — HYDROMORPHONE HYDROCHLORIDE 6 MILLIGRAM(S): 2 INJECTION INTRAMUSCULAR; INTRAVENOUS; SUBCUTANEOUS at 14:36

## 2023-09-30 RX ADMIN — HYDROMORPHONE HYDROCHLORIDE 6 MILLIGRAM(S): 2 INJECTION INTRAMUSCULAR; INTRAVENOUS; SUBCUTANEOUS at 19:55

## 2023-09-30 RX ADMIN — CYCLOBENZAPRINE HYDROCHLORIDE 5 MILLIGRAM(S): 10 TABLET, FILM COATED ORAL at 21:27

## 2023-09-30 RX ADMIN — HYDROMORPHONE HYDROCHLORIDE 6 MILLIGRAM(S): 2 INJECTION INTRAMUSCULAR; INTRAVENOUS; SUBCUTANEOUS at 11:06

## 2023-09-30 RX ADMIN — POLYETHYLENE GLYCOL 3350 17 GRAM(S): 17 POWDER, FOR SOLUTION ORAL at 17:43

## 2023-09-30 RX ADMIN — CHLORHEXIDINE GLUCONATE 1 APPLICATION(S): 213 SOLUTION TOPICAL at 05:22

## 2023-09-30 RX ADMIN — CYCLOBENZAPRINE HYDROCHLORIDE 5 MILLIGRAM(S): 10 TABLET, FILM COATED ORAL at 13:03

## 2023-09-30 RX ADMIN — Medication 1000 MILLIGRAM(S): at 21:39

## 2023-09-30 RX ADMIN — SODIUM CHLORIDE 3 GRAM(S): 9 INJECTION INTRAMUSCULAR; INTRAVENOUS; SUBCUTANEOUS at 05:21

## 2023-09-30 RX ADMIN — Medication 5 MILLIGRAM(S): at 05:24

## 2023-09-30 RX ADMIN — HYDROMORPHONE HYDROCHLORIDE 0.5 MILLIGRAM(S): 2 INJECTION INTRAMUSCULAR; INTRAVENOUS; SUBCUTANEOUS at 01:40

## 2023-09-30 RX ADMIN — HYDROMORPHONE HYDROCHLORIDE 0.5 MILLIGRAM(S): 2 INJECTION INTRAMUSCULAR; INTRAVENOUS; SUBCUTANEOUS at 06:34

## 2023-09-30 RX ADMIN — Medication 1000 MILLIGRAM(S): at 21:26

## 2023-09-30 RX ADMIN — CYCLOBENZAPRINE HYDROCHLORIDE 5 MILLIGRAM(S): 10 TABLET, FILM COATED ORAL at 05:21

## 2023-09-30 RX ADMIN — MIDODRINE HYDROCHLORIDE 10 MILLIGRAM(S): 2.5 TABLET ORAL at 05:21

## 2023-09-30 RX ADMIN — HYDROMORPHONE HYDROCHLORIDE 0.5 MILLIGRAM(S): 2 INJECTION INTRAMUSCULAR; INTRAVENOUS; SUBCUTANEOUS at 06:11

## 2023-09-30 NOTE — PROGRESS NOTE ADULT - SUBJECTIVE AND OBJECTIVE BOX
HPI:  Taken from outpatient neurosurgery note on 9/13/2023 " The patient, a long-standing patient of my practice, reports a history of spinal issues that dates back to a childhood injury. Over the years she's experienced developing issues with walking in a straight line, overactive reflexes in her lower extremities, and unique difficulty with her lower extremities referred to as cloneness. Recent upturn in these symptoms has limited her ability to carry out her daily routines. The patient also reports no significant improvement in her condition since our last clinical encounter two years ago."    44 y/o female with no PMHx or PSHx presents for surgery today.  She reports ambulating with walker or baby stroller at home for years.  She reports intermittent falls.  She has right greater than left leg tingling, low back and mid back pain.  She denies arm or leg pain.  She reports urinary incontinence since having a baby 2 years ago where if she doesn't get to the bathroom fast enough, she has incontinence, but she is able to hold it for a short while.  She takes no pain medications.  She has taken ibuprofen in the past.  She denies saddle anesthesia, bowel incontinence.   (25 Sep 2023 06:51)    INTERVAL EVENTS:  passed TOV    HOSPITAL COURSE:    Vital Signs Last 24 Hrs  T(C): 37.6 (29 Sep 2023 22:03), Max: 37.9 (29 Sep 2023 17:47)  T(F): 99.7 (29 Sep 2023 22:03), Max: 100.2 (29 Sep 2023 17:47)  HR: 74 (30 Sep 2023 00:00) (69 - 137)  BP: 127/64 (29 Sep 2023 21:00) (122/59 - 156/87)  BP(mean): 89 (29 Sep 2023 21:00) (84 - 116)  RR: 18 (30 Sep 2023 00:00) (15 - 19)  SpO2: 100% (30 Sep 2023 00:00) (97% - 100%)    Parameters below as of 30 Sep 2023 00:00  Patient On (Oxygen Delivery Method): room air        I&O's Summary    28 Sep 2023 07:01  -  29 Sep 2023 07:00  --------------------------------------------------------  IN: 4205.2 mL / OUT: 5930 mL / NET: -1724.8 mL    29 Sep 2023 07:01  -  30 Sep 2023 01:31  --------------------------------------------------------  IN: 1806.8 mL / OUT: 2045 mL / NET: -238.2 mL        PHYSICAL EXAM:        TUBES/LINES:  [] Chin  [] Trach  [] NGT  [] PEG  [] Wound Drains  [] Others    DIET:  [] NPO  [] Mechanical  [] Tube feeds    LABS:                        9.3    11.37 )-----------( 144      ( 29 Sep 2023 17:08 )             26.6     09-29    136  |  104  |  3<L>  ----------------------------<  158<H>  4.1   |  24  |  0.39<L>    Ca    8.2<L>      29 Sep 2023 17:08  Phos  2.1     09-29  Mg     1.6     09-29    TPro  5.9<L>  /  Alb  3.2<L>  /  TBili  0.4  /  DBili  x   /  AST  62<H>  /  ALT  34  /  AlkPhos  58  09-28    PT/INR - ( 29 Sep 2023 04:43 )   PT: 11.6 sec;   INR: 1.02          PTT - ( 29 Sep 2023 04:43 )  PTT:23.6 sec  Urinalysis Basic - ( 29 Sep 2023 17:08 )    Color: x / Appearance: x / SG: x / pH: x  Gluc: 158 mg/dL / Ketone: x  / Bili: x / Urobili: x   Blood: x / Protein: x / Nitrite: x   Leuk Esterase: x / RBC: x / WBC x   Sq Epi: x / Non Sq Epi: x / Bacteria: x      CARDIAC MARKERS ( 28 Sep 2023 05:23 )  x     / x     / 3028 U/L / x     / x          CAPILLARY BLOOD GLUCOSE          Drug Levels: [] N/A    CSF Analysis: [] N/A      Allergies    No Known Allergies    Intolerances      MEDICATIONS:  Antibiotics:    Neuro:  acetaminophen     Tablet .. 1000 milliGRAM(s) Oral every 8 hours PRN  cyclobenzaprine 5 milliGRAM(s) Oral every 8 hours  HYDROmorphone   Tablet 6 milliGRAM(s) Oral every 4 hours PRN  HYDROmorphone   Tablet 4 milliGRAM(s) Oral every 4 hours PRN  HYDROmorphone  Injectable 0.5 milliGRAM(s) IV Push every 4 hours PRN  melatonin 5 milliGRAM(s) Oral at bedtime PRN    Anticoagulation:  enoxaparin Injectable 40 milliGRAM(s) SubCutaneous every 24 hours    OTHER:  bisacodyl 5 milliGRAM(s) Oral every 12 hours  bisacodyl Suppository 10 milliGRAM(s) Rectal daily  chlorhexidine 2% Cloths 1 Application(s) Topical <User Schedule>  influenza   Vaccine 0.5 milliLiter(s) IntraMuscular once  midodrine 10 milliGRAM(s) Oral every 8 hours  norepinephrine Infusion 0.05 MICROgram(s)/kG/Min IV Continuous <Continuous>  polyethylene glycol 3350 17 Gram(s) Oral two times a day  senna 2 Tablet(s) Oral at bedtime    IVF:  sodium chloride 3 Gram(s) Oral every 6 hours    CULTURES:  Culture Results:   No growth at 1 day. (09-28 @ 02:35)  Culture Results:   No growth at 2 days. (09-27 @ 20:32)    RADIOLOGY & ADDITIONAL TESTS:      ASSESSMENT:  43y Female s/p    M40.205    S31.000A    S31.000A M40.205 S22.633XX97.009G    S31.000A M40.205 S22.009S22.009G    No pertinent family history in first degree relatives    Handoff    No pertinent past medical history    History of acute illness    Deformity of thoracic vertebra    Deformity of thoracic vertebra    Corpectomy, spine, lumbar, 4 levels    No significant past surgical history    Room Service Assist    [id number]        PLAN:  NEURO:    CARDIOVASCULAR:    PULMONARY:    RENAL:    GI:    HEME:    ID:    ENDO:    DVT PROPHYLAXIS:  [] Venodynes                                [] Heparin/Lovenox    DISPOSITION:    Assessment:  Present when checked    []  GCS  E   V  M     Heart Failure: []Acute, [] acute on chronic , []chronic  Heart Failure:  [] Diastolic (HFpEF), [] Systolic (HFrEF), []Combined (HFpEF and HFrEF), [] RHF, [] Pulm HTN, [] Other    [] CELE, [] ATN, [] AIN, [] other  [] CKD1, [] CKD2, [] CKD 3, [] CKD 4, [] CKD 5, []ESRD    Encephalopathy: [] Metabolic, [] Hepatic, [] toxic, [] Neurological, [] Other    Abnormal Nurtitional Status: [] malnurtition (see nutrition note), [ ]underweight: BMI < 19, [] morbid obesity: BMI >40, [] Cachexia    [] Sepsis  [] hypovolemic shock,[] cardiogenic shock, [] hemorrhagic shock, [] neuogenic shock  [] Acute Respiratory Failure  []Cerebral edema, [] Brain compression/ herniation,   [] Functional quadriplegia  [] Acute blood loss anemia   HPI:  Taken from outpatient neurosurgery note on 9/13/2023 " The patient, a long-standing patient of my practice, reports a history of spinal issues that dates back to a childhood injury. Over the years she's experienced developing issues with walking in a straight line, overactive reflexes in her lower extremities, and unique difficulty with her lower extremities referred to as cloneness. Recent upturn in these symptoms has limited her ability to carry out her daily routines. The patient also reports no significant improvement in her condition since our last clinical encounter two years ago."    42 y/o female with no PMHx or PSHx presents for surgery today.  She reports ambulating with walker or baby stroller at home for years.  She reports intermittent falls.  She has right greater than left leg tingling, low back and mid back pain.  She denies arm or leg pain.  She reports urinary incontinence since having a baby 2 years ago where if she doesn't get to the bathroom fast enough, she has incontinence, but she is able to hold it for a short while.  She takes no pain medications.  She has taken ibuprofen in the past.  She denies saddle anesthesia, bowel incontinence.   (25 Sep 2023 06:51)    INTERVAL EVENTS:  passed TOV    HOSPITAL COURSE:  9/25: POD 0 s/p C7-L1 fusion, T3-8 vertebral column resection, placement of anterior cage.   9/26; POD1 H/H 6.8 postop and PLT 85. Given 2u PRBC and 1u PLT. Switched propofol to precedex gtt. Pt extubated to face mask. Pt noted to only be withdrawing to noxious stimuli on bilateral lower extremities with sensation decreased RLE per patient. Dr. Vieira notified and plan is to keep MAP>100 and obtain CT full spine in the morning. Phenylephrine started to maintain MAP>100. Advanced diet to regular. Passed TOV. Increased need for aurora, UO high.   9/27: POD2 C7-L1 fusion T3-8 vertebral column resection. increased pressor requirements o/n, started on levo additionally. lactate 1.5, BNP 1400 from 800, CK elevated. echo: EF 60-65%. given 250cc bolus albumin, started on midodrine 10q8 to wean off pressors. PICC placed by Alli.   9/28: POD3 s/p C7-L1 fusion, T3-8 vertebral column resection, placement of anterior cage. R radial A-line removed and replaced on the L, trops and EKG done for chest discomfort, WNL, resolved with treatment of overall pain, continues to be febrile, lyrica dc'd to help.   Lyrica restarted. Weaning phenylephrine. Serum Na uptrending, 3% saline discontinued. Pt febrile with uptrending WBC, ID consulted recommend monitoring for now off antibiotics.  9/29: POD4. started on 3% o/n for Na 132. remains on aurora and levo for MAP>100. Tachycardic, given 1L NS. Right axillary a-line placed. 3% dc'd. HMV dc'd. Aquacell dressing replaced. Passed TOV.   9/30: POD 5.  Remains on levo gtt.       Vital Signs Last 24 Hrs  T(C): 37.6 (29 Sep 2023 22:03), Max: 37.9 (29 Sep 2023 17:47)  T(F): 99.7 (29 Sep 2023 22:03), Max: 100.2 (29 Sep 2023 17:47)  HR: 74 (30 Sep 2023 00:00) (69 - 137)  BP: 127/64 (29 Sep 2023 21:00) (122/59 - 156/87)  BP(mean): 89 (29 Sep 2023 21:00) (84 - 116)  RR: 18 (30 Sep 2023 00:00) (15 - 19)  SpO2: 100% (30 Sep 2023 00:00) (97% - 100%)    Parameters below as of 30 Sep 2023 00:00  Patient On (Oxygen Delivery Method): room air        I&O's Summary    28 Sep 2023 07:01  -  29 Sep 2023 07:00  --------------------------------------------------------  IN: 4205.2 mL / OUT: 5930 mL / NET: -1724.8 mL    29 Sep 2023 07:01  -  30 Sep 2023 01:31  --------------------------------------------------------  IN: 1806.8 mL / OUT: 2045 mL / NET: -238.2 mL        PHYSICAL EXAM:  GEN: laying in bed, NAD  NEURO: AOx3. FC, OE spont, speech intact, face symmetric. CNII-XII intact. PERRL, EOMI. No pronator drift. b/l UE 5/5, b/l LE trace withdrawal to noxious stim. decreased sensation T10/below  CV: RRR +S1/S2  PULM: CTAB  GI: Abd soft, NT/ND  EXT: ext warm, dry, nontender  WOUND: posterior incision c/d/i      LABS:                        9.3    11.37 )-----------( 144      ( 29 Sep 2023 17:08 )             26.6     09-29    136  |  104  |  3<L>  ----------------------------<  158<H>  4.1   |  24  |  0.39<L>    Ca    8.2<L>      29 Sep 2023 17:08  Phos  2.1     09-29  Mg     1.6     09-29    TPro  5.9<L>  /  Alb  3.2<L>  /  TBili  0.4  /  DBili  x   /  AST  62<H>  /  ALT  34  /  AlkPhos  58  09-28    PT/INR - ( 29 Sep 2023 04:43 )   PT: 11.6 sec;   INR: 1.02          PTT - ( 29 Sep 2023 04:43 )  PTT:23.6 sec  Urinalysis Basic - ( 29 Sep 2023 17:08 )    Color: x / Appearance: x / SG: x / pH: x  Gluc: 158 mg/dL / Ketone: x  / Bili: x / Urobili: x   Blood: x / Protein: x / Nitrite: x   Leuk Esterase: x / RBC: x / WBC x   Sq Epi: x / Non Sq Epi: x / Bacteria: x      CARDIAC MARKERS ( 28 Sep 2023 05:23 )  x     / x     / 3028 U/L / x     / x          CAPILLARY BLOOD GLUCOSE          Drug Levels: [] N/A    CSF Analysis: [] N/A      Allergies    No Known Allergies    Intolerances      MEDICATIONS:  Antibiotics:    Neuro:  acetaminophen     Tablet .. 1000 milliGRAM(s) Oral every 8 hours PRN  cyclobenzaprine 5 milliGRAM(s) Oral every 8 hours  HYDROmorphone   Tablet 6 milliGRAM(s) Oral every 4 hours PRN  HYDROmorphone   Tablet 4 milliGRAM(s) Oral every 4 hours PRN  HYDROmorphone  Injectable 0.5 milliGRAM(s) IV Push every 4 hours PRN  melatonin 5 milliGRAM(s) Oral at bedtime PRN    Anticoagulation:  enoxaparin Injectable 40 milliGRAM(s) SubCutaneous every 24 hours    OTHER:  bisacodyl 5 milliGRAM(s) Oral every 12 hours  bisacodyl Suppository 10 milliGRAM(s) Rectal daily  chlorhexidine 2% Cloths 1 Application(s) Topical <User Schedule>  influenza   Vaccine 0.5 milliLiter(s) IntraMuscular once  midodrine 10 milliGRAM(s) Oral every 8 hours  norepinephrine Infusion 0.05 MICROgram(s)/kG/Min IV Continuous <Continuous>  polyethylene glycol 3350 17 Gram(s) Oral two times a day  senna 2 Tablet(s) Oral at bedtime    IVF:  sodium chloride 3 Gram(s) Oral every 6 hours    CULTURES:  Culture Results:   No growth at 1 day. (09-28 @ 02:35)  Culture Results:   No growth at 2 days. (09-27 @ 20:32)    RADIOLOGY & ADDITIONAL TESTS:      ASSESSMENT:  43F no PMHx suffered childhood injury resulting in spine injury and residual gait disturbance, hyperreflexia of BL LE. She has right greater than left leg tingling, low back and mid back pain. 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 S/p C7-L1 fusion, T3-8 vertebral column resection, placement of anterior cage (9/25/23).     NEURO:  - Neuro/spine checks/vitals q1hrs  - Pain control: tylenol 1g q8h, flexeril 5q8, dilaudid 4/6 PO+ dilaudid prn pushes (lyrica held due to maintain SBP)  - Post-op CT full spine: L1 screw not attached to the vertical yoli, epidural lipomatosis L2-3  - DANIELE x1 superficial - monitor outputs    Cardio:  - MAP>100, on levo gtt  - midodrine 10mg q8h to wean off pressors  - echo 9/27: EF 60-65%    Pulm:  - room air  - incentive spirometry    GI:  - Regular diet   - Bowel regimen   - pending BM     Renal:  - salt tabs 3q6  - voiding    Endo:  - a1c 5.6  - ISS     Heme:  - SCDs for DVT ppx/ SQL   - 1.5L EBL, 1.5L albumin and cell saver intraop  - 2u PRBC and 1u PLT postop 9/25    ID:  - febrile 9/27 f/u panculture    - ID following    Discussed with Dr. Vieira and Dr. Franco

## 2023-09-30 NOTE — PROGRESS NOTE ADULT - SUBJECTIVE AND OBJECTIVE BOX
NSCU Progress Note    Assessment/Hospital Course:    43y/F  with no PMH, traumatic spinal fracture --> resulted in severe thoracic kyphosis with progressing myelopathy. Baseline exam difficulty ambulating, but with good strength on all 4s.  Now admitted for elective spine surgery s/p 5-level vertebral column resection with fusion of C7-L1, put in a cage; stage 1; stable introp monitoring; pleura violated, repaired.      24 Hour Events/Subjective:  - POD5  - ongoing map goals      REVIEW OF SYSTEMS:  - negative except as above    VITALS:   - Reviewed      IMAGING/DATA:   - Reviewed          PHYSICAL EXAM:    General: calm  CVS: RRR  Pulm: CTAB  GI: Soft, NTND  Extremities: No LE Edema  Neuro: AOx3, FC, BUE 5/5,BLE 0/5, L2 sensory R>L

## 2023-09-30 NOTE — PROGRESS NOTE ADULT - SUBJECTIVE AND OBJECTIVE BOX
=================================  NEUROCRITICAL CARE ATTENDING NOTE  =================================    NGUYEN GROVER   MRN-[id number]  Summary:  43y/F  with no PMH, traumatic spinal fracture --> resulted in severe thoracic kyphosis with progressing myelopathy. Baseline exam difficulty ambulating, but with good strength on all 4s.  Now admitted for elective spine surgery.    Taken from outpatient neurosurgery note on 9/13/2023 " The patient, a long-standing patient of my practice, reports a history of spinal issues that dates back to a childhood injury. Over the years she's experienced developing issues with walking in a straight line, overactive reflexes in her lower extremities, and unique difficulty with her lower extremities referred to as cloneness. Recent upturn in these symptoms has limited her ability to carry out her daily routines. The patient also reports no significant improvement in her condition since our last clinical encounter two years ago."    44 y/o female with no PMHx or PSHx presents for surgery today.  She reports ambulating with walker or baby stroller at home for years.  She reports intermittent falls.  She has right greater than left leg tingling, low back and mid back pain.  She denies arm or leg pain.  She reports urinary incontinence since having a baby 2 years ago where if she doesn't get to the bathroom fast enough, she has incontinence, but she is able to hold it for a short while.  She takes no pain medications.  She has taken ibuprofen in the past.  She denies saddle anesthesia, bowel incontinence.   (25 Sep 2023 06:51)    COURSE IN THE HOSPITAL:  09/25 POD#0 5-level vertebral column resection with fusion of C7-L1, put in a cage; stage 1; stable introp monitoring; pleura violated, repaired; extubated  09/26 POD#1   09/27 POD#2 Tmax 38.4  09/28 POD#3 Tmax 38.5 central line (RIJ) removed  09/29 POD#4 Tmax 38.2 pain control better, Chin out, passed TOV  09/30 POD#5 Tmax 38.1    Past Medical History: No pertinent past medical history History of acute illness  Allergies:  No Known Allergies  Home meds:     PHYSICAL EXAMINATION  T(C): 37.5 (09-30 @ 18:01), Max: 38.1 (09-30 @ 15:00) HR: 127 (09-30 @ 18:00) (74 - 127) BP: 127/64 (09-29 @ 21:00) (122/59 - 127/64) RR: 18 (09-30 @ 18:00) (12 - 22) SpO2: 100% (09-30 @ 18:00) (99% - 100%)   NEUROLOGIC EXAMINATION:  Patient is awake alert oriented x3, pupils equal and reactive, B UE 5/5 B LE 1/5  GENERAL:  not intubated  EENT:  anicteric  CARDIOVASCULAR: (+) S1 S2, normal rate and regular rhythm  PULMONARY: clear to auscultation bilaterally  ABDOMEN: soft, nontender with normoactive bowel sounds  EXTREMITIES: no edema  SKIN: no rash    LABS: 09-30    (9.44)  8.9  (9.2)  8.24  )-----------( 156      ( 30 Sep 2023 17:15 )             26.1    (136)  135  |  103  |  5<L>  ----------------------------<  141<H>  3.9   |  23  |  0.39<L>    Ca    8.4      30 Sep 2023 05:30  Phos  2.1     09-30  Mg     1.7     09-30 09-29 @ 07:01  -  09-30 @ 07:00  --------------------------------------------------------  IN: 2562.9 mL / OUT: 3255 mL / NET: -692.1 mL    09-30 @ 07:01  -  09-30 @ 19:05  --------------------------------------------------------  IN: 1959.2 mL / OUT: 1020 mL / NET: 939.2 mL     Bacteriology: 09/27 UA  NEG  09/28 Blood CS NG2D x1  09/27 Blood CS NG2D x1     CSF studies:  EEG:  Neuroimaging:  Other imaging:    MEDICATIONS: 09-30    ·	enoxaparin Injectable 40 SubCutaneous every 24 hours  ·	cyclobenzaprine 5 Oral every 8 hours  ·	midodrine 15 milliGRAM(s) Oral every 8 hours  ·	bisacodyl 5 Oral every 12 hours bisacodyl Suppository 10 Rectal daily  ·	polyethylene glycol 3350 17 Oral two times a day  ·	saline laxative (FLEET) Rectal Enema 1 Rectal once  ·	senna 2 Oral at bedtime  ·	sodium chloride 3 Oral every 6 hours  ·	acetaminophen     Tablet .. 1000 Oral every 8 hours PRN  ·	HYDROmorphone   Tablet 6 Oral every 4 hours PRN  ·	HYDROmorphone   Tablet 4 Oral every 4 hours PRN  ·	HYDROmorphone  Injectable 0.5 IV Push every 4 hours PRN  ·	melatonin 5 Oral at bedtime PRN     IV FLUIDS:   DRIPS:   ·	norepinephrine Infusion 0.05 MICROgram(s)/kG/Min IV Continuous <Continuous>0.09  DIET: regular   Lines: [place] L PICC  Drains:     Wounds:    CODE STATUS:  Full Code                       GOALS OF CARE:  aggressive                      DISPOSITION:  ICU =================================  NEUROCRITICAL CARE ATTENDING NOTE  =================================    NGUYEN GROVER   MRN-[id number]  Summary:  43y/F  with no PMH, traumatic spinal fracture --> resulted in severe thoracic kyphosis with progressing myelopathy. Baseline exam difficulty ambulating, but with good strength on all 4s.  Now admitted for elective spine surgery.    Taken from outpatient neurosurgery note on 9/13/2023 " The patient, a long-standing patient of my practice, reports a history of spinal issues that dates back to a childhood injury. Over the years she's experienced developing issues with walking in a straight line, overactive reflexes in her lower extremities, and unique difficulty with her lower extremities referred to as cloneness. Recent upturn in these symptoms has limited her ability to carry out her daily routines. The patient also reports no significant improvement in her condition since our last clinical encounter two years ago."    44 y/o female with no PMHx or PSHx presents for surgery today.  She reports ambulating with walker or baby stroller at home for years.  She reports intermittent falls.  She has right greater than left leg tingling, low back and mid back pain.  She denies arm or leg pain.  She reports urinary incontinence since having a baby 2 years ago where if she doesn't get to the bathroom fast enough, she has incontinence, but she is able to hold it for a short while.  She takes no pain medications.  She has taken ibuprofen in the past.  She denies saddle anesthesia, bowel incontinence.   (25 Sep 2023 06:51)    COURSE IN THE HOSPITAL:  09/25 POD#0 5-level vertebral column resection with fusion of C7-L1, put in a cage; stage 1; stable introp monitoring; pleura violated, repaired; extubated  09/26 POD#1   09/27 POD#2 Tmax 38.4  09/28 POD#3 Tmax 38.5 central line (RIJ) removed  09/29 POD#4 Tmax 38.2 pain control better, Chin out, passed TOV  09/30 POD#5 Tmax 38.1    Past Medical History: No pertinent past medical history History of acute illness  Allergies:  No Known Allergies  Home meds:     PHYSICAL EXAMINATION  T(C): 37.5 (09-30 @ 18:01), Max: 38.1 (09-30 @ 15:00) HR: 127 (09-30 @ 18:00) (74 - 127) BP: 127/64 (09-29 @ 21:00) (122/59 - 127/64) RR: 18 (09-30 @ 18:00) (12 - 22) SpO2: 100% (09-30 @ 18:00) (99% - 100%)   NEUROLOGIC EXAMINATION:  Patient is awake alert oriented x3, pupils equal and reactive, B UE 5/5 B LE 1/5  GENERAL:  not intubated  EENT:  anicteric  CARDIOVASCULAR: (+) S1 S2, normal rate and regular rhythm  PULMONARY: clear to auscultation bilaterally  ABDOMEN: soft, nontender with normoactive bowel sounds  EXTREMITIES: no edema  SKIN: no rash    LABS: 09-30    (9.44)  8.9  (9.2)  8.24  )-----------( 156      ( 30 Sep 2023 17:15 )             26.1    (136)  135  |  103  |  5<L>  ----------------------------<  141<H>  3.9   |  23  |  0.39<L>    Ca    8.4      30 Sep 2023 05:30  Phos  2.1     09-30  Mg     1.7     09-30 09-29 @ 07:01  -  09-30 @ 07:00  --------------------------------------------------------  IN: 2562.9 mL / OUT: 3255 mL / NET: -692.1 mL    09-30 @ 07:01  -  09-30 @ 19:05  --------------------------------------------------------  IN: 1959.2 mL / OUT: 1020 mL / NET: 939.2 mL     Bacteriology: 09/27 UA  NEG  09/28 Blood CS NG2D x1  09/27 Blood CS NG2D x1     CSF studies:  EEG:  Neuroimaging:  Other imaging:    MEDICATIONS: 09-30    ·	enoxaparin Injectable 40 SubCutaneous every 24 hours  ·	cyclobenzaprine 5 Oral every 8 hours  ·	midodrine 15 milliGRAM(s) Oral every 8 hours  ·	bisacodyl 5 Oral every 12 hours bisacodyl Suppository 10 Rectal daily  ·	polyethylene glycol 3350 17 Oral two times a day  ·	saline laxative (FLEET) Rectal Enema 1 Rectal once  ·	senna 2 Oral at bedtime  ·	sodium chloride 3 Oral every 6 hours  ·	acetaminophen     Tablet .. 1000 Oral every 8 hours PRN  ·	HYDROmorphone   Tablet 6 Oral every 4 hours PRN  ·	HYDROmorphone   Tablet 4 Oral every 4 hours PRN  ·	HYDROmorphone  Injectable 0.5 IV Push every 4 hours PRN  ·	melatonin 5 Oral at bedtime PRN     IV FLUIDS: NS@50  DRIPS:   ·	norepinephrine Infusion 0.05 MICROgram(s)/kG/Min IV Continuous <Continuous>0.13  DIET: regular   Lines: [place] L PICC  Drains:     Wounds:    CODE STATUS:  Full Code                       GOALS OF CARE:  aggressive                      DISPOSITION:  ICU

## 2023-09-30 NOTE — PROGRESS NOTE ADULT - ASSESSMENT
43y/F with h/o traumatic spinal cord fracture, severe thoracic kyphosis, s/p 5-level vertebral resection, fusion C7-L1 (09/25/2023, Dr. Vieira)    PLAN:   NEURO: spine checks q4h, VSq1, PRN pain meds tylenol, ERAS protocol, hydromorphone; d/c standing tylenol  hemodynamic augmentation MAP >100 as per NSG  second stage surgery next week  REHAB:  physical therapy evaluation and management    EARLY MOB:  HOB up, bed rest    PULM:  RA  CARDIO:  , echo, (EF 60-65) troponin, on levophed, titrate to MAP goal; continue midodrine  ENDO:  Blood sugar goals 140-180 mg/dL  GI:  bowel regimen - add supp  DIET: regular diet  RENAL:  IVL  HEM/ONC: stable  VTE Prophylaxis: SCDs, SQL  ID: febrile low grade, WBC significantly down, CXR NEG, pancultured, UA NEG; no ABx for now  Social: will update family    Active issues:  What's keeping patient in the ICU? pressors  What is this patient's dispo plan? stepdown after stage 2 surgery    ATTENDING ATTESTATION:  I was physically present for the key portions of the evaluation and management (E/M) service provided.  I agree with the above history, physical and plan, which I have reviewed and edited where appropriate.    Patient at high risk for neurological deterioration or death due to:  ICU delirium, aspiration PNA, DVT / PE.  Critical care time:  I have personally provided 45 minutes of critical care time, excluding time spent on separate procedures.      Plan discussed with RN, house staff.    43y/F with h/o traumatic spinal cord fracture, severe thoracic kyphosis, s/p 5-level vertebral resection, fusion C7-L1 (09/25/2023, Dr. Vieira)    PLAN:   NEURO: spine checks q4h, VSq1, PRN pain meds tylenol, ERAS protocol, hydromorphone  hemodynamic augmentation MAP >90 as per NSG  second stage surgery next week  REHAB:  physical therapy evaluation and management    EARLY MOB:  HOB up, bed rest    PULM:  room air, incentive spirometry  CARDIO:  MAP 90, echo, (EF 60-65) troponin, on levophed, titrate to MAP goal; continue midodrine 15 q8h  ENDO:  Blood sugar goals 140-180 mg/dL  GI:  bowel regimen  DIET: regular diet  RENAL:  NS@50cc/hr  HEM/ONC: stable  VTE Prophylaxis: SCDs, SQL  ID: febrile low grade, no leukocytosis  Social: will update family    Active issues:  What's keeping patient in the ICU? pressors  What is this patient's dispo plan? stepdown after stage 2 surgery    ATTENDING ATTESTATION:  I was physically present for the key portions of the evaluation and management (E/M) service provided.  I agree with the above history, physical and plan, which I have reviewed and edited where appropriate.    Patient at high risk for neurological deterioration or death due to:  ICU delirium, aspiration PNA, DVT / PE.  Critical care time:  I have personally provided 45 minutes of critical care time, excluding time spent on separate procedures.      Plan discussed with RN, house staff.

## 2023-09-30 NOTE — PROGRESS NOTE ADULT - ASSESSMENT
ASSESSMENT/PLAN:     s/p 5-level vertebral column resection with fusion of C7-L1, put in a cage; stage 1; stable introp monitoring; pleura violated, repaired; post op with BLE weakness requring pressors to augment MAP>100, now pending additional imaging    NEURO:  - Neurochecks q1h  - Surgical drains per NSGY  - Augment map as per below  - Tentative Stage 2 next Wednesday   - Pain control, ERAS  - Activity: bed rest for now, PT/OT when able    PULM:  - Incentive spirometry  - mobilize as tolerated  - Aspiration Precautions    CV:  - MAP>100 per nsg  - trop, ekg while on pressors  - exhibiting sxs of neurogenic shock given LE findings, less likely cardiogenic given normal cardiac markers and POCUS with good cardiac contractility will order official TTE, septic shock remains in diagnosis, however given recent spinal surgery with new LE weakness and increasing pressor requirements, would favor neurogenic  - c/w levophed  - increase midodrine 15q8h  - Rt axillary olimpia placed 9/29    RENAL:  - Fluids: NS 50cc/h while on pressors  - trend renal function  - salt tabs    GI:  - Diet: Regular  - Bowel regimen standing    ENDO:   - Goal euglycemia (-180)    HEME/ONC:  s/p 700cc cell saver, 1.5 L EBL; 1500 5% albumin, given TXA;  post-op Hb 6.8 - give 2 units pRBC, repeat   - monitor H/H  - repeat cbc, stable post transfusion    VTE prophylaxis   - SCDs   - hold chemoprophylaxis due to: SQL      ID:  - febrile, pan cultured 9/27, UA negative, will continue to monitor  - ID following  - if spikes, low threshold to empirically cover with vanc/zosyn        Dispo: ICU pending OR plan, BP augmentation ASSESSMENT/PLAN:     s/p 5-level vertebral column resection with fusion of C7-L1, put in a cage; stage 1; stable introp monitoring; pleura violated, repaired; post op with BLE weakness requring pressors to augment MAP>100, now pending additional imaging    NEURO:  - Neurochecks q4h  - Surgical drains per NSGY  - Augment map as per below  - Tentative Stage 2 next Wednesday   - Pain control, ERAS  - Activity: bed rest for now, PT/OT when able    PULM:  - Incentive spirometry  - mobilize as tolerated  - Aspiration Precautions    CV:  - MAP>100 per nsg  - trop, ekg while on pressors  - c/w levophed  - increase midodrine 15q8h  - Rt axillary olimpia placed 9/29    RENAL:  - Fluids: NS 50cc/h while on pressors  - trend renal function  - salt tabs    GI:  - Diet: Regular  - Bowel regimen standing  - lactulose today   - polyethylene glycol 17G daily, senna, dulcolax       ENDO:   - Goal euglycemia (-180)    HEME/ONC:  s/p 700cc cell saver, 1.5 L EBL; 1500 5% albumin, given TXA;  post-op Hb 6.8 - give 2 units pRBC, repeat   - monitor H/H  - repeat cbc today    VTE prophylaxis   - SCDs   - hold chemoprophylaxis due to: SQL      ID:  - febrile, pan cultured 9/27, UA negative, will continue to monitor  - ID following  - if spikes, low threshold to empirically cover with vanc/zosyn        Dispo: ICU pending OR plan, BP augmentation

## 2023-10-01 LAB
ANION GAP SERPL CALC-SCNC: 10 MMOL/L — SIGNIFICANT CHANGE UP (ref 5–17)
APPEARANCE UR: CLEAR — SIGNIFICANT CHANGE UP
BACTERIA # UR AUTO: ABNORMAL /HPF
BILIRUB UR-MCNC: NEGATIVE — SIGNIFICANT CHANGE UP
BLD GP AB SCN SERPL QL: NEGATIVE — SIGNIFICANT CHANGE UP
BUN SERPL-MCNC: 4 MG/DL — LOW (ref 7–23)
CALCIUM SERPL-MCNC: 8.4 MG/DL — SIGNIFICANT CHANGE UP (ref 8.4–10.5)
CHLORIDE SERPL-SCNC: 102 MMOL/L — SIGNIFICANT CHANGE UP (ref 96–108)
CK SERPL-CCNC: 449 U/L — HIGH (ref 25–170)
CO2 SERPL-SCNC: 25 MMOL/L — SIGNIFICANT CHANGE UP (ref 22–31)
COLOR SPEC: YELLOW — SIGNIFICANT CHANGE UP
CREAT SERPL-MCNC: 0.38 MG/DL — LOW (ref 0.5–1.3)
DIFF PNL FLD: ABNORMAL
EGFR: 129 ML/MIN/1.73M2 — SIGNIFICANT CHANGE UP
EPI CELLS # UR: SIGNIFICANT CHANGE UP /HPF (ref 0–5)
GLUCOSE SERPL-MCNC: 127 MG/DL — HIGH (ref 70–99)
GLUCOSE UR QL: NEGATIVE — SIGNIFICANT CHANGE UP
HCG SERPL-ACNC: <0 MIU/ML — SIGNIFICANT CHANGE UP
HCT VFR BLD CALC: 25.6 % — LOW (ref 34.5–45)
HCT VFR BLD CALC: 26.3 % — LOW (ref 34.5–45)
HGB BLD-MCNC: 8.5 G/DL — LOW (ref 11.5–15.5)
HGB BLD-MCNC: 8.7 G/DL — LOW (ref 11.5–15.5)
KETONES UR-MCNC: ABNORMAL MG/DL
LEUKOCYTE ESTERASE UR-ACNC: ABNORMAL
MAGNESIUM SERPL-MCNC: 1.6 MG/DL — SIGNIFICANT CHANGE UP (ref 1.6–2.6)
MCHC RBC-ENTMCNC: 27.8 PG — SIGNIFICANT CHANGE UP (ref 27–34)
MCHC RBC-ENTMCNC: 27.8 PG — SIGNIFICANT CHANGE UP (ref 27–34)
MCHC RBC-ENTMCNC: 33.1 GM/DL — SIGNIFICANT CHANGE UP (ref 32–36)
MCHC RBC-ENTMCNC: 33.2 GM/DL — SIGNIFICANT CHANGE UP (ref 32–36)
MCV RBC AUTO: 83.7 FL — SIGNIFICANT CHANGE UP (ref 80–100)
MCV RBC AUTO: 84 FL — SIGNIFICANT CHANGE UP (ref 80–100)
MRSA PCR RESULT.: NEGATIVE — SIGNIFICANT CHANGE UP
NITRITE UR-MCNC: POSITIVE
NRBC # BLD: 0 /100 WBCS — SIGNIFICANT CHANGE UP (ref 0–0)
NRBC # BLD: 0 /100 WBCS — SIGNIFICANT CHANGE UP (ref 0–0)
PH UR: 6.5 — SIGNIFICANT CHANGE UP (ref 5–8)
PHOSPHATE SERPL-MCNC: 3.2 MG/DL — SIGNIFICANT CHANGE UP (ref 2.5–4.5)
PLATELET # BLD AUTO: 173 K/UL — SIGNIFICANT CHANGE UP (ref 150–400)
PLATELET # BLD AUTO: 199 K/UL — SIGNIFICANT CHANGE UP (ref 150–400)
POTASSIUM SERPL-MCNC: 4 MMOL/L — SIGNIFICANT CHANGE UP (ref 3.5–5.3)
POTASSIUM SERPL-SCNC: 4 MMOL/L — SIGNIFICANT CHANGE UP (ref 3.5–5.3)
PROT UR-MCNC: NEGATIVE MG/DL — SIGNIFICANT CHANGE UP
RBC # BLD: 3.06 M/UL — LOW (ref 3.8–5.2)
RBC # BLD: 3.13 M/UL — LOW (ref 3.8–5.2)
RBC # FLD: 14.5 % — SIGNIFICANT CHANGE UP (ref 10.3–14.5)
RBC # FLD: 14.5 % — SIGNIFICANT CHANGE UP (ref 10.3–14.5)
RBC CASTS # UR COMP ASSIST: < 5 /HPF — SIGNIFICANT CHANGE UP
RH IG SCN BLD-IMP: POSITIVE — SIGNIFICANT CHANGE UP
S AUREUS DNA NOSE QL NAA+PROBE: POSITIVE
SODIUM SERPL-SCNC: 137 MMOL/L — SIGNIFICANT CHANGE UP (ref 135–145)
SP GR SPEC: 1.01 — SIGNIFICANT CHANGE UP (ref 1–1.03)
UROBILINOGEN FLD QL: 1 E.U./DL — SIGNIFICANT CHANGE UP
WBC # BLD: 7.06 K/UL — SIGNIFICANT CHANGE UP (ref 3.8–10.5)
WBC # BLD: 8.2 K/UL — SIGNIFICANT CHANGE UP (ref 3.8–10.5)
WBC # FLD AUTO: 7.06 K/UL — SIGNIFICANT CHANGE UP (ref 3.8–10.5)
WBC # FLD AUTO: 8.2 K/UL — SIGNIFICANT CHANGE UP (ref 3.8–10.5)
WBC UR QL: > 10 /HPF

## 2023-10-01 PROCEDURE — 99232 SBSQ HOSP IP/OBS MODERATE 35: CPT

## 2023-10-01 PROCEDURE — 71045 X-RAY EXAM CHEST 1 VIEW: CPT | Mod: 26

## 2023-10-01 PROCEDURE — 99024 POSTOP FOLLOW-UP VISIT: CPT

## 2023-10-01 PROCEDURE — 93970 EXTREMITY STUDY: CPT | Mod: 26

## 2023-10-01 PROCEDURE — 99291 CRITICAL CARE FIRST HOUR: CPT

## 2023-10-01 PROCEDURE — 93971 EXTREMITY STUDY: CPT | Mod: 26,LT

## 2023-10-01 RX ORDER — NOREPINEPHRINE BITARTRATE/D5W 8 MG/250ML
0.05 PLASTIC BAG, INJECTION (ML) INTRAVENOUS
Qty: 16 | Refills: 0 | Status: DISCONTINUED | OUTPATIENT
Start: 2023-10-01 | End: 2023-10-06

## 2023-10-01 RX ORDER — PIPERACILLIN AND TAZOBACTAM 4; .5 G/20ML; G/20ML
4.5 INJECTION, POWDER, LYOPHILIZED, FOR SOLUTION INTRAVENOUS ONCE
Refills: 0 | Status: COMPLETED | OUTPATIENT
Start: 2023-10-01 | End: 2023-10-01

## 2023-10-01 RX ORDER — VANCOMYCIN HCL 1 G
VIAL (EA) INTRAVENOUS
Refills: 0 | Status: DISCONTINUED | OUTPATIENT
Start: 2023-10-01 | End: 2023-10-03

## 2023-10-01 RX ORDER — MAGNESIUM SULFATE 500 MG/ML
4 VIAL (ML) INJECTION ONCE
Refills: 0 | Status: COMPLETED | OUTPATIENT
Start: 2023-10-01 | End: 2023-10-01

## 2023-10-01 RX ORDER — PIPERACILLIN AND TAZOBACTAM 4; .5 G/20ML; G/20ML
4.5 INJECTION, POWDER, LYOPHILIZED, FOR SOLUTION INTRAVENOUS EVERY 8 HOURS
Refills: 0 | Status: DISCONTINUED | OUTPATIENT
Start: 2023-10-01 | End: 2023-10-06

## 2023-10-01 RX ORDER — SODIUM CHLORIDE 9 MG/ML
1000 INJECTION INTRAMUSCULAR; INTRAVENOUS; SUBCUTANEOUS
Refills: 0 | Status: DISCONTINUED | OUTPATIENT
Start: 2023-10-01 | End: 2023-10-05

## 2023-10-01 RX ORDER — VANCOMYCIN HCL 1 G
1000 VIAL (EA) INTRAVENOUS ONCE
Refills: 0 | Status: COMPLETED | OUTPATIENT
Start: 2023-10-01 | End: 2023-10-01

## 2023-10-01 RX ORDER — VANCOMYCIN HCL 1 G
1000 VIAL (EA) INTRAVENOUS EVERY 12 HOURS
Refills: 0 | Status: DISCONTINUED | OUTPATIENT
Start: 2023-10-02 | End: 2023-10-03

## 2023-10-01 RX ADMIN — CYCLOBENZAPRINE HYDROCHLORIDE 5 MILLIGRAM(S): 10 TABLET, FILM COATED ORAL at 13:19

## 2023-10-01 RX ADMIN — PIPERACILLIN AND TAZOBACTAM 200 GRAM(S): 4; .5 INJECTION, POWDER, LYOPHILIZED, FOR SOLUTION INTRAVENOUS at 14:00

## 2023-10-01 RX ADMIN — POLYETHYLENE GLYCOL 3350 17 GRAM(S): 17 POWDER, FOR SOLUTION ORAL at 05:07

## 2023-10-01 RX ADMIN — Medication 1000 MILLIGRAM(S): at 19:22

## 2023-10-01 RX ADMIN — HYDROMORPHONE HYDROCHLORIDE 0.5 MILLIGRAM(S): 2 INJECTION INTRAMUSCULAR; INTRAVENOUS; SUBCUTANEOUS at 04:30

## 2023-10-01 RX ADMIN — HYDROMORPHONE HYDROCHLORIDE 0.5 MILLIGRAM(S): 2 INJECTION INTRAMUSCULAR; INTRAVENOUS; SUBCUTANEOUS at 20:10

## 2023-10-01 RX ADMIN — HYDROMORPHONE HYDROCHLORIDE 0.5 MILLIGRAM(S): 2 INJECTION INTRAMUSCULAR; INTRAVENOUS; SUBCUTANEOUS at 14:11

## 2023-10-01 RX ADMIN — HYDROMORPHONE HYDROCHLORIDE 6 MILLIGRAM(S): 2 INJECTION INTRAMUSCULAR; INTRAVENOUS; SUBCUTANEOUS at 13:18

## 2023-10-01 RX ADMIN — CHLORHEXIDINE GLUCONATE 1 APPLICATION(S): 213 SOLUTION TOPICAL at 05:07

## 2023-10-01 RX ADMIN — HYDROMORPHONE HYDROCHLORIDE 0.5 MILLIGRAM(S): 2 INJECTION INTRAMUSCULAR; INTRAVENOUS; SUBCUTANEOUS at 13:42

## 2023-10-01 RX ADMIN — HYDROMORPHONE HYDROCHLORIDE 0.5 MILLIGRAM(S): 2 INJECTION INTRAMUSCULAR; INTRAVENOUS; SUBCUTANEOUS at 10:59

## 2023-10-01 RX ADMIN — HYDROMORPHONE HYDROCHLORIDE 6 MILLIGRAM(S): 2 INJECTION INTRAMUSCULAR; INTRAVENOUS; SUBCUTANEOUS at 06:02

## 2023-10-01 RX ADMIN — Medication 25 GRAM(S): at 07:48

## 2023-10-01 RX ADMIN — PIPERACILLIN AND TAZOBACTAM 25 GRAM(S): 4; .5 INJECTION, POWDER, LYOPHILIZED, FOR SOLUTION INTRAVENOUS at 23:00

## 2023-10-01 RX ADMIN — MIDODRINE HYDROCHLORIDE 15 MILLIGRAM(S): 2.5 TABLET ORAL at 05:08

## 2023-10-01 RX ADMIN — Medication 5 MILLIGRAM(S): at 05:08

## 2023-10-01 RX ADMIN — Medication 250 MILLIGRAM(S): at 13:20

## 2023-10-01 RX ADMIN — HYDROMORPHONE HYDROCHLORIDE 0.5 MILLIGRAM(S): 2 INJECTION INTRAMUSCULAR; INTRAVENOUS; SUBCUTANEOUS at 03:16

## 2023-10-01 RX ADMIN — SODIUM CHLORIDE 3 GRAM(S): 9 INJECTION INTRAMUSCULAR; INTRAVENOUS; SUBCUTANEOUS at 05:08

## 2023-10-01 RX ADMIN — CYCLOBENZAPRINE HYDROCHLORIDE 5 MILLIGRAM(S): 10 TABLET, FILM COATED ORAL at 05:08

## 2023-10-01 RX ADMIN — MIDODRINE HYDROCHLORIDE 15 MILLIGRAM(S): 2.5 TABLET ORAL at 13:19

## 2023-10-01 RX ADMIN — MIDODRINE HYDROCHLORIDE 15 MILLIGRAM(S): 2.5 TABLET ORAL at 22:28

## 2023-10-01 RX ADMIN — HYDROMORPHONE HYDROCHLORIDE 0.5 MILLIGRAM(S): 2 INJECTION INTRAMUSCULAR; INTRAVENOUS; SUBCUTANEOUS at 09:37

## 2023-10-01 RX ADMIN — HYDROMORPHONE HYDROCHLORIDE 0.5 MILLIGRAM(S): 2 INJECTION INTRAMUSCULAR; INTRAVENOUS; SUBCUTANEOUS at 19:26

## 2023-10-01 RX ADMIN — SODIUM CHLORIDE 3 GRAM(S): 9 INJECTION INTRAMUSCULAR; INTRAVENOUS; SUBCUTANEOUS at 17:04

## 2023-10-01 RX ADMIN — HYDROMORPHONE HYDROCHLORIDE 6 MILLIGRAM(S): 2 INJECTION INTRAMUSCULAR; INTRAVENOUS; SUBCUTANEOUS at 18:00

## 2023-10-01 RX ADMIN — Medication 1000 MILLIGRAM(S): at 18:00

## 2023-10-01 RX ADMIN — Medication 5 MILLIGRAM(S): at 17:04

## 2023-10-01 RX ADMIN — HYDROMORPHONE HYDROCHLORIDE 6 MILLIGRAM(S): 2 INJECTION INTRAMUSCULAR; INTRAVENOUS; SUBCUTANEOUS at 19:22

## 2023-10-01 RX ADMIN — HYDROMORPHONE HYDROCHLORIDE 6 MILLIGRAM(S): 2 INJECTION INTRAMUSCULAR; INTRAVENOUS; SUBCUTANEOUS at 23:19

## 2023-10-01 RX ADMIN — HYDROMORPHONE HYDROCHLORIDE 6 MILLIGRAM(S): 2 INJECTION INTRAMUSCULAR; INTRAVENOUS; SUBCUTANEOUS at 13:23

## 2023-10-01 RX ADMIN — SODIUM CHLORIDE 3 GRAM(S): 9 INJECTION INTRAMUSCULAR; INTRAVENOUS; SUBCUTANEOUS at 11:24

## 2023-10-01 RX ADMIN — POLYETHYLENE GLYCOL 3350 17 GRAM(S): 17 POWDER, FOR SOLUTION ORAL at 17:04

## 2023-10-01 RX ADMIN — HYDROMORPHONE HYDROCHLORIDE 0.5 MILLIGRAM(S): 2 INJECTION INTRAMUSCULAR; INTRAVENOUS; SUBCUTANEOUS at 23:48

## 2023-10-01 RX ADMIN — SODIUM CHLORIDE 3 GRAM(S): 9 INJECTION INTRAMUSCULAR; INTRAVENOUS; SUBCUTANEOUS at 23:07

## 2023-10-01 RX ADMIN — HYDROMORPHONE HYDROCHLORIDE 6 MILLIGRAM(S): 2 INJECTION INTRAMUSCULAR; INTRAVENOUS; SUBCUTANEOUS at 22:59

## 2023-10-01 RX ADMIN — ENOXAPARIN SODIUM 40 MILLIGRAM(S): 100 INJECTION SUBCUTANEOUS at 22:29

## 2023-10-01 RX ADMIN — CYCLOBENZAPRINE HYDROCHLORIDE 5 MILLIGRAM(S): 10 TABLET, FILM COATED ORAL at 22:28

## 2023-10-01 RX ADMIN — SENNA PLUS 2 TABLET(S): 8.6 TABLET ORAL at 22:28

## 2023-10-01 RX ADMIN — HYDROMORPHONE HYDROCHLORIDE 6 MILLIGRAM(S): 2 INJECTION INTRAMUSCULAR; INTRAVENOUS; SUBCUTANEOUS at 05:22

## 2023-10-01 NOTE — PROGRESS NOTE ADULT - SUBJECTIVE AND OBJECTIVE BOX
HPI:  Taken from outpatient neurosurgery note on 9/13/2023 " The patient, a long-standing patient of my practice, reports a history of spinal issues that dates back to a childhood injury. Over the years she's experienced developing issues with walking in a straight line, overactive reflexes in her lower extremities, and unique difficulty with her lower extremities referred to as cloneness. Recent upturn in these symptoms has limited her ability to carry out her daily routines. The patient also reports no significant improvement in her condition since our last clinical encounter two years ago."    44 y/o female with no PMHx or PSHx presents for surgery today.  She reports ambulating with walker or baby stroller at home for years.  She reports intermittent falls.  She has right greater than left leg tingling, low back and mid back pain.  She denies arm or leg pain.  She reports urinary incontinence since having a baby 2 years ago where if she doesn't get to the bathroom fast enough, she has incontinence, but she is able to hold it for a short while.  She takes no pain medications.  She has taken ibuprofen in the past.  She denies saddle anesthesia, bowel incontinence.   (25 Sep 2023 06:51)    INTERVAL EVENTS:  Continued pain, improves with dilaudid    HOSPITAL COURSE:  9/25: POD 0 s/p C7-L1 fusion, T3-8 vertebral column resection, placement of anterior cage.   9/26; POD1 H/H 6.8 postop and PLT 85. Given 2u PRBC and 1u PLT. Switched propofol to precedex gtt. Pt extubated to face mask. Pt noted to only be withdrawing to noxious stimuli on bilateral lower extremities with sensation decreased RLE per patient. Dr. Vieira notified and plan is to keep MAP>100 and obtain CT full spine in the morning. Phenylephrine started to maintain MAP>100. Advanced diet to regular. Passed TOV. Increased need for aurora, UO high.   9/27: POD2 C7-L1 fusion T3-8 vertebral column resection. increased pressor requirements o/n, started on levo additionally. lactate 1.5, BNP 1400 from 800, CK elevated. echo: EF 60-65%. given 250cc bolus albumin, started on midodrine 10q8 to wean off pressors. PICC placed by Alli.   9/28: POD3 s/p C7-L1 fusion, T3-8 vertebral column resection, placement of anterior cage. R radial A-line removed and replaced on the L, trops and EKG done for chest discomfort, WNL, resolved with treatment of overall pain, continues to be febrile, lyrica dc'd to help.   Lyrica restarted. Weaning phenylephrine. Serum Na uptrending, 3% saline discontinued. Pt febrile with uptrending WBC, ID consulted recommend monitoring for now off antibiotics.  9/29: POD4. started on 3% o/n for Na 132. remains on aurora and levo for MAP>100. Tachycardic, given 1L NS. Right axillary a-line placed. 3% dc'd. HMV dc'd. Aquacell dressing replaced. Passed TOV.   9/30: POD 5.  Remains on levo gtt. Increased midodrine to 15q8. Given enema. Had BM.  10/1: POD 6. MAP goal >90, on levo gtt.    Vital Signs Last 24 Hrs  T(C): 38 (01 Oct 2023 01:11), Max: 38.3 (30 Sep 2023 21:49)  T(F): 100.4 (01 Oct 2023 01:11), Max: 100.9 (30 Sep 2023 21:49)  HR: 90 (01 Oct 2023 01:00) (75 - 135)  BP: --  BP(mean): --  RR: 17 (01 Oct 2023 01:00) (12 - 22)  SpO2: 100% (01 Oct 2023 01:00) (99% - 100%)    Parameters below as of 01 Oct 2023 01:00  Patient On (Oxygen Delivery Method): room air        I&O's Summary    29 Sep 2023 07:01  -  30 Sep 2023 07:00  --------------------------------------------------------  IN: 2562.9 mL / OUT: 3255 mL / NET: -692.1 mL    30 Sep 2023 07:01  -  01 Oct 2023 01:16  --------------------------------------------------------  IN: 4792.8 mL / OUT: 2410 mL / NET: 2382.8 mL        PHYSICAL EXAM:  GEN: laying in bed, NAD  NEURO: AOx3. FC, OE spont, speech intact, face symmetric. CNII-XII intact. PERRL, EOMI. No pronator drift. b/l UE 5/5, b/l LE withdraw to noxious stim. decreased sensation T10/below  CV: RRR +S1/S2  PULM: CTAB  GI: Abd soft, NT/ND  EXT: ext warm, dry, nontender  WOUND: posterior incision c/d/i      LABS:                        8.9    8.24  )-----------( 156      ( 30 Sep 2023 17:15 )             26.1     09-30    135  |  103  |  5<L>  ----------------------------<  141<H>  3.9   |  23  |  0.39<L>    Ca    8.4      30 Sep 2023 05:30  Phos  2.1     09-30  Mg     1.7     09-30      PT/INR - ( 29 Sep 2023 04:43 )   PT: 11.6 sec;   INR: 1.02          PTT - ( 29 Sep 2023 04:43 )  PTT:23.6 sec  Urinalysis Basic - ( 30 Sep 2023 05:30 )    Color: x / Appearance: x / SG: x / pH: x  Gluc: 141 mg/dL / Ketone: x  / Bili: x / Urobili: x   Blood: x / Protein: x / Nitrite: x   Leuk Esterase: x / RBC: x / WBC x   Sq Epi: x / Non Sq Epi: x / Bacteria: x          CAPILLARY BLOOD GLUCOSE          Drug Levels: [] N/A    CSF Analysis: [] N/A      Allergies    No Known Allergies    Intolerances      MEDICATIONS:  Antibiotics:    Neuro:  acetaminophen     Tablet .. 1000 milliGRAM(s) Oral every 8 hours PRN  cyclobenzaprine 5 milliGRAM(s) Oral every 8 hours  HYDROmorphone   Tablet 6 milliGRAM(s) Oral every 4 hours PRN  HYDROmorphone   Tablet 4 milliGRAM(s) Oral every 4 hours PRN  HYDROmorphone  Injectable 0.5 milliGRAM(s) IV Push every 4 hours PRN  melatonin 5 milliGRAM(s) Oral at bedtime PRN    Anticoagulation:  enoxaparin Injectable 40 milliGRAM(s) SubCutaneous every 24 hours    OTHER:  bisacodyl 5 milliGRAM(s) Oral every 12 hours  bisacodyl Suppository 10 milliGRAM(s) Rectal daily  chlorhexidine 2% Cloths 1 Application(s) Topical <User Schedule>  influenza   Vaccine 0.5 milliLiter(s) IntraMuscular once  midodrine 15 milliGRAM(s) Oral every 8 hours  norepinephrine Infusion 0.05 MICROgram(s)/kG/Min IV Continuous <Continuous>  polyethylene glycol 3350 17 Gram(s) Oral two times a day  senna 2 Tablet(s) Oral at bedtime    IVF:  sodium chloride 3 Gram(s) Oral every 6 hours  sodium chloride 0.9%. 1000 milliLiter(s) IV Continuous <Continuous>    CULTURES:  Culture Results:   No growth at 2 days. (09-28 @ 02:35)  Culture Results:   No growth at 3 days. (09-27 @ 20:32)    RADIOLOGY & ADDITIONAL TESTS:      ASSESSMENT:  43F no PMHx suffered childhood injury resulting in spine injury and residual gait disturbance, hyperreflexia of BL LE. She has right greater than left leg tingling, low back and mid back pain. 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 S/p C7-L1 fusion, T3-8 vertebral column resection, placement of anterior cage (9/25/23).     NEURO:  - Neuro/spine checks q4/vitals q1hrs  - Pain control: tylenol 1g q8h, flexeril 5q8, dilaudid 4/6 PO+ dilaudid prn pushes (lyrica held due to maintain SBP)  - Post-op CT full spine: L1 screw not attached to the vertical yoli, epidural lipomatosis L2-3  - DANIELE x1 superficial - monitor outputs    Cardio:  - MAP>90, on levo gtt  - midodrine 15mg q8h to wean off pressors  - echo 9/27: EF 60-65%    Pulm:  - room air  - incentive spirometry    GI:  - Regular diet   - Bowel regimen   - BM 9/30    Renal:  - salt tabs 3q6  - voiding    Endo:  - a1c 5.6  - ISS     Heme:  - SCDs for DVT ppx/ SQL   - 1.5L EBL, 1.5L albumin and cell saver intraop  - 2u PRBC and 1u PLT postop 9/25    ID:  - febrile 9/27 f/u panculture    - ID following    Discussed with Dr. Vieira and Dr. Franco none

## 2023-10-01 NOTE — PROGRESS NOTE ADULT - SUBJECTIVE AND OBJECTIVE BOX
INFECTIOUS DISEASES CONSULT FOLLOW-UP NOTE    INTERVAL HPI/OVERNIGHT EVENTS:  Recurrent fever T 100.9 overnight  Decreased pressors for MAP pushes, levo .1  WBC normal  No new symptoms. Specifically no cough/SOB, dysuria, diarrhea, or increased pain at surgical site    ROS:   Constitutional, eyes, ENT, cardiovascular, respiratory, gastrointestinal, genitourinary, integumentary, neurological, psychiatric and heme/lymph are otherwise negative other than noted above       ANTIBIOTICS/RELEVANT:    MEDICATIONS  (STANDING):  bisacodyl 5 milliGRAM(s) Oral every 12 hours  bisacodyl Suppository 10 milliGRAM(s) Rectal daily  chlorhexidine 2% Cloths 1 Application(s) Topical <User Schedule>  cyclobenzaprine 5 milliGRAM(s) Oral every 8 hours  enoxaparin Injectable 40 milliGRAM(s) SubCutaneous every 24 hours  influenza   Vaccine 0.5 milliLiter(s) IntraMuscular once  midodrine 15 milliGRAM(s) Oral every 8 hours  norepinephrine Infusion 0.05 MICROgram(s)/kG/Min (3.22 mL/Hr) IV Continuous <Continuous>  piperacillin/tazobactam IVPB.. 4.5 Gram(s) IV Intermittent every 8 hours  polyethylene glycol 3350 17 Gram(s) Oral two times a day  senna 2 Tablet(s) Oral at bedtime  sodium chloride 3 Gram(s) Oral every 6 hours  sodium chloride 0.9%. 1000 milliLiter(s) (50 mL/Hr) IV Continuous <Continuous>  vancomycin  IVPB        MEDICATIONS  (PRN):  acetaminophen     Tablet .. 1000 milliGRAM(s) Oral every 8 hours PRN Mild Pain (1 - 3)  HYDROmorphone   Tablet 4 milliGRAM(s) Oral every 4 hours PRN Moderate Pain (4 - 6)  HYDROmorphone   Tablet 6 milliGRAM(s) Oral every 4 hours PRN Severe Pain (7 - 10)  HYDROmorphone  Injectable 0.5 milliGRAM(s) IV Push every 4 hours PRN breaktrhough pain  melatonin 5 milliGRAM(s) Oral at bedtime PRN Insomnia  sodium chloride 0.9% lock flush 10 milliLiter(s) IV Push every 1 hour PRN Pre/post blood products, medications, blood draw, and to maintain line patency        Vital Signs Last 24 Hrs  T(C): 37.7 (01 Oct 2023 18:08), Max: 38.3 (30 Sep 2023 21:49)  T(F): 99.9 (01 Oct 2023 18:08), Max: 100.9 (30 Sep 2023 21:49)  HR: 95 (01 Oct 2023 20:00) (73 - 120)  BP: --  BP(mean): --  RR: 17 (01 Oct 2023 20:00) (16 - 18)  SpO2: 100% (01 Oct 2023 20:00) (98% - 100%)    Parameters below as of 01 Oct 2023 20:00  Patient On (Oxygen Delivery Method): room air        23 @ 07:  -  10-01-23 @ 07:00  --------------------------------------------------------  IN: 5706.6 mL / OUT: 3730 mL / NET: 1976.6 mL    10-01-23 @ 07:  -  10-01-23 @ 20:52  --------------------------------------------------------  IN: 1162 mL / OUT: 2560 mL / NET: -1398 mL      PHYSICAL EXAM:  Constitutional: alert, NAD  Eyes: the sclera and conjunctiva were normal.   ENT: the ears and nose were normal in appearance.   Neck: the appearance of the neck was normal and the neck was supple.   Pulmonary: no respiratory distress and lungs were clear to auscultation bilaterally. On RA  Heart: heart rate was normal and rhythm regular, normal S1 and S2  Vascular:. there was no peripheral edema  Abdomen: normal bowel sounds, soft, non-tender. +distention  : external catheter  Ext: no swelling, no palpable cords at IV sites  Neurological: no focal deficits.   Psychiatric: the affect was normal  Vasc: BRANDON PICC c/d/i        LABS:                        8.7    8.20  )-----------( 199      ( 01 Oct 2023 13:24 )             26.3     10-01    137  |  102  |  4<L>  ----------------------------<  127<H>  4.0   |  25  |  0.38<L>    Ca    8.4      01 Oct 2023 05:07  Phos  3.2     10-  Mg     1.6     10-        Urinalysis Basic - ( 01 Oct 2023 11:43 )    Color: Yellow / Appearance: Clear / S.015 / pH: x  Gluc: x / Ketone: Trace mg/dL  / Bili: Negative / Urobili: 1.0 E.U./dL   Blood: x / Protein: NEGATIVE mg/dL / Nitrite: POSITIVE   Leuk Esterase: Trace / RBC: < 5 /HPF / WBC > 10 /HPF   Sq Epi: x / Non Sq Epi: x / Bacteria: Many /HPF        MICROBIOLOGY:  Reviewed    RADIOLOGY & ADDITIONAL STUDIES:  Reviewed

## 2023-10-01 NOTE — CHART NOTE - NSCHARTNOTEFT_GEN_A_CORE
Per ID, recommended for full fever w/u d/t low grade fever 100.4.     LED negative  LUE +thrombophklebitis  UA+, though patient denies any symptoms, writer cancelled the urine culture in the s/o asymptomatic bacteruria   Pending CT chest  on Empiriv Vanc/Zosyn per ID

## 2023-10-01 NOTE — PROGRESS NOTE ADULT - ASSESSMENT
ASSESSMENT/PLAN:     s/p 5-level vertebral column resection with fusion of C7-L1, put in a cage; stage 1; stable introp monitoring; pleura violated, repaired; post op with BLE weakness requring pressors to augment MAP>100, now pending additional imaging    NEURO:  - Neurochecks q4h  - Surgical drains per NSGY  - Augment map as per below  - Tentative Stage 2 next Wednesday   - Pain control, ERAS  - Activity: bed rest for now, PT/OT when able    PULM:  - Incentive spirometry  - mobilize as tolerated  - Aspiration Precautions    CV:  - MAP>90 per nsg  - trop, ekg while on pressors daily  - c/w levophed  - midodrine 15q8h  - Rt axillary olimpia placed 9/29    RENAL:  - Fluids: NS 50cc/h while on pressors  - trend renal function  - salt tabs    GI:  - Diet: Regular  - Bowel regimen standing  - lactulose today   - polyethylene glycol 17G daily, senna, dulcolax       ENDO:   - Goal euglycemia (-180)    HEME/ONC:  s/p 700cc cell saver, 1.5 L EBL; 1500 5% albumin, given TXA;  post-op Hb 6.8 - give 2 units pRBC, repeat   - monitor H/H  - repeat cbc today, may need 1uprbc    VTE prophylaxis   - SCDs   - SQL      ID:  - febrile, pan cultured 9/27, UA negative, will continue to monitor  - ID following  - if spikes, low threshold to empirically cover with vanc/zosyn        Dispo: ICU pending OR plan, BP augmentation

## 2023-10-01 NOTE — CHART NOTE - NSCHARTNOTEFT_GEN_A_CORE
MAP goal increased back to >100 due to worsened sensation loss, on levo gtt. afebrile however per ID Dr De Souza, ramírez cx sent and b/l UE and LUE dopplers performed. started empirically on vanc/zosyn. UA+. b/l LE duplex negative, LUE doppler +L cephalic superficial thrombophlebitis.

## 2023-10-01 NOTE — PROGRESS NOTE ADULT - SUBJECTIVE AND OBJECTIVE BOX
=================================  NEUROCRITICAL CARE ATTENDING NOTE  =================================    NGUYEN GROVER   MRN-[id number]  Summary:  43y/F  with no PMH, traumatic spinal fracture --> resulted in severe thoracic kyphosis with progressing myelopathy. Baseline exam difficulty ambulating, but with good strength on all 4s.  Now admitted for elective spine surgery.    Taken from outpatient neurosurgery note on 9/13/2023 " The patient, a long-standing patient of my practice, reports a history of spinal issues that dates back to a childhood injury. Over the years she's experienced developing issues with walking in a straight line, overactive reflexes in her lower extremities, and unique difficulty with her lower extremities referred to as cloneness. Recent upturn in these symptoms has limited her ability to carry out her daily routines. The patient also reports no significant improvement in her condition since our last clinical encounter two years ago."    42 y/o female with no PMHx or PSHx presents for surgery today.  She reports ambulating with walker or baby stroller at home for years.  She reports intermittent falls.  She has right greater than left leg tingling, low back and mid back pain.  She denies arm or leg pain.  She reports urinary incontinence since having a baby 2 years ago where if she doesn't get to the bathroom fast enough, she has incontinence, but she is able to hold it for a short while.  She takes no pain medications.  She has taken ibuprofen in the past.  She denies saddle anesthesia, bowel incontinence.   (25 Sep 2023 06:51)    COURSE IN THE HOSPITAL:  09/25 POD#0 5-level vertebral column resection with fusion of C7-L1, put in a cage; stage 1; stable introp monitoring; pleura violated, repaired; extubated  09/26 POD#1   09/27 POD#2 Tmax 38.4  09/28 POD#3 Tmax 38.5 central line (RIJ) removed  09/29 POD#4 Tmax 38.2 pain control better, Chin out, passed TOV  09/30 POD#5 Tmax 38.1  10/01 POD#6 Tmax 38.3    Past Medical History: No pertinent past medical history History of acute illness  Allergies:  No Known Allergies  Home meds:     PHYSICAL EXAMINATION  T(C): 37.7 (10-01 @ 18:08), Max: 38.3 (09-30 @ 21:49) HR: 110 (10-01 @ 18:00) (73 - 135) BP: -- RR: 18 (10-01 @ 18:00) (16 - 18) SpO2: 100% (10-01 @ 18:00) (98% - 100%)   NEUROLOGIC EXAMINATION:  Patient is awake alert oriented x3, pupils equal and reactive, B UE 5/5 B LE 1/5  GENERAL:  not intubated  EENT:  anicteric  CARDIOVASCULAR: (+) S1 S2, normal rate and regular rhythm  PULMONARY: clear to auscultation bilaterally  ABDOMEN: soft, nontender with normoactive bowel sounds  EXTREMITIES: no edema  SKIN: no rash    LABS: 10-01    (7.06)  8.7  (8.5)  8.20  )-----------( 199   (177)   ( 01 Oct 2023 13:24 )             26.3   (135)  137  |  102  |  4<L>  ----------------------------<  127<H>  4.0   |  25  |  0.38<L>    Ca    8.4      01 Oct 2023 05:07  Phos  3.2     10-01  Mg     1.6     10-01    09-30 @ 07:01  -  10-01 @ 07:00  --------------------------------------------------------  IN: 5706.6 mL / OUT: 3730 mL / NET: 1976.6 mL    10-01 @ 07:01  -  10-01 @ 18:49  --------------------------------------------------------  IN: 974 mL / OUT: 2160 mL / NET: -1186 mL     Bacteriology: 10/05 trace LE (+) nitrite WBC >10 many bacteria  09/28 Blood CS NG2D x1  09/27 Blood CS NG2D x1     CSF studies:  EEG:  Neuroimaging:  Other imaging:    MEDICATIONS: 10-01    ·	enoxaparin Injectable 40 SubCutaneous every 24 hours  ·	piperacillin/tazobactam IVPB.. 4.5 IV Intermittent every 8 hours  ·	vancomycin  IVPB     ·	cyclobenzaprine 5 Oral every 8 hours  ·	midodrine 15 milliGRAM(s) Oral every 8 hours  ·	bisacodyl 5 Oral every 12 hours bisacodyl Suppository 10 Rectal daily  ·	polyethylene glycol 3350 17 Oral two times a day  ·	senna 2 Oral at bedtime  ·	sodium chloride 3 Oral every 6 hours  ·	acetaminophen     Tablet .. 1000 Oral every 8 hours PRN  ·	HYDROmorphone   Tablet 4 Oral every 4 hours PRN  ·	HYDROmorphone   Tablet 6 Oral every 4 hours PRN  ·	HYDROmorphone  Injectable 0.5 IV Push every 4 hours PRN  ·	melatonin 5 Oral at bedtime PRN     IV FLUIDS: NS@50  DRIPS:   ·	norepinephrine Infusion 0.05 MICROgram(s)/kG/Min IV Continuous <Continuous>0.  DIET: regular   Lines: Rosa Maria L PICC  Drains:     Wounds:    CODE STATUS:  Full Code                       GOALS OF CARE:  aggressive                      DISPOSITION:  ICU =================================  NEUROCRITICAL CARE ATTENDING NOTE  =================================    NGUYEN GROVER   MRN-[id number]  Summary:  43y/F  with no PMH, traumatic spinal fracture --> resulted in severe thoracic kyphosis with progressing myelopathy. Baseline exam difficulty ambulating, but with good strength on all 4s.  Now admitted for elective spine surgery.    Taken from outpatient neurosurgery note on 9/13/2023 " The patient, a long-standing patient of my practice, reports a history of spinal issues that dates back to a childhood injury. Over the years she's experienced developing issues with walking in a straight line, overactive reflexes in her lower extremities, and unique difficulty with her lower extremities referred to as cloneness. Recent upturn in these symptoms has limited her ability to carry out her daily routines. The patient also reports no significant improvement in her condition since our last clinical encounter two years ago."    42 y/o female with no PMHx or PSHx presents for surgery today.  She reports ambulating with walker or baby stroller at home for years.  She reports intermittent falls.  She has right greater than left leg tingling, low back and mid back pain.  She denies arm or leg pain.  She reports urinary incontinence since having a baby 2 years ago where if she doesn't get to the bathroom fast enough, she has incontinence, but she is able to hold it for a short while.  She takes no pain medications.  She has taken ibuprofen in the past.  She denies saddle anesthesia, bowel incontinence.   (25 Sep 2023 06:51)    COURSE IN THE HOSPITAL:  09/25 POD#0 5-level vertebral column resection with fusion of C7-L1, put in a cage; stage 1; stable introp monitoring; pleura violated, repaired; extubated  09/26 POD#1   09/27 POD#2 Tmax 38.4  09/28 POD#3 Tmax 38.5 central line (RIJ) removed  09/29 POD#4 Tmax 38.2 pain control better, Chin out, passed TOV  09/30 POD#5 Tmax 38.1  10/01 POD#6 Tmax 38.3 ? pressure-dependent, B LE doppler NEG    Past Medical History: No pertinent past medical history History of acute illness  Allergies:  No Known Allergies  Home meds:     PHYSICAL EXAMINATION  T(C): 37.7 (10-01 @ 18:08), Max: 38.3 (09-30 @ 21:49) HR: 110 (10-01 @ 18:00) (73 - 135) BP: -- RR: 18 (10-01 @ 18:00) (16 - 18) SpO2: 100% (10-01 @ 18:00) (98% - 100%)   NEUROLOGIC EXAMINATION:  Patient is awake alert oriented x3, pupils equal and reactive, B UE 5/5 B LE 1/5  GENERAL:  not intubated  EENT:  anicteric  CARDIOVASCULAR: (+) S1 S2, tachycardic rate and regular rhythm  PULMONARY: clear to auscultation bilaterally  ABDOMEN: soft, nontender with normoactive bowel sounds  EXTREMITIES: no edema  SKIN: no rash    LABS: 10-01    (7.06)  8.7  (8.5)  8.20  )-----------( 199   (177)   ( 01 Oct 2023 13:24 )             26.3   (135)  137  |  102  |  4<L>  ----------------------------<  127<H>  4.0   |  25  |  0.38<L>    Ca    8.4      01 Oct 2023 05:07  Phos  3.2     10-01  Mg     1.6     10-01    09-30 @ 07:01  -  10-01 @ 07:00  --------------------------------------------------------  IN: 5706.6 mL / OUT: 3730 mL / NET: 1976.6 mL    10-01 @ 07:01  -  10-01 @ 18:49  --------------------------------------------------------  IN: 974 mL / OUT: 2160 mL / NET: -1186 mL     Bacteriology: 10/05 trace LE (+) nitrite WBC >10 many bacteria  09/28 Blood CS NG2D x1  09/27 Blood CS NG2D x1     CSF studies:  EEG:  Neuroimaging:  Other imaging:    MEDICATIONS: 10-01    ·	enoxaparin Injectable 40 SubCutaneous every 24 hours  ·	piperacillin/tazobactam IVPB.. 4.5 IV Intermittent every 8 hours  ·	vancomycin  IVPB     ·	cyclobenzaprine 5 Oral every 8 hours  ·	midodrine 15 milliGRAM(s) Oral every 8 hours  ·	bisacodyl 5 Oral every 12 hours bisacodyl Suppository 10 Rectal daily  ·	polyethylene glycol 3350 17 Oral two times a day  ·	senna 2 Oral at bedtime  ·	sodium chloride 3 Oral every 6 hours  ·	acetaminophen     Tablet .. 1000 Oral every 8 hours PRN  ·	HYDROmorphone   Tablet 4 Oral every 4 hours PRN  ·	HYDROmorphone   Tablet 6 Oral every 4 hours PRN  ·	HYDROmorphone  Injectable 0.5 IV Push every 4 hours PRN  ·	melatonin 5 Oral at bedtime PRN     IV FLUIDS: NS@50  DRIPS:   ·	norepinephrine Infusion 0.05 MICROgram(s)/kG/Min IV Continuous <Continuous>0.18  DIET: regular   Lines: Rosa Maria L PICC  Drains:     Wounds:    CODE STATUS:  Full Code                       GOALS OF CARE:  aggressive                      DISPOSITION:  ICU

## 2023-10-01 NOTE — PROGRESS NOTE ADULT - SUBJECTIVE AND OBJECTIVE BOX
[facility] Progress Note    Assessment/Hospital Course:    43y/F  with no PMH, traumatic spinal fracture --> resulted in severe thoracic kyphosis with progressing myelopathy. Baseline exam difficulty ambulating, but with good strength on all 4s.  Now admitted for elective spine surgery s/p 5-level vertebral column resection with fusion of C7-L1, put in a cage; stage 1; stable introp monitoring; pleura violated, repaired.      24 Hour Events/Subjective:  - POD6  - ongoing map goals, decreasing levo requirements  - h/h trending down no e/o bleed      REVIEW OF SYSTEMS:  - negative except as above    VITALS:   - Reviewed      IMAGING/DATA:   - Reviewed          PHYSICAL EXAM:    General: calm  CVS: RRR  Pulm: CTAB  GI: Soft, NTND  Extremities: No LE Edema  Neuro: AOx3, FC, BUE 5/5,BLE 0/5, L2 sensory R>L

## 2023-10-01 NOTE — PROGRESS NOTE ADULT - ASSESSMENT
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 On pressors since 9/26 for likely neurogenic shock, and MAP pushes. Developed fever and leukocytosis on 9/27 (POD 2) but had no focal symptoms of infection and looked well, and septic workup was negative so monitored off abx for presumed fever/leukocytosis from post-op inflammatory response/fusion fever. However overnight had recurrent fever (POD 6). Still no focal signs of infection. WBC normal. Pressors for MAP pushes decreasing.    Given recurrent fever on POD6, I believe we should initiate repeat infectious and non-infectious workup, and start empiric antibiotics pending these results.    Recommendations:  -Start vancomycin 1g IV q12h and check trough before 4th dose  -Start zosyn 4.5g IV q8h EI  -CT chest given pleural injury in OR  -LUE duplex (has PICC here), and BLE duplex  -UA/UCx and BCx x2  -LFTs    ID Team 1 will follow

## 2023-10-01 NOTE — PROGRESS NOTE ADULT - ASSESSMENT
43y/F with h/o traumatic spinal cord fracture, severe thoracic kyphosis, s/p 5-level vertebral resection, fusion C7-L1 (09/25/2023, Dr. Vieira)    PLAN:   NEURO: spine checks q4h, VSq1, PRN pain meds tylenol, ERAS protocol, hydromorphone  hemodynamic augmentation MAP >90 as per NSG  second stage surgery next week  REHAB:  physical therapy evaluation and management    EARLY MOB:  HOB up, bed rest    PULM:  room air, incentive spirometry  CARDIO:  MAP 90, echo, (EF 60-65) troponin, on levophed, titrate to MAP goal; continue midodrine 15 q8h  ENDO:  Blood sugar goals 140-180 mg/dL  GI:  bowel regimen  DIET: regular diet  RENAL:  NS@50cc/hr  HEM/ONC: stable  VTE Prophylaxis: SCDs, SQL  ID: febrile low grade, no leukocytosis  Social: will update family    Active issues:  What's keeping patient in the ICU? pressors  What is this patient's dispo plan? stepdown after stage 2 surgery    ATTENDING ATTESTATION:  I was physically present for the key portions of the evaluation and management (E/M) service provided.  I agree with the above history, physical and plan, which I have reviewed and edited where appropriate.    Patient at high risk for neurological deterioration or death due to:  ICU delirium, aspiration PNA, DVT / PE.  Critical care time:  I have personally provided 45 minutes of critical care time, excluding time spent on separate procedures.      Plan discussed with RN, house staff.    43y/F with h/o traumatic spinal cord fracture, severe thoracic kyphosis, s/p 5-level vertebral resection, fusion C7-L1 (09/25/2023, Dr. Vieira)    PLAN:   NEURO: spine checks q4h, VSq1, PRN pain meds tylenol, ERAS protocol, hydromorphone  hemodynamic augmentation MAP >100 as per NSG  second stage surgery next week, pending ID clearance  REHAB:  physical therapy evaluation and management    EARLY MOB:  HOB up, bed rest    PULM:  room air, incentive spirometry  CARDIO:  , echo, (EF 60-65) on levophed, titrate to MAP goal; continue midodrine 15 q8h  ENDO:  Blood sugar goals 140-180 mg/dL  GI:  bowel regimen  DIET: regular diet  RENAL:  NS@30cc/hr  HEM/ONC: stable  VTE Prophylaxis: SCDs, SQL; L upper superficial thrombophlebitis   ID: febrile low grade, no leukocytosis, PCT; zosyn/vanc as per ID (empiric treatment ?source); vanc trough; MRSA swab, if NEG d/c Vanc  Social: will update family    Active issues:  What's keeping patient in the ICU? pressors  What is this patient's dispo plan? stepdown after pressors weaned off, and after stage 2 surgery    ATTENDING ATTESTATION:  I was physically present for the key portions of the evaluation and management (E/M) service provided.  I agree with the above history, physical and plan, which I have reviewed and edited where appropriate.    Patient at high risk for neurological deterioration or death due to:  ICU delirium, aspiration PNA, DVT / PE.  Critical care time:  I have personally provided 45 minutes of critical care time, excluding time spent on separate procedures.      Plan discussed with RN, house staff.

## 2023-10-02 LAB
ALBUMIN SERPL ELPH-MCNC: 2.9 G/DL — LOW (ref 3.3–5)
ALP SERPL-CCNC: 59 U/L — SIGNIFICANT CHANGE UP (ref 40–120)
ALT FLD-CCNC: 27 U/L — SIGNIFICANT CHANGE UP (ref 10–45)
ANION GAP SERPL CALC-SCNC: 9 MMOL/L — SIGNIFICANT CHANGE UP (ref 5–17)
AST SERPL-CCNC: 29 U/L — SIGNIFICANT CHANGE UP (ref 10–40)
BASOPHILS # BLD AUTO: 0.02 K/UL — SIGNIFICANT CHANGE UP (ref 0–0.2)
BASOPHILS NFR BLD AUTO: 0.3 % — SIGNIFICANT CHANGE UP (ref 0–2)
BILIRUB SERPL-MCNC: 0.2 MG/DL — SIGNIFICANT CHANGE UP (ref 0.2–1.2)
BUN SERPL-MCNC: 7 MG/DL — SIGNIFICANT CHANGE UP (ref 7–23)
CALCIUM SERPL-MCNC: 8.4 MG/DL — SIGNIFICANT CHANGE UP (ref 8.4–10.5)
CHLORIDE SERPL-SCNC: 102 MMOL/L — SIGNIFICANT CHANGE UP (ref 96–108)
CK SERPL-CCNC: 314 U/L — HIGH (ref 25–170)
CO2 SERPL-SCNC: 26 MMOL/L — SIGNIFICANT CHANGE UP (ref 22–31)
CREAT SERPL-MCNC: 0.43 MG/DL — LOW (ref 0.5–1.3)
CULTURE RESULTS: SIGNIFICANT CHANGE UP
EGFR: 125 ML/MIN/1.73M2 — SIGNIFICANT CHANGE UP
EOSINOPHIL # BLD AUTO: 0.27 K/UL — SIGNIFICANT CHANGE UP (ref 0–0.5)
EOSINOPHIL NFR BLD AUTO: 3.9 % — SIGNIFICANT CHANGE UP (ref 0–6)
GLUCOSE SERPL-MCNC: 125 MG/DL — HIGH (ref 70–99)
HCT VFR BLD CALC: 25 % — LOW (ref 34.5–45)
HGB BLD-MCNC: 8.3 G/DL — LOW (ref 11.5–15.5)
IMM GRANULOCYTES NFR BLD AUTO: 0.4 % — SIGNIFICANT CHANGE UP (ref 0–0.9)
LYMPHOCYTES # BLD AUTO: 1.3 K/UL — SIGNIFICANT CHANGE UP (ref 1–3.3)
LYMPHOCYTES # BLD AUTO: 19 % — SIGNIFICANT CHANGE UP (ref 13–44)
MAGNESIUM SERPL-MCNC: 1.8 MG/DL — SIGNIFICANT CHANGE UP (ref 1.6–2.6)
MCHC RBC-ENTMCNC: 28.3 PG — SIGNIFICANT CHANGE UP (ref 27–34)
MCHC RBC-ENTMCNC: 33.2 GM/DL — SIGNIFICANT CHANGE UP (ref 32–36)
MCV RBC AUTO: 85.3 FL — SIGNIFICANT CHANGE UP (ref 80–100)
MONOCYTES # BLD AUTO: 0.62 K/UL — SIGNIFICANT CHANGE UP (ref 0–0.9)
MONOCYTES NFR BLD AUTO: 9 % — SIGNIFICANT CHANGE UP (ref 2–14)
NEUTROPHILS # BLD AUTO: 4.62 K/UL — SIGNIFICANT CHANGE UP (ref 1.8–7.4)
NEUTROPHILS NFR BLD AUTO: 67.4 % — SIGNIFICANT CHANGE UP (ref 43–77)
NRBC # BLD: 0 /100 WBCS — SIGNIFICANT CHANGE UP (ref 0–0)
PHOSPHATE SERPL-MCNC: 3.4 MG/DL — SIGNIFICANT CHANGE UP (ref 2.5–4.5)
PLATELET # BLD AUTO: 210 K/UL — SIGNIFICANT CHANGE UP (ref 150–400)
POTASSIUM SERPL-MCNC: 3.9 MMOL/L — SIGNIFICANT CHANGE UP (ref 3.5–5.3)
POTASSIUM SERPL-SCNC: 3.9 MMOL/L — SIGNIFICANT CHANGE UP (ref 3.5–5.3)
PROCALCITONIN SERPL-MCNC: 0.13 NG/ML — HIGH (ref 0.02–0.1)
PROT SERPL-MCNC: 6 G/DL — SIGNIFICANT CHANGE UP (ref 6–8.3)
RAPID RVP RESULT: SIGNIFICANT CHANGE UP
RBC # BLD: 2.93 M/UL — LOW (ref 3.8–5.2)
RBC # FLD: 14.5 % — SIGNIFICANT CHANGE UP (ref 10.3–14.5)
SARS-COV-2 RNA SPEC QL NAA+PROBE: SIGNIFICANT CHANGE UP
SODIUM SERPL-SCNC: 137 MMOL/L — SIGNIFICANT CHANGE UP (ref 135–145)
SPECIMEN SOURCE: SIGNIFICANT CHANGE UP
TROPONIN T, HIGH SENSITIVITY RESULT: 6 NG/L — SIGNIFICANT CHANGE UP (ref 0–51)
WBC # BLD: 6.86 K/UL — SIGNIFICANT CHANGE UP (ref 3.8–10.5)
WBC # FLD AUTO: 6.86 K/UL — SIGNIFICANT CHANGE UP (ref 3.8–10.5)

## 2023-10-02 PROCEDURE — 99231 SBSQ HOSP IP/OBS SF/LOW 25: CPT

## 2023-10-02 PROCEDURE — 99291 CRITICAL CARE FIRST HOUR: CPT

## 2023-10-02 PROCEDURE — 74018 RADEX ABDOMEN 1 VIEW: CPT | Mod: 26

## 2023-10-02 PROCEDURE — 99232 SBSQ HOSP IP/OBS MODERATE 35: CPT

## 2023-10-02 RX ORDER — HYDROMORPHONE HYDROCHLORIDE 2 MG/ML
2 INJECTION INTRAMUSCULAR; INTRAVENOUS; SUBCUTANEOUS EVERY 4 HOURS
Refills: 0 | Status: DISCONTINUED | OUTPATIENT
Start: 2023-10-02 | End: 2023-10-06

## 2023-10-02 RX ORDER — HYDROMORPHONE HYDROCHLORIDE 2 MG/ML
4 INJECTION INTRAMUSCULAR; INTRAVENOUS; SUBCUTANEOUS EVERY 4 HOURS
Refills: 0 | Status: DISCONTINUED | OUTPATIENT
Start: 2023-10-02 | End: 2023-10-06

## 2023-10-02 RX ORDER — MAGNESIUM SULFATE 500 MG/ML
2 VIAL (ML) INJECTION ONCE
Refills: 0 | Status: COMPLETED | OUTPATIENT
Start: 2023-10-02 | End: 2023-10-02

## 2023-10-02 RX ORDER — KETOROLAC TROMETHAMINE 30 MG/ML
30 SYRINGE (ML) INJECTION EVERY 8 HOURS
Refills: 0 | Status: DISCONTINUED | OUTPATIENT
Start: 2023-10-02 | End: 2023-10-04

## 2023-10-02 RX ORDER — CYCLOBENZAPRINE HYDROCHLORIDE 10 MG/1
10 TABLET, FILM COATED ORAL THREE TIMES A DAY
Refills: 0 | Status: DISCONTINUED | OUTPATIENT
Start: 2023-10-02 | End: 2023-10-03

## 2023-10-02 RX ORDER — SIMETHICONE 80 MG/1
80 TABLET, CHEWABLE ORAL EVERY 6 HOURS
Refills: 0 | Status: DISCONTINUED | OUTPATIENT
Start: 2023-10-02 | End: 2023-10-03

## 2023-10-02 RX ORDER — KETOROLAC TROMETHAMINE 30 MG/ML
15 SYRINGE (ML) INJECTION EVERY 6 HOURS
Refills: 0 | Status: DISCONTINUED | OUTPATIENT
Start: 2023-10-02 | End: 2023-10-02

## 2023-10-02 RX ORDER — POTASSIUM CHLORIDE 20 MEQ
20 PACKET (EA) ORAL ONCE
Refills: 0 | Status: COMPLETED | OUTPATIENT
Start: 2023-10-02 | End: 2023-10-02

## 2023-10-02 RX ORDER — ALBUMIN HUMAN 25 %
500 VIAL (ML) INTRAVENOUS ONCE
Refills: 0 | Status: COMPLETED | OUTPATIENT
Start: 2023-10-02 | End: 2023-10-02

## 2023-10-02 RX ADMIN — SENNA PLUS 2 TABLET(S): 8.6 TABLET ORAL at 21:18

## 2023-10-02 RX ADMIN — Medication 3.22 MICROGRAM(S)/KG/MIN: at 08:35

## 2023-10-02 RX ADMIN — Medication 250 MILLIGRAM(S): at 17:03

## 2023-10-02 RX ADMIN — Medication 20 MILLIEQUIVALENT(S): at 07:42

## 2023-10-02 RX ADMIN — Medication 15 MILLIGRAM(S): at 10:26

## 2023-10-02 RX ADMIN — MIDODRINE HYDROCHLORIDE 15 MILLIGRAM(S): 2.5 TABLET ORAL at 13:58

## 2023-10-02 RX ADMIN — Medication 3.22 MICROGRAM(S)/KG/MIN: at 20:17

## 2023-10-02 RX ADMIN — HYDROMORPHONE HYDROCHLORIDE 0.5 MILLIGRAM(S): 2 INJECTION INTRAMUSCULAR; INTRAVENOUS; SUBCUTANEOUS at 07:59

## 2023-10-02 RX ADMIN — HYDROMORPHONE HYDROCHLORIDE 4 MILLIGRAM(S): 2 INJECTION INTRAMUSCULAR; INTRAVENOUS; SUBCUTANEOUS at 10:36

## 2023-10-02 RX ADMIN — SIMETHICONE 80 MILLIGRAM(S): 80 TABLET, CHEWABLE ORAL at 20:37

## 2023-10-02 RX ADMIN — HYDROMORPHONE HYDROCHLORIDE 0.5 MILLIGRAM(S): 2 INJECTION INTRAMUSCULAR; INTRAVENOUS; SUBCUTANEOUS at 16:24

## 2023-10-02 RX ADMIN — Medication 1000 MILLIGRAM(S): at 03:37

## 2023-10-02 RX ADMIN — SODIUM CHLORIDE 3 GRAM(S): 9 INJECTION INTRAMUSCULAR; INTRAVENOUS; SUBCUTANEOUS at 23:57

## 2023-10-02 RX ADMIN — Medication 5 MILLIGRAM(S): at 17:03

## 2023-10-02 RX ADMIN — CHLORHEXIDINE GLUCONATE 1 APPLICATION(S): 213 SOLUTION TOPICAL at 06:26

## 2023-10-02 RX ADMIN — HYDROMORPHONE HYDROCHLORIDE 4 MILLIGRAM(S): 2 INJECTION INTRAMUSCULAR; INTRAVENOUS; SUBCUTANEOUS at 22:02

## 2023-10-02 RX ADMIN — HYDROMORPHONE HYDROCHLORIDE 0.5 MILLIGRAM(S): 2 INJECTION INTRAMUSCULAR; INTRAVENOUS; SUBCUTANEOUS at 16:09

## 2023-10-02 RX ADMIN — CYCLOBENZAPRINE HYDROCHLORIDE 5 MILLIGRAM(S): 10 TABLET, FILM COATED ORAL at 06:26

## 2023-10-02 RX ADMIN — Medication 1000 MILLIGRAM(S): at 04:00

## 2023-10-02 RX ADMIN — MIDODRINE HYDROCHLORIDE 15 MILLIGRAM(S): 2.5 TABLET ORAL at 06:26

## 2023-10-02 RX ADMIN — Medication 15 MILLIGRAM(S): at 10:51

## 2023-10-02 RX ADMIN — PIPERACILLIN AND TAZOBACTAM 25 GRAM(S): 4; .5 INJECTION, POWDER, LYOPHILIZED, FOR SOLUTION INTRAVENOUS at 21:18

## 2023-10-02 RX ADMIN — POLYETHYLENE GLYCOL 3350 17 GRAM(S): 17 POWDER, FOR SOLUTION ORAL at 06:26

## 2023-10-02 RX ADMIN — PIPERACILLIN AND TAZOBACTAM 25 GRAM(S): 4; .5 INJECTION, POWDER, LYOPHILIZED, FOR SOLUTION INTRAVENOUS at 13:58

## 2023-10-02 RX ADMIN — HYDROMORPHONE HYDROCHLORIDE 4 MILLIGRAM(S): 2 INJECTION INTRAMUSCULAR; INTRAVENOUS; SUBCUTANEOUS at 20:37

## 2023-10-02 RX ADMIN — HYDROMORPHONE HYDROCHLORIDE 4 MILLIGRAM(S): 2 INJECTION INTRAMUSCULAR; INTRAVENOUS; SUBCUTANEOUS at 16:09

## 2023-10-02 RX ADMIN — HYDROMORPHONE HYDROCHLORIDE 0.5 MILLIGRAM(S): 2 INJECTION INTRAMUSCULAR; INTRAVENOUS; SUBCUTANEOUS at 12:17

## 2023-10-02 RX ADMIN — Medication 30 MILLIGRAM(S): at 17:03

## 2023-10-02 RX ADMIN — SODIUM CHLORIDE 3 GRAM(S): 9 INJECTION INTRAMUSCULAR; INTRAVENOUS; SUBCUTANEOUS at 11:43

## 2023-10-02 RX ADMIN — SODIUM CHLORIDE 30 MILLILITER(S): 9 INJECTION INTRAMUSCULAR; INTRAVENOUS; SUBCUTANEOUS at 18:25

## 2023-10-02 RX ADMIN — HYDROMORPHONE HYDROCHLORIDE 6 MILLIGRAM(S): 2 INJECTION INTRAMUSCULAR; INTRAVENOUS; SUBCUTANEOUS at 07:42

## 2023-10-02 RX ADMIN — ENOXAPARIN SODIUM 40 MILLIGRAM(S): 100 INJECTION SUBCUTANEOUS at 21:18

## 2023-10-02 RX ADMIN — HYDROMORPHONE HYDROCHLORIDE 6 MILLIGRAM(S): 2 INJECTION INTRAMUSCULAR; INTRAVENOUS; SUBCUTANEOUS at 05:14

## 2023-10-02 RX ADMIN — HYDROMORPHONE HYDROCHLORIDE 0.5 MILLIGRAM(S): 2 INJECTION INTRAMUSCULAR; INTRAVENOUS; SUBCUTANEOUS at 04:00

## 2023-10-02 RX ADMIN — CYCLOBENZAPRINE HYDROCHLORIDE 10 MILLIGRAM(S): 10 TABLET, FILM COATED ORAL at 13:58

## 2023-10-02 RX ADMIN — SIMETHICONE 80 MILLIGRAM(S): 80 TABLET, CHEWABLE ORAL at 11:43

## 2023-10-02 RX ADMIN — HYDROMORPHONE HYDROCHLORIDE 0.5 MILLIGRAM(S): 2 INJECTION INTRAMUSCULAR; INTRAVENOUS; SUBCUTANEOUS at 00:59

## 2023-10-02 RX ADMIN — HYDROMORPHONE HYDROCHLORIDE 4 MILLIGRAM(S): 2 INJECTION INTRAMUSCULAR; INTRAVENOUS; SUBCUTANEOUS at 15:39

## 2023-10-02 RX ADMIN — POLYETHYLENE GLYCOL 3350 17 GRAM(S): 17 POWDER, FOR SOLUTION ORAL at 17:03

## 2023-10-02 RX ADMIN — PIPERACILLIN AND TAZOBACTAM 25 GRAM(S): 4; .5 INJECTION, POWDER, LYOPHILIZED, FOR SOLUTION INTRAVENOUS at 06:25

## 2023-10-02 RX ADMIN — Medication 25 GRAM(S): at 07:47

## 2023-10-02 RX ADMIN — HYDROMORPHONE HYDROCHLORIDE 6 MILLIGRAM(S): 2 INJECTION INTRAMUSCULAR; INTRAVENOUS; SUBCUTANEOUS at 03:37

## 2023-10-02 RX ADMIN — HYDROMORPHONE HYDROCHLORIDE 0.5 MILLIGRAM(S): 2 INJECTION INTRAMUSCULAR; INTRAVENOUS; SUBCUTANEOUS at 03:53

## 2023-10-02 RX ADMIN — HYDROMORPHONE HYDROCHLORIDE 0.5 MILLIGRAM(S): 2 INJECTION INTRAMUSCULAR; INTRAVENOUS; SUBCUTANEOUS at 12:02

## 2023-10-02 RX ADMIN — Medication 30 MILLIGRAM(S): at 17:18

## 2023-10-02 RX ADMIN — HYDROMORPHONE HYDROCHLORIDE 4 MILLIGRAM(S): 2 INJECTION INTRAMUSCULAR; INTRAVENOUS; SUBCUTANEOUS at 09:36

## 2023-10-02 RX ADMIN — SODIUM CHLORIDE 3 GRAM(S): 9 INJECTION INTRAMUSCULAR; INTRAVENOUS; SUBCUTANEOUS at 17:03

## 2023-10-02 RX ADMIN — Medication 10 MILLIGRAM(S): at 13:58

## 2023-10-02 RX ADMIN — MIDODRINE HYDROCHLORIDE 15 MILLIGRAM(S): 2.5 TABLET ORAL at 21:18

## 2023-10-02 RX ADMIN — Medication 250 MILLIGRAM(S): at 06:25

## 2023-10-02 RX ADMIN — SODIUM CHLORIDE 3 GRAM(S): 9 INJECTION INTRAMUSCULAR; INTRAVENOUS; SUBCUTANEOUS at 06:26

## 2023-10-02 RX ADMIN — CYCLOBENZAPRINE HYDROCHLORIDE 10 MILLIGRAM(S): 10 TABLET, FILM COATED ORAL at 21:18

## 2023-10-02 RX ADMIN — Medication 5 MILLIGRAM(S): at 06:26

## 2023-10-02 RX ADMIN — HYDROMORPHONE HYDROCHLORIDE 6 MILLIGRAM(S): 2 INJECTION INTRAMUSCULAR; INTRAVENOUS; SUBCUTANEOUS at 08:00

## 2023-10-02 RX ADMIN — HYDROMORPHONE HYDROCHLORIDE 0.5 MILLIGRAM(S): 2 INJECTION INTRAMUSCULAR; INTRAVENOUS; SUBCUTANEOUS at 08:30

## 2023-10-02 RX ADMIN — Medication 250 MILLILITER(S): at 11:44

## 2023-10-02 NOTE — PROGRESS NOTE ADULT - SUBJECTIVE AND OBJECTIVE BOX
NEUROCRITICAL CARE EVENING NOTE    DAY EVENTS:    - stable pressor requirements to maintain map goal >100      VITALS/IMAGING/DATA  - Reviewed    ALLERGIES:   - Reviewed      MEDICATIONS:  - Reviewed      PHYSICAL EXAM:  Exam unchanged from Day time, refer to progress note.

## 2023-10-02 NOTE — PROGRESS NOTE ADULT - SUBJECTIVE AND OBJECTIVE BOX
S/Overnight events: POD 7, ANNA overnight. Maintaining MAP >100.    Hospital Course:   : POD 0 s/p C7-L1 fusion, T3-8 vertebral column resection, placement of anterior cage.   ; POD1 H/H 6.8 postop and PLT 85. Given 2u PRBC and 1u PLT. Switched propofol to precedex gtt. Pt extubated to face mask. Pt noted to only be withdrawing to noxious stimuli on bilateral lower extremities with sensation decreased RLE per patient. Dr. Vieira notified and plan is to keep MAP>100 and obtain CT full spine in the morning. Phenylephrine started to maintain MAP>100. Advanced diet to regular. Passed TOV. Increased need for aurora, UO high.   : POD2 C7-L1 fusion T3-8 vertebral column resection. increased pressor requirements o/n, started on levo additionally. lactate 1.5, BNP 1400 from 800, CK elevated. echo: EF 60-65%. given 250cc bolus albumin, started on midodrine 10q8 to wean off pressors. PICC placed by Alli.   : POD3 s/p C7-L1 fusion, T3-8 vertebral column resection, placement of anterior cage. R radial A-line removed and replaced on the L, trops and EKG done for chest discomfort, WNL, resolved with treatment of overall pain, continues to be febrile, lyrica dc'd to help.   Lyrica restarted. Weaning phenylephrine. Serum Na uptrending, 3% saline discontinued. Pt febrile with uptrending WBC, ID consulted recommend monitoring for now off antibiotics.  : POD4. started on 3% o/n for Na 132. remains on aurora and levo for MAP>100. Tachycardic, given 1L NS. Right axillary a-line placed. 3% dc'd. HMV dc'd. Aquacell dressing replaced. Passed TOV.   : POD 5.  Remains on levo gtt. Increased midodrine to 15q8. Given enema. Had BM.  10/1: POD 6. MAP goal increased back to >100 due to worsened sensation loss, on levo gtt. afebrile however per ID Dr De Souza, desiree cx sent. UA+. started empirically on vanc/zosyn. b/l LE doppler and LUE doppler ordered per ID, +LUE superficial thrombophlebitis. BL LE dopplers negative for DVT.     Vital Signs Last 24 Hrs  T(C): 37.9 (01 Oct 2023 22:08), Max: 38 (01 Oct 2023 01:11)  T(F): 100.2 (01 Oct 2023 22:08), Max: 100.4 (01 Oct 2023 01:11)  HR: 89 (01 Oct 2023 23:00) (73 - 120)  BP: --  BP(mean): --  RR: 18 (01 Oct 2023 23:00) (16 - 18)  SpO2: 100% (01 Oct 2023 23:00) (98% - 100%)    Parameters below as of 01 Oct 2023 23:00  Patient On (Oxygen Delivery Method): room air    I&O's Detail    30 Sep 2023 07:01  -  01 Oct 2023 07:00  --------------------------------------------------------  IN:    IV PiggyBack: 1416.5 mL    IV PiggyBack: 25 mL    Norepinephrine: 157.7 mL    Norepinephrine: 112.4 mL    Oral Fluid: 1110 mL    sodium chloride 0.9%: 135 mL    sodium chloride 0.9%: 750 mL    Sodium Chloride 0.9% Bolus: 2000 mL  Total IN: 5706.6 mL    OUT:    Bulb (mL): 80 mL    Voided (mL): 3650 mL  Total OUT: 3730 mL    Total NET: 1976.6 mL    01 Oct 2023 07:01  -  02 Oct 2023 00:57  --------------------------------------------------------  IN:    IV PiggyBack: 475 mL    Norepinephrine: 185 mL    sodium chloride 0.9%: 650 mL    sodium chloride 0.9%: 120 mL  Total IN: 1430 mL    OUT:    Bulb (mL): 10 mL    Voided (mL): 2550 mL  Total OUT: 2560 mL    Total NET: -1130 mL    I&O's Summary    30 Sep 2023 07:01  -  01 Oct 2023 07:00  --------------------------------------------------------  IN: 5706.6 mL / OUT: 3730 mL / NET: 1976.6 mL    01 Oct 2023 07:01  -  02 Oct 2023 00:57  --------------------------------------------------------  IN: 1430 mL / OUT: 2560 mL / NET: -1130 mL    PHYSICAL EXAM:  General: Pt is sitting up comfortably in bed, in NAD, on RA  HEENT: CN II-XII grossly intact, PERRL 3mm, EOMI B/L, face symmetric  Cardiovascular: RRR, normal S1 and S2   Respiratory: non-labored breathing, symmetric chest rise   GI: abd soft, NTND   Neuro: A&O x 3, no aphasia, speech clear  Strength 5/5 BL UE   Strength 0/5 BL LE  Sensation intact to light touch throughout BL UE, T10 and below sensory defiicit   Vascular: Distal pulses 2+ x4, no calf edema or erythema  Wounds: Posterior spine wound C/D/I    DEVICE/DRAIN DRESSING: SCDs BL    TUBES/LINES:  [] CVC  [X] A-line  [] Lumbar Drain  [] Ventriculostomy  [] Other    DIET:  [] NPO  [X] Mechanical  [] Tube feeds    LABS:    Urinalysis Basic - ( 01 Oct 2023 11:43 )    Color: Yellow / Appearance: Clear / S.015 / pH: x  Gluc: x / Ketone: Trace mg/dL  / Bili: Negative / Urobili: 1.0 E.U./dL   Blood: x / Protein: NEGATIVE mg/dL / Nitrite: POSITIVE   Leuk Esterase: Trace / RBC: < 5 /HPF / WBC > 10 /HPF   Sq Epi: x / Non Sq Epi: x / Bacteria: Many /HPF      CARDIAC MARKERS ( 01 Oct 2023 13:24 )  x     / x     / 449 U/L / x     / x          CAPILLARY BLOOD GLUCOSE    Allergies    No Known Allergies    Intolerances    MEDICATIONS:  Antibiotics:  piperacillin/tazobactam IVPB.. 4.5 Gram(s) IV Intermittent every 8 hours  vancomycin  IVPB      vancomycin  IVPB 1000 milliGRAM(s) IV Intermittent every 12 hours    Neuro:  acetaminophen     Tablet .. 1000 milliGRAM(s) Oral every 8 hours PRN  cyclobenzaprine 5 milliGRAM(s) Oral every 8 hours  HYDROmorphone   Tablet 6 milliGRAM(s) Oral every 4 hours PRN  HYDROmorphone   Tablet 4 milliGRAM(s) Oral every 4 hours PRN  HYDROmorphone  Injectable 0.5 milliGRAM(s) IV Push every 4 hours PRN  melatonin 5 milliGRAM(s) Oral at bedtime PRN    Anticoagulation:  enoxaparin Injectable 40 milliGRAM(s) SubCutaneous every 24 hours    OTHER:  bisacodyl 5 milliGRAM(s) Oral every 12 hours  bisacodyl Suppository 10 milliGRAM(s) Rectal daily  chlorhexidine 2% Cloths 1 Application(s) Topical <User Schedule>  influenza   Vaccine 0.5 milliLiter(s) IntraMuscular once  midodrine 15 milliGRAM(s) Oral every 8 hours  norepinephrine Infusion 0.05 MICROgram(s)/kG/Min IV Continuous <Continuous>  polyethylene glycol 3350 17 Gram(s) Oral two times a day  senna 2 Tablet(s) Oral at bedtime    IVF:  sodium chloride 3 Gram(s) Oral every 6 hours  sodium chloride 0.9%. 1000 milliLiter(s) IV Continuous <Continuous>    CULTURES:  Culture Results:   No growth at 3 days. ( @ 02:35)  Culture Results:   No growth at 4 days. ( @ 20:32)    RADIOLOGY & ADDITIONAL TESTS:  < from: CT Cervical Spine No Cont (23 @ 10:46) >    IMPRESSION:    Status post corpectomy T3-T7 with fusion of C7-L1 as above. The right L1   transpedicular screw is not connected with the vertical yoli. The superior   portion of the corpectomy device extends 6 mm posterior to the T2   vertebral body. There is been improvement in the kyphosis and bony spinal   canal stenosis at this level although evaluation of soft tissues is   markedly limited due to extensivestreak artifact from surgical hardware.    Interval development of epidural lipomatosis in the lumbar spine from   L2-3 inferiorly.    Findings were discussed with BERYL Bryant by Dr. Bladimir Amador on   2023 1:56PM    --- End of Report ---    < end of copied text >    ASSESSMENT:  43F no PMHx suffered childhood injury resulting in spine injury and residual gait disturbance, hyperreflexia of BL LE. She has right greater than left leg tingling, low back and mid back pain. MRI 2023 showed 90 degree kyphosis of T3-8 and thoracic myelopathy. S/p C7-L1 fusion, T3-8 vertebral column resection, placement of anterior cage (23).     M40.205    S31.000A    S31.000A M40.205 S22.680CJ36.009G    S31.000A M40.205 S22.009S22.009G    No pertinent family history in first degree relatives    Handoff    No pertinent past medical history    History of acute illness    Deformity of thoracic vertebra    Deformity of thoracic vertebra    Corpectomy, spine, lumbar, 4 levels    No significant past surgical history    Room Service Assist    [email or web address]    PLAN:  NEURO:  - Neuro/spine checks q4/vitals q1hrs  - Pain control: tylenol 1g q8h, flexeril 5q8, dilaudid 4/6 PO+ dilaudid prn pushes (lyrica held due to maintain SBP)  - Post-op CT full spine: L1 screw not attached to the vertical yoli, epidural lipomatosis L2-3  - DANIELE x1 superficial - monitor outputs    Cardio:  - MAP>100, on levo gtt  - midodrine 15mg q8h to wean off pressors  - echo : EF 60-65%    Pulm:  - room air  - incentive spirometry    GI:  - Regular diet   - Bowel regimen   - BM     Renal:  - NS @ 30cc/hr  - salt tabs 3q6  - voiding    Endo:  - a1c 5.6  - ISS     Heme:  - SCDs for DVT ppx/ SQL   - 1.5L EBL, 1.5L albumin and cell saver intraop  - 2u PRBC and 1u PLT postop   - b/l LE dopplers 10/1 negative  - LUE doppler 10/1: +cephalic thrombophlebitis    ID:  - febrile  f/u panculture    - ID following    Discussed with Dr. Vieira and Dr. Franco

## 2023-10-02 NOTE — CHART NOTE - NSCHARTNOTEFT_GEN_A_CORE
POD 7, ANNA overnight. Maintaining MAP >100. 500cc albumin bolus, dilaudid lowered to 2/4mg, toradol [id number] added for pain, abd xray for distension shows gas pattern. aquacel dressing changed.

## 2023-10-02 NOTE — PROGRESS NOTE ADULT - ASSESSMENT
Assessment: 43F s/p traumatic spine injury w/o neurological deficit & severe thoracic kyphosis now s/p T3-8 VCR, C7-L1 fusion, correction of deformity, placement of anterior cage.  STAGE 1. plastics closure.      NEURO:  T3-8 VCR, C7-L1 fusion, correction of deformity, placement of anterior cage.  STAGE 1. plastics closure  -no loss of motors intra-op however pt w/ significant neurological deficit; pending    -neuro check q1  -pain management: start Toradol 30mg q8 x2 days, decrease oral Dilaudid from 6mg/4mg PRN to 4mg/2mg PRN d/c IV pushes for breakthrough; flexeril 10mg TID   -Monitor Drain output   -PT/OT evaluation placed     PULMONARY:  saturating well on RA,   -continue to monitor on pulse o2   -incentive spirometry 10q/hr when awake     CARDIOVASCULAR:  monitor on telemetry   vitals q1  MAP goal >100  -olimpia in place     GASTROENTEROLOGY:  bedside speech & swallow if pass can start advancing diet as tolerated.   ensure BMs w/ Miralax & senna  GI ppx : Protonix 40mg qd  Daily stool count, LBM 10/1  abdomen distended non-tender obtain KUB  -start simethicone 80mg q6 x3 days     RENAL/:  -check BMP qd  -strict i/o's ;straight cath  -Na goal 135-145    ENDOCRINE:  glucose goal 100-180    HEME/ONC:  DVT ppx: Lovenox SQ  b/l SCDs    INFECTIOUS:   empiric abx for low grade fevers; blood culture negative, RVP negative, UA (+) likely asymptomatic bacteruria.   c/w zosyn 4.5g q8 & vancomycin 1gq12 check trough pre-4th dose given recent OR instrumentation   LUE duplex superficial thrombophlebitis ?source of fever  incision site evaluated no evidence of infection

## 2023-10-02 NOTE — PROGRESS NOTE ADULT - ASSESSMENT
ASSESSMENT/PLAN:     s/p 5-level vertebral column resection with fusion of C7-L1, put in a cage; stage 1; stable introp monitoring; pleura violated, repaired; post op with BLE weakness requring pressors to augment MAP>100, now pending additional imaging    NEURO:  - Neurochecks q4h  - Surgical drains per NSGY  - Augment map as per below  - Tentative Stage 2 next Wednesday   - Pain control  - Activity: bed rest for now, PT/OT when able    PULM:  - Incentive spirometry  - mobilize as tolerated  - Aspiration Precautions    CV:  - MAP>100 per nsg  - trop, ekg while on pressors daily  - c/w levophed  - midodrine 15q8h  - Rt axillary olimpia placed 9/29    RENAL:  - Fluids: NS 50cc/h while on pressors  - trend renal function  - salt tabs    GI:  - Diet: Regular  - Bowel regimen standing  - polyethylene glycol 17G daily, senna, dulcolax       ENDO:   - Goal euglycemia (-180)    HEME/ONC:  s/p 700cc cell saver, 1.5 L EBL; 1500 5% albumin, given TXA;  post-op Hb 6.8 - give 2 units pRBC, repeat   - monitor H/H    VTE prophylaxis   - SCDs   - SQL      ID:  - febrile, pan cultured 9/27, UA negative, will continue to monitor  - vanc/zosyn per ID for empiric coverage (10/1 - )  - vanc savana  - f/u cultures  - asymptomatic bacteruria         Dispo: ICU pending OR plan, BP augmentation

## 2023-10-02 NOTE — PROGRESS NOTE ADULT - ASSESSMENT
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 On pressors since 9/26 for likely neurogenic shock, and MAP pushes. Developed fever and leukocytosis on 9/27 (POD 2) but had no focal symptoms of infection and looked well, and septic workup was negative so monitored off abx for presumed fever/leukocytosis from post-op inflammatory response/fusion fever. However overnight had recurrent fever (POD 6). Still no focal signs of infection. WBC normal. Pressors for MAP pushes decreasing.    Given recurrent fever on POD6, I believe we should initiate repeat infectious and non-infectious workup, and start empiric antibiotics pending these results.    Recommendations:  -Start vancomycin 1g IV q12h and check trough before 4th dose  -Start zosyn 4.5g IV q8h EI  -CT chest given pleural injury in OR  -LUE duplex (has PICC here), and BLE duplex  -UA/UCx and BCx x2  -LFTs    ID Team 1 will follow 43F w/ hx chronic thoracic spine injury (T3-8) resulting in severe kyphosis and progressive myelopathy s/p T3-8 vertebral column resection with placement of anterior cage, C7-L1 fusion and plastics closure on 9/25 with intra-op violation of pleura s/p repair, admitted to [facility] post-op. On pressors since 9/26 for likely neurogenic shock, and MAP pushes. Developed fever and leukocytosis on 9/27 (POD 2) but had no focal symptoms of infection and looked well, and septic workup was negative so monitored off abx for presumed fever/leukocytosis from post-op inflammatory response/fusion fever. However 9/30 had recurrent fever (POD 6). Still no focal signs of infection. WBC downtrending. LUE duplex showing superficial thrombophlebitis which could be possible source of infection.      #febrile leukocytosis  -c/w vancomycin 1g IV q12h and check trough before 4th dose (10/1-)  -Start zosyn 4.5g IV q8h (10/1- )  -Please order CT chest given pleural injury in OR  - Please order RVP    #Asymptomatic Bacteruria  - UA+ however patient asx, will continue to monitor        ID Team 1 will follow  Discussed with Attending, reocmmendations not final until cosigned by attending.  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 On pressors since 9/26 for likely neurogenic shock, and MAP pushes. Developed fever and leukocytosis on 9/27 (POD 2) but had no focal symptoms of infection and looked well, and septic workup was negative so monitored off abx for presumed fever/leukocytosis from post-op inflammatory response/fusion fever. However 9/30 had recurrent fever (POD 6). Still no focal signs of infection. WBC normalized. BCx ngtd, RVP neg. BLE duplex neg. UA+ but not sent for culture, no clear urinary symptoms (but may have decreased sensation here due to spinal issue). LUE duplex showing superficial thrombophlebitis which could be potential source of fever.    #Post-op fever, unclear source. Possibly superficial thrombophlebitis vs. urinary vs. fusion fever/post-op inflamm  -c/w vancomycin 1g IV q12h and check trough before 4th dose (10/1-)  -c/w zosyn 4.5g IV q8h (10/1- )  -Follow pending blood cultures  -Recommend CT chest given pleural injury in OR  -Recommended RVP - neg    #Asymptomatic Bacteruria  - UA+ however patient asx, will continue to monitor    #LUE superficial thrombophlebitis  -Recommend heat packs      ID Team 1 will follow

## 2023-10-02 NOTE — CHART NOTE - NSCHARTNOTEFT_GEN_A_CORE
Admitting Diagnosis:   Patient is a 43y old  Female who presents with a chief complaint of leg weakness, difficulty walking and worsening back pain x years (02 Oct 2023 12:11)      PAST MEDICAL & SURGICAL HISTORY:  History of acute illness  childhood      No significant past surgical history          Current Nutrition Order:       PO Intake: Good (%) [   ]  Fair (50-75%) [   ] Poor (<25%) [   ]    GI Issues:     Pain:    Skin Integrity:    Labs:   10-02    137  |  102  |  7   ----------------------------<  125<H>  3.9   |  26  |  0.43<L>    Ca    8.4      02 Oct 2023 04:03  Phos  3.4     10-02  Mg     1.8     10-02    TPro  6.0  /  Alb  2.9<L>  /  TBili  0.2  /  DBili  x   /  AST  29  /  ALT  27  /  AlkPhos  59  10-02    CAPILLARY BLOOD GLUCOSE          Medications:  MEDICATIONS  (STANDING):  bisacodyl 5 milliGRAM(s) Oral every 12 hours  bisacodyl Suppository 10 milliGRAM(s) Rectal daily  chlorhexidine 2% Cloths 1 Application(s) Topical <User Schedule>  cyclobenzaprine 10 milliGRAM(s) Oral three times a day  enoxaparin Injectable 40 milliGRAM(s) SubCutaneous every 24 hours  influenza   Vaccine 0.5 milliLiter(s) IntraMuscular once  ketorolac   Injectable 30 milliGRAM(s) IV Push every 8 hours  midodrine 15 milliGRAM(s) Oral every 8 hours  norepinephrine Infusion 0.05 MICROgram(s)/kG/Min (3.22 mL/Hr) IV Continuous <Continuous>  piperacillin/tazobactam IVPB.. 4.5 Gram(s) IV Intermittent every 8 hours  polyethylene glycol 3350 17 Gram(s) Oral two times a day  senna 2 Tablet(s) Oral at bedtime  sodium chloride 3 Gram(s) Oral every 6 hours  sodium chloride 0.9%. 1000 milliLiter(s) (30 mL/Hr) IV Continuous <Continuous>  vancomycin  IVPB      vancomycin  IVPB 1000 milliGRAM(s) IV Intermittent every 12 hours    MEDICATIONS  (PRN):  acetaminophen     Tablet .. 1000 milliGRAM(s) Oral every 8 hours PRN Mild Pain (1 - 3)  HYDROmorphone   Tablet 4 milliGRAM(s) Oral every 4 hours PRN Severe Pain (7 - 10)  HYDROmorphone   Tablet 2 milliGRAM(s) Oral every 4 hours PRN Moderate Pain (4 - 6)  HYDROmorphone  Injectable 0.5 milliGRAM(s) IV Push every 4 hours PRN breaktrhough pain  melatonin 5 milliGRAM(s) Oral at bedtime PRN Insomnia  simethicone 80 milliGRAM(s) Chew every 6 hours PRN Gas  sodium chloride 0.9% lock flush 10 milliLiter(s) IV Push every 1 hour PRN Pre/post blood products, medications, blood draw, and to maintain line patency      Weight:  Daily     Daily     Weight Change:     Nutrition Focused Physical Exam: Completed [   ]  Not Pertinent [   ]  Muscle Wasting- Temporal [   ]  Clavicle/Pectoral [   ]  Shoulder/Deltoid [   ]  Scapula [   ]  Interosseous [   ]  Quadriceps [   ]  Gastrocnemius [   ]  Fat Wasting- Orbital [   ]  Buccal [   ]  Triceps [   ]  Rib [   ]  Suspect [PCM] 2/2 to physical assessment, [poor intake], and [wt loss]; please see malnutrition chart note.    Estimated energy needs:     Subjective:     Previous Nutrition Diagnosis:    Active [   ]  Resolved [   ]    If resolved, new PES:     Goal:    Recommendations:    Education:     Risk Level: High [   ] Moderate [   ] Low [   ] Admitting Diagnosis:   Patient is a 43y old  Female who presents with a chief complaint of leg weakness, difficulty walking and worsening back pain x years (02 Oct 2023 12:11)    PAST MEDICAL & SURGICAL HISTORY:  History of acute illness in childhood    No significant past surgical history    Current Nutrition Order: regular     PO Intake: Good (%) [   ]  Fair (50-75%) [ x  ] Poor (<25%) [   ]    GI Issues: WNL except distended    Pain: none reported    Skin Integrity: WNL except surgical incision (midline, neck, back); Kameron 16    Labs:   10-02    137  |  102  |  7   ----------------------------<  125<H>  3.9   |  26  |  0.43<L>    Ca    8.4      02 Oct 2023 04:03  Phos  3.4     10-02  Mg     1.8     10-02    TPro  6.0  /  Alb  2.9<L>  /  TBili  0.2  /  DBili  x   /  AST  29  /  ALT  27  /  AlkPhos  59  10-02      Medications:  MEDICATIONS  (STANDING):  bisacodyl 5 milliGRAM(s) Oral every 12 hours  bisacodyl Suppository 10 milliGRAM(s) Rectal daily  chlorhexidine 2% Cloths 1 Application(s) Topical <User Schedule>  cyclobenzaprine 10 milliGRAM(s) Oral three times a day  enoxaparin Injectable 40 milliGRAM(s) SubCutaneous every 24 hours  influenza   Vaccine 0.5 milliLiter(s) IntraMuscular once  ketorolac   Injectable 30 milliGRAM(s) IV Push every 8 hours  midodrine 15 milliGRAM(s) Oral every 8 hours  norepinephrine Infusion 0.05 MICROgram(s)/kG/Min (3.22 mL/Hr) IV Continuous <Continuous>  piperacillin/tazobactam IVPB.. 4.5 Gram(s) IV Intermittent every 8 hours  polyethylene glycol 3350 17 Gram(s) Oral two times a day  senna 2 Tablet(s) Oral at bedtime  sodium chloride 3 Gram(s) Oral every 6 hours  sodium chloride 0.9%. 1000 milliLiter(s) (30 mL/Hr) IV Continuous <Continuous>  vancomycin  IVPB      vancomycin  IVPB 1000 milliGRAM(s) IV Intermittent every 12 hours    MEDICATIONS  (PRN):  acetaminophen     Tablet .. 1000 milliGRAM(s) Oral every 8 hours PRN Mild Pain (1 - 3)  HYDROmorphone   Tablet 4 milliGRAM(s) Oral every 4 hours PRN Severe Pain (7 - 10)  HYDROmorphone   Tablet 2 milliGRAM(s) Oral every 4 hours PRN Moderate Pain (4 - 6)  HYDROmorphone  Injectable 0.5 milliGRAM(s) IV Push every 4 hours PRN breaktrhough pain  melatonin 5 milliGRAM(s) Oral at bedtime PRN Insomnia  simethicone 80 milliGRAM(s) Chew every 6 hours PRN Gas  sodium chloride 0.9% lock flush 10 milliLiter(s) IV Push every 1 hour PRN Pre/post blood products, medications, blood draw, and to maintain line patency    ANTHROPOMETRICS   Height: 5'0"  Weight: 151 pounds (9/25)  BMI: 29.5 kg/m^2  IBW: 100 pounds   %IBW: 151 %     Weight Change: No new weights obtained since admission. Recommend nursing to obtain current weight and trend weights weekly. RD to continue monitoring weights as able.     Nutrition Focused Physical Exam: Completed [   ]  Not Pertinent [ x  ]    Estimated energy needs:   Calories: (30-35 kcal/kg) 5844-3727 kcal  Protein: (1.2-1.5 g/kg) 55-68 g  Fluids: 1 mL/kcal or as per team  Estimated needs based on IBW as patient's current body weight is >120% ideal body weight. Needs adjusted for post-surgical adult Pt     Subjective:   43F no PMHx suffered childhood injury resulting in spine injury and residual gait disturbance, hyperreflexia of BL LE. She has right greater than left leg tingling, low back and mid back pain. 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 S/p C7-L1 fusion, T3-8 vertebral column resection, placement of anterior cage (9/25/23).     Met with Pt bedside. Reports improved intake recently. Currently ordered for regular diet with Ensure + High Protein TID. Pt endorses consuming the Ensure. Denies GI pain/discomfort. Last bowel movement noted on 10/1.    Previous Nutrition Diagnosis: Increased nutrient needs (protein) r/t post-op healing as evidenced by increased metabolic demand for nutrients     Active [ x  ]  Resolved [   ]    If resolved, new PES:     Goal: Consistently meet >75% EER    Recommendations:   Continue regular diet + ensure high protein TID  Follow intake closely, adjust prn  Monitor electrolytes, adjust and replete prn  Pain and bowel regimen per team   RD to remain available for additional nutrition interventions as needed    Education: Pt educated on medical nutrition therapy specific to the diagnosis; Pt expressed understanding; all questions and concerns addressed to Pt satisfaction    Risk Level: High [ x  ] Moderate [   ] Low [   ]

## 2023-10-02 NOTE — PROGRESS NOTE ADULT - SUBJECTIVE AND OBJECTIVE BOX
NEUROSURGERY PAIN MANAGEMENT CONSULT NOTE    Chief Complaint: back pain     HPI:  Taken from outpatient neurosurgery note on 9/13/2023 " The patient, a long-standing patient of my practice, reports a history of spinal issues that dates back to a childhood injury. Over the years she's experienced developing issues with walking in a straight line, overactive reflexes in her lower extremities, and unique difficulty with her lower extremities referred to as cloneness. Recent upturn in these symptoms has limited her ability to carry out her daily routines. The patient also reports no significant improvement in her condition since our last clinical encounter two years ago."    44 y/o female with no PMHx or PSHx presents for surgery today.  She reports ambulating with walker or baby stroller at home for years.  She reports intermittent falls.  She has right greater than left leg tingling, low back and mid back pain.  She denies arm or leg pain.  She reports urinary incontinence since having a baby 2 years ago where if she doesn't get to the bathroom fast enough, she has incontinence, but she is able to hold it for a short while.  She takes no pain medications.  She has taken ibuprofen in the past.  She denies saddle anesthesia, bowel incontinence.   (25 Sep 2023 06:51)      PAST MEDICAL & SURGICAL HISTORY:  History of acute illness  childhood      No significant past surgical history          FAMILY HISTORY:  No pertinent family history in first degree relatives        SOCIAL HISTORY:  [ ] Denies Smoking, Alcohol, or Drug Use    HOME MEDICATIONS:   Please refer to initial HNP    PAIN HOME MEDICATIONS:    Allergies    No Known Allergies    Intolerances        PAIN MEDICATIONS:  acetaminophen     Tablet .. 1000 milliGRAM(s) Oral every 8 hours PRN  cyclobenzaprine 10 milliGRAM(s) Oral three times a day  HYDROmorphone   Tablet 2 milliGRAM(s) Oral every 4 hours PRN  HYDROmorphone   Tablet 4 milliGRAM(s) Oral every 4 hours PRN  HYDROmorphone  Injectable 0.5 milliGRAM(s) IV Push every 4 hours PRN  ketorolac   Injectable 30 milliGRAM(s) IV Push every 8 hours  melatonin 5 milliGRAM(s) Oral at bedtime PRN    Heme:  enoxaparin Injectable 40 milliGRAM(s) SubCutaneous every 24 hours    Antibiotics:  piperacillin/tazobactam IVPB.. 4.5 Gram(s) IV Intermittent every 8 hours  vancomycin  IVPB      vancomycin  IVPB 1000 milliGRAM(s) IV Intermittent every 12 hours    Cardiovascular:  midodrine 15 milliGRAM(s) Oral every 8 hours  norepinephrine Infusion 0.05 MICROgram(s)/kG/Min IV Continuous <Continuous>    GI:  bisacodyl 5 milliGRAM(s) Oral every 12 hours  bisacodyl Suppository 10 milliGRAM(s) Rectal daily  polyethylene glycol 3350 17 Gram(s) Oral two times a day  senna 2 Tablet(s) Oral at bedtime  simethicone 80 milliGRAM(s) Chew every 6 hours PRN    Endocrine:    All Other Medications:  chlorhexidine 2% Cloths 1 Application(s) Topical <User Schedule>  influenza   Vaccine 0.5 milliLiter(s) IntraMuscular once  sodium chloride 3 Gram(s) Oral every 6 hours  sodium chloride 0.9% lock flush 10 milliLiter(s) IV Push every 1 hour PRN  sodium chloride 0.9%. 1000 milliLiter(s) IV Continuous <Continuous>      Vital Signs Last 24 Hrs  T(C): 37.6 (02 Oct 2023 09:06), Max: 37.9 (01 Oct 2023 22:08)  T(F): 99.6 (02 Oct 2023 09:06), Max: 100.2 (01 Oct 2023 22:08)  HR: 98 (02 Oct 2023 11:00) (72 - 114)  BP: --  BP(mean): --  RR: 19 (02 Oct 2023 11:00) (16 - 20)  SpO2: 100% (02 Oct 2023 11:00) (97% - 100%)    Parameters below as of 02 Oct 2023 11:00  Patient On (Oxygen Delivery Method): room air        LABS:                        8.3    6.86  )-----------( 210      ( 02 Oct 2023 04:03 )             25.0     10-02    137  |  102  |  7   ----------------------------<  125<H>  3.9   |  26  |  0.43<L>    Ca    8.4      02 Oct 2023 04:03  Phos  3.4     10-02  Mg     1.8     10-02    TPro  6.0  /  Alb  2.9<L>  /  TBili  0.2  /  DBili  x   /  AST  29  /  ALT  27  /  AlkPhos  59  10-02      Urinalysis Basic - ( 02 Oct 2023 04:03 )    Color: x / Appearance: x / SG: x / pH: x  Gluc: 125 mg/dL / Ketone: x  / Bili: x / Urobili: x   Blood: x / Protein: x / Nitrite: x   Leuk Esterase: x / RBC: x / WBC x   Sq Epi: x / Non Sq Epi: x / Bacteria: x        RADIOLOGY:    Drug Screen:        REVIEW OF SYSTEMS:  CONSTITUTIONAL: No fever or fatigue O/N.   EYES: No eye pain, visual disturbances  ENMT:  No difficulty hearing. No throat pain  NECK: No pain or stiffness  RESPIRATORY: No cough, wheezing; No shortness of breath  CARDIOVASCULAR: No chest pain, palpitations.   GASTROINTESTINAL: Pt reports passing gas. No bowel movements. No abdominal or epigastric pain. No nausea, vomiting. GENITOURINARY: No dysuria, frequency, or incontinence  NEUROLOGICAL: No headaches, loss of strength, numbness, or tremors. No dizzinesss or lightheadedness with pain medications.   MUSCULOSKELETAL: Incisional back pain. No joint pain or swelling; No muscle, or extremity pain      PAIN ASSESSMENT: patient appears to be more comfortable than the end of last week, c/o pulling back pain     PHYSICAL EXAM  GENERAL: Laying in bed, NAD  Neuro: CN II-XII PERRRL, EOMI  Cranial nerves grossly intact  Motor exam: b/l uppers strong, b/l lowers 1/5  Sensation intact to LT in UE/LE in 3 dermatomes  CHEST/LUNG: Clear to auscultation bilaterally; No rales, rhonchi, wheezing, or rubs  HEART: Regular rate and rhythm; No murmurs, rubs, or gallops  ABDOMEN: Soft, Nontender, Nondistended; Bowel sounds present  EXTREMITIES:  2+ Peripheral Pulses, No clubbing, cyanosis, or edema  SKIN: No rashes or lesions      ASSESSMENT:   43F no PMHx suffered childhood injury resulting in spine injury and residual gait disturbance, hyperreflexia of BL LE. She has right greater than left leg tingling, low back and mid back pain. 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 S/p C7-L1 fusion, T3-8 vertebral column resection, placement of anterior cage (9/25/23).       PLAN:   - Pain:  Tylenol 1000mg every 8 hours as needed   Increase Flexeril to 10mg every 8 hours  Dilaudid 2mg OR 4mg every 4 hours as needed for moderate or severe pain  Toradol 30mg every 8 hours x3 days     - Bowel regimen: Senna    - Nausea ppx: Zofran standing  - Functional Goals: Pt will get OOB with PT today. Pt will resume previous level of activity without impairment from surgery.   - Additional Consults: None recommended.   - Additional Labs/Imaging:  None recommended.   - Follow up, Discharge Planning: pending progress after 2nd stage operation on Friday  - Pain Management follow up plan: will continue to follow    d/w Dr. [place]

## 2023-10-02 NOTE — PROGRESS NOTE ADULT - SUBJECTIVE AND OBJECTIVE BOX
INTERVAL HISTORY: HPI:  Taken from outpatient neurosurgery note on 9/13/2023 " The patient, a long-standing patient of my practice, reports a history of spinal issues that dates back to a childhood injury. Over the years she's experienced developing issues with walking in a straight line, overactive reflexes in her lower extremities, and unique difficulty with her lower extremities referred to as cloneness. Recent upturn in these symptoms has limited her ability to carry out her daily routines. The patient also reports no significant improvement in her condition since our last clinical encounter two years ago."    42 y/o female with no PMHx or PSHx presents for surgery today.  She reports ambulating with walker or baby stroller at home for years.  She reports intermittent falls.  She has right greater than left leg tingling, low back and mid back pain.  She denies arm or leg pain.  She reports urinary incontinence since having a baby 2 years ago where if she doesn't get to the bathroom fast enough, she has incontinence, but she is able to hold it for a short while.  She takes no pain medications.  She has taken ibuprofen in the past.  She denies saddle anesthesia, bowel incontinence.   (25 Sep 2023 06:51)      MEDICATIONS  (STANDING):  bisacodyl 5 milliGRAM(s) Oral every 12 hours  bisacodyl Suppository 10 milliGRAM(s) Rectal daily  chlorhexidine 2% Cloths 1 Application(s) Topical <User Schedule>  cyclobenzaprine 10 milliGRAM(s) Oral three times a day  enoxaparin Injectable 40 milliGRAM(s) SubCutaneous every 24 hours  influenza   Vaccine 0.5 milliLiter(s) IntraMuscular once  ketorolac   Injectable 30 milliGRAM(s) IV Push every 8 hours  midodrine 15 milliGRAM(s) Oral every 8 hours  norepinephrine Infusion 0.05 MICROgram(s)/kG/Min (3.22 mL/Hr) IV Continuous <Continuous>  piperacillin/tazobactam IVPB.. 4.5 Gram(s) IV Intermittent every 8 hours  polyethylene glycol 3350 17 Gram(s) Oral two times a day  senna 2 Tablet(s) Oral at bedtime  sodium chloride 3 Gram(s) Oral every 6 hours  sodium chloride 0.9%. 1000 milliLiter(s) (30 mL/Hr) IV Continuous <Continuous>  vancomycin  IVPB      vancomycin  IVPB 1000 milliGRAM(s) IV Intermittent every 12 hours    MEDICATIONS  (PRN):  acetaminophen     Tablet .. 1000 milliGRAM(s) Oral every 8 hours PRN Mild Pain (1 - 3)  HYDROmorphone   Tablet 2 milliGRAM(s) Oral every 4 hours PRN Moderate Pain (4 - 6)  HYDROmorphone   Tablet 4 milliGRAM(s) Oral every 4 hours PRN Severe Pain (7 - 10)  HYDROmorphone  Injectable 0.5 milliGRAM(s) IV Push every 4 hours PRN breaktrhough pain  melatonin 5 milliGRAM(s) Oral at bedtime PRN Insomnia  simethicone 80 milliGRAM(s) Chew every 6 hours PRN Gas  sodium chloride 0.9% lock flush 10 milliLiter(s) IV Push every 1 hour PRN Pre/post blood products, medications, blood draw, and to maintain line patency      Drug Dosing Weight  Height (cm): 152.4 (25 Sep 2023 07:32)  Weight (kg): 68.6 (25 Sep 2023 07:32)  BMI (kg/m2): 29.5 (25 Sep 2023 07:32)  BSA (m2): 1.66 (25 Sep 2023 07:32)    PAST MEDICAL & SURGICAL HISTORY:  History of acute illness  childhood      No significant past surgical history          REVIEW OF SYSTEMS: [ ] Unable to Assess due to neurologic exam   [ ] All ROS addressed below are non-contributory, except:  Neuro: [ ] Headache [ ] Back pain [ ] Numbness [ ] Weakness [ ] Ataxia [ ] Dizziness [ ] Aphasia [ ] Dysarthria [ ] Visual disturbance  Resp: [ ] Shortness of breath/dyspnea, [ ] Orthopnea [ ] Cough  CV: [ ] Chest pain [ ] Palpitation [ ] Lightheadedness [ ] Syncope  Renal: [ ] Thirst [ ] Edema  GI: [ ] Nausea [ ] Emesis [ ] Abdominal pain [ ] Constipation [ ] Diarrhea  Hem: [ ] Hematemesis [ ] bright red blood per rectum  ID: [ ] Fever [ ] Chills [ ] Dysuria  ENT: [ ] Rhinorrhea    PHYSICAL EXAM:  PHYSICAL EXAM:    Constitutional: No Acute Distress     Neurological: AOx3, Following Commands, Moving all Extremities     Motor exam:          Upper extremity                         Delt     Bicep     Tricep    HG                                                 R         5/5        5/5        5/5       5/5                                               L          5/5        5/5        5/5       5/5          Lower extremity                        HF         KF        KE       DF         PF                                                  R        0/5        0/5       0/5      0/5       0/5                                               L         0/5        0/5       0/5      0/5       0/5                                                 Sensation: [] intact to light touch  [x] decreased: T5 & below sensation L approx 65% R 40% per pt   Pulmonary: Clear to Auscultation, No rales, No rhonchi, No wheezes   Cardiovascular: S1, S2, Regular rate and rhythm   Gastrointestinal: Soft, Non-tender, Non-distended   Extremities: No calf tenderness   Incision: CDI; drain in place     ICU Vital Signs Last 24 Hrs  T(C): 37.5 (02 Oct 2023 14:22), Max: 37.9 (01 Oct 2023 22:08)  T(F): 99.5 (02 Oct 2023 14:22), Max: 100.2 (01 Oct 2023 22:08)  HR: 100 (02 Oct 2023 17:00) (72 - 114)  ABP: 151/81 (02 Oct 2023 17:00) (113/63 - 171/73)  ABP(mean): 104 (02 Oct 2023 17:00) (84 - 121)  RR: 16 (02 Oct 2023 17:00) (12 - 20)  SpO2: 99% (02 Oct 2023 17:00) (97% - 100%)    O2 Parameters below as of 02 Oct 2023 17:00  Patient On (Oxygen Delivery Method): room air    I&O's Detail    01 Oct 2023 07:01  -  02 Oct 2023 07:00  --------------------------------------------------------  IN:    IV PiggyBack: 50 mL    IV PiggyBack: 750 mL    Norepinephrine: 268 mL    Oral Fluid: 200 mL    sodium chloride 0.9%: 650 mL    sodium chloride 0.9%: 300 mL  Total IN: 2218 mL    OUT:    Bulb (mL): 40 mL    Voided (mL): 3300 mL  Total OUT: 3340 mL    Total NET: -1122 mL      02 Oct 2023 07:01  -  02 Oct 2023 17:23  --------------------------------------------------------  IN:    Albumin 5%  - 250 mL: 500 mL    IV PiggyBack: 50 mL    IV PiggyBack: 125 mL    Norepinephrine: 124.9 mL    Oral Fluid: 500 mL    sodium chloride 0.9%: 210 mL  Total IN: 1509.9 mL    OUT:    Voided (mL): 2200 mL  Total OUT: 2200 mL    Total NET: -690.1 mL    LABS:  CBC Full  -  ( 02 Oct 2023 04:03 )  WBC Count : 6.86 K/uL  RBC Count : 2.93 M/uL  Hemoglobin : 8.3 g/dL  Hematocrit : 25.0 %  Platelet Count - Automated : 210 K/uL  Mean Cell Volume : 85.3 fl  Mean Cell Hemoglobin : 28.3 pg  Mean Cell Hemoglobin Concentration : 33.2 gm/dL  Auto Neutrophil # : 4.62 K/uL  Auto Lymphocyte # : 1.30 K/uL  Auto Monocyte # : 0.62 K/uL  Auto Eosinophil # : 0.27 K/uL  Auto Basophil # : 0.02 K/uL  Auto Neutrophil % : 67.4 %  Auto Lymphocyte % : 19.0 %  Auto Monocyte % : 9.0 %  Auto Eosinophil % : 3.9 %  Auto Basophil % : 0.3 %    10-02    137  |  102  |  7   ----------------------------<  125<H>  3.9   |  26  |  0.43<L>    Ca    8.4      02 Oct 2023 04:03  Phos  3.4     10-02  Mg     1.8     10-02    TPro  6.0  /  Alb  2.9<L>  /  TBili  0.2  /  DBili  x   /  AST  29  /  ALT  27  /  AlkPhos  59  10-02      Urinalysis Basic - ( 02 Oct 2023 04:03 )    Color: x / Appearance: x / SG: x / pH: x  Gluc: 125 mg/dL / Ketone: x  / Bili: x / Urobili: x   Blood: x / Protein: x / Nitrite: x   Leuk Esterase: x / RBC: x / WBC x   Sq Epi: x / Non Sq Epi: x / Bacteria: x      RADIOLOGY & ADDITIONAL STUDIES: reviewed

## 2023-10-02 NOTE — PROGRESS NOTE ADULT - SUBJECTIVE AND OBJECTIVE BOX
INFECTIOUS DISEASES CONSULT FOLLOW-UP NOTE    INTERVAL HPI/OVERNIGHT EVENTS:      ROS:   Constitutional, eyes, ENT, cardiovascular, respiratory, gastrointestinal, genitourinary, integumentary, neurological, psychiatric and heme/lymph are otherwise negative other than noted above       ANTIBIOTICS/RELEVANT:    MEDICATIONS  (STANDING):  bisacodyl 5 milliGRAM(s) Oral every 12 hours  bisacodyl Suppository 10 milliGRAM(s) Rectal daily  chlorhexidine 2% Cloths 1 Application(s) Topical <User Schedule>  cyclobenzaprine 5 milliGRAM(s) Oral every 8 hours  enoxaparin Injectable 40 milliGRAM(s) SubCutaneous every 24 hours  influenza   Vaccine 0.5 milliLiter(s) IntraMuscular once  midodrine 15 milliGRAM(s) Oral every 8 hours  norepinephrine Infusion 0.05 MICROgram(s)/kG/Min (3.22 mL/Hr) IV Continuous <Continuous>  piperacillin/tazobactam IVPB.. 4.5 Gram(s) IV Intermittent every 8 hours  polyethylene glycol 3350 17 Gram(s) Oral two times a day  senna 2 Tablet(s) Oral at bedtime  sodium chloride 3 Gram(s) Oral every 6 hours  sodium chloride 0.9%. 1000 milliLiter(s) (30 mL/Hr) IV Continuous <Continuous>  vancomycin  IVPB      vancomycin  IVPB 1000 milliGRAM(s) IV Intermittent every 12 hours    MEDICATIONS  (PRN):  acetaminophen     Tablet .. 1000 milliGRAM(s) Oral every 8 hours PRN Mild Pain (1 - 3)  HYDROmorphone   Tablet 4 milliGRAM(s) Oral every 4 hours PRN Moderate Pain (4 - 6)  HYDROmorphone   Tablet 6 milliGRAM(s) Oral every 4 hours PRN Severe Pain (7 - 10)  HYDROmorphone  Injectable 0.5 milliGRAM(s) IV Push every 4 hours PRN breaktrhough pain  melatonin 5 milliGRAM(s) Oral at bedtime PRN Insomnia  sodium chloride 0.9% lock flush 10 milliLiter(s) IV Push every 1 hour PRN Pre/post blood products, medications, blood draw, and to maintain line patency        Vital Signs Last 24 Hrs  T(C): 37.6 (02 Oct 2023 05:01), Max: 37.9 (01 Oct 2023 22:08)  T(F): 99.7 (02 Oct 2023 05:01), Max: 100.2 (01 Oct 2023 22:08)  HR: 82 (02 Oct 2023 08:00) (72 - 120)  BP: --  BP(mean): --  RR: 20 (02 Oct 2023 08:00) (16 - 20)  SpO2: 99% (02 Oct 2023 08:00) (97% - 100%)    Parameters below as of 02 Oct 2023 08:00  Patient On (Oxygen Delivery Method): room air        10-01-23 @ 07:01  -  10-02-23 @ 07:00  --------------------------------------------------------  IN: 2218 mL / OUT: 3340 mL / NET: -1122 mL    10-02-23 @ 07:01  -  10-02-23 @ 08:59  --------------------------------------------------------  IN: 71.9 mL / OUT: 0 mL / NET: 71.9 mL      PHYSICAL EXAM:  Constitutional: alert, NAD  Eyes: the sclera and conjunctiva were normal.   ENT: the ears and nose were normal in appearance.   Neck: the appearance of the neck was normal and the neck was supple.   Pulmonary: no respiratory distress and lungs were clear to auscultation bilaterally.   Heart: heart rate was normal and rhythm regular, normal S1 and S2  Vascular:. there was no peripheral edema  Abdomen: normal bowel sounds, soft, non-tender  Neurological: no focal deficits.   Psychiatric: the affect was normal        LABS:                        8.3    6.86  )-----------( 210      ( 02 Oct 2023 04:03 )             25.0     10-02    137  |  102  |  7   ----------------------------<  125<H>  3.9   |  26  |  0.43<L>    Ca    8.4      02 Oct 2023 04:03  Phos  3.4     10-02  Mg     1.8     10-02    TPro  6.0  /  Alb  2.9<L>  /  TBili  0.2  /  DBili  x   /  AST  29  /  ALT  27  /  AlkPhos  59  10-02      Urinalysis Basic - ( 02 Oct 2023 04:03 )    Color: x / Appearance: x / SG: x / pH: x  Gluc: 125 mg/dL / Ketone: x  / Bili: x / Urobili: x   Blood: x / Protein: x / Nitrite: x   Leuk Esterase: x / RBC: x / WBC x   Sq Epi: x / Non Sq Epi: x / Bacteria: x        MICROBIOLOGY:      RADIOLOGY & ADDITIONAL STUDIES:  Reviewed INFECTIOUS DISEASES CONSULT FOLLOW-UP NOTE    INTERVAL HPI/OVERNIGHT EVENTS: ANNA overnight. Maintaining MAP >100.      ROS:   Constitutional, eyes, ENT, cardiovascular, respiratory, gastrointestinal, genitourinary, integumentary, neurological, psychiatric and heme/lymph are otherwise negative other than noted above       ANTIBIOTICS/RELEVANT:    MEDICATIONS  (STANDING):  bisacodyl 5 milliGRAM(s) Oral every 12 hours  bisacodyl Suppository 10 milliGRAM(s) Rectal daily  chlorhexidine 2% Cloths 1 Application(s) Topical <User Schedule>  cyclobenzaprine 5 milliGRAM(s) Oral every 8 hours  enoxaparin Injectable 40 milliGRAM(s) SubCutaneous every 24 hours  influenza   Vaccine 0.5 milliLiter(s) IntraMuscular once  midodrine 15 milliGRAM(s) Oral every 8 hours  norepinephrine Infusion 0.05 MICROgram(s)/kG/Min (3.22 mL/Hr) IV Continuous <Continuous>  piperacillin/tazobactam IVPB.. 4.5 Gram(s) IV Intermittent every 8 hours  polyethylene glycol 3350 17 Gram(s) Oral two times a day  senna 2 Tablet(s) Oral at bedtime  sodium chloride 3 Gram(s) Oral every 6 hours  sodium chloride 0.9%. 1000 milliLiter(s) (30 mL/Hr) IV Continuous <Continuous>  vancomycin  IVPB      vancomycin  IVPB 1000 milliGRAM(s) IV Intermittent every 12 hours    MEDICATIONS  (PRN):  acetaminophen     Tablet .. 1000 milliGRAM(s) Oral every 8 hours PRN Mild Pain (1 - 3)  HYDROmorphone   Tablet 4 milliGRAM(s) Oral every 4 hours PRN Moderate Pain (4 - 6)  HYDROmorphone   Tablet 6 milliGRAM(s) Oral every 4 hours PRN Severe Pain (7 - 10)  HYDROmorphone  Injectable 0.5 milliGRAM(s) IV Push every 4 hours PRN breaktrhough pain  melatonin 5 milliGRAM(s) Oral at bedtime PRN Insomnia  sodium chloride 0.9% lock flush 10 milliLiter(s) IV Push every 1 hour PRN Pre/post blood products, medications, blood draw, and to maintain line patency        Vital Signs Last 24 Hrs  T(C): 37.6 (02 Oct 2023 05:01), Max: 37.9 (01 Oct 2023 22:08)  T(F): 99.7 (02 Oct 2023 05:01), Max: 100.2 (01 Oct 2023 22:08)  HR: 82 (02 Oct 2023 08:00) (72 - 120)  BP: --  BP(mean): --  RR: 20 (02 Oct 2023 08:00) (16 - 20)  SpO2: 99% (02 Oct 2023 08:00) (97% - 100%)    Parameters below as of 02 Oct 2023 08:00  Patient On (Oxygen Delivery Method): room air        10-01-23 @ 07:01  -  10-02-23 @ 07:00  --------------------------------------------------------  IN: 2218 mL / OUT: 3340 mL / NET: -1122 mL    10-02-23 @ 07:01  -  10-02-23 @ 08:59  --------------------------------------------------------  IN: 71.9 mL / OUT: 0 mL / NET: 71.9 mL      PHYSICAL EXAM:  Constitutional: alert, NAD  Eyes: the sclera and conjunctiva were normal.   ENT: the ears and nose were normal in appearance.   Neck: the appearance of the neck was normal and the neck was supple.   Pulmonary: no respiratory distress and lungs were clear to auscultation bilaterally. On RA  Heart: heart rate was normal and rhythm regular, normal S1 and S2  Vascular:. there was no peripheral edema  Abdomen: normal bowel sounds, soft, non-tender. +distention  : external catheter  Ext: no swelling, no palpable cords at IV sites  Neurological: no focal deficits.   Psychiatric: the affect was normal  Vasc: LUE PICC c/d/i. Nodular on PE.            LABS:                        8.3    6.86  )-----------( 210      ( 02 Oct 2023 04:03 )             25.0     10-02    137  |  102  |  7   ----------------------------<  125<H>  3.9   |  26  |  0.43<L>    Ca    8.4      02 Oct 2023 04:03  Phos  3.4     10-02  Mg     1.8     10-02    TPro  6.0  /  Alb  2.9<L>  /  TBili  0.2  /  DBili  x   /  AST  29  /  ALT  27  /  AlkPhos  59  10-02      Urinalysis Basic - ( 02 Oct 2023 04:03 )    Color: x / Appearance: x / SG: x / pH: x  Gluc: 125 mg/dL / Ketone: x  / Bili: x / Urobili: x   Blood: x / Protein: x / Nitrite: x   Leuk Esterase: x / RBC: x / WBC x   Sq Epi: x / Non Sq Epi: x / Bacteria: x        MICROBIOLOGY:      RADIOLOGY & ADDITIONAL STUDIES:  Reviewed INFECTIOUS DISEASES CONSULT FOLLOW-UP NOTE    INTERVAL HPI/OVERNIGHT EVENTS: ANNA overnight.       ROS:   Constitutional, eyes, ENT, cardiovascular, respiratory, gastrointestinal, genitourinary, integumentary, neurological, psychiatric and heme/lymph are otherwise negative other than noted above       ANTIBIOTICS/RELEVANT:    MEDICATIONS  (STANDING):  bisacodyl 5 milliGRAM(s) Oral every 12 hours  bisacodyl Suppository 10 milliGRAM(s) Rectal daily  chlorhexidine 2% Cloths 1 Application(s) Topical <User Schedule>  cyclobenzaprine 5 milliGRAM(s) Oral every 8 hours  enoxaparin Injectable 40 milliGRAM(s) SubCutaneous every 24 hours  influenza   Vaccine 0.5 milliLiter(s) IntraMuscular once  midodrine 15 milliGRAM(s) Oral every 8 hours  norepinephrine Infusion 0.05 MICROgram(s)/kG/Min (3.22 mL/Hr) IV Continuous <Continuous>  piperacillin/tazobactam IVPB.. 4.5 Gram(s) IV Intermittent every 8 hours  polyethylene glycol 3350 17 Gram(s) Oral two times a day  senna 2 Tablet(s) Oral at bedtime  sodium chloride 3 Gram(s) Oral every 6 hours  sodium chloride 0.9%. 1000 milliLiter(s) (30 mL/Hr) IV Continuous <Continuous>  vancomycin  IVPB      vancomycin  IVPB 1000 milliGRAM(s) IV Intermittent every 12 hours    MEDICATIONS  (PRN):  acetaminophen     Tablet .. 1000 milliGRAM(s) Oral every 8 hours PRN Mild Pain (1 - 3)  HYDROmorphone   Tablet 4 milliGRAM(s) Oral every 4 hours PRN Moderate Pain (4 - 6)  HYDROmorphone   Tablet 6 milliGRAM(s) Oral every 4 hours PRN Severe Pain (7 - 10)  HYDROmorphone  Injectable 0.5 milliGRAM(s) IV Push every 4 hours PRN breaktrhough pain  melatonin 5 milliGRAM(s) Oral at bedtime PRN Insomnia  sodium chloride 0.9% lock flush 10 milliLiter(s) IV Push every 1 hour PRN Pre/post blood products, medications, blood draw, and to maintain line patency        Vital Signs Last 24 Hrs  T(C): 37.6 (02 Oct 2023 05:01), Max: 37.9 (01 Oct 2023 22:08)  T(F): 99.7 (02 Oct 2023 05:01), Max: 100.2 (01 Oct 2023 22:08)  HR: 82 (02 Oct 2023 08:00) (72 - 120)  BP: --  BP(mean): --  RR: 20 (02 Oct 2023 08:00) (16 - 20)  SpO2: 99% (02 Oct 2023 08:00) (97% - 100%)    Parameters below as of 02 Oct 2023 08:00  Patient On (Oxygen Delivery Method): room air        10-01-23 @ 07:01  -  10-02-23 @ 07:00  --------------------------------------------------------  IN: 2218 mL / OUT: 3340 mL / NET: -1122 mL    10-02-23 @ 07:01  -  10-02-23 @ 08:59  --------------------------------------------------------  IN: 71.9 mL / OUT: 0 mL / NET: 71.9 mL      PHYSICAL EXAM:  Constitutional: alert, NAD  Eyes: the sclera and conjunctiva were normal.   ENT: the ears and nose were normal in appearance.   Neck: the appearance of the neck was normal and the neck was supple.   Pulmonary: no respiratory distress and lungs were clear to auscultation bilaterally. On RA  Heart: heart rate was normal and rhythm regular, normal S1 and S2  Vascular:. there was no peripheral edema  Abdomen: normal bowel sounds, soft, non-tender. +distention  : external catheter  Ext: no swelling, +cord at L cephalic vein, without fluctuance or tenderness  Neurological: no focal deficits.   Psychiatric: the affect was normal  Frenchc: BRANDON PICC c/d/i            LABS:                        8.3    6.86  )-----------( 210      ( 02 Oct 2023 04:03 )             25.0     10-02    137  |  102  |  7   ----------------------------<  125<H>  3.9   |  26  |  0.43<L>    Ca    8.4      02 Oct 2023 04:03  Phos  3.4     10-02  Mg     1.8     10-02    TPro  6.0  /  Alb  2.9<L>  /  TBili  0.2  /  DBili  x   /  AST  29  /  ALT  27  /  AlkPhos  59  10-02      Urinalysis Basic - ( 02 Oct 2023 04:03 )    Color: x / Appearance: x / SG: x / pH: x  Gluc: 125 mg/dL / Ketone: x  / Bili: x / Urobili: x   Blood: x / Protein: x / Nitrite: x   Leuk Esterase: x / RBC: x / WBC x   Sq Epi: x / Non Sq Epi: x / Bacteria: x        MICROBIOLOGY:      RADIOLOGY & ADDITIONAL STUDIES:  Reviewed

## 2023-10-03 LAB
ANION GAP SERPL CALC-SCNC: 10 MMOL/L — SIGNIFICANT CHANGE UP (ref 5–17)
BUN SERPL-MCNC: 7 MG/DL — SIGNIFICANT CHANGE UP (ref 7–23)
CALCIUM SERPL-MCNC: 9.1 MG/DL — SIGNIFICANT CHANGE UP (ref 8.4–10.5)
CHLORIDE SERPL-SCNC: 103 MMOL/L — SIGNIFICANT CHANGE UP (ref 96–108)
CO2 SERPL-SCNC: 25 MMOL/L — SIGNIFICANT CHANGE UP (ref 22–31)
CREAT SERPL-MCNC: 0.43 MG/DL — LOW (ref 0.5–1.3)
CULTURE RESULTS: SIGNIFICANT CHANGE UP
EGFR: 125 ML/MIN/1.73M2 — SIGNIFICANT CHANGE UP
GLUCOSE SERPL-MCNC: 148 MG/DL — HIGH (ref 70–99)
HCT VFR BLD CALC: 25.5 % — LOW (ref 34.5–45)
HCT VFR BLD CALC: 30.1 % — LOW (ref 34.5–45)
HGB BLD-MCNC: 10 G/DL — LOW (ref 11.5–15.5)
HGB BLD-MCNC: 8.5 G/DL — LOW (ref 11.5–15.5)
MAGNESIUM SERPL-MCNC: 1.9 MG/DL — SIGNIFICANT CHANGE UP (ref 1.6–2.6)
MCHC RBC-ENTMCNC: 27.9 PG — SIGNIFICANT CHANGE UP (ref 27–34)
MCHC RBC-ENTMCNC: 28.4 PG — SIGNIFICANT CHANGE UP (ref 27–34)
MCHC RBC-ENTMCNC: 33.2 GM/DL — SIGNIFICANT CHANGE UP (ref 32–36)
MCHC RBC-ENTMCNC: 33.3 GM/DL — SIGNIFICANT CHANGE UP (ref 32–36)
MCV RBC AUTO: 83.6 FL — SIGNIFICANT CHANGE UP (ref 80–100)
MCV RBC AUTO: 85.5 FL — SIGNIFICANT CHANGE UP (ref 80–100)
NRBC # BLD: 0 /100 WBCS — SIGNIFICANT CHANGE UP (ref 0–0)
NRBC # BLD: 0 /100 WBCS — SIGNIFICANT CHANGE UP (ref 0–0)
PHOSPHATE SERPL-MCNC: 2.8 MG/DL — SIGNIFICANT CHANGE UP (ref 2.5–4.5)
PLATELET # BLD AUTO: 276 K/UL — SIGNIFICANT CHANGE UP (ref 150–400)
PLATELET # BLD AUTO: 309 K/UL — SIGNIFICANT CHANGE UP (ref 150–400)
POTASSIUM SERPL-MCNC: 4 MMOL/L — SIGNIFICANT CHANGE UP (ref 3.5–5.3)
POTASSIUM SERPL-SCNC: 4 MMOL/L — SIGNIFICANT CHANGE UP (ref 3.5–5.3)
RBC # BLD: 3.05 M/UL — LOW (ref 3.8–5.2)
RBC # BLD: 3.52 M/UL — LOW (ref 3.8–5.2)
RBC # FLD: 14.3 % — SIGNIFICANT CHANGE UP (ref 10.3–14.5)
RBC # FLD: 14.6 % — HIGH (ref 10.3–14.5)
SODIUM SERPL-SCNC: 138 MMOL/L — SIGNIFICANT CHANGE UP (ref 135–145)
SPECIMEN SOURCE: SIGNIFICANT CHANGE UP
TROPONIN T, HIGH SENSITIVITY RESULT: 12 NG/L — SIGNIFICANT CHANGE UP (ref 0–51)
VANCOMYCIN TROUGH SERPL-MCNC: 4 UG/ML — LOW (ref 10–20)
VANCOMYCIN TROUGH SERPL-MCNC: 4 UG/ML — LOW (ref 10–20)
WBC # BLD: 6.6 K/UL — SIGNIFICANT CHANGE UP (ref 3.8–10.5)
WBC # BLD: 6.81 K/UL — SIGNIFICANT CHANGE UP (ref 3.8–10.5)
WBC # FLD AUTO: 6.6 K/UL — SIGNIFICANT CHANGE UP (ref 3.8–10.5)
WBC # FLD AUTO: 6.81 K/UL — SIGNIFICANT CHANGE UP (ref 3.8–10.5)

## 2023-10-03 PROCEDURE — 99291 CRITICAL CARE FIRST HOUR: CPT

## 2023-10-03 PROCEDURE — 99232 SBSQ HOSP IP/OBS MODERATE 35: CPT

## 2023-10-03 RX ORDER — SIMETHICONE 80 MG/1
80 TABLET, CHEWABLE ORAL EVERY 6 HOURS
Refills: 0 | Status: DISCONTINUED | OUTPATIENT
Start: 2023-10-03 | End: 2023-10-06

## 2023-10-03 RX ORDER — SODIUM CHLORIDE 9 MG/ML
1000 INJECTION INTRAMUSCULAR; INTRAVENOUS; SUBCUTANEOUS ONCE
Refills: 0 | Status: COMPLETED | OUTPATIENT
Start: 2023-10-03 | End: 2023-10-03

## 2023-10-03 RX ORDER — VANCOMYCIN HCL 1 G
1000 VIAL (EA) INTRAVENOUS EVERY 8 HOURS
Refills: 0 | Status: DISCONTINUED | OUTPATIENT
Start: 2023-10-03 | End: 2023-10-03

## 2023-10-03 RX ORDER — DIAZEPAM 5 MG
2 TABLET ORAL EVERY 8 HOURS
Refills: 0 | Status: DISCONTINUED | OUTPATIENT
Start: 2023-10-03 | End: 2023-10-06

## 2023-10-03 RX ORDER — SODIUM,POTASSIUM PHOSPHATES 278-250MG
1 POWDER IN PACKET (EA) ORAL ONCE
Refills: 0 | Status: COMPLETED | OUTPATIENT
Start: 2023-10-03 | End: 2023-10-03

## 2023-10-03 RX ORDER — MAGNESIUM SULFATE 500 MG/ML
1 VIAL (ML) INJECTION ONCE
Refills: 0 | Status: COMPLETED | OUTPATIENT
Start: 2023-10-03 | End: 2023-10-03

## 2023-10-03 RX ORDER — SODIUM CHLORIDE 9 MG/ML
2 INJECTION INTRAMUSCULAR; INTRAVENOUS; SUBCUTANEOUS EVERY 8 HOURS
Refills: 0 | Status: DISCONTINUED | OUTPATIENT
Start: 2023-10-03 | End: 2023-10-04

## 2023-10-03 RX ADMIN — Medication 1000 MILLIGRAM(S): at 06:58

## 2023-10-03 RX ADMIN — Medication 1000 MILLIGRAM(S): at 21:01

## 2023-10-03 RX ADMIN — HYDROMORPHONE HYDROCHLORIDE 2 MILLIGRAM(S): 2 INJECTION INTRAMUSCULAR; INTRAVENOUS; SUBCUTANEOUS at 16:12

## 2023-10-03 RX ADMIN — CHLORHEXIDINE GLUCONATE 1 APPLICATION(S): 213 SOLUTION TOPICAL at 05:07

## 2023-10-03 RX ADMIN — PIPERACILLIN AND TAZOBACTAM 25 GRAM(S): 4; .5 INJECTION, POWDER, LYOPHILIZED, FOR SOLUTION INTRAVENOUS at 14:13

## 2023-10-03 RX ADMIN — HYDROMORPHONE HYDROCHLORIDE 4 MILLIGRAM(S): 2 INJECTION INTRAMUSCULAR; INTRAVENOUS; SUBCUTANEOUS at 21:02

## 2023-10-03 RX ADMIN — HYDROMORPHONE HYDROCHLORIDE 4 MILLIGRAM(S): 2 INJECTION INTRAMUSCULAR; INTRAVENOUS; SUBCUTANEOUS at 14:11

## 2023-10-03 RX ADMIN — PIPERACILLIN AND TAZOBACTAM 25 GRAM(S): 4; .5 INJECTION, POWDER, LYOPHILIZED, FOR SOLUTION INTRAVENOUS at 05:06

## 2023-10-03 RX ADMIN — Medication 100 GRAM(S): at 06:51

## 2023-10-03 RX ADMIN — SODIUM CHLORIDE 1000 MILLILITER(S): 9 INJECTION INTRAMUSCULAR; INTRAVENOUS; SUBCUTANEOUS at 20:59

## 2023-10-03 RX ADMIN — SODIUM CHLORIDE 30 MILLILITER(S): 9 INJECTION INTRAMUSCULAR; INTRAVENOUS; SUBCUTANEOUS at 21:03

## 2023-10-03 RX ADMIN — Medication 30 MILLIGRAM(S): at 18:00

## 2023-10-03 RX ADMIN — ENOXAPARIN SODIUM 40 MILLIGRAM(S): 100 INJECTION SUBCUTANEOUS at 21:02

## 2023-10-03 RX ADMIN — SODIUM CHLORIDE 2 GRAM(S): 9 INJECTION INTRAMUSCULAR; INTRAVENOUS; SUBCUTANEOUS at 17:57

## 2023-10-03 RX ADMIN — HYDROMORPHONE HYDROCHLORIDE 4 MILLIGRAM(S): 2 INJECTION INTRAMUSCULAR; INTRAVENOUS; SUBCUTANEOUS at 22:00

## 2023-10-03 RX ADMIN — SIMETHICONE 80 MILLIGRAM(S): 80 TABLET, CHEWABLE ORAL at 17:56

## 2023-10-03 RX ADMIN — HYDROMORPHONE HYDROCHLORIDE 4 MILLIGRAM(S): 2 INJECTION INTRAMUSCULAR; INTRAVENOUS; SUBCUTANEOUS at 05:07

## 2023-10-03 RX ADMIN — Medication 3.22 MICROGRAM(S)/KG/MIN: at 21:02

## 2023-10-03 RX ADMIN — HYDROMORPHONE HYDROCHLORIDE 4 MILLIGRAM(S): 2 INJECTION INTRAMUSCULAR; INTRAVENOUS; SUBCUTANEOUS at 09:50

## 2023-10-03 RX ADMIN — SIMETHICONE 80 MILLIGRAM(S): 80 TABLET, CHEWABLE ORAL at 21:02

## 2023-10-03 RX ADMIN — HYDROMORPHONE HYDROCHLORIDE 2 MILLIGRAM(S): 2 INJECTION INTRAMUSCULAR; INTRAVENOUS; SUBCUTANEOUS at 11:15

## 2023-10-03 RX ADMIN — SIMETHICONE 80 MILLIGRAM(S): 80 TABLET, CHEWABLE ORAL at 09:59

## 2023-10-03 RX ADMIN — Medication 5 MILLIGRAM(S): at 05:07

## 2023-10-03 RX ADMIN — HYDROMORPHONE HYDROCHLORIDE 2 MILLIGRAM(S): 2 INJECTION INTRAMUSCULAR; INTRAVENOUS; SUBCUTANEOUS at 16:45

## 2023-10-03 RX ADMIN — HYDROMORPHONE HYDROCHLORIDE 2 MILLIGRAM(S): 2 INJECTION INTRAMUSCULAR; INTRAVENOUS; SUBCUTANEOUS at 06:59

## 2023-10-03 RX ADMIN — MIDODRINE HYDROCHLORIDE 15 MILLIGRAM(S): 2.5 TABLET ORAL at 14:12

## 2023-10-03 RX ADMIN — MIDODRINE HYDROCHLORIDE 15 MILLIGRAM(S): 2.5 TABLET ORAL at 05:07

## 2023-10-03 RX ADMIN — HYDROMORPHONE HYDROCHLORIDE 4 MILLIGRAM(S): 2 INJECTION INTRAMUSCULAR; INTRAVENOUS; SUBCUTANEOUS at 09:18

## 2023-10-03 RX ADMIN — MIDODRINE HYDROCHLORIDE 15 MILLIGRAM(S): 2.5 TABLET ORAL at 21:01

## 2023-10-03 RX ADMIN — HYDROMORPHONE HYDROCHLORIDE 2 MILLIGRAM(S): 2 INJECTION INTRAMUSCULAR; INTRAVENOUS; SUBCUTANEOUS at 11:45

## 2023-10-03 RX ADMIN — SODIUM CHLORIDE 3 GRAM(S): 9 INJECTION INTRAMUSCULAR; INTRAVENOUS; SUBCUTANEOUS at 05:07

## 2023-10-03 RX ADMIN — Medication 50 MILLIGRAM(S): at 09:59

## 2023-10-03 RX ADMIN — SENNA PLUS 2 TABLET(S): 8.6 TABLET ORAL at 21:01

## 2023-10-03 RX ADMIN — Medication 1 PACKET(S): at 06:51

## 2023-10-03 RX ADMIN — Medication 30 MILLIGRAM(S): at 18:30

## 2023-10-03 RX ADMIN — CYCLOBENZAPRINE HYDROCHLORIDE 10 MILLIGRAM(S): 10 TABLET, FILM COATED ORAL at 05:07

## 2023-10-03 RX ADMIN — Medication 2 MILLIGRAM(S): at 11:14

## 2023-10-03 RX ADMIN — Medication 10 MILLIGRAM(S): at 11:15

## 2023-10-03 RX ADMIN — SODIUM CHLORIDE 3 GRAM(S): 9 INJECTION INTRAMUSCULAR; INTRAVENOUS; SUBCUTANEOUS at 11:14

## 2023-10-03 RX ADMIN — Medication 30 MILLIGRAM(S): at 08:00

## 2023-10-03 RX ADMIN — Medication 5 MILLIGRAM(S): at 17:57

## 2023-10-03 RX ADMIN — Medication 30 MILLIGRAM(S): at 00:03

## 2023-10-03 RX ADMIN — Medication 250 MILLIGRAM(S): at 06:51

## 2023-10-03 RX ADMIN — HYDROMORPHONE HYDROCHLORIDE 4 MILLIGRAM(S): 2 INJECTION INTRAMUSCULAR; INTRAVENOUS; SUBCUTANEOUS at 06:57

## 2023-10-03 RX ADMIN — POLYETHYLENE GLYCOL 3350 17 GRAM(S): 17 POWDER, FOR SOLUTION ORAL at 05:04

## 2023-10-03 RX ADMIN — HYDROMORPHONE HYDROCHLORIDE 4 MILLIGRAM(S): 2 INJECTION INTRAMUSCULAR; INTRAVENOUS; SUBCUTANEOUS at 15:00

## 2023-10-03 RX ADMIN — Medication 30 MILLIGRAM(S): at 01:00

## 2023-10-03 RX ADMIN — PIPERACILLIN AND TAZOBACTAM 25 GRAM(S): 4; .5 INJECTION, POWDER, LYOPHILIZED, FOR SOLUTION INTRAVENOUS at 22:00

## 2023-10-03 RX ADMIN — Medication 50 MILLIGRAM(S): at 18:00

## 2023-10-03 RX ADMIN — Medication 1000 MILLIGRAM(S): at 22:00

## 2023-10-03 RX ADMIN — Medication 2 MILLIGRAM(S): at 17:56

## 2023-10-03 RX ADMIN — POLYETHYLENE GLYCOL 3350 17 GRAM(S): 17 POWDER, FOR SOLUTION ORAL at 17:57

## 2023-10-03 NOTE — CHART NOTE - NSCHARTNOTEFT_GEN_A_CORE
Tmax 100.9F in AM, given tylenol. Flexeril changed to valium 2q8 and lyrica 50q8 started per pain managment.  DC'd vanc, continue zosyn x 7 days per ID. 1u PRBC to maintain hbg >9.0. DANIELE drain removed. Salt tabs weaned to 2q8. MAP goal changed to >85 to help wean off levo gtt.

## 2023-10-03 NOTE — PROGRESS NOTE ADULT - ASSESSMENT
Assessment: 43F s/p traumatic spine injury w/o neurological deficit & severe thoracic kyphosis now s/p T3-8 VCR, C7-L1 fusion, correction of deformity, placement of anterior cage.  STAGE 1. plastics closure.      NEURO:  T3-8 VCR, C7-L1 fusion, correction of deformity, placement of anterior cage.  STAGE 1. plastics closure  -no loss of motors intra-op however pt w/ significant neurological deficit; pending    -neuro check q1  -pain management: start Toradol 30mg q8 x1 days, oral Dilaudid 4mg/2mg PRN, valium 2mg PRN, lyrica 50mg qd   -Monitor Drain output likely to be removed today   -PT/OT evaluation after stage 2     PULMONARY:  saturating well on RA,   -continue to monitor on pulse o2   -incentive spirometry 10q/hr when awake     CARDIOVASCULAR:  monitor on telemetry   vitals q1  MAP goal >100; c/w midodrine 15mg qd & levophed   -olimpia in place;     GASTROENTEROLOGY:  regular diet   ensure BMs w/ Miralax & senna  GI ppx : Protonix 40mg qd  Daily stool count, LBM 10/2  abdomen distended KUB w/ non-obstructive gas pattern   -start simethicone 80mg q6 x3 days (change to standing from PRN)     RENAL/:  -check BMP qd  -strict i/o's ;straight cath PRN  -Na goal 135-145    ENDOCRINE:  glucose goal 100-180    HEME/ONC:  DVT ppx: Lovenox SQ  b/l SCDs  provide 1 addition unit PRBC for hypotension 2/2 postoperative state goal >9   LE doppler negative     INFECTIOUS:   empiric abx for low grade fevers; blood culture negative, RVP negative, UA (+) likely asymptomatic bacteruria.   c/w zosyn 4.5g q8 (total 7 days) d/c vancomycin    LUE duplex superficial thrombophlebitis ?source of fever  incision site evaluated no evidence of infection

## 2023-10-03 NOTE — PROGRESS NOTE ADULT - ASSESSMENT
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 On pressors since 9/26 for likely neurogenic shock, and MAP pushes. Developed fever and leukocytosis on 9/27 (POD 2) but had no focal symptoms of infection and looked well, and septic workup was negative so monitored off abx for presumed fever/leukocytosis from post-op inflammatory response/fusion fever. However 9/30 had recurrent fever (POD 6). Still no focal signs of infection. WBC normalized. BCx ngtd, RVP neg. BLE duplex neg. UA+ but not sent for culture, no clear urinary symptoms (but may have decreased sensation here due to spinal issue). LUE duplex showing superficial thrombophlebitis which could be potential source of fever.    #Post-op fever, unclear source. Possibly superficial thrombophlebitis vs. urinary vs. fusion fever/post-op inflamm  -discontinue vancomycin 1g IV q12h.  trough low (10/3)  -c/w zosyn 4.5g IV q8h (10/1- ) due to possible complicated UTI  -blood cultures - after 48 hours  -Recommend CT chest given pleural injury in OR  -Recommended RVP - neg    #Asymptomatic Bacteruria  - UA+ however patient asx, will continue to monitor    #LUE superficial thrombophlebitis  - Improved swelling. 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 On pressors since 9/26 for likely neurogenic shock, and MAP pushes. Developed fever and leukocytosis on 9/27 (POD 2) but had no focal symptoms of infection and looked well, and septic workup was negative so monitored off abx for presumed fever/leukocytosis from post-op inflammatory response/fusion fever. However 9/30 had recurrent fever (POD 6). Still no focal signs of infection. WBC normalized. BCx ngtd, RVP neg. BLE duplex neg. UA+ but not sent for culture, no clear urinary symptoms (but may have decreased sensation here due to spinal issue). LUE duplex showing superficial thrombophlebitis which is a potential source of fever.    #Post-op fever, unclear source. Possibly superficial thrombophlebitis vs. urinary vs. fusion fever/post-op inflamm  -Recommend CT chest given pleural injury in OR, to evaluate for any occult infection here  -discontinue vancomycin 1g IV q12h as there is low suspicion for wound infection/SSI at this time, and BCx neg @48h  -c/w zosyn 4.5g IV q8h (10/1- ) due to possible complicated UTI  -Has a LUE PICC, but site looks clean and uninfected    #LUE superficial thrombophlebitis  - Improved on exam    ID Team 1 will follow

## 2023-10-03 NOTE — PROGRESS NOTE ADULT - SUBJECTIVE AND OBJECTIVE BOX
NEUROSURGERY PAIN MANAGEMENT CONSULT NOTE    Chief Complaint: back pain     HPI:  Taken from outpatient neurosurgery note on 9/13/2023 " The patient, a long-standing patient of my practice, reports a history of spinal issues that dates back to a childhood injury. Over the years she's experienced developing issues with walking in a straight line, overactive reflexes in her lower extremities, and unique difficulty with her lower extremities referred to as cloneness. Recent upturn in these symptoms has limited her ability to carry out her daily routines. The patient also reports no significant improvement in her condition since our last clinical encounter two years ago."    42 y/o female with no PMHx or PSHx presents for surgery today.  She reports ambulating with walker or baby stroller at home for years.  She reports intermittent falls.  She has right greater than left leg tingling, low back and mid back pain.  She denies arm or leg pain.  She reports urinary incontinence since having a baby 2 years ago where if she doesn't get to the bathroom fast enough, she has incontinence, but she is able to hold it for a short while.  She takes no pain medications.  She has taken ibuprofen in the past.  She denies saddle anesthesia, bowel incontinence.   (25 Sep 2023 06:51)      PAST MEDICAL & SURGICAL HISTORY:  History of acute illness  childhood      No significant past surgical history          FAMILY HISTORY:  No pertinent family history in first degree relatives        SOCIAL HISTORY:  [ ] Denies Smoking, Alcohol, or Drug Use    HOME MEDICATIONS:   Please refer to initial HNP    PAIN HOME MEDICATIONS:    Allergies    No Known Allergies    Intolerances        PAIN MEDICATIONS:  acetaminophen     Tablet .. 1000 milliGRAM(s) Oral every 8 hours PRN  diazepam    Tablet 2 milliGRAM(s) Oral every 8 hours  HYDROmorphone   Tablet 2 milliGRAM(s) Oral every 4 hours PRN  HYDROmorphone   Tablet 4 milliGRAM(s) Oral every 4 hours PRN  ketorolac   Injectable 30 milliGRAM(s) IV Push every 8 hours  melatonin 5 milliGRAM(s) Oral at bedtime PRN  pregabalin 50 milliGRAM(s) Oral every 8 hours    Heme:  enoxaparin Injectable 40 milliGRAM(s) SubCutaneous every 24 hours    Antibiotics:  piperacillin/tazobactam IVPB.. 4.5 Gram(s) IV Intermittent every 8 hours    Cardiovascular:  midodrine 15 milliGRAM(s) Oral every 8 hours  norepinephrine Infusion 0.05 MICROgram(s)/kG/Min IV Continuous <Continuous>    GI:  bisacodyl 5 milliGRAM(s) Oral every 12 hours  bisacodyl Suppository 10 milliGRAM(s) Rectal daily  polyethylene glycol 3350 17 Gram(s) Oral two times a day  senna 2 Tablet(s) Oral at bedtime  simethicone 80 milliGRAM(s) Chew every 6 hours    Endocrine:    All Other Medications:  chlorhexidine 2% Cloths 1 Application(s) Topical <User Schedule>  influenza   Vaccine 0.5 milliLiter(s) IntraMuscular once  sodium chloride 2 Gram(s) Oral every 8 hours  sodium chloride 0.9% lock flush 10 milliLiter(s) IV Push every 1 hour PRN  sodium chloride 0.9%. 1000 milliLiter(s) IV Continuous <Continuous>      Vital Signs Last 24 Hrs  T(C): 38.3 (03 Oct 2023 09:09), Max: 38.3 (03 Oct 2023 09:09)  T(F): 100.9 (03 Oct 2023 09:09), Max: 100.9 (03 Oct 2023 09:09)  HR: 87 (03 Oct 2023 13:00) (70 - 107)  BP: --  BP(mean): --  RR: 22 (03 Oct 2023 13:00) (12 - 22)  SpO2: 100% (03 Oct 2023 13:00) (99% - 100%)    Parameters below as of 03 Oct 2023 13:00  Patient On (Oxygen Delivery Method): room air        LABS:                        8.5    6.60  )-----------( 276      ( 03 Oct 2023 05:05 )             25.5     10-03    138  |  103  |  7   ----------------------------<  148<H>  4.0   |  25  |  0.43<L>    Ca    9.1      03 Oct 2023 05:05  Phos  2.8     10-03  Mg     1.9     10-03    TPro  6.0  /  Alb  2.9<L>  /  TBili  0.2  /  DBili  x   /  AST  29  /  ALT  27  /  AlkPhos  59  10-02      Urinalysis Basic - ( 03 Oct 2023 05:05 )    Color: x / Appearance: x / SG: x / pH: x  Gluc: 148 mg/dL / Ketone: x  / Bili: x / Urobili: x   Blood: x / Protein: x / Nitrite: x   Leuk Esterase: x / RBC: x / WBC x   Sq Epi: x / Non Sq Epi: x / Bacteria: x        RADIOLOGY:    Drug Screen:        REVIEW OF SYSTEMS:  CONSTITUTIONAL: No fever or fatigue O/N.   EYES: No eye pain, visual disturbances  ENMT:  No difficulty hearing. No throat pain  NECK: No pain or stiffness  RESPIRATORY: No cough, wheezing; No shortness of breath  CARDIOVASCULAR: No chest pain, palpitations.   GASTROINTESTINAL: Pt reports passing gas. No bowel movements. No abdominal or epigastric pain. No nausea, vomiting. GENITOURINARY: No dysuria, frequency, or incontinence  NEUROLOGICAL: No headaches, loss of strength, numbness, or tremors. No dizzinesss or lightheadedness with pain medications.   MUSCULOSKELETAL: Incisional back pain. No joint pain or swelling; No muscle, or extremity pain    PAIN ASSESSMENT: c/o left upper back pain / tightness    PHYSICAL EXAM  GENERAL: Laying in bed, NAD  Neuro: CN II-XII PERRRL, EOMI  Cranial nerves grossly intact  Motor exam: b/l uppers strong, b/l lowers 1/5  Sensation intact to LT in UE/LE in 3 dermatomes  CHEST/LUNG: Clear to auscultation bilaterally; No rales, rhonchi, wheezing, or rubs  HEART: Regular rate and rhythm; No murmurs, rubs, or gallops  ABDOMEN: Soft, Nontender, Nondistended; Bowel sounds present  EXTREMITIES:  2+ Peripheral Pulses, No clubbing, cyanosis, or edema  SKIN: No rashes or lesions      ASSESSMENT:   43F no PMHx suffered childhood injury resulting in spine injury and residual gait disturbance, hyperreflexia of BL LE. She has right greater than left leg tingling, low back and mid back pain. 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 S/p C7-L1 fusion, T3-8 vertebral column resection, placement of anterior cage (9/25/23).       PLAN:   - Pain:  Tylenol 1000mg every 8 hours as needed   Switch Flexeril to Valium 2mg every 8 hours   Start Lyrica 50mg every 8 hours   Dilaudid 2mg OR 4mg every 4 hours as needed for moderate or severe pain  Toradol 30mg every 8 hours x3 days     - Bowel regimen: Senna    - Nausea ppx: Zofran standing  - Functional Goals: Pt will get OOB with PT today. Pt will resume previous level of activity without impairment from surgery.   - Additional Consults: None recommended.   - Additional Labs/Imaging:  None recommended.   - Follow up, Discharge Planning: pending 2nd stage surgery  - Pain Management follow up plan: will continue to follow    d/w Dr. [place]

## 2023-10-03 NOTE — PROGRESS NOTE ADULT - ASSESSMENT
ASSESSMENT/PLAN:     s/p 5-level vertebral column resection with fusion of C7-L1, put in a cage; stage 1; stable introp monitoring; pleura violated, repaired; post op with BLE weakness requring pressors to augment MAP>100, now pending additional imaging    NEURO:  - Neurochecks q4h  - Surgical drains per NSGY  - Augment map as per below  - Tentative Stage 2 next Wednesday   - Pain control  - Activity: bed rest for now, PT/OT when able    PULM:  - Incentive spirometry  - mobilize as tolerated  - Aspiration Precautions    CV:  - MAP>100 per nsg  - trop, ekg while on pressors daily  - c/w levophed  - midodrine 15q8h  - Rt axillary olimpia placed 9/29    RENAL:  - Fluids: NS 50cc/h while on pressors  - trend renal function  - salt tabs    GI:  - Diet: Regular  - Bowel regimen standing  - polyethylene glycol 17G daily, senna, dulcolax   - lactulose      ENDO:   - Goal euglycemia (-180)    HEME/ONC:  s/p 700cc cell saver, 1.5 L EBL; 1500 5% albumin, given TXA;  post-op Hb 6.8 - give 2 units pRBC, repeat   - monitor H/H    VTE prophylaxis   - SCDs   - SQL      ID:  - febrile, pan cultured 9/27, UA negative, will continue to monitor  - vanc/zosyn per ID for empiric coverage (10/1 - )  - vanc mjugh  - f/u cultures  - asymptomatic bacteruria         Dispo: ICU pending OR plan, BP augmentation

## 2023-10-03 NOTE — PROGRESS NOTE ADULT - SUBJECTIVE AND OBJECTIVE BOX
HPI:  Taken from outpatient neurosurgery note on 9/13/2023 " The patient, a long-standing patient of my practice, reports a history of spinal issues that dates back to a childhood injury. Over the years she's experienced developing issues with walking in a straight line, overactive reflexes in her lower extremities, and unique difficulty with her lower extremities referred to as cloneness. Recent upturn in these symptoms has limited her ability to carry out her daily routines. The patient also reports no significant improvement in her condition since our last clinical encounter two years ago."    44 y/o female with no PMHx or PSHx presents for surgery today.  She reports ambulating with walker or baby stroller at home for years.  She reports intermittent falls.  She has right greater than left leg tingling, low back and mid back pain.  She denies arm or leg pain.  She reports urinary incontinence since having a baby 2 years ago where if she doesn't get to the bathroom fast enough, she has incontinence, but she is able to hold it for a short while.  She takes no pain medications.  She has taken ibuprofen in the past.  She denies saddle anesthesia, bowel incontinence.   (25 Sep 2023 06:51)    Hospital Course:  9/25: POD 0 s/p C7-L1 fusion, T3-8 vertebral column resection, placement of anterior cage.   9/26; POD1 H/H 6.8 postop and PLT 85. Given 2u PRBC and 1u PLT. Switched propofol to precedex gtt. Pt extubated to face mask. Pt noted to only be withdrawing to noxious stimuli on bilateral lower extremities with sensation decreased RLE per patient. Dr. Vieira notified and plan is to keep MAP>100 and obtain CT full spine in the morning. Phenylephrine started to maintain MAP>100. Advanced diet to regular. Passed TOV. Increased need for aurora, UO high.   9/27: POD2 C7-L1 fusion T3-8 vertebral column resection. increased pressor requirements o/n, started on levo additionally. lactate 1.5, BNP 1400 from 800, CK elevated. echo: EF 60-65%. given 250cc bolus albumin, started on midodrine 10q8 to wean off pressors. PICC placed by Alli.   9/28: POD3 s/p C7-L1 fusion, T3-8 vertebral column resection, placement of anterior cage. R radial A-line removed and replaced on the L, trops and EKG done for chest discomfort, WNL, resolved with treatment of overall pain, continues to be febrile, lyrica dc'd to help.   Lyrica restarted. Weaning phenylephrine. Serum Na uptrending, 3% saline discontinued. Pt febrile with uptrending WBC, ID consulted recommend monitoring for now off antibiotics.  9/29: POD4. started on 3% o/n for Na 132. remains on aurora and levo for MAP>100. Tachycardic, given 1L NS. Right axillary a-line placed. 3% dc'd. HMV dc'd. Aquacell dressing replaced. Passed TOV.   9/30: POD 5.  Remains on levo gtt. Increased midodrine to 15q8. Given enema. Had BM.  10/1: POD 6. MAP goal increased back to >100 due to worsened sensation loss, on levo gtt. afebrile however per ID Dr De Souza, desiree cx sent. UA+. started empirically on vanc/zosyn. b/l LE doppler and LUE doppler ordered per ID, +LUE superficial thrombophlebitis. BL LE dopplers negative for DVT.   10/2: POD 7, ANNA overnight. Maintaining MAP >100. 500cc albumin bolus, dilaudid lowered to 2/4mg, toradol [id number] added for pain, abd xray for distension shows gas pattern. aquacel dressing changed.  10/3: POD8 ANNA overnight. Remains on levophed to maintain MAP>100. Neuro exam unchanged.    Vital Signs Last 24 Hrs  T(C): 37.7 (03 Oct 2023 01:03), Max: 37.9 (02 Oct 2023 21:55)  T(F): 99.9 (03 Oct 2023 01:03), Max: 100.2 (02 Oct 2023 21:55)  HR: 77 (03 Oct 2023 01:00) (72 - 114)  BP: --  BP(mean): --  RR: 17 (03 Oct 2023 01:00) (12 - 20)  SpO2: 100% (03 Oct 2023 01:00) (97% - 100%)    Parameters below as of 03 Oct 2023 01:00  Patient On (Oxygen Delivery Method): room air        I&O's Summary    01 Oct 2023 07:01  -  02 Oct 2023 07:00  --------------------------------------------------------  IN: 2218 mL / OUT: 3340 mL / NET: -1122 mL    02 Oct 2023 07:01  -  03 Oct 2023 01:55  --------------------------------------------------------  IN: 2435 mL / OUT: 3357.5 mL / NET: -922.5 mL      PHYSICAL EXAM:  General: Pt is sitting up comfortably in bed, in NAD, on RA  HEENT: CN II-XII grossly intact, PERRL 3mm, EOMI B/L, face symmetric  Cardiovascular: RRR, normal S1 and S2   Respiratory: non-labored breathing, symmetric chest rise   GI: abd soft, NTND   Neuro: A&O x 3, no aphasia, speech clear  Strength 5/5 BL UE   Strength 0/5 BL LE, no withdrawal to noxious stimuli  Sensation intact to light touch throughout BL UE, T10 and below sensory defiicit   Vascular: Distal pulses 2+ x4, no calf edema or erythema  Wounds: Posterior spine wound C/D/I, dressing in place    TUBES/LINES:  [] CVC  [X] A-line  [] Lumbar Drain  [] Ventriculostomy  [] Other    DIET:  [] NPO  [X] Mechanical  [] Tube feeds    LABS:                        8.3    6.86  )-----------( 210      ( 02 Oct 2023 04:03 )             25.0     10-02    137  |  102  |  7   ----------------------------<  125<H>  3.9   |  26  |  0.43<L>    Ca    8.4      02 Oct 2023 04:03  Phos  3.4     10-02  Mg     1.8     10-02    TPro  6.0  /  Alb  2.9<L>  /  TBili  0.2  /  DBili  x   /  AST  29  /  ALT  27  /  AlkPhos  59  10-02      Urinalysis Basic - ( 02 Oct 2023 04:03 )    Color: x / Appearance: x / SG: x / pH: x  Gluc: 125 mg/dL / Ketone: x  / Bili: x / Urobili: x   Blood: x / Protein: x / Nitrite: x   Leuk Esterase: x / RBC: x / WBC x   Sq Epi: x / Non Sq Epi: x / Bacteria: x      CARDIAC MARKERS ( 02 Oct 2023 04:03 )  x     / x     / 314 U/L / x     / x      CARDIAC MARKERS ( 01 Oct 2023 13:24 )  x     / x     / 449 U/L / x     / x          CAPILLARY BLOOD GLUCOSE          Drug Levels: [] N/A    CSF Analysis: [] N/A      Allergies    No Known Allergies    Intolerances      MEDICATIONS:  Antibiotics:  piperacillin/tazobactam IVPB.. 4.5 Gram(s) IV Intermittent every 8 hours  vancomycin  IVPB 1000 milliGRAM(s) IV Intermittent every 12 hours  vancomycin  IVPB        Neuro:  acetaminophen     Tablet .. 1000 milliGRAM(s) Oral every 8 hours PRN  cyclobenzaprine 10 milliGRAM(s) Oral three times a day  HYDROmorphone   Tablet 2 milliGRAM(s) Oral every 4 hours PRN  HYDROmorphone   Tablet 4 milliGRAM(s) Oral every 4 hours PRN  ketorolac   Injectable 30 milliGRAM(s) IV Push every 8 hours  melatonin 5 milliGRAM(s) Oral at bedtime PRN    Anticoagulation:  enoxaparin Injectable 40 milliGRAM(s) SubCutaneous every 24 hours    OTHER:  bisacodyl 5 milliGRAM(s) Oral every 12 hours  bisacodyl Suppository 10 milliGRAM(s) Rectal daily  chlorhexidine 2% Cloths 1 Application(s) Topical <User Schedule>  influenza   Vaccine 0.5 milliLiter(s) IntraMuscular once  midodrine 15 milliGRAM(s) Oral every 8 hours  norepinephrine Infusion 0.05 MICROgram(s)/kG/Min IV Continuous <Continuous>  polyethylene glycol 3350 17 Gram(s) Oral two times a day  senna 2 Tablet(s) Oral at bedtime  simethicone 80 milliGRAM(s) Chew every 6 hours PRN    IVF:  sodium chloride 3 Gram(s) Oral every 6 hours  sodium chloride 0.9%. 1000 milliLiter(s) IV Continuous <Continuous>    CULTURES:  Culture Results:   No growth at 1 day. (10-01 @ 11:25)  Culture Results:   No growth at 1 day. (10-01 @ 11:25)    RADIOLOGY & ADDITIONAL TESTS:      ASSESSMENT:  43F no PMHx suffered childhood injury resulting in spine injury and residual gait disturbance, hyperreflexia of BL LE. She has right greater than left leg tingling, low back and mid back pain. 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 S/p C7-L1 fusion, T3-8 vertebral column resection, placement of anterior cage (9/25/23).     M40.205    S31.000A    S31.000A M40.205 S22.872TE74.009G    S31.000A M40.205 S22.009S22.009G    No pertinent family history in first degree relatives    Handoff    No pertinent past medical history    History of acute illness    Deformity of thoracic vertebra    Deformity of thoracic vertebra    Corpectomy, spine, lumbar, 4 levels    No significant past surgical history    Room Service Assist    [email or web address]    PLAN:  NEURO:  - Neuro/spine checks q4/vitals q1hrs  - Pain control: tylenol 1g q8h, flexeril 10q8, dilaudid 2/4 PO, (lyrica held due to maintain SBP), toradol 30mg q8hr x2days  - Post-op CT full spine: L1 screw not attached to the vertical yoli, epidural lipomatosis L2-3  - DANIELE x1 superficial - monitor outputs    Cardio:  - MAP>100, on levo gtt  - midodrine 15mg q8h to wean off pressors  - echo 9/27: EF 60-65%    Pulm:  - room air  - incentive spirometry    GI:  - Regular diet   - Bowel regimen   - BM 10/2    Renal:  - NS @ 30cc/hr  - salt tabs 3q6  - voiding    Endo:  - a1c 5.6  - ISS     Heme:  - SCDs for DVT ppx/ SQL   - 1.5L EBL, 1.5L albumin and cell saver intraop  - 2u PRBC and 1u PLT postop 9/25  - b/l LE dopplers 10/1 negative  - LUE doppler 10/1: +cephalic thrombophlebitis    ID:  - febrile 9/27 f/u panculture  - Vanc (10/1- )/Zosyn (10/1- )    - ID following    Discussed with Dr. Vieira and Dr. Villeda

## 2023-10-03 NOTE — PROGRESS NOTE ADULT - SUBJECTIVE AND OBJECTIVE BOX
INTERVAL HISTORY: HPI:  Taken from outpatient neurosurgery note on 9/13/2023 " The patient, a long-standing patient of my practice, reports a history of spinal issues that dates back to a childhood injury. Over the years she's experienced developing issues with walking in a straight line, overactive reflexes in her lower extremities, and unique difficulty with her lower extremities referred to as cloneness. Recent upturn in these symptoms has limited her ability to carry out her daily routines. The patient also reports no significant improvement in her condition since our last clinical encounter two years ago."    44 y/o female with no PMHx or PSHx presents for surgery today.  She reports ambulating with walker or baby stroller at home for years.  She reports intermittent falls.  She has right greater than left leg tingling, low back and mid back pain.  She denies arm or leg pain.  She reports urinary incontinence since having a baby 2 years ago where if she doesn't get to the bathroom fast enough, she has incontinence, but she is able to hold it for a short while.  She takes no pain medications.  She has taken ibuprofen in the past.  She denies saddle anesthesia, bowel incontinence.   (25 Sep 2023 06:51)    Drug Dosing Weight  Height (cm): 152.4 (25 Sep 2023 07:32)  Weight (kg): 68.6 (25 Sep 2023 07:32)  BMI (kg/m2): 29.5 (25 Sep 2023 07:32)  BSA (m2): 1.66 (25 Sep 2023 07:32)    PAST MEDICAL & SURGICAL HISTORY:  History of acute illness  childhood  No significant past surgical history      REVIEW OF SYSTEMS: [ ] Unable to Assess due to neurologic exam   [ ] All ROS addressed below are non-contributory, except:  Neuro: [ ] Headache [ ] Back pain [ ] Numbness [ ] Weakness [ ] Ataxia [ ] Dizziness [ ] Aphasia [ ] Dysarthria [ ] Visual disturbance  Resp: [ ] Shortness of breath/dyspnea, [ ] Orthopnea [ ] Cough  CV: [ ] Chest pain [ ] Palpitation [ ] Lightheadedness [ ] Syncope  Renal: [ ] Thirst [ ] Edema  GI: [ ] Nausea [ ] Emesis [ ] Abdominal pain [ ] Constipation [ ] Diarrhea  Hem: [ ] Hematemesis [ ] bright red blood per rectum  ID: [ ] Fever [ ] Chills [ ] Dysuria  ENT: [ ] Rhinorrhea    PHYSICAL EXAM:  PHYSICAL EXAM:    Constitutional: No Acute Distress     Neurological: AOx3, Following Commands, Moving all Extremities     Motor exam:          Upper extremity                         Delt     Bicep     Tricep    HG                                                 R         5/5        5/5        5/5       5/5                                               L          5/5        5/5        5/5       5/5          Lower extremity              R & L : triple flexion              Sensation: [] intact to light touch  [x] decreased: T5 & below sensation L approx. 65-70% R 50% per pt   Pulmonary: Clear to Auscultation, No rales, No rhonchi, No wheezes   Cardiovascular: S1, S2, Regular rate and rhythm   Gastrointestinal: Soft, Non-tender, distended hyperactive BS  Extremities: No calf tenderness   Incision: CDI; drain in place     ICU Vital Signs Last 24 Hrs  T(C): 37.5 (02 Oct 2023 14:22), Max: 37.9 (01 Oct 2023 22:08)  T(F): 99.5 (02 Oct 2023 14:22), Max: 100.2 (01 Oct 2023 22:08)  HR: 100 (02 Oct 2023 17:00) (72 - 114)  ABP: 151/81 (02 Oct 2023 17:00) (113/63 - 171/73)  ABP(mean): 104 (02 Oct 2023 17:00) (84 - 121)  RR: 16 (02 Oct 2023 17:00) (12 - 20)  SpO2: 99% (02 Oct 2023 17:00) (97% - 100%)    O2 Parameters below as of 02 Oct 2023 17:00  Patient On (Oxygen Delivery Method): room air    I&O's Detail    01 Oct 2023 07:01  -  02 Oct 2023 07:00  --------------------------------------------------------  IN:    IV PiggyBack: 50 mL    IV PiggyBack: 750 mL    Norepinephrine: 268 mL    Oral Fluid: 200 mL    sodium chloride 0.9%: 650 mL    sodium chloride 0.9%: 300 mL  Total IN: 2218 mL    OUT:    Bulb (mL): 40 mL    Voided (mL): 3300 mL  Total OUT: 3340 mL    Total NET: -1122 mL      02 Oct 2023 07:01  -  02 Oct 2023 17:23  --------------------------------------------------------  IN:    Albumin 5%  - 250 mL: 500 mL    IV PiggyBack: 50 mL    IV PiggyBack: 125 mL    Norepinephrine: 124.9 mL    Oral Fluid: 500 mL    sodium chloride 0.9%: 210 mL  Total IN: 1509.9 mL    OUT:    Voided (mL): 2200 mL  Total OUT: 2200 mL    Total NET: -690.1 mL    LABS:  CBC Full  -  ( 02 Oct 2023 04:03 )  WBC Count : 6.86 K/uL  RBC Count : 2.93 M/uL  Hemoglobin : 8.3 g/dL  Hematocrit : 25.0 %  Platelet Count - Automated : 210 K/uL  Mean Cell Volume : 85.3 fl  Mean Cell Hemoglobin : 28.3 pg  Mean Cell Hemoglobin Concentration : 33.2 gm/dL  Auto Neutrophil # : 4.62 K/uL  Auto Lymphocyte # : 1.30 K/uL  Auto Monocyte # : 0.62 K/uL  Auto Eosinophil # : 0.27 K/uL  Auto Basophil # : 0.02 K/uL  Auto Neutrophil % : 67.4 %  Auto Lymphocyte % : 19.0 %  Auto Monocyte % : 9.0 %  Auto Eosinophil % : 3.9 %  Auto Basophil % : 0.3 %    10-02    137  |  102  |  7   ----------------------------<  125<H>  3.9   |  26  |  0.43<L>    Ca    8.4      02 Oct 2023 04:03  Phos  3.4     10-02  Mg     1.8     10-02    TPro  6.0  /  Alb  2.9<L>  /  TBili  0.2  /  DBili  x   /  AST  29  /  ALT  27  /  AlkPhos  59  10-02      Urinalysis Basic - ( 02 Oct 2023 04:03 )    Color: x / Appearance: x / SG: x / pH: x  Gluc: 125 mg/dL / Ketone: x  / Bili: x / Urobili: x   Blood: x / Protein: x / Nitrite: x   Leuk Esterase: x / RBC: x / WBC x   Sq Epi: x / Non Sq Epi: x / Bacteria: x      RADIOLOGY & ADDITIONAL STUDIES: reviewed

## 2023-10-03 NOTE — PROGRESS NOTE ADULT - SUBJECTIVE AND OBJECTIVE BOX
NEUROCRITICAL CARE EVENING NOTE    DAY EVENTS:    - stable pressor requirements to maintain map goal >100  - BM yesteray      VITALS/IMAGING/DATA  - Reviewed    ALLERGIES:   - Reviewed      MEDICATIONS:  - Reviewed      PHYSICAL EXAM:  Exam unchanged from Day time, refer to progress note.

## 2023-10-03 NOTE — PROGRESS NOTE ADULT - SUBJECTIVE AND OBJECTIVE BOX
INFECTIOUS DISEASES CONSULT FOLLOW-UP NOTE    INTERVAL HPI/OVERNIGHT EVENTS:  Patient is complaining of pain in her shoulders and chest that has been relieved with pain medication. She also has tingling felt in her upper abdomen. Patient admits to having a bowel movement, but she does not know if she had diarrhea. However, there was increased volume likely due to her decreased bowel movements. She denies headache, chills, shortness of breath or cough.     ROS:   Constitutional, eyes, ENT, cardiovascular, respiratory, gastrointestinal, genitourinary, integumentary, neurological, psychiatric and heme/lymph are otherwise negative other than noted above       ANTIBIOTICS/RELEVANT:    MEDICATIONS  (STANDING):  bisacodyl 5 milliGRAM(s) Oral every 12 hours  bisacodyl Suppository 10 milliGRAM(s) Rectal daily  chlorhexidine 2% Cloths 1 Application(s) Topical <User Schedule>  diazepam    Tablet 2 milliGRAM(s) Oral every 8 hours  enoxaparin Injectable 40 milliGRAM(s) SubCutaneous every 24 hours  influenza   Vaccine 0.5 milliLiter(s) IntraMuscular once  ketorolac   Injectable 30 milliGRAM(s) IV Push every 8 hours  midodrine 15 milliGRAM(s) Oral every 8 hours  norepinephrine Infusion 0.05 MICROgram(s)/kG/Min (3.22 mL/Hr) IV Continuous <Continuous>  piperacillin/tazobactam IVPB.. 4.5 Gram(s) IV Intermittent every 8 hours  polyethylene glycol 3350 17 Gram(s) Oral two times a day  pregabalin 50 milliGRAM(s) Oral every 8 hours  senna 2 Tablet(s) Oral at bedtime  simethicone 80 milliGRAM(s) Chew every 6 hours  sodium chloride 3 Gram(s) Oral every 6 hours  sodium chloride 0.9%. 1000 milliLiter(s) (30 mL/Hr) IV Continuous <Continuous>    MEDICATIONS  (PRN):  acetaminophen     Tablet .. 1000 milliGRAM(s) Oral every 8 hours PRN Mild Pain (1 - 3)  HYDROmorphone   Tablet 4 milliGRAM(s) Oral every 4 hours PRN Severe Pain (7 - 10)  HYDROmorphone   Tablet 2 milliGRAM(s) Oral every 4 hours PRN Moderate Pain (4 - 6)  melatonin 5 milliGRAM(s) Oral at bedtime PRN Insomnia  sodium chloride 0.9% lock flush 10 milliLiter(s) IV Push every 1 hour PRN Pre/post blood products, medications, blood draw, and to maintain line patency        Vital Signs Last 24 Hrs  T(C): 38.3 (03 Oct 2023 09:09), Max: 38.3 (03 Oct 2023 09:09)  T(F): 100.9 (03 Oct 2023 09:09), Max: 100.9 (03 Oct 2023 09:09)  HR: 93 (03 Oct 2023 10:00) (70 - 107)  BP: --  BP(mean): --  RR: 18 (03 Oct 2023 10:00) (12 - 20)  SpO2: 100% (03 Oct 2023 10:00) (99% - 100%)    Parameters below as of 03 Oct 2023 10:00  Patient On (Oxygen Delivery Method): room air        10-02-23 @ 07:01  -  10-03-23 @ 07:00  --------------------------------------------------------  IN: 3609.5 mL / OUT: 4557.5 mL / NET: -948 mL    10-03-23 @ 07:01  -  10-03-23 @ 11:52  --------------------------------------------------------  IN: 197.7 mL / OUT: 510 mL / NET: -312.3 mL      PHYSICAL EXAM:  Constitutional: alert, NAD  Eyes: the sclera and conjunctiva were normal.   ENT: the ears and nose were normal in appearance.   Neck: the appearance of the neck was normal and the neck was supple.   Pulmonary: no respiratory distress and lungs were clear to auscultation bilaterally. On RA  Heart: heart rate was normal and rhythm regular, normal S1 and S2  Vascular:. there was no peripheral edema  Abdomen: normal bowel sounds, soft, non-tender. less distention  : external catheter  Ext: no swelling, +cord at L cephalic vein, without fluctuance or tenderness  Neurological: no focal deficits.   Psychiatric: the affect was normal  Vasc: LUE PICC c/d/i  Skin: picture of spinal incision taken by ICU team. Spinal wound appears without swelling or erythema. DANIELE drainage is serous anguinous      LABS:                        8.5    6.60  )-----------( 276      ( 03 Oct 2023 05:05 )             25.5     10-03    138  |  103  |  7   ----------------------------<  148<H>  4.0   |  25  |  0.43<L>    Ca    9.1      03 Oct 2023 05:05  Phos  2.8     10-03  Mg     1.9     10-03    TPro  6.0  /  Alb  2.9<L>  /  TBili  0.2  /  DBili  x   /  AST  29  /  ALT  27  /  AlkPhos  59  10-02      Urinalysis Basic - ( 03 Oct 2023 05:05 )    Color: x / Appearance: x / SG: x / pH: x  Gluc: 148 mg/dL / Ketone: x  / Bili: x / Urobili: x   Blood: x / Protein: x / Nitrite: x   Leuk Esterase: x / RBC: x / WBC x   Sq Epi: x / Non Sq Epi: x / Bacteria: x        MICROBIOLOGY:      RADIOLOGY & ADDITIONAL STUDIES:  Reviewed INFECTIOUS DISEASES CONSULT FOLLOW-UP NOTE    INTERVAL HPI/OVERNIGHT EVENTS:  Patient is complaining of MSK pain in her shoulders and chest that has been relieved with pain medication. She also has tingling felt in her upper abdomen. Patient admits to having a bowel movement, but she does not know if she had diarrhea. She denies headache, chills, shortness of breath or cough.     ROS:   Constitutional, eyes, ENT, cardiovascular, respiratory, gastrointestinal, genitourinary, integumentary, neurological, psychiatric and heme/lymph are otherwise negative other than noted above       ANTIBIOTICS/RELEVANT:    MEDICATIONS  (STANDING):  bisacodyl 5 milliGRAM(s) Oral every 12 hours  bisacodyl Suppository 10 milliGRAM(s) Rectal daily  chlorhexidine 2% Cloths 1 Application(s) Topical <User Schedule>  diazepam    Tablet 2 milliGRAM(s) Oral every 8 hours  enoxaparin Injectable 40 milliGRAM(s) SubCutaneous every 24 hours  influenza   Vaccine 0.5 milliLiter(s) IntraMuscular once  ketorolac   Injectable 30 milliGRAM(s) IV Push every 8 hours  midodrine 15 milliGRAM(s) Oral every 8 hours  norepinephrine Infusion 0.05 MICROgram(s)/kG/Min (3.22 mL/Hr) IV Continuous <Continuous>  piperacillin/tazobactam IVPB.. 4.5 Gram(s) IV Intermittent every 8 hours  polyethylene glycol 3350 17 Gram(s) Oral two times a day  pregabalin 50 milliGRAM(s) Oral every 8 hours  senna 2 Tablet(s) Oral at bedtime  simethicone 80 milliGRAM(s) Chew every 6 hours  sodium chloride 3 Gram(s) Oral every 6 hours  sodium chloride 0.9%. 1000 milliLiter(s) (30 mL/Hr) IV Continuous <Continuous>    MEDICATIONS  (PRN):  acetaminophen     Tablet .. 1000 milliGRAM(s) Oral every 8 hours PRN Mild Pain (1 - 3)  HYDROmorphone   Tablet 4 milliGRAM(s) Oral every 4 hours PRN Severe Pain (7 - 10)  HYDROmorphone   Tablet 2 milliGRAM(s) Oral every 4 hours PRN Moderate Pain (4 - 6)  melatonin 5 milliGRAM(s) Oral at bedtime PRN Insomnia  sodium chloride 0.9% lock flush 10 milliLiter(s) IV Push every 1 hour PRN Pre/post blood products, medications, blood draw, and to maintain line patency        Vital Signs Last 24 Hrs  T(C): 38.3 (03 Oct 2023 09:09), Max: 38.3 (03 Oct 2023 09:09)  T(F): 100.9 (03 Oct 2023 09:09), Max: 100.9 (03 Oct 2023 09:09)  HR: 93 (03 Oct 2023 10:00) (70 - 107)  BP: --  BP(mean): --  RR: 18 (03 Oct 2023 10:00) (12 - 20)  SpO2: 100% (03 Oct 2023 10:00) (99% - 100%)    Parameters below as of 03 Oct 2023 10:00  Patient On (Oxygen Delivery Method): room air        10-02-23 @ 07:01  -  10-03-23 @ 07:00  --------------------------------------------------------  IN: 3609.5 mL / OUT: 4557.5 mL / NET: -948 mL    10-03-23 @ 07:01  -  10-03-23 @ 11:52  --------------------------------------------------------  IN: 197.7 mL / OUT: 510 mL / NET: -312.3 mL      PHYSICAL EXAM:  Constitutional: alert, NAD  Eyes: the sclera and conjunctiva were normal.   ENT: the ears and nose were normal in appearance.   Neck: the appearance of the neck was normal and the neck was supple.   Pulmonary: no respiratory distress and lungs were clear to auscultation bilaterally. On RA  Heart: heart rate was normal and rhythm regular, normal S1 and S2  Vascular:. there was no peripheral edema  Abdomen: normal bowel sounds, soft, non-tender. less distention  : external catheter  Ext: no swelling, +cord at L cephalic vein, without fluctuance or tenderness  Neurological: no focal deficits.   Psychiatric: the affect was normal  Vasc: LUE PICC c/d/i  Skin: reviewed photo of spinal incision taken by ICU team. Spinal wound appears well healing, without swelling, erythema or drainage. DANIELE drainage is serosanguinous      LABS:                        8.5    6.60  )-----------( 276      ( 03 Oct 2023 05:05 )             25.5     10-03    138  |  103  |  7   ----------------------------<  148<H>  4.0   |  25  |  0.43<L>    Ca    9.1      03 Oct 2023 05:05  Phos  2.8     10-03  Mg     1.9     10-03    TPro  6.0  /  Alb  2.9<L>  /  TBili  0.2  /  DBili  x   /  AST  29  /  ALT  27  /  AlkPhos  59  10-02      Urinalysis Basic - ( 03 Oct 2023 05:05 )    Color: x / Appearance: x / SG: x / pH: x  Gluc: 148 mg/dL / Ketone: x  / Bili: x / Urobili: x   Blood: x / Protein: x / Nitrite: x   Leuk Esterase: x / RBC: x / WBC x   Sq Epi: x / Non Sq Epi: x / Bacteria: x        MICROBIOLOGY:  Reviewed    RADIOLOGY & ADDITIONAL STUDIES:  Reviewed

## 2023-10-04 LAB
ANION GAP SERPL CALC-SCNC: 10 MMOL/L — SIGNIFICANT CHANGE UP (ref 5–17)
BUN SERPL-MCNC: 10 MG/DL — SIGNIFICANT CHANGE UP (ref 7–23)
CALCIUM SERPL-MCNC: 9.1 MG/DL — SIGNIFICANT CHANGE UP (ref 8.4–10.5)
CHLORIDE SERPL-SCNC: 104 MMOL/L — SIGNIFICANT CHANGE UP (ref 96–108)
CO2 SERPL-SCNC: 24 MMOL/L — SIGNIFICANT CHANGE UP (ref 22–31)
CREAT SERPL-MCNC: 0.45 MG/DL — LOW (ref 0.5–1.3)
EGFR: 124 ML/MIN/1.73M2 — SIGNIFICANT CHANGE UP
GLUCOSE SERPL-MCNC: 148 MG/DL — HIGH (ref 70–99)
HCT VFR BLD CALC: 30.3 % — LOW (ref 34.5–45)
HGB BLD-MCNC: 10.1 G/DL — LOW (ref 11.5–15.5)
MAGNESIUM SERPL-MCNC: 1.7 MG/DL — SIGNIFICANT CHANGE UP (ref 1.6–2.6)
MCHC RBC-ENTMCNC: 28.1 PG — SIGNIFICANT CHANGE UP (ref 27–34)
MCHC RBC-ENTMCNC: 33.3 GM/DL — SIGNIFICANT CHANGE UP (ref 32–36)
MCV RBC AUTO: 84.4 FL — SIGNIFICANT CHANGE UP (ref 80–100)
NRBC # BLD: 0 /100 WBCS — SIGNIFICANT CHANGE UP (ref 0–0)
PHOSPHATE SERPL-MCNC: 3.6 MG/DL — SIGNIFICANT CHANGE UP (ref 2.5–4.5)
PLATELET # BLD AUTO: 338 K/UL — SIGNIFICANT CHANGE UP (ref 150–400)
POTASSIUM SERPL-MCNC: 4 MMOL/L — SIGNIFICANT CHANGE UP (ref 3.5–5.3)
POTASSIUM SERPL-SCNC: 4 MMOL/L — SIGNIFICANT CHANGE UP (ref 3.5–5.3)
RBC # BLD: 3.59 M/UL — LOW (ref 3.8–5.2)
RBC # FLD: 14.5 % — SIGNIFICANT CHANGE UP (ref 10.3–14.5)
SODIUM SERPL-SCNC: 138 MMOL/L — SIGNIFICANT CHANGE UP (ref 135–145)
TROPONIN T, HIGH SENSITIVITY RESULT: 9 NG/L — SIGNIFICANT CHANGE UP (ref 0–51)
WBC # BLD: 7.82 K/UL — SIGNIFICANT CHANGE UP (ref 3.8–10.5)
WBC # FLD AUTO: 7.82 K/UL — SIGNIFICANT CHANGE UP (ref 3.8–10.5)

## 2023-10-04 PROCEDURE — 99024 POSTOP FOLLOW-UP VISIT: CPT

## 2023-10-04 PROCEDURE — 99291 CRITICAL CARE FIRST HOUR: CPT

## 2023-10-04 PROCEDURE — 99232 SBSQ HOSP IP/OBS MODERATE 35: CPT

## 2023-10-04 RX ORDER — SODIUM CHLORIDE 9 MG/ML
1000 INJECTION INTRAMUSCULAR; INTRAVENOUS; SUBCUTANEOUS ONCE
Refills: 0 | Status: COMPLETED | OUTPATIENT
Start: 2023-10-04 | End: 2023-10-04

## 2023-10-04 RX ORDER — MAGNESIUM SULFATE 500 MG/ML
2 VIAL (ML) INJECTION ONCE
Refills: 0 | Status: COMPLETED | OUTPATIENT
Start: 2023-10-04 | End: 2023-10-04

## 2023-10-04 RX ADMIN — SIMETHICONE 80 MILLIGRAM(S): 80 TABLET, CHEWABLE ORAL at 18:30

## 2023-10-04 RX ADMIN — HYDROMORPHONE HYDROCHLORIDE 4 MILLIGRAM(S): 2 INJECTION INTRAMUSCULAR; INTRAVENOUS; SUBCUTANEOUS at 06:00

## 2023-10-04 RX ADMIN — Medication 50 MILLIGRAM(S): at 18:30

## 2023-10-04 RX ADMIN — CHLORHEXIDINE GLUCONATE 1 APPLICATION(S): 213 SOLUTION TOPICAL at 05:49

## 2023-10-04 RX ADMIN — HYDROMORPHONE HYDROCHLORIDE 4 MILLIGRAM(S): 2 INJECTION INTRAMUSCULAR; INTRAVENOUS; SUBCUTANEOUS at 12:15

## 2023-10-04 RX ADMIN — Medication 1000 MILLIGRAM(S): at 18:45

## 2023-10-04 RX ADMIN — Medication 3.22 MICROGRAM(S)/KG/MIN: at 18:40

## 2023-10-04 RX ADMIN — PIPERACILLIN AND TAZOBACTAM 25 GRAM(S): 4; .5 INJECTION, POWDER, LYOPHILIZED, FOR SOLUTION INTRAVENOUS at 14:37

## 2023-10-04 RX ADMIN — Medication 25 GRAM(S): at 07:31

## 2023-10-04 RX ADMIN — MIDODRINE HYDROCHLORIDE 15 MILLIGRAM(S): 2.5 TABLET ORAL at 05:49

## 2023-10-04 RX ADMIN — PIPERACILLIN AND TAZOBACTAM 25 GRAM(S): 4; .5 INJECTION, POWDER, LYOPHILIZED, FOR SOLUTION INTRAVENOUS at 21:44

## 2023-10-04 RX ADMIN — Medication 30 MILLIGRAM(S): at 01:01

## 2023-10-04 RX ADMIN — Medication 30 MILLIGRAM(S): at 09:15

## 2023-10-04 RX ADMIN — SODIUM CHLORIDE 1000 MILLILITER(S): 9 INJECTION INTRAMUSCULAR; INTRAVENOUS; SUBCUTANEOUS at 20:20

## 2023-10-04 RX ADMIN — SIMETHICONE 80 MILLIGRAM(S): 80 TABLET, CHEWABLE ORAL at 05:48

## 2023-10-04 RX ADMIN — ENOXAPARIN SODIUM 40 MILLIGRAM(S): 100 INJECTION SUBCUTANEOUS at 21:44

## 2023-10-04 RX ADMIN — Medication 5 MILLIGRAM(S): at 18:30

## 2023-10-04 RX ADMIN — SIMETHICONE 80 MILLIGRAM(S): 80 TABLET, CHEWABLE ORAL at 11:40

## 2023-10-04 RX ADMIN — HYDROMORPHONE HYDROCHLORIDE 2 MILLIGRAM(S): 2 INJECTION INTRAMUSCULAR; INTRAVENOUS; SUBCUTANEOUS at 15:15

## 2023-10-04 RX ADMIN — MIDODRINE HYDROCHLORIDE 15 MILLIGRAM(S): 2.5 TABLET ORAL at 14:38

## 2023-10-04 RX ADMIN — PIPERACILLIN AND TAZOBACTAM 25 GRAM(S): 4; .5 INJECTION, POWDER, LYOPHILIZED, FOR SOLUTION INTRAVENOUS at 05:49

## 2023-10-04 RX ADMIN — HYDROMORPHONE HYDROCHLORIDE 4 MILLIGRAM(S): 2 INJECTION INTRAMUSCULAR; INTRAVENOUS; SUBCUTANEOUS at 11:39

## 2023-10-04 RX ADMIN — Medication 1000 MILLIGRAM(S): at 05:48

## 2023-10-04 RX ADMIN — Medication 1000 MILLIGRAM(S): at 19:15

## 2023-10-04 RX ADMIN — HYDROMORPHONE HYDROCHLORIDE 4 MILLIGRAM(S): 2 INJECTION INTRAMUSCULAR; INTRAVENOUS; SUBCUTANEOUS at 05:48

## 2023-10-04 RX ADMIN — SENNA PLUS 2 TABLET(S): 8.6 TABLET ORAL at 21:44

## 2023-10-04 RX ADMIN — MIDODRINE HYDROCHLORIDE 15 MILLIGRAM(S): 2.5 TABLET ORAL at 21:44

## 2023-10-04 RX ADMIN — Medication 1000 MILLIGRAM(S): at 06:00

## 2023-10-04 RX ADMIN — Medication 2 MILLIGRAM(S): at 18:30

## 2023-10-04 RX ADMIN — Medication 50 MILLIGRAM(S): at 01:01

## 2023-10-04 RX ADMIN — Medication 2 MILLIGRAM(S): at 08:45

## 2023-10-04 RX ADMIN — Medication 30 MILLIGRAM(S): at 02:24

## 2023-10-04 RX ADMIN — HYDROMORPHONE HYDROCHLORIDE 4 MILLIGRAM(S): 2 INJECTION INTRAMUSCULAR; INTRAVENOUS; SUBCUTANEOUS at 20:17

## 2023-10-04 RX ADMIN — HYDROMORPHONE HYDROCHLORIDE 2 MILLIGRAM(S): 2 INJECTION INTRAMUSCULAR; INTRAVENOUS; SUBCUTANEOUS at 14:38

## 2023-10-04 RX ADMIN — Medication 50 MILLIGRAM(S): at 08:45

## 2023-10-04 RX ADMIN — Medication 5 MILLIGRAM(S): at 21:44

## 2023-10-04 RX ADMIN — HYDROMORPHONE HYDROCHLORIDE 4 MILLIGRAM(S): 2 INJECTION INTRAMUSCULAR; INTRAVENOUS; SUBCUTANEOUS at 19:30

## 2023-10-04 RX ADMIN — Medication 30 MILLIGRAM(S): at 08:45

## 2023-10-04 RX ADMIN — Medication 2 MILLIGRAM(S): at 01:01

## 2023-10-04 RX ADMIN — SODIUM CHLORIDE 2 GRAM(S): 9 INJECTION INTRAMUSCULAR; INTRAVENOUS; SUBCUTANEOUS at 01:02

## 2023-10-04 RX ADMIN — Medication 10 MILLIGRAM(S): at 05:49

## 2023-10-04 RX ADMIN — POLYETHYLENE GLYCOL 3350 17 GRAM(S): 17 POWDER, FOR SOLUTION ORAL at 05:49

## 2023-10-04 NOTE — PROGRESS NOTE ADULT - SUBJECTIVE AND OBJECTIVE BOX
INTERVAL HISTORY: HPI:  Taken from outpatient neurosurgery note on 9/13/2023 " The patient, a long-standing patient of my practice, reports a history of spinal issues that dates back to a childhood injury. Over the years she's experienced developing issues with walking in a straight line, overactive reflexes in her lower extremities, and unique difficulty with her lower extremities referred to as cloneness. Recent upturn in these symptoms has limited her ability to carry out her daily routines. The patient also reports no significant improvement in her condition since our last clinical encounter two years ago."    44 y/o female with no PMHx or PSHx presents for surgery today.  She reports ambulating with walker or baby stroller at home for years.  She reports intermittent falls.  She has right greater than left leg tingling, low back and mid back pain.  She denies arm or leg pain.  She reports urinary incontinence since having a baby 2 years ago where if she doesn't get to the bathroom fast enough, she has incontinence, but she is able to hold it for a short while.  She takes no pain medications.  She has taken ibuprofen in the past.  She denies saddle anesthesia, bowel incontinence.   (25 Sep 2023 06:51)    Drug Dosing Weight  Height (cm): 152.4 (25 Sep 2023 07:32)  Weight (kg): 68.6 (25 Sep 2023 07:32)  BMI (kg/m2): 29.5 (25 Sep 2023 07:32)  BSA (m2): 1.66 (25 Sep 2023 07:32)    PAST MEDICAL & SURGICAL HISTORY:  History of acute illness  childhood  No significant past surgical history      REVIEW OF SYSTEMS: [ ] Unable to Assess due to neurologic exam   [ ] All ROS addressed below are non-contributory, except:  Neuro: [ ] Headache [ ] Back pain [ ] Numbness [ ] Weakness [ ] Ataxia [ ] Dizziness [ ] Aphasia [ ] Dysarthria [ ] Visual disturbance  Resp: [ ] Shortness of breath/dyspnea, [ ] Orthopnea [ ] Cough  CV: [ ] Chest pain [ ] Palpitation [ ] Lightheadedness [ ] Syncope  Renal: [ ] Thirst [ ] Edema  GI: [ ] Nausea [ ] Emesis [ ] Abdominal pain [ ] Constipation [ ] Diarrhea  Hem: [ ] Hematemesis [ ] bright red blood per rectum  ID: [ ] Fever [ ] Chills [ ] Dysuria  ENT: [ ] Rhinorrhea    PHYSICAL EXAM:  PHYSICAL EXAM:    Constitutional: No Acute Distress     Neurological: AOx3, Following Commands, Moving all Extremities     Motor exam:          Upper extremity                         Delt     Bicep     Tricep    HG                                                 R         5/5        5/5        5/5       5/5                                               L          5/5        5/5        5/5       5/5          Lower extremity              R & L : triple flexion              Sensation: [] intact to light touch  [x] decreased: T5 & below sensation L approx. 65-70% R 50% per pt   Pulmonary: Clear to Auscultation, No rales, No rhonchi, No wheezes   Cardiovascular: S1, S2, Regular rate and rhythm   Gastrointestinal: Soft, Non-tender, distended hyperactive BS  Extremities: No calf tenderness   Incision: CDI; drain in place             ICU Vital Signs Last 24 Hrs  T(C): 37.7 (04 Oct 2023 09:17), Max: 37.8 (03 Oct 2023 21:00)  T(F): 99.8 (04 Oct 2023 09:17), Max: 100 (03 Oct 2023 21:00)  HR: 96 (04 Oct 2023 09:17) (76 - 122)  BP: --  BP(mean): --  ABP: 131/74 (04 Oct 2023 09:17) (109/67 - 181/90)  ABP(mean): 99 (04 Oct 2023 09:17) (83 - 130)  RR: 22 (04 Oct 2023 09:17) (13 - 32)  SpO2: 99% (04 Oct 2023 09:17) (96% - 100%)      10-03-23 @ 07:01  -  10-04-23 @ 07:00  --------------------------------------------------------  IN: 3215.3 mL / OUT: 3535 mL / NET: -319.7 mL    10-04-23 @ 07:01  -  10-04-23 @ 10:15  --------------------------------------------------------  IN: 87.7 mL / OUT: 300 mL / NET: -212.3 mL            acetaminophen     Tablet .. 1000 milliGRAM(s) Oral every 8 hours PRN  bisacodyl 5 milliGRAM(s) Oral every 12 hours  bisacodyl Suppository 10 milliGRAM(s) Rectal daily  chlorhexidine 2% Cloths 1 Application(s) Topical <User Schedule>  diazepam    Tablet 2 milliGRAM(s) Oral every 8 hours  enoxaparin Injectable 40 milliGRAM(s) SubCutaneous every 24 hours  HYDROmorphone   Tablet 2 milliGRAM(s) Oral every 4 hours PRN  HYDROmorphone   Tablet 4 milliGRAM(s) Oral every 4 hours PRN  influenza   Vaccine 0.5 milliLiter(s) IntraMuscular once  melatonin 5 milliGRAM(s) Oral at bedtime PRN  midodrine 15 milliGRAM(s) Oral every 8 hours  norepinephrine Infusion 0.05 MICROgram(s)/kG/Min (3.22 mL/Hr) IV Continuous <Continuous>  piperacillin/tazobactam IVPB.. 4.5 Gram(s) IV Intermittent every 8 hours  polyethylene glycol 3350 17 Gram(s) Oral two times a day  pregabalin 50 milliGRAM(s) Oral every 8 hours  senna 2 Tablet(s) Oral at bedtime  simethicone 80 milliGRAM(s) Chew every 6 hours  sodium chloride 2 Gram(s) Oral every 8 hours  sodium chloride 0.9% lock flush 10 milliLiter(s) IV Push every 1 hour PRN  sodium chloride 0.9%. 1000 milliLiter(s) (30 mL/Hr) IV Continuous <Continuous>      LABS:  Na: 138 (10-04 @ 02:18), 138 (10-03 @ 05:05), 137 (10-02 @ 04:03)  K: 4.0 (10-04 @ 02:18), 4.0 (10-03 @ 05:05), 3.9 (10-02 @ 04:03)  Cl: 104 (10-04 @ 02:18), 103 (10-03 @ 05:05), 102 (10-02 @ 04:03)  CO2: 24 (10-04 @ 02:18), 25 (10-03 @ 05:05), 26 (10-02 @ 04:03)  BUN: 10 (10-04 @ 02:18), 7 (10-03 @ 05:05), 7 (10-02 @ 04:03)  Cr: 0.45 (10-04 @ 02:18), 0.43 (10-03 @ 05:05), 0.43 (10-02 @ 04:03)  Glu: 148(10-04 @ 02:18), 148(10-03 @ 05:05), 125(10-02 @ 04:03)    Hgb: 10.1 (10-04 @ 02:18), 10.0 (10-03 @ 16:33), 8.5 (10-03 @ 05:05), 8.3 (10-02 @ 04:03), 8.7 (10-01 @ 13:24)  Hct: 30.3 (10-04 @ 02:18), 30.1 (10-03 @ 16:33), 25.5 (10-03 @ 05:05), 25.0 (10-02 @ 04:03), 26.3 (10-01 @ 13:24)  WBC: 7.82 (10-04 @ 02:18), 6.81 (10-03 @ 16:33), 6.60 (10-03 @ 05:05), 6.86 (10-02 @ 04:03), 8.20 (10-01 @ 13:24)  Plt: 338 (10-04 @ 02:18), 309 (10-03 @ 16:33), 276 (10-03 @ 05:05), 210 (10-02 @ 04:03), 199 (10-01 @ 13:24)

## 2023-10-04 NOTE — PROGRESS NOTE ADULT - SUBJECTIVE AND OBJECTIVE BOX
HPI:  Taken from outpatient neurosurgery note on 9/13/2023 " The patient, a long-standing patient of my practice, reports a history of spinal issues that dates back to a childhood injury. Over the years she's experienced developing issues with walking in a straight line, overactive reflexes in her lower extremities, and unique difficulty with her lower extremities referred to as cloneness. Recent upturn in these symptoms has limited her ability to carry out her daily routines. The patient also reports no significant improvement in her condition since our last clinical encounter two years ago."    44 y/o female with no PMHx or PSHx presents for surgery today.  She reports ambulating with walker or baby stroller at home for years.  She reports intermittent falls.  She has right greater than left leg tingling, low back and mid back pain.  She denies arm or leg pain.  She reports urinary incontinence since having a baby 2 years ago where if she doesn't get to the bathroom fast enough, she has incontinence, but she is able to hold it for a short while.  She takes no pain medications.  She has taken ibuprofen in the past.  She denies saddle anesthesia, bowel incontinence.   (25 Sep 2023 06:51)    INTERVAL EVENTS:  abd distended, started simethicone, suppository this AM    HOSPITAL COURSE:  9/25: POD 0 s/p C7-L1 fusion, T3-8 vertebral column resection, placement of anterior cage.   9/26; POD1 H/H 6.8 postop and PLT 85. Given 2u PRBC and 1u PLT. Switched propofol to precedex gtt. Pt extubated to face mask. Pt noted to only be withdrawing to noxious stimuli on bilateral lower extremities with sensation decreased RLE per patient. Dr. Vieira notified and plan is to keep MAP>100 and obtain CT full spine in the morning. Phenylephrine started to maintain MAP>100. Advanced diet to regular. Passed TOV. Increased need for aurora, UO high.   9/27: POD2 C7-L1 fusion T3-8 vertebral column resection. increased pressor requirements o/n, started on levo additionally. lactate 1.5, BNP 1400 from 800, CK elevated. echo: EF 60-65%. given 250cc bolus albumin, started on midodrine 10q8 to wean off pressors. PICC placed by Alli.   9/28: POD3 s/p C7-L1 fusion, T3-8 vertebral column resection, placement of anterior cage. R radial A-line removed and replaced on the L, trops and EKG done for chest discomfort, WNL, resolved with treatment of overall pain, continues to be febrile, lyrica dc'd to help.   Lyrica restarted. Weaning phenylephrine. Serum Na uptrending, 3% saline discontinued. Pt febrile with uptrending WBC, ID consulted recommend monitoring for now off antibiotics.  9/29: POD4. started on 3% o/n for Na 132. remains on aurora and levo for MAP>100. Tachycardic, given 1L NS. Right axillary a-line placed. 3% dc'd. HMV dc'd. Aquacell dressing replaced. Passed TOV.   9/30: POD 5.  Remains on levo gtt. Increased midodrine to 15q8. Given enema. Had BM.  10/1: POD 6. MAP goal increased back to >100 due to worsened sensation loss, on levo gtt. afebrile however per ID Dr De Souza, desiree cx sent. UA+. started empirically on vanc/zosyn. b/l LE doppler and LUE doppler ordered per ID, +LUE superficial thrombophlebitis. BL LE dopplers negative for DVT.   10/2: POD 7, ANNA overnight. Maintaining MAP >100. 500cc albumin bolus, dilaudid lowered to 2/4mg, toradol [id number] added for pain, abd xray for distension shows gas pattern. aquacel dressing changed.  10/3: POD8 ANNA overnight. Remains on levophed to maintain MAP>100 in AM. Neuro exam unchanged. Tmax 100.9F in AM, given tylenol. Flexeril changed to valium 2q8 and lyrica 50q8 started per pain managment.  DC'd vanc, continue zosyn x 7 days per ID. 1u PRBC to maintain hbg >9.0. DANIELE drain removed. Salt tabs weaned to 2q8. MAP goal changed to >85 to help wean off levo gtt.   10/4: POD 9. MAP goal >100.      Vital Signs Last 24 Hrs  T(C): 37.8 (03 Oct 2023 21:00), Max: 38.3 (03 Oct 2023 09:09)  T(F): 100 (03 Oct 2023 21:00), Max: 100.9 (03 Oct 2023 09:09)  HR: 84 (04 Oct 2023 00:00) (70 - 100)  BP: --  BP(mean): --  RR: 20 (04 Oct 2023 00:00) (16 - 32)  SpO2: 100% (04 Oct 2023 00:00) (100% - 100%)    Parameters below as of 04 Oct 2023 00:00  Patient On (Oxygen Delivery Method): room air        I&O's Summary    02 Oct 2023 07:01  -  03 Oct 2023 07:00  --------------------------------------------------------  IN: 3609.5 mL / OUT: 4557.5 mL / NET: -948 mL    03 Oct 2023 07:01  -  04 Oct 2023 00:27  --------------------------------------------------------  IN: 2676.6 mL / OUT: 2810 mL / NET: -133.4 mL      PHYSICAL EXAM:  General: Pt is sitting up comfortably in bed, in NAD, on RA  HEENT: CN II-XII grossly intact, PERRL 3mm, EOMI B/L, face symmetric  Cardiovascular: RRR, normal S1 and S2   Respiratory: non-labored breathing, symmetric chest rise   GI: abd soft, nontender, distended  Neuro: A&O x 3, no aphasia, speech clear  Strength 5/5 BL UE   Strength 0/5 BL LE, +withdrawal to noxious stimuli  Sensation intact to light touch throughout BL UE, T8 and below sensory defiicit   Vascular: Distal pulses 2+ x4, no calf edema or erythema  Wounds: Posterior spine wound C/D/I, dressing in place      LABS:                        10.0   6.81  )-----------( 309      ( 03 Oct 2023 16:33 )             30.1     10-03    138  |  103  |  7   ----------------------------<  148<H>  4.0   |  25  |  0.43<L>    Ca    9.1      03 Oct 2023 05:05  Phos  2.8     10-03  Mg     1.9     10-03    TPro  6.0  /  Alb  2.9<L>  /  TBili  0.2  /  DBili  x   /  AST  29  /  ALT  27  /  AlkPhos  59  10-02      Urinalysis Basic - ( 03 Oct 2023 05:05 )    Color: x / Appearance: x / SG: x / pH: x  Gluc: 148 mg/dL / Ketone: x  / Bili: x / Urobili: x   Blood: x / Protein: x / Nitrite: x   Leuk Esterase: x / RBC: x / WBC x   Sq Epi: x / Non Sq Epi: x / Bacteria: x      CARDIAC MARKERS ( 02 Oct 2023 04:03 )  x     / x     / 314 U/L / x     / x          CAPILLARY BLOOD GLUCOSE          Drug Levels: [] N/A  Vancomycin Level, Trough: 4.0 ug/mL (10-03 @ 05:50)  Vancomycin Level, Trough: 4.0 ug/mL (10-03 @ 05:05)    CSF Analysis: [] N/A      Allergies    No Known Allergies    Intolerances      MEDICATIONS:  Antibiotics:  piperacillin/tazobactam IVPB.. 4.5 Gram(s) IV Intermittent every 8 hours    Neuro:  acetaminophen     Tablet .. 1000 milliGRAM(s) Oral every 8 hours PRN  diazepam    Tablet 2 milliGRAM(s) Oral every 8 hours  HYDROmorphone   Tablet 2 milliGRAM(s) Oral every 4 hours PRN  HYDROmorphone   Tablet 4 milliGRAM(s) Oral every 4 hours PRN  ketorolac   Injectable 30 milliGRAM(s) IV Push every 8 hours  melatonin 5 milliGRAM(s) Oral at bedtime PRN  pregabalin 50 milliGRAM(s) Oral every 8 hours    Anticoagulation:  enoxaparin Injectable 40 milliGRAM(s) SubCutaneous every 24 hours    OTHER:  bisacodyl 5 milliGRAM(s) Oral every 12 hours  bisacodyl Suppository 10 milliGRAM(s) Rectal once  bisacodyl Suppository 10 milliGRAM(s) Rectal daily  chlorhexidine 2% Cloths 1 Application(s) Topical <User Schedule>  influenza   Vaccine 0.5 milliLiter(s) IntraMuscular once  midodrine 15 milliGRAM(s) Oral every 8 hours  norepinephrine Infusion 0.05 MICROgram(s)/kG/Min IV Continuous <Continuous>  polyethylene glycol 3350 17 Gram(s) Oral two times a day  senna 2 Tablet(s) Oral at bedtime  simethicone 80 milliGRAM(s) Chew every 6 hours    IVF:  sodium chloride 2 Gram(s) Oral every 8 hours  sodium chloride 0.9%. 1000 milliLiter(s) IV Continuous <Continuous>    CULTURES:  Culture Results:   No growth at 2 days. (10-01 @ 11:25)  Culture Results:   No growth at 2 days. (10-01 @ 11:25)    RADIOLOGY & ADDITIONAL TESTS:      ASSESSMENT:  43F no PMHx suffered childhood injury resulting in spine injury and residual gait disturbance, hyperreflexia of BL LE. She has right greater than left leg tingling, low back and mid back pain. 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 S/p C7-L1 fusion, T3-8 vertebral column resection, placement of anterior cage (9/25/23).     NEURO:  - Neuro/spine checks q4/vitals q1hrs  - Pain control: tylenol 1g q8h prn, valium 2q8, dilaudid 2/4 PO, Lyrica 50q8, toradol 30mg q8hr x2days  - Post-op CT full spine: L1 screw not attached to the vertical yoli, epidural lipomatosis L2-3    Cardio:  - MAP>100, on levo gtt  - midodrine 15mg q8h to wean off pressors  - echo 9/27: EF 60-65%    Pulm:  - room air  - incentive spirometry    GI:  - Regular diet   - Bowel regimen   - BM 10/2  - simethicone prn for gas    Renal:  - NS @ 30cc/hr  - salt tabs 2q8  - voiding    Endo:  - a1c 5.6  - ISS     Heme:  - SCDs for DVT ppx/ SQL   - 1.5L EBL, 1.5L albumin and cell saver intraop  - 2u PRBC and 1u PLT postop 9/25, 1u PRBC 10/3  - b/l LE dopplers 10/1 negative  - LUE doppler 10/1: +cephalic thrombophlebitis    ID:  - febrile 9/27, 10/1 f/u panculture, +UA 10/1  - Vanc (10/1-10/3 ) dc'd, Zosyn (10/1- 10/8)  x 7 days   - ID following    Discussed with Dr. Vieira and Dr. Villeda

## 2023-10-04 NOTE — PROGRESS NOTE ADULT - SUBJECTIVE AND OBJECTIVE BOX
INFECTIOUS DISEASES CONSULT FOLLOW-UP NOTE    INTERVAL HPI/OVERNIGHT EVENTS: ANNA. White count lateral. Patient improves feeling improved overall.       ROS:   Constitutional, eyes, ENT, cardiovascular, respiratory, gastrointestinal, genitourinary, integumentary, neurological, psychiatric and heme/lymph are otherwise negative other than noted above       ANTIBIOTICS/RELEVANT:    MEDICATIONS  (STANDING):  bisacodyl 5 milliGRAM(s) Oral every 12 hours  bisacodyl Suppository 10 milliGRAM(s) Rectal daily  chlorhexidine 2% Cloths 1 Application(s) Topical <User Schedule>  diazepam    Tablet 2 milliGRAM(s) Oral every 8 hours  enoxaparin Injectable 40 milliGRAM(s) SubCutaneous every 24 hours  influenza   Vaccine 0.5 milliLiter(s) IntraMuscular once  midodrine 15 milliGRAM(s) Oral every 8 hours  norepinephrine Infusion 0.05 MICROgram(s)/kG/Min (3.22 mL/Hr) IV Continuous <Continuous>  piperacillin/tazobactam IVPB.. 4.5 Gram(s) IV Intermittent every 8 hours  polyethylene glycol 3350 17 Gram(s) Oral two times a day  pregabalin 50 milliGRAM(s) Oral every 8 hours  senna 2 Tablet(s) Oral at bedtime  simethicone 80 milliGRAM(s) Chew every 6 hours  sodium chloride 0.9%. 1000 milliLiter(s) (30 mL/Hr) IV Continuous <Continuous>    MEDICATIONS  (PRN):  acetaminophen     Tablet .. 1000 milliGRAM(s) Oral every 8 hours PRN Mild Pain (1 - 3)  HYDROmorphone   Tablet 4 milliGRAM(s) Oral every 4 hours PRN Severe Pain (7 - 10)  HYDROmorphone   Tablet 2 milliGRAM(s) Oral every 4 hours PRN Moderate Pain (4 - 6)  melatonin 5 milliGRAM(s) Oral at bedtime PRN Insomnia  sodium chloride 0.9% lock flush 10 milliLiter(s) IV Push every 1 hour PRN Pre/post blood products, medications, blood draw, and to maintain line patency        Vital Signs Last 24 Hrs  T(C): 37.7 (04 Oct 2023 09:17), Max: 37.8 (03 Oct 2023 21:00)  T(F): 99.8 (04 Oct 2023 09:17), Max: 100 (03 Oct 2023 21:00)  HR: 93 (04 Oct 2023 12:14) (76 - 122)  BP: --  BP(mean): --  RR: 20 (04 Oct 2023 12:00) (13 - 32)  SpO2: 100% (04 Oct 2023 12:14) (93% - 100%)    Parameters below as of 04 Oct 2023 12:00  Patient On (Oxygen Delivery Method): room air        10-03-23 @ 07:01  -  10-04-23 @ 07:00  --------------------------------------------------------  IN: 3215.3 mL / OUT: 3535 mL / NET: -319.7 mL    10-04-23 @ 07:01  -  10-04-23 @ 12:40  --------------------------------------------------------  IN: 226.3 mL / OUT: 900 mL / NET: -673.7 mL      PHYSICAL EXAM:  Constitutional: alert, NAD  Eyes: the sclera and conjunctiva were normal.   ENT: the ears and nose were normal in appearance.   Neck: the appearance of the neck was normal and the neck was supple.   Pulmonary: no respiratory distress and lungs were clear to auscultation bilaterally. On RA  Heart: heart rate was normal and rhythm regular, normal S1 and S2  Vascular:. there was no peripheral edema  Abdomen: normal bowel sounds, soft, non-tender. less distention  : external catheter  Ext: no swelling, +cord at L cephalic vein, without fluctuance or tenderness  Neurological: no focal deficits.   Psychiatric: the affect was normal  Frenchc: BRANDON PICC c/d/i        LABS:                        10.1   7.82  )-----------( 338      ( 04 Oct 2023 02:18 )             30.3     10-04    138  |  104  |  10  ----------------------------<  148<H>  4.0   |  24  |  0.45<L>    Ca    9.1      04 Oct 2023 02:18  Phos  3.6     10-04  Mg     1.7     10-04        Urinalysis Basic - ( 04 Oct 2023 02:18 )    Color: x / Appearance: x / SG: x / pH: x  Gluc: 148 mg/dL / Ketone: x  / Bili: x / Urobili: x   Blood: x / Protein: x / Nitrite: x   Leuk Esterase: x / RBC: x / WBC x   Sq Epi: x / Non Sq Epi: x / Bacteria: x        MICROBIOLOGY:      RADIOLOGY & ADDITIONAL STUDIES:  Reviewed INFECTIOUS DISEASES CONSULT FOLLOW-UP NOTE    INTERVAL HPI/OVERNIGHT EVENTS: ANNA. White count lateral. Patient improves feeling improved overall.       ROS:   Constitutional, eyes, ENT, cardiovascular, respiratory, gastrointestinal, genitourinary, integumentary, neurological, psychiatric and heme/lymph are otherwise negative other than noted above       ANTIBIOTICS/RELEVANT:    MEDICATIONS  (STANDING):  bisacodyl 5 milliGRAM(s) Oral every 12 hours  bisacodyl Suppository 10 milliGRAM(s) Rectal daily  chlorhexidine 2% Cloths 1 Application(s) Topical <User Schedule>  diazepam    Tablet 2 milliGRAM(s) Oral every 8 hours  enoxaparin Injectable 40 milliGRAM(s) SubCutaneous every 24 hours  influenza   Vaccine 0.5 milliLiter(s) IntraMuscular once  midodrine 15 milliGRAM(s) Oral every 8 hours  norepinephrine Infusion 0.05 MICROgram(s)/kG/Min (3.22 mL/Hr) IV Continuous <Continuous>  piperacillin/tazobactam IVPB.. 4.5 Gram(s) IV Intermittent every 8 hours  polyethylene glycol 3350 17 Gram(s) Oral two times a day  pregabalin 50 milliGRAM(s) Oral every 8 hours  senna 2 Tablet(s) Oral at bedtime  simethicone 80 milliGRAM(s) Chew every 6 hours  sodium chloride 0.9%. 1000 milliLiter(s) (30 mL/Hr) IV Continuous <Continuous>    MEDICATIONS  (PRN):  acetaminophen     Tablet .. 1000 milliGRAM(s) Oral every 8 hours PRN Mild Pain (1 - 3)  HYDROmorphone   Tablet 4 milliGRAM(s) Oral every 4 hours PRN Severe Pain (7 - 10)  HYDROmorphone   Tablet 2 milliGRAM(s) Oral every 4 hours PRN Moderate Pain (4 - 6)  melatonin 5 milliGRAM(s) Oral at bedtime PRN Insomnia  sodium chloride 0.9% lock flush 10 milliLiter(s) IV Push every 1 hour PRN Pre/post blood products, medications, blood draw, and to maintain line patency        Vital Signs Last 24 Hrs  T(C): 37.7 (04 Oct 2023 09:17), Max: 37.8 (03 Oct 2023 21:00)  T(F): 99.8 (04 Oct 2023 09:17), Max: 100 (03 Oct 2023 21:00)  HR: 93 (04 Oct 2023 12:14) (76 - 122)  BP: --  BP(mean): --  RR: 20 (04 Oct 2023 12:00) (13 - 32)  SpO2: 100% (04 Oct 2023 12:14) (93% - 100%)    Parameters below as of 04 Oct 2023 12:00  Patient On (Oxygen Delivery Method): room air        10-03-23 @ 07:01  -  10-04-23 @ 07:00  --------------------------------------------------------  IN: 3215.3 mL / OUT: 3535 mL / NET: -319.7 mL    10-04-23 @ 07:01  -  10-04-23 @ 12:40  --------------------------------------------------------  IN: 226.3 mL / OUT: 900 mL / NET: -673.7 mL      PHYSICAL EXAM:  Constitutional: alert, NAD  Eyes: the sclera and conjunctiva were normal.   ENT: the ears and nose were normal in appearance.   Pulmonary: no respiratory distress and lungs were clear to auscultation bilaterally. On RA  Heart: heart rate was normal and rhythm regular, normal S1 and S2  Vascular:. there was no peripheral edema  Abdomen: normal bowel sounds, soft, non-tender. less distention  : external catheter  Ext: no swelling, Resolved cord at L cephalic vein  Neurological: no focal deficits.   Psychiatric: the affect was normal  Vasc: MIKEE PICC c/d/i        LABS:                        10.1   7.82  )-----------( 338      ( 04 Oct 2023 02:18 )             30.3     10-04    138  |  104  |  10  ----------------------------<  148<H>  4.0   |  24  |  0.45<L>    Ca    9.1      04 Oct 2023 02:18  Phos  3.6     10-04  Mg     1.7     10-04        Urinalysis Basic - ( 04 Oct 2023 02:18 )    Color: x / Appearance: x / SG: x / pH: x  Gluc: 148 mg/dL / Ketone: x  / Bili: x / Urobili: x   Blood: x / Protein: x / Nitrite: x   Leuk Esterase: x / RBC: x / WBC x   Sq Epi: x / Non Sq Epi: x / Bacteria: x        MICROBIOLOGY:      RADIOLOGY & ADDITIONAL STUDIES:  Reviewed

## 2023-10-04 NOTE — PROGRESS NOTE ADULT - ASSESSMENT
ASSESSMENT/PLAN:     s/p 5-level vertebral column resection with fusion of C7-L1, put in a cage; stage 1; stable introp monitoring; pleura violated, repaired; post op with BLE weakness requring pressors to augment MAP>100, now pending additional imaging    NEURO:  - Neurochecks q4h  - Surgical drains per NSGY  - Augment map as per below  - Tentative Stage 2 friday  - Pain control  - Activity: bed rest for now, PT/OT when able    PULM:  - Incentive spirometry  - mobilize as tolerated  - Aspiration Precautions    CV:  - MAP>100 per nsg  - trop, ekg while on pressors daily  - c/w levophed  - midodrine 15q8h  - Rt axillary olimpia placed 9/29    RENAL:  - Fluids: NS 50cc/h while on pressors  - trend renal function  - salt tabs    GI:  - Diet: Regular  - Bowel regimen standing  - polyethylene glycol 17G daily, senna, dulcolax   - lactulose      ENDO:   - Goal euglycemia (-180)    HEME/ONC:  s/p 700cc cell saver, 1.5 L EBL; 1500 5% albumin, given TXA;  post-op Hb 6.8 - give 2 units pRBC, repeat   - monitor H/H    VTE prophylaxis   - SCDs   - SQL      ID:  - febrile, pan cultured 9/27, UA negative, will continue to monitor  - vanc/zosyn per ID for empiric coverage (10/1 - )  - vanc mjugh  - f/u cultures  - asymptomatic bacteruria         Dispo: ICU pending OR plan, BP augmentation

## 2023-10-04 NOTE — PROGRESS NOTE ADULT - ASSESSMENT
Assessment: 43F s/p traumatic spine injury w/o neurological deficit & severe thoracic kyphosis now s/p T3-8 VCR, C7-L1 fusion, correction of deformity, placement of anterior cage.  STAGE 1. plastics closure.      NEURO:  T3-8 VCR, C7-L1 fusion, correction of deformity, placement of anterior cage.  STAGE 1. plastics closure  -no loss of motors intra-op however pt w/ significant neurological deficit; pending    -neuro check q1  -pain management: oral Dilaudid 4mg/2mg PRN, valium 2mg PRN, lyrica 50mg qd   -PT/OT evaluation after stage 2     PULMONARY:  saturating well on RA,   -continue to monitor on pulse o2   -incentive spirometry 10q/hr when awake     CARDIOVASCULAR:  monitor on telemetry   vitals q1  MAP goal >100; c/w midodrine 15mg qd & levophed   -olimpia in place;     GASTROENTEROLOGY:  regular diet   ensure BMs w/ Miralax & senna  GI ppx : Protonix 40mg qd  Daily stool count, LBM 10/3  abdomen distended KUB w/ non-obstructive gas pattern   -simethicone 80mg q6 x3 days (change to standing from PRN)     RENAL/:  -check BMP qd  -strict i/o's ;straight cath PRN  -Na goal 135-145    ENDOCRINE:  glucose goal 100-180    HEME/ONC:  DVT ppx: Lovenox SQ  b/l SCDs  provide 1 addition unit PRBC for hypotension 2/2 postoperative state goal >9   LE doppler negative     INFECTIOUS:   empiric abx for low grade fevers; blood culture negative, RVP negative, UA (+) likely asymptomatic bacteruria.   c/w zosyn 4.5g q8 (total 7 days)   LUE duplex superficial thrombophlebitis ?source of fever  incision site evaluated no evidence of infection

## 2023-10-04 NOTE — PROGRESS NOTE ADULT - ASSESSMENT
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 On pressors since 9/26 for likely neurogenic shock, and MAP pushes. Developed fever and leukocytosis on 9/27 (POD 2) but had no focal symptoms of infection and looked well, and septic workup was negative so monitored off abx for presumed fever/leukocytosis from post-op inflammatory response/fusion fever. However 9/30 had recurrent fever (POD 6). Still no focal signs of infection. WBC normalized. BCx ngtd, RVP neg. BLE duplex neg. UA+ but not sent for culture, no clear urinary symptoms (but may have decreased sensation here due to spinal issue). LUE duplex showing superficial thrombophlebitis which is a potential source of fever.    #Post-op fever, unclear source. Possibly superficial thrombophlebitis vs. urinary vs. fusion fever/post-op inflammhere  -discontinue vancomycin 1g IV q12h as there is low suspicion for wound infection/SSI at this time, and BCx neg @48h  -c/w zosyn 4.5g IV q8h (10/1- ) due to possible complicated UTI      #LUE superficial thrombophlebitis  - Improved on exam  - if febrile, would recommend CT chest and cx from PICC line.     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 On pressors since 9/26 for likely neurogenic shock, and MAP pushes. Developed fever and leukocytosis on 9/27 (POD 2) but had no focal symptoms of infection and looked well, and septic workup was negative so monitored off abx for presumed fever/leukocytosis from post-op inflammatory response/fusion fever. However 9/30 had recurrent fever (POD 6). Still no focal signs of infection. WBC normalized. BCx ngtd, RVP neg. BLE duplex neg. UA+ but not sent for culture, no clear urinary symptoms (but may have decreased sensation here due to spinal issue). LUE duplex showing superficial thrombophlebitis which is a potential source of fever.    #Post-op fever, unclear source. Possibly superficial thrombophlebitis vs. urinary vs. fusion fever/post-op inflammhere  -discontinue vancomycin 1g IV q12h as there is low suspicion for wound infection/SSI at this time, and BCx neg @48h  -c/w zosyn 4.5g IV q8h (10/1- ) due to possible complicated UTI      #LUE superficial thrombophlebitis  - Improved on exam  - if febrile, would recommend CT chest and cx from PICC line.     ID Team 1 will sign off. Please reconsult with additional questions.  Recommendations discussed with attending, not finalized until attested.    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 On pressors since 9/26 for likely neurogenic shock, and MAP pushes. Developed fever and leukocytosis on 9/27 (POD 2) but had no focal symptoms of infection and looked well, and septic workup was negative so monitored off abx for presumed fever/leukocytosis from post-op inflammatory response/fusion fever. However 9/30 had recurrent fever (POD 6). Still no focal signs of infection. WBC normalized. BCx ngtd, RVP neg. BLE duplex neg. UA+ but not sent for culture, no clear urinary symptoms (but may have decreased sensation here due to spinal issue). LUE duplex showed superficial thrombophlebitis.    #Post-op fever, unclear source. Possibly superficial thrombophlebitis vs. UTI vs. fusion fever/post-op inflammation  -c/w zosyn 4.5g IV q8h for 7 days (EOT 10/7)  -If fever recurs, would obtain CT chest given pleural injury in OR, to evaluate for any occult infection here, and would draw blood cultures from PICC and arterial line.    #LUE superficial thrombophlebitis  - Improved on exam    ID Team 1 will follow

## 2023-10-05 ENCOUNTER — TRANSCRIPTION ENCOUNTER (OUTPATIENT)
Age: 43
End: 2023-10-05

## 2023-10-05 LAB
ANION GAP SERPL CALC-SCNC: 11 MMOL/L — SIGNIFICANT CHANGE UP (ref 5–17)
APTT BLD: 34.8 SEC — SIGNIFICANT CHANGE UP (ref 24.5–35.6)
BLD GP AB SCN SERPL QL: NEGATIVE — SIGNIFICANT CHANGE UP
BUN SERPL-MCNC: 8 MG/DL — SIGNIFICANT CHANGE UP (ref 7–23)
CALCIUM SERPL-MCNC: 9.2 MG/DL — SIGNIFICANT CHANGE UP (ref 8.4–10.5)
CHLORIDE SERPL-SCNC: 101 MMOL/L — SIGNIFICANT CHANGE UP (ref 96–108)
CO2 SERPL-SCNC: 24 MMOL/L — SIGNIFICANT CHANGE UP (ref 22–31)
CREAT SERPL-MCNC: 0.49 MG/DL — LOW (ref 0.5–1.3)
EGFR: 121 ML/MIN/1.73M2 — SIGNIFICANT CHANGE UP
GLUCOSE SERPL-MCNC: 155 MG/DL — HIGH (ref 70–99)
HCG SERPL-ACNC: <0 MIU/ML — SIGNIFICANT CHANGE UP
HCT VFR BLD CALC: 29.4 % — LOW (ref 34.5–45)
HGB BLD-MCNC: 9.8 G/DL — LOW (ref 11.5–15.5)
INR BLD: 1.02 — SIGNIFICANT CHANGE UP (ref 0.85–1.18)
MAGNESIUM SERPL-MCNC: 1.9 MG/DL — SIGNIFICANT CHANGE UP (ref 1.6–2.6)
MCHC RBC-ENTMCNC: 28.5 PG — SIGNIFICANT CHANGE UP (ref 27–34)
MCHC RBC-ENTMCNC: 33.3 GM/DL — SIGNIFICANT CHANGE UP (ref 32–36)
MCV RBC AUTO: 85.5 FL — SIGNIFICANT CHANGE UP (ref 80–100)
NRBC # BLD: 0 /100 WBCS — SIGNIFICANT CHANGE UP (ref 0–0)
PHOSPHATE SERPL-MCNC: 3.5 MG/DL — SIGNIFICANT CHANGE UP (ref 2.5–4.5)
PLATELET # BLD AUTO: 391 K/UL — SIGNIFICANT CHANGE UP (ref 150–400)
POTASSIUM SERPL-MCNC: 4.2 MMOL/L — SIGNIFICANT CHANGE UP (ref 3.5–5.3)
POTASSIUM SERPL-SCNC: 4.2 MMOL/L — SIGNIFICANT CHANGE UP (ref 3.5–5.3)
PROTHROM AB SERPL-ACNC: 11.6 SEC — SIGNIFICANT CHANGE UP (ref 9.5–13)
RBC # BLD: 3.44 M/UL — LOW (ref 3.8–5.2)
RBC # FLD: 14.6 % — HIGH (ref 10.3–14.5)
RH IG SCN BLD-IMP: POSITIVE — SIGNIFICANT CHANGE UP
SODIUM SERPL-SCNC: 136 MMOL/L — SIGNIFICANT CHANGE UP (ref 135–145)
TROPONIN T, HIGH SENSITIVITY RESULT: 19 NG/L — SIGNIFICANT CHANGE UP (ref 0–51)
WBC # BLD: 8.49 K/UL — SIGNIFICANT CHANGE UP (ref 3.8–10.5)
WBC # FLD AUTO: 8.49 K/UL — SIGNIFICANT CHANGE UP (ref 3.8–10.5)

## 2023-10-05 PROCEDURE — 99232 SBSQ HOSP IP/OBS MODERATE 35: CPT

## 2023-10-05 PROCEDURE — 99291 CRITICAL CARE FIRST HOUR: CPT

## 2023-10-05 PROCEDURE — 99024 POSTOP FOLLOW-UP VISIT: CPT

## 2023-10-05 RX ORDER — SODIUM CHLORIDE 9 MG/ML
1000 INJECTION INTRAMUSCULAR; INTRAVENOUS; SUBCUTANEOUS ONCE
Refills: 0 | Status: COMPLETED | OUTPATIENT
Start: 2023-10-05 | End: 2023-10-05

## 2023-10-05 RX ORDER — ACETAMINOPHEN 500 MG
1000 TABLET ORAL ONCE
Refills: 0 | Status: COMPLETED | OUTPATIENT
Start: 2023-10-06 | End: 2023-10-06

## 2023-10-05 RX ORDER — DIAZEPAM 5 MG
1 TABLET ORAL ONCE
Refills: 0 | Status: DISCONTINUED | OUTPATIENT
Start: 2023-10-05 | End: 2023-10-05

## 2023-10-05 RX ORDER — APREPITANT 80 MG/1
40 CAPSULE ORAL ONCE
Refills: 0 | Status: COMPLETED | OUTPATIENT
Start: 2023-10-06 | End: 2023-10-06

## 2023-10-05 RX ORDER — MAGNESIUM SULFATE 500 MG/ML
1 VIAL (ML) INJECTION ONCE
Refills: 0 | Status: COMPLETED | OUTPATIENT
Start: 2023-10-05 | End: 2023-10-05

## 2023-10-05 RX ORDER — METHOCARBAMOL 500 MG/1
500 TABLET, FILM COATED ORAL ONCE
Refills: 0 | Status: DISCONTINUED | OUTPATIENT
Start: 2023-10-05 | End: 2023-10-05

## 2023-10-05 RX ORDER — ACETAMINOPHEN 500 MG
1000 TABLET ORAL ONCE
Refills: 0 | Status: COMPLETED | OUTPATIENT
Start: 2023-10-05 | End: 2023-10-05

## 2023-10-05 RX ORDER — POVIDONE-IODINE 5 %
1 AEROSOL (ML) TOPICAL ONCE
Refills: 0 | Status: COMPLETED | OUTPATIENT
Start: 2023-10-06 | End: 2023-10-06

## 2023-10-05 RX ORDER — CELECOXIB 200 MG/1
200 CAPSULE ORAL ONCE
Refills: 0 | Status: COMPLETED | OUTPATIENT
Start: 2023-10-06 | End: 2023-10-06

## 2023-10-05 RX ORDER — SODIUM CHLORIDE 9 MG/ML
1000 INJECTION INTRAMUSCULAR; INTRAVENOUS; SUBCUTANEOUS
Refills: 0 | Status: DISCONTINUED | OUTPATIENT
Start: 2023-10-05 | End: 2023-10-06

## 2023-10-05 RX ADMIN — Medication 400 MILLIGRAM(S): at 22:57

## 2023-10-05 RX ADMIN — SIMETHICONE 80 MILLIGRAM(S): 80 TABLET, CHEWABLE ORAL at 06:08

## 2023-10-05 RX ADMIN — Medication 1000 MILLIGRAM(S): at 04:30

## 2023-10-05 RX ADMIN — HYDROMORPHONE HYDROCHLORIDE 4 MILLIGRAM(S): 2 INJECTION INTRAMUSCULAR; INTRAVENOUS; SUBCUTANEOUS at 21:11

## 2023-10-05 RX ADMIN — MIDODRINE HYDROCHLORIDE 15 MILLIGRAM(S): 2.5 TABLET ORAL at 21:11

## 2023-10-05 RX ADMIN — POLYETHYLENE GLYCOL 3350 17 GRAM(S): 17 POWDER, FOR SOLUTION ORAL at 06:07

## 2023-10-05 RX ADMIN — CHLORHEXIDINE GLUCONATE 1 APPLICATION(S): 213 SOLUTION TOPICAL at 06:08

## 2023-10-05 RX ADMIN — PIPERACILLIN AND TAZOBACTAM 25 GRAM(S): 4; .5 INJECTION, POWDER, LYOPHILIZED, FOR SOLUTION INTRAVENOUS at 21:10

## 2023-10-05 RX ADMIN — Medication 5 MILLIGRAM(S): at 06:07

## 2023-10-05 RX ADMIN — POLYETHYLENE GLYCOL 3350 17 GRAM(S): 17 POWDER, FOR SOLUTION ORAL at 17:43

## 2023-10-05 RX ADMIN — HYDROMORPHONE HYDROCHLORIDE 4 MILLIGRAM(S): 2 INJECTION INTRAMUSCULAR; INTRAVENOUS; SUBCUTANEOUS at 01:06

## 2023-10-05 RX ADMIN — SIMETHICONE 80 MILLIGRAM(S): 80 TABLET, CHEWABLE ORAL at 12:38

## 2023-10-05 RX ADMIN — HYDROMORPHONE HYDROCHLORIDE 2 MILLIGRAM(S): 2 INJECTION INTRAMUSCULAR; INTRAVENOUS; SUBCUTANEOUS at 17:44

## 2023-10-05 RX ADMIN — Medication 2 MILLIGRAM(S): at 09:31

## 2023-10-05 RX ADMIN — SODIUM CHLORIDE 30 MILLILITER(S): 9 INJECTION INTRAMUSCULAR; INTRAVENOUS; SUBCUTANEOUS at 05:55

## 2023-10-05 RX ADMIN — Medication 1 MILLIGRAM(S): at 10:22

## 2023-10-05 RX ADMIN — Medication 50 MILLIGRAM(S): at 01:06

## 2023-10-05 RX ADMIN — SODIUM CHLORIDE 1000 MILLILITER(S): 9 INJECTION INTRAMUSCULAR; INTRAVENOUS; SUBCUTANEOUS at 05:42

## 2023-10-05 RX ADMIN — SODIUM CHLORIDE 1000 MILLILITER(S): 9 INJECTION INTRAMUSCULAR; INTRAVENOUS; SUBCUTANEOUS at 13:45

## 2023-10-05 RX ADMIN — Medication 5 MILLIGRAM(S): at 17:43

## 2023-10-05 RX ADMIN — Medication 100 GRAM(S): at 07:00

## 2023-10-05 RX ADMIN — HYDROMORPHONE HYDROCHLORIDE 2 MILLIGRAM(S): 2 INJECTION INTRAMUSCULAR; INTRAVENOUS; SUBCUTANEOUS at 18:15

## 2023-10-05 RX ADMIN — Medication 1000 MILLIGRAM(S): at 05:00

## 2023-10-05 RX ADMIN — HYDROMORPHONE HYDROCHLORIDE 2 MILLIGRAM(S): 2 INJECTION INTRAMUSCULAR; INTRAVENOUS; SUBCUTANEOUS at 10:00

## 2023-10-05 RX ADMIN — HYDROMORPHONE HYDROCHLORIDE 2 MILLIGRAM(S): 2 INJECTION INTRAMUSCULAR; INTRAVENOUS; SUBCUTANEOUS at 09:31

## 2023-10-05 RX ADMIN — HYDROMORPHONE HYDROCHLORIDE 4 MILLIGRAM(S): 2 INJECTION INTRAMUSCULAR; INTRAVENOUS; SUBCUTANEOUS at 01:32

## 2023-10-05 RX ADMIN — SIMETHICONE 80 MILLIGRAM(S): 80 TABLET, CHEWABLE ORAL at 00:44

## 2023-10-05 RX ADMIN — MIDODRINE HYDROCHLORIDE 15 MILLIGRAM(S): 2.5 TABLET ORAL at 13:44

## 2023-10-05 RX ADMIN — PIPERACILLIN AND TAZOBACTAM 25 GRAM(S): 4; .5 INJECTION, POWDER, LYOPHILIZED, FOR SOLUTION INTRAVENOUS at 13:44

## 2023-10-05 RX ADMIN — MIDODRINE HYDROCHLORIDE 15 MILLIGRAM(S): 2.5 TABLET ORAL at 06:07

## 2023-10-05 RX ADMIN — Medication 1000 MILLIGRAM(S): at 13:44

## 2023-10-05 RX ADMIN — Medication 1000 MILLIGRAM(S): at 14:15

## 2023-10-05 RX ADMIN — Medication 1000 MILLIGRAM(S): at 23:58

## 2023-10-05 RX ADMIN — Medication 5 MILLIGRAM(S): at 21:11

## 2023-10-05 RX ADMIN — PIPERACILLIN AND TAZOBACTAM 25 GRAM(S): 4; .5 INJECTION, POWDER, LYOPHILIZED, FOR SOLUTION INTRAVENOUS at 06:08

## 2023-10-05 RX ADMIN — HYDROMORPHONE HYDROCHLORIDE 4 MILLIGRAM(S): 2 INJECTION INTRAMUSCULAR; INTRAVENOUS; SUBCUTANEOUS at 21:36

## 2023-10-05 RX ADMIN — SIMETHICONE 80 MILLIGRAM(S): 80 TABLET, CHEWABLE ORAL at 17:43

## 2023-10-05 RX ADMIN — Medication 50 MILLIGRAM(S): at 17:43

## 2023-10-05 RX ADMIN — Medication 1 MILLIGRAM(S): at 21:58

## 2023-10-05 RX ADMIN — SENNA PLUS 2 TABLET(S): 8.6 TABLET ORAL at 21:11

## 2023-10-05 RX ADMIN — HYDROMORPHONE HYDROCHLORIDE 4 MILLIGRAM(S): 2 INJECTION INTRAMUSCULAR; INTRAVENOUS; SUBCUTANEOUS at 11:00

## 2023-10-05 RX ADMIN — Medication 50 MILLIGRAM(S): at 09:31

## 2023-10-05 RX ADMIN — Medication 2 MILLIGRAM(S): at 01:06

## 2023-10-05 RX ADMIN — Medication 2 MILLIGRAM(S): at 17:42

## 2023-10-05 RX ADMIN — HYDROMORPHONE HYDROCHLORIDE 4 MILLIGRAM(S): 2 INJECTION INTRAMUSCULAR; INTRAVENOUS; SUBCUTANEOUS at 10:35

## 2023-10-05 RX ADMIN — HYDROMORPHONE HYDROCHLORIDE 4 MILLIGRAM(S): 2 INJECTION INTRAMUSCULAR; INTRAVENOUS; SUBCUTANEOUS at 06:27

## 2023-10-05 RX ADMIN — HYDROMORPHONE HYDROCHLORIDE 4 MILLIGRAM(S): 2 INJECTION INTRAMUSCULAR; INTRAVENOUS; SUBCUTANEOUS at 06:44

## 2023-10-05 NOTE — PROGRESS NOTE ADULT - ATTENDING COMMENTS
Fevers persist. Patient is clinically well appearing. On pressors for MAP pushes.  Recommend CT chest, given ongoing fevers, to evaluate for occult pleural infection given hx pleural tear.  May consider blood cultures off of PICC and arterial line tomorrow.  Continue with zosyn as above
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 On pressors since 9/26 for likely neurogenic shock, was hypotensive and also undergoing MAP pushes on levo/phenyl . Developed fever on 9/27 (POD 2) which persisted for ~12 hours and then resolved, although may be trending up again this afternoon. She has leukocytosis that developed after surgery, peaked at 22.6k on 9/27 and is now overall trending downward. UA unremarkable, blood cx x1 ngtd, CXR without consolidation but +bilateral pleural effusions. CT spine post-op day 1 with post-surgical changes. Patient is on room air. Not on abx since khang-op ancef.     Patient is clinically very well appearing. No local signs/symptoms of infection other than mild cough which patient describes as clearing her throat. Noted prior presence of central line (since replaced) and also pleural violation intra-op. At this time, I think fever is most likely related to post-op inflammation/fusion fever, and hypotension most likely neurogenic shock. However will monitor closely and follow up pending blood cultures. Recommend obtaining sputum culture as well. If patient has significant increase in pressor requirement, vanc/zosyn would be a reasonable empiric antibiotic regimen.
Fevers resolved. Patient feeling better overall, well appearing.  Continue zosyn to complete a 7 day course as above, empiric, possibly targeting a urinary source given positive UA and neuro deficits limiting ability to report local symptoms, and recent coronado.
I have edited the above note and agree with it as written.  Afebrile today but getting tylenol. SVT is possible source of fever (although unimpressive in appearance). Continue broad spectrum abx for now. If BCx neg at 48h will likely stop vanc, and continue zosyn for 7 day course.
I have edited the above note and agree with it as written.    Patient looks brighter today, feeling well. No fevers in @36 hours. Continue to monitor. Plan as above.
Afebrile today, WBC downtrending, decreasing pressors for MAP pushes.  Patient has no symptoms of infection at this time, and is feeling well overall aside from well controlled pain at surgical sites.  Continue to monitor off of abx.

## 2023-10-05 NOTE — PROGRESS NOTE ADULT - ASSESSMENT
ASSESSMENT/PLAN:     s/p 5-level vertebral column resection with fusion of C7-L1, put in a cage; stage 1; stable introp monitoring; pleura violated, repaired; post op with BLE weakness requring pressors to augment MAP>100, now pending additional imaging    NEURO:  - Neurochecks q4h  - Surgical drains per NSGY  - Augment map as per below  - Tentative Stage 2 tomorrow  - Pain control  - Activity: bed rest for now, PT/OT when able    PULM:  - Incentive spirometry  - mobilize as tolerated  - Aspiration Precautions    CV:  - MAP>100 per nsg  - trop, ekg while on pressors daily  - c/w levophed  - midodrine 15q8h  - Rt axillary olimpia placed 9/29    RENAL:  - Fluids: NS 50cc/h while on pressors  - trend renal function  - salt tabs    GI:  - Diet: NPO MN for OR tomorrow  - Bowel regimen standing  - polyethylene glycol 17G daily, senna, dulcolax   - lactulose      ENDO:   - Goal euglycemia (-180)    HEME/ONC:  s/p 700cc cell saver, 1.5 L EBL; 1500 5% albumin, given TXA;  post-op Hb 6.8 - give 2 units pRBC, repeat   - monitor H/H    VTE prophylaxis   - SCDs   - hold chemoppx for OR      ID:  - febrile, pan cultured 9/27, UA negative, will continue to monitor  - vanc/zosyn per ID for empiric coverage (10/1 - )  - vanc torugh  - f/u cultures  - asymptomatic bacteruria         Dispo: ICU pending OR plan, BP augmentation

## 2023-10-05 NOTE — PROGRESS NOTE ADULT - SUBJECTIVE AND OBJECTIVE BOX
INTERVAL HISTORY: HPI:  Taken from outpatient neurosurgery note on 9/13/2023 " The patient, a long-standing patient of my practice, reports a history of spinal issues that dates back to a childhood injury. Over the years she's experienced developing issues with walking in a straight line, overactive reflexes in her lower extremities, and unique difficulty with her lower extremities referred to as cloneness. Recent upturn in these symptoms has limited her ability to carry out her daily routines. The patient also reports no significant improvement in her condition since our last clinical encounter two years ago."    42 y/o female with no PMHx or PSHx presents for surgery today.  She reports ambulating with walker or baby stroller at home for years.  She reports intermittent falls.  She has right greater than left leg tingling, low back and mid back pain.  She denies arm or leg pain.  She reports urinary incontinence since having a baby 2 years ago where if she doesn't get to the bathroom fast enough, she has incontinence, but she is able to hold it for a short while.  She takes no pain medications.  She has taken ibuprofen in the past.  She denies saddle anesthesia, bowel incontinence.   (25 Sep 2023 06:51)    Drug Dosing Weight  Height (cm): 152.4 (25 Sep 2023 07:32)  Weight (kg): 68.6 (25 Sep 2023 07:32)  BMI (kg/m2): 29.5 (25 Sep 2023 07:32)  BSA (m2): 1.66 (25 Sep 2023 07:32)    PAST MEDICAL & SURGICAL HISTORY:  History of acute illness  childhood  No significant past surgical history      REVIEW OF SYSTEMS: [ ] Unable to Assess due to neurologic exam   [ ] All ROS addressed below are non-contributory, except:  Neuro: [ ] Headache [ ] Back pain [ ] Numbness [ ] Weakness [ ] Ataxia [ ] Dizziness [ ] Aphasia [ ] Dysarthria [ ] Visual disturbance  Resp: [ ] Shortness of breath/dyspnea, [ ] Orthopnea [ ] Cough  CV: [ ] Chest pain [ ] Palpitation [ ] Lightheadedness [ ] Syncope  Renal: [ ] Thirst [ ] Edema  GI: [ ] Nausea [ ] Emesis [ ] Abdominal pain [ ] Constipation [ ] Diarrhea  Hem: [ ] Hematemesis [ ] bright red blood per rectum  ID: [ ] Fever [ ] Chills [ ] Dysuria  ENT: [ ] Rhinorrhea    PHYSICAL EXAM:    Constitutional: No Acute Distress     Neurological: AOx3, Following Commands, Moving all Extremities     Motor exam:          Upper extremity                         Delt     Bicep     Tricep    HG                                                 R         5/5        5/5        5/5       5/5                                               L          5/5        5/5        5/5       5/5          Lower extremity              R & L : triple flexion              Sensation: [] intact to light touch  [x] decreased: T5 & below sensation L approx. 65-70% R 50% per pt   Pulmonary: Clear to Auscultation, No rales, No rhonchi, No wheezes   Cardiovascular: S1, S2, Regular rate and rhythm   Gastrointestinal: Soft, Non-tender, distended hyperactive BS  Extremities: No calf tenderness   Incision: CDI; drain in place       ICU Vital Signs Last 24 Hrs  T(C): 37.3 (05 Oct 2023 09:10), Max: 37.6 (04 Oct 2023 14:21)  T(F): 99.2 (05 Oct 2023 09:10), Max: 99.7 (04 Oct 2023 14:21)  HR: 97 (05 Oct 2023 10:00) (80 - 123)  ABP: 142/77 (05 Oct 2023 10:00) (92/50 - 172/91)  ABP(mean): 107 (05 Oct 2023 10:00) (65 - 126)  RR: 16 (05 Oct 2023 10:00) (16 - 20)  SpO2: 100% (05 Oct 2023 10:00) (97% - 100%)      10-04-23 @ 07:01  -  10-05-23 @ 07:00  --------------------------------------------------------  IN: 3261.7 mL / OUT: 4300 mL / NET: -1038.3 mL    10-05-23 @ 07:01  -  10-05-23 @ 11:37  --------------------------------------------------------  IN: 119.5 mL / OUT: 1400 mL / NET: -1280.5 mL    acetaminophen     Tablet .. 1000 milliGRAM(s) Oral every 8 hours PRN  bisacodyl 5 milliGRAM(s) Oral every 12 hours  bisacodyl Suppository 10 milliGRAM(s) Rectal daily  chlorhexidine 2% Cloths 1 Application(s) Topical <User Schedule>  diazepam    Tablet 2 milliGRAM(s) Oral every 8 hours  enoxaparin Injectable 40 milliGRAM(s) SubCutaneous every 24 hours  HYDROmorphone   Tablet 4 milliGRAM(s) Oral every 4 hours PRN  HYDROmorphone   Tablet 2 milliGRAM(s) Oral every 4 hours PRN  influenza   Vaccine 0.5 milliLiter(s) IntraMuscular once  melatonin 5 milliGRAM(s) Oral at bedtime PRN  midodrine 15 milliGRAM(s) Oral every 8 hours  norepinephrine Infusion 0.05 MICROgram(s)/kG/Min (3.22 mL/Hr) IV Continuous <Continuous>  piperacillin/tazobactam IVPB.. 4.5 Gram(s) IV Intermittent every 8 hours  polyethylene glycol 3350 17 Gram(s) Oral two times a day  pregabalin 50 milliGRAM(s) Oral every 8 hours  senna 2 Tablet(s) Oral at bedtime  simethicone 80 milliGRAM(s) Chew every 6 hours  sodium chloride 0.9% lock flush 10 milliLiter(s) IV Push every 1 hour PRN  sodium chloride 0.9%. 1000 milliLiter(s) (30 mL/Hr) IV Continuous <Continuous>      LABS:  Na: 136 (10-05 @ 04:48), 138 (10-04 @ 02:18), 138 (10-03 @ 05:05)  K: 4.2 (10-05 @ 04:48), 4.0 (10-04 @ 02:18), 4.0 (10-03 @ 05:05)  Cl: 101 (10-05 @ 04:48), 104 (10-04 @ 02:18), 103 (10-03 @ 05:05)  CO2: 24 (10-05 @ 04:48), 24 (10-04 @ 02:18), 25 (10-03 @ 05:05)  BUN: 8 (10-05 @ 04:48), 10 (10-04 @ 02:18), 7 (10-03 @ 05:05)  Cr: 0.49 (10-05 @ 04:48), 0.45 (10-04 @ 02:18), 0.43 (10-03 @ 05:05)  Glu: 155(10-05 @ 04:48), 148(10-04 @ 02:18), 148(10-03 @ 05:05)    Hgb: 9.8 (10-05 @ 04:48), 10.1 (10-04 @ 02:18), 10.0 (10-03 @ 16:33), 8.5 (10-03 @ 05:05)  Hct: 29.4 (10-05 @ 04:48), 30.3 (10-04 @ 02:18), 30.1 (10-03 @ 16:33), 25.5 (10-03 @ 05:05)  WBC: 8.49 (10-05 @ 04:48), 7.82 (10-04 @ 02:18), 6.81 (10-03 @ 16:33), 6.60 (10-03 @ 05:05)  Plt: 391 (10-05 @ 04:48), 338 (10-04 @ 02:18), 309 (10-03 @ 16:33), 276 (10-03 @ 05:05)    INR: 1.02 10-05-23 @ 04:48  PTT: 34.8 10-05-23 @ 04:48

## 2023-10-05 NOTE — PROGRESS NOTE ADULT - SUBJECTIVE AND OBJECTIVE BOX
HPI:  Taken from outpatient neurosurgery note on 9/13/2023 " The patient, a long-standing patient of my practice, reports a history of spinal issues that dates back to a childhood injury. Over the years she's experienced developing issues with walking in a straight line, overactive reflexes in her lower extremities, and unique difficulty with her lower extremities referred to as cloneness. Recent upturn in these symptoms has limited her ability to carry out her daily routines. The patient also reports no significant improvement in her condition since our last clinical encounter two years ago."    44 y/o female with no PMHx or PSHx presents for surgery today.  She reports ambulating with walker or baby stroller at home for years.  She reports intermittent falls.  She has right greater than left leg tingling, low back and mid back pain.  She denies arm or leg pain.  She reports urinary incontinence since having a baby 2 years ago where if she doesn't get to the bathroom fast enough, she has incontinence, but she is able to hold it for a short while.  She takes no pain medications.  She has taken ibuprofen in the past.  She denies saddle anesthesia, bowel incontinence.   (25 Sep 2023 06:51)    INTERVAL EVENTS:  ANNA o/n, remains on levo for MAP goal >100    HOSPITAL COURSE:  9/25: POD 0 s/p C7-L1 fusion, T3-8 vertebral column resection, placement of anterior cage.   9/26; POD1 H/H 6.8 postop and PLT 85. Given 2u PRBC and 1u PLT. Switched propofol to precedex gtt. Pt extubated to face mask. Pt noted to only be withdrawing to noxious stimuli on bilateral lower extremities with sensation decreased RLE per patient. Dr. Vieira notified and plan is to keep MAP>100 and obtain CT full spine in the morning. Phenylephrine started to maintain MAP>100. Advanced diet to regular. Passed TOV. Increased need for aurora, UO high.   9/27: POD2 C7-L1 fusion T3-8 vertebral column resection. increased pressor requirements o/n, started on levo additionally. lactate 1.5, BNP 1400 from 800, CK elevated. echo: EF 60-65%. given 250cc bolus albumin, started on midodrine 10q8 to wean off pressors. PICC placed by Alli.   9/28: POD3 s/p C7-L1 fusion, T3-8 vertebral column resection, placement of anterior cage. R radial A-line removed and replaced on the L, trops and EKG done for chest discomfort, WNL, resolved with treatment of overall pain, continues to be febrile, lyrica dc'd to help.   Lyrica restarted. Weaning phenylephrine. Serum Na uptrending, 3% saline discontinued. Pt febrile with uptrending WBC, ID consulted recommend monitoring for now off antibiotics.  9/29: POD4. started on 3% o/n for Na 132. remains on aurora and levo for MAP>100. Tachycardic, given 1L NS. Right axillary a-line placed. 3% dc'd. HMV dc'd. Aquacell dressing replaced. Passed TOV.   9/30: POD 5.  Remains on levo gtt. Increased midodrine to 15q8. Given enema. Had BM.  10/1: POD 6. MAP goal increased back to >100 due to worsened sensation loss, on levo gtt. afebrile however per ID Dr De Souza, desiree cx sent. UA+. started empirically on vanc/zosyn. b/l LE doppler and LUE doppler ordered per ID, +LUE superficial thrombophlebitis. BL LE dopplers negative for DVT.   10/2: POD 7, ANNA overnight. Maintaining MAP >100. 500cc albumin bolus, dilaudid lowered to 2/4mg, toradol [id number] added for pain, abd xray for distension shows gas pattern. aquacel dressing changed.  10/3: POD8 ANNA overnight. Remains on levophed to maintain MAP>100 in AM. Neuro exam unchanged. Tmax 100.9F in AM, given tylenol. Flexeril changed to valium 2q8 and lyrica 50q8 started per pain managment.  DC'd vanc, continue zosyn x 7 days per ID. 1u PRBC to maintain hbg >9.0. DANIELE drain removed. Salt tabs weaned to 2q8. MAP goal changed to >85 to help wean off levo gtt.   10/4: POD 9. MAP goal >100. Sodium chloride tabs discontinued.      Vital Signs Last 24 Hrs  T(C): 36.8 (04 Oct 2023 21:58), Max: 37.7 (04 Oct 2023 05:19)  T(F): 98.2 (04 Oct 2023 21:58), Max: 99.9 (04 Oct 2023 05:19)  HR: 84 (05 Oct 2023 00:00) (81 - 122)  BP: --  BP(mean): --  RR: 16 (05 Oct 2023 00:00) (13 - 30)  SpO2: 100% (05 Oct 2023 00:00) (93% - 100%)    Parameters below as of 05 Oct 2023 00:00  Patient On (Oxygen Delivery Method): room air        I&O's Summary    03 Oct 2023 07:01  -  04 Oct 2023 07:00  --------------------------------------------------------  IN: 3215.3 mL / OUT: 3535 mL / NET: -319.7 mL    04 Oct 2023 07:01  -  05 Oct 2023 00:37  --------------------------------------------------------  IN: 1771.7 mL / OUT: 3400 mL / NET: -1628.3 mL      PHYSICAL EXAM:  General: Pt is sitting up comfortably in bed, in NAD, on RA  HEENT: CN II-XII grossly intact, PERRL 3mm, EOMI B/L, face symmetric  Cardiovascular: RRR, normal S1 and S2   Respiratory: non-labored breathing, symmetric chest rise   GI: abd soft, nontender, distended  Neuro: A&O x 3, no aphasia, speech clear  Strength 5/5 BL UE   Strength 0/5 BL LE, +withdrawal to noxious stimuli  Sensation intact to light touch throughout BL UE, T8 and below sensory defiicit   Vascular: Distal pulses 2+ x4, no calf edema or erythema  Wounds: Posterior spine wound C/D/I, dressing in place        LABS:                        10.1   7.82  )-----------( 338      ( 04 Oct 2023 02:18 )             30.3     10-04    138  |  104  |  10  ----------------------------<  148<H>  4.0   |  24  |  0.45<L>    Ca    9.1      04 Oct 2023 02:18  Phos  3.6     10-04  Mg     1.7     10-04        Urinalysis Basic - ( 04 Oct 2023 02:18 )    Color: x / Appearance: x / SG: x / pH: x  Gluc: 148 mg/dL / Ketone: x  / Bili: x / Urobili: x   Blood: x / Protein: x / Nitrite: x   Leuk Esterase: x / RBC: x / WBC x   Sq Epi: x / Non Sq Epi: x / Bacteria: x          CAPILLARY BLOOD GLUCOSE          Drug Levels: [] N/A  Vancomycin Level, Trough: 4.0 ug/mL (10-03 @ 05:50)  Vancomycin Level, Trough: 4.0 ug/mL (10-03 @ 05:05)    CSF Analysis: [] N/A      Allergies    No Known Allergies    Intolerances      MEDICATIONS:  Antibiotics:  piperacillin/tazobactam IVPB.. 4.5 Gram(s) IV Intermittent every 8 hours    Neuro:  acetaminophen     Tablet .. 1000 milliGRAM(s) Oral every 8 hours PRN  diazepam    Tablet 2 milliGRAM(s) Oral every 8 hours  HYDROmorphone   Tablet 4 milliGRAM(s) Oral every 4 hours PRN  HYDROmorphone   Tablet 2 milliGRAM(s) Oral every 4 hours PRN  melatonin 5 milliGRAM(s) Oral at bedtime PRN  pregabalin 50 milliGRAM(s) Oral every 8 hours    Anticoagulation:  enoxaparin Injectable 40 milliGRAM(s) SubCutaneous every 24 hours    OTHER:  bisacodyl 5 milliGRAM(s) Oral every 12 hours  bisacodyl Suppository 10 milliGRAM(s) Rectal daily  chlorhexidine 2% Cloths 1 Application(s) Topical <User Schedule>  influenza   Vaccine 0.5 milliLiter(s) IntraMuscular once  midodrine 15 milliGRAM(s) Oral every 8 hours  norepinephrine Infusion 0.05 MICROgram(s)/kG/Min IV Continuous <Continuous>  polyethylene glycol 3350 17 Gram(s) Oral two times a day  senna 2 Tablet(s) Oral at bedtime  simethicone 80 milliGRAM(s) Chew every 6 hours    IVF:  sodium chloride 0.9%. 1000 milliLiter(s) IV Continuous <Continuous>    CULTURES:  Culture Results:   No growth at 3 days. (10-01 @ 11:25)  Culture Results:   No growth at 3 days. (10-01 @ 11:25)    RADIOLOGY & ADDITIONAL TESTS:      ASSESSMENT:  43F no PMHx suffered childhood injury resulting in spine injury and residual gait disturbance, hyperreflexia of BL LE. She has right greater than left leg tingling, low back and mid back pain. 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 S/p C7-L1 fusion, T3-8 vertebral column resection, placement of anterior cage (9/25/23).     NEURO:  - Neuro/spine checks q4/vitals q1hrs  - Pain control: tylenol 1g q8h prn, valium 2q8, dilaudid 2/4 PO, Lyrica 50q8, toradol 30mg q8hr x2days  - Post-op CT full spine: L1 screw not attached to the vertical yoli, epidural lipomatosis L2-3    Cardio:  - MAP > 100, on levo gtt   - midodrine 15 mg q8h to wean off pressors  - echo 9/27: EF 60-65%    Pulm:  - room air  - incentive spirometry    GI:  - Regular diet   - Bowel regimen   - BM 10/2  - simethicone prn for gas    Renal:  - NS @ 30cc/hr  - voiding    Endo:  - a1c 5.6  - ISS     Heme:  - SCDs for DVT ppx/ SQL   - 1.5L EBL, 1.5L albumin and cell saver intraop  - 2u PRBC and 1u PLT postop 9/25, 1u PRBC 10/3  - b/l LE dopplers 10/1 negative  - LUE doppler 10/1: +cephalic thrombophlebitis    ID:  - febrile 9/27, 10/1 f/u panculture, +UA 10/1  - Vanc (10/1-10/3 ) dc'd, Zosyn (10/1- 10/8)  x 7 days   - ID following    Discussed with Dr. Vieira and Dr. Villeda

## 2023-10-05 NOTE — PROGRESS NOTE ADULT - ASSESSMENT
Assessment: 43F s/p traumatic spine injury w/o neurological deficit & severe thoracic kyphosis now s/p T3-8 VCR, C7-L1 fusion, correction of deformity, placement of anterior cage.  STAGE 1. plastics closure.      NEURO:  T3-8 VCR, C7-L1 fusion, correction of deformity, placement of anterior cage.  STAGE 1. plastics closure  -no loss of motors intra-op however pt w/ significant neurological deficit; pending    -neuro check q4  -pain management: oral Dilaudid 4mg/2mg PRN, valium 2mg PRN, lyrica 50mg qd   -PT/OT evaluation after stage 2 10/5    PULMONARY:  saturating well on RA,   -continue to monitor on pulse o2   -incentive spirometry 10q/hr when awake     CARDIOVASCULAR:  monitor on telemetry   vitals q1  MAP goal >100; c/w midodrine 15mg qd & levophed   -olimpia in place;     GASTROENTEROLOGY:  regular diet   ensure BMs w/ Miralax & senna  GI ppx : Protonix 40mg qd  Daily stool count, LBM 10/3  abdomen distended KUB w/ non-obstructive gas pattern       RENAL/:  -check BMP qd  -strict i/o's ;straight cath PRN  -Na goal 135-145    ENDOCRINE:  glucose goal 100-180    HEME/ONC:  DVT ppx: Lovenox SQ  b/l SCDs  s/p PRBC transfusion  LE doppler negative     INFECTIOUS:   empiric abx for low grade fevers; blood culture negative, RVP negative, UA (+) likely asymptomatic bacteruria.   c/w zosyn 4.5g q8 (total 7 days end 10/8)   incision site evaluated no evidence of infection

## 2023-10-05 NOTE — PROGRESS NOTE ADULT - TIME BILLING
Evaluating fever
Managing post-operative fever
Evaluating fever
Managing fever
Managing post-op fever, possible UTI

## 2023-10-05 NOTE — PROGRESS NOTE ADULT - SUBJECTIVE AND OBJECTIVE BOX
Surgery:  Revision of T3-T8 VCR, C7-T1 Fusion, Correction of Deformity, Revision of Anterior Cage, Plastics Closure   Consent: Signed by patient     Representative Consent: [  x] Signed by patient                                                 [  ] N/A -> only for cerebral angiogram    No Known Allergies      Subjective: Given 1 mg IV valium in AM for muscle spasm. 1L bolus given in afternoon for high urine output. Preop for OR tomorrow.     T(C): 37.1 (10-05-23 @ 17:49), Max: 37.5 (10-05-23 @ 05:03)  HR: 85 (10-05-23 @ 18:00) (80 - 123)  BP: --  RR: 18 (10-05-23 @ 18:00) (16 - 20)  SpO2: 100% (10-05-23 @ 18:00) (99% - 100%)  Wt(kg): --    EXAM:  General: Pt is sitting up comfortably in bed, in NAD, on RA  HEENT: CN II-XII grossly intact, PERRL 3mm, EOMI B/L, face symmetric  Cardiovascular: RRR, normal S1 and S2   Respiratory: non-labored breathing, symmetric chest rise   GI: abd soft, nontender, distended  Neuro: A&O x 3, no aphasia, speech clear  Strength 5/5 BL UE   Strength 0/5 BL LE, +TF  to noxious stimuli RL>LL  Sensation intact to light touch throughout BL UE, T8 and below sensory defiicit   Vascular: Distal pulses 2+ x4, no calf edema or erythema  Wounds: Posterior spine wound C/D/I, dressing in place    10-05    136  |  101  |  8   ----------------------------<  155<H>  4.2   |  24  |  0.49<L>    Ca    9.2      05 Oct 2023 04:48  Phos  3.5     10-05  Mg     1.9     10-05      CBC Full  -  ( 05 Oct 2023 04:48 )  WBC Count : 8.49 K/uL  RBC Count : 3.44 M/uL  Hemoglobin : 9.8 g/dL  Hematocrit : 29.4 %  Platelet Count - Automated : 391 K/uL  Mean Cell Volume : 85.5 fl  Mean Cell Hemoglobin : 28.5 pg  Mean Cell Hemoglobin Concentration : 33.3 gm/dL  Auto Neutrophil # : x  Auto Lymphocyte # : x  Auto Monocyte # : x  Auto Eosinophil # : x  Auto Basophil # : x  Auto Neutrophil % : x  Auto Lymphocyte % : x  Auto Monocyte % : x  Auto Eosinophil % : x  Auto Basophil % : x    PT/INR - ( 05 Oct 2023 04:48 )   PT: 11.6 sec;   INR: 1.02          PTT - ( 05 Oct 2023 04:48 )  PTT:34.8 sec    Pregnancy test (serum hcg for any female under 56, must be resulted day before OR): [x ] Negative Result  [ ] Positive Result  [ ] N/A : male or female>57 y/o  Type & Screen (in past 72hrs):     2 Type & Screen within 72 hours if anticipate blood need in OR:  x_ Y _ N     Blood ordered and on hold for OR:   [ ] No need     [ ] 1u pRBC on hold      [x ] 2u pRBC on hold    COVID swab (in past 48hrs): _ Y  x_N      ECHO:  < from: TTE Echo Complete w/ Contrast w/ Doppler (09.27.23 @ 09:35) >     1. Normal left ventricular size and systolic function.   2. Normal right ventricular size and systolic function.   3. Normal atria.   4. No significant valvular disease.   5. No pericardial effusion.    < end of copied text >  < from: Xray Chest 1 View- PORTABLE-Routine (Xray Chest 1 View- PORTABLE-Routine .) (10.01.23 @ 12:06) >    Frontal examination the chest demonstrates the heart to be within normal   limits in transverse diameter. No interval change position remaining   support devices in comparison to prior examination of the chest   9/29/2023. Limited inspiration. No acute infiltrates. No interval change   comparison to prior examination of the chest9/29/2023.    IMPRESSION: No interval change.    < end of copied text >    Medical Clearances:cleared by intensivist    Last dose of antiplatelet/anticoagulation drug: SQL 10/4    Implanted Devices (pacemaker, drug pump...etc):  []YES   [x] NO                  If yes --> EPS consulted to interrogate device: [ ] YES  [ ] NO                            If yes -->  EPS called to let them know patient going for surgery: [ ] device needs to be turned off                                                                                                                                                 [ ] magnet needs to be placed for surgery                                                                                                                                                [ ] nothing to do per EP, may proceed with cautery use in OR                                       3M nasal swab ordered?  x_Y  _N    Cranial surgery: Order written for hair to be shampooed night before surgery and morning before surgery  [] yes   [x]no  Chlorhexidine Wipes ordered for Neck Down?  _x Y  _ N  (twice a day if 1 day before surgery, daily for 3 days if 3 days prior, daily if in ICU)                 Assessment:  43F no PMHx suffered childhood injury resulting in spine injury and residual gait disturbance, hyperreflexia of BL LE. She has right greater than left leg tingling, low back and mid back pain. 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 S/p C7-L1 fusion, T3-8 vertebral column resection, placement of anterior cage (9/25/23).     NEURO:  - Neuro/spine checks q4/vitals q1hrs  - Pain control: tylenol 1g q8h prn, valium 2q8, dilaudid 2/4 PO, Lyrica 50q8, s/p toradol 30mg q8hr x2days  - Post-op CT full spine: L1 screw not attached to the vertical yoli, epidural lipomatosis L2-3    Cardio:  - MAP > 100, on levo gtt   - midodrine 15 mg q8h to wean off pressors  - echo 9/27: EF 60-65%    Pulm:  - room air  - incentive spirometry    GI:  - Regular diet, NPO after midnight for OR  - Bowel regimen   - BM 10/4  - simethicone PRN for gas    Renal:  - NS @ 30cc/hr  - voiding    Endo:  - a1c 5.6  - ISS dc'd    Heme:  - SCDs for DVT ppx/ SQL held for OR  - 1.5L EBL, 1.5L albumin and cell saver intraop  - 2u PRBC and 1u PLT postop 9/25, 1u PRBC 10/3  - b/l LE dopplers 10/1 negative  - LUE doppler 10/1: +cephalic thrombophlebitis    ID:  - febrile 9/27, 10/1 f/u panculture, +UA 10/1  - Vanc (10/1-10/3 ) dc'd, Zosyn (10/1- 10/8)  x 7 days   - ID signed off    Discussed with Dr. Vieira and Dr. Sutton   Assessment:  Present when checked    []  GCS  E   V  M     Heart Failure: []Acute, [] acute on chronic , []chronic  Heart Failure:  [] Diastolic (HFpEF), [] Systolic (HFrEF), []Combined (HFpEF and HFrEF), [] RHF, [] Pulm HTN, [] Other    [] CELE, [] ATN, [] AIN, [] other  [] CKD1, [] CKD2, [] CKD 3, [] CKD 4, [] CKD 5, []ESRD    Encephalopathy: [] Metabolic, [] Hepatic, [] toxic, [] Neurological, [] Other    Abnormal Nurtitional Status: [] malnurtition (see nutrition note), [ ]underweight: BMI < 19, [] morbid obesity: BMI >40, [] Cachexia    [] Sepsis  [] hypovolemic shock,[] cardiogenic shock, [] hemorrhagic shock, [] neuogenic shock  [] Acute Respiratory Failure  []Cerebral edema, [] Brain compression/ herniation,   [] Functional quadriplegia  [] Acute blood loss anemia

## 2023-10-05 NOTE — PROGRESS NOTE ADULT - ASSESSMENT
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 On pressors since 9/26 for likely neurogenic shock, and MAP pushes. Developed fever and leukocytosis on 9/27 (POD 2) but had no focal symptoms of infection and looked well, and septic workup was negative so monitored off abx for presumed fever/leukocytosis from post-op inflammatory response/fusion fever. However 9/30 had recurrent fever (POD 6). Still no focal signs of infection. WBC normalized. BCx ngtd, RVP neg. BLE duplex neg. UA+ but not sent for culture, no clear urinary symptoms (but may have decreased sensation here due to spinal issue). LUE duplex showed superficial thrombophlebitis.    #Post-op fever, unclear source. Possibly superficial thrombophlebitis vs. UTI vs. fusion fever/post-op inflammation  - Fever seems to be resolved  -c/w zosyn 4.5g IV q8h for 7 days (EOT 10/7)  -If fever recurs, would obtain CT chest given pleural injury in OR, to evaluate for any occult infection here, and would draw blood cultures from PICC and arterial line.    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 On pressors since 9/26 for likely neurogenic shock, and MAP pushes. Developed fever and leukocytosis on 9/27 (POD 2) but had no focal symptoms of infection and looked well, and septic workup was negative so monitored off abx for presumed fever/leukocytosis from post-op inflammatory response/fusion fever. However 9/30 had recurrent fever (POD 6). Still no focal signs of infection. WBC normalized. BCx ngtd, RVP neg. BLE duplex neg. UA+ but not sent for culture, no clear urinary symptoms (but may have decreased sensation here due to spinal issue). LUE duplex showed superficial thrombophlebitis.    #Post-op fever, unclear source. Possibly superficial thrombophlebitis vs. UTI vs. fusion fever/post-op inflammation  - Fevers appear resolved  -c/w zosyn 4.5g IV q8h for 7 days (EOT 10/7)  -If fever recurs, would obtain CT chest given pleural injury in OR, to evaluate for any occult infection here, and would draw blood cultures from PICC and arterial line.    #LUE superficial thrombophlebitis  - Improved on exam    - ID will sign off, please reconsult with additional questions.  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 On pressors since 9/26 for likely neurogenic shock, and MAP pushes. Developed fever and leukocytosis on 9/27 (POD 2) but had no focal symptoms of infection and looked well, and septic workup was negative so monitored off abx for presumed fever/leukocytosis from post-op inflammatory response/fusion fever. However 9/30 had recurrent fever (POD 6). Still no focal signs of infection. WBC normalized. BCx ngtd, RVP neg. BLE duplex neg. UA+ but not sent for culture, no clear urinary symptoms (but may have decreased sensation here due to spinal issue). LUE duplex showed superficial thrombophlebitis.    #Post-op fever, unclear source. Possibly superficial thrombophlebitis vs. UTI vs. fusion fever/post-op inflammation  -No fevers x 48h  -c/w zosyn 4.5g IV q8h for 7 days (EOT 10/7)  -If fever recurs, would consider CT chest given pleural injury in OR, to evaluate for any occult infection here, and would draw blood cultures from PICC and arterial line.    #LUE superficial thrombophlebitis  - Improved on exam    ID will sign off, please reconsult with additional questions.

## 2023-10-05 NOTE — PROGRESS NOTE ADULT - SUBJECTIVE AND OBJECTIVE BOX
INFECTIOUS DISEASES CONSULT FOLLOW-UP NOTE    INTERVAL HPI/OVERNIGHT EVENTS:  Patient is still having mild shoulder and back pain from past surgery. She has no other complaints otherwise. No events.    ROS:   Constitutional, eyes, ENT, cardiovascular, respiratory, gastrointestinal, genitourinary, integumentary, neurological, psychiatric and heme/lymph are otherwise negative other than noted above       ANTIBIOTICS/RELEVANT:    MEDICATIONS  (STANDING):  bisacodyl 5 milliGRAM(s) Oral every 12 hours  bisacodyl Suppository 10 milliGRAM(s) Rectal daily  chlorhexidine 2% Cloths 1 Application(s) Topical <User Schedule>  diazepam    Tablet 2 milliGRAM(s) Oral every 8 hours  enoxaparin Injectable 40 milliGRAM(s) SubCutaneous every 24 hours  influenza   Vaccine 0.5 milliLiter(s) IntraMuscular once  midodrine 15 milliGRAM(s) Oral every 8 hours  norepinephrine Infusion 0.05 MICROgram(s)/kG/Min (3.22 mL/Hr) IV Continuous <Continuous>  piperacillin/tazobactam IVPB.. 4.5 Gram(s) IV Intermittent every 8 hours  polyethylene glycol 3350 17 Gram(s) Oral two times a day  pregabalin 50 milliGRAM(s) Oral every 8 hours  senna 2 Tablet(s) Oral at bedtime  simethicone 80 milliGRAM(s) Chew every 6 hours  sodium chloride 0.9%. 1000 milliLiter(s) (30 mL/Hr) IV Continuous <Continuous>    MEDICATIONS  (PRN):  acetaminophen     Tablet .. 1000 milliGRAM(s) Oral every 8 hours PRN Mild Pain (1 - 3)  HYDROmorphone   Tablet 4 milliGRAM(s) Oral every 4 hours PRN Severe Pain (7 - 10)  HYDROmorphone   Tablet 2 milliGRAM(s) Oral every 4 hours PRN Moderate Pain (4 - 6)  melatonin 5 milliGRAM(s) Oral at bedtime PRN Insomnia  sodium chloride 0.9% lock flush 10 milliLiter(s) IV Push every 1 hour PRN Pre/post blood products, medications, blood draw, and to maintain line patency        Vital Signs Last 24 Hrs  T(C): 37.3 (05 Oct 2023 09:10), Max: 37.6 (04 Oct 2023 14:21)  T(F): 99.2 (05 Oct 2023 09:10), Max: 99.7 (04 Oct 2023 14:21)  HR: 89 (05 Oct 2023 11:00) (80 - 123)  BP: --  BP(mean): --  RR: 16 (05 Oct 2023 11:00) (16 - 20)  SpO2: 100% (05 Oct 2023 11:00) (97% - 100%)    Parameters below as of 05 Oct 2023 11:00  Patient On (Oxygen Delivery Method): room air        10-04-23 @ 07:01  -  10-05-23 @ 07:00  --------------------------------------------------------  IN: 3261.7 mL / OUT: 4300 mL / NET: -1038.3 mL    10-05-23 @ 07:01  -  10-05-23 @ 11:51  --------------------------------------------------------  IN: 119.5 mL / OUT: 1400 mL / NET: -1280.5 mL      PHYSICAL EXAM:  Constitutional: alert, NAD  Eyes: the sclera and conjunctiva were normal.   ENT: the ears and nose were normal in appearance.   Pulmonary: no respiratory distress and lungs were clear to auscultation bilaterally. On RA  Heart: heart rate was normal and rhythm regular, normal S1 and S2  Vascular:. there was no peripheral edema  Abdomen: normal bowel sounds, soft, non-tender. less distention  : external catheter  Ext: no swelling, Resolved cord at L cephalic vein  Neurological: no focal deficits.   Psychiatric: the affect was normal  Vasc: BRANDON PICC c/d/i        LABS:                        9.8    8.49  )-----------( 391      ( 05 Oct 2023 04:48 )             29.4     10-05    136  |  101  |  8   ----------------------------<  155<H>  4.2   |  24  |  0.49<L>    Ca    9.2      05 Oct 2023 04:48  Phos  3.5     10-05  Mg     1.9     10-05      PT/INR - ( 05 Oct 2023 04:48 )   PT: 11.6 sec;   INR: 1.02          PTT - ( 05 Oct 2023 04:48 )  PTT:34.8 sec  Urinalysis Basic - ( 05 Oct 2023 04:48 )    Color: x / Appearance: x / SG: x / pH: x  Gluc: 155 mg/dL / Ketone: x  / Bili: x / Urobili: x   Blood: x / Protein: x / Nitrite: x   Leuk Esterase: x / RBC: x / WBC x   Sq Epi: x / Non Sq Epi: x / Bacteria: x        MICROBIOLOGY:      RADIOLOGY & ADDITIONAL STUDIES:  Reviewed INFECTIOUS DISEASES CONSULT FOLLOW-UP NOTE    INTERVAL HPI/OVERNIGHT EVENTS:  Patient is still having mild shoulder and back pain from past surgery. She has no other complaints otherwise. No events.    ROS:   Constitutional, eyes, ENT, cardiovascular, respiratory, gastrointestinal, genitourinary, integumentary, neurological, psychiatric and heme/lymph are otherwise negative other than noted above       ANTIBIOTICS/RELEVANT:    MEDICATIONS  (STANDING):  bisacodyl 5 milliGRAM(s) Oral every 12 hours  bisacodyl Suppository 10 milliGRAM(s) Rectal daily  chlorhexidine 2% Cloths 1 Application(s) Topical <User Schedule>  diazepam    Tablet 2 milliGRAM(s) Oral every 8 hours  enoxaparin Injectable 40 milliGRAM(s) SubCutaneous every 24 hours  influenza   Vaccine 0.5 milliLiter(s) IntraMuscular once  midodrine 15 milliGRAM(s) Oral every 8 hours  norepinephrine Infusion 0.05 MICROgram(s)/kG/Min (3.22 mL/Hr) IV Continuous <Continuous>  piperacillin/tazobactam IVPB.. 4.5 Gram(s) IV Intermittent every 8 hours  polyethylene glycol 3350 17 Gram(s) Oral two times a day  pregabalin 50 milliGRAM(s) Oral every 8 hours  senna 2 Tablet(s) Oral at bedtime  simethicone 80 milliGRAM(s) Chew every 6 hours  sodium chloride 0.9%. 1000 milliLiter(s) (30 mL/Hr) IV Continuous <Continuous>    MEDICATIONS  (PRN):  acetaminophen     Tablet .. 1000 milliGRAM(s) Oral every 8 hours PRN Mild Pain (1 - 3)  HYDROmorphone   Tablet 4 milliGRAM(s) Oral every 4 hours PRN Severe Pain (7 - 10)  HYDROmorphone   Tablet 2 milliGRAM(s) Oral every 4 hours PRN Moderate Pain (4 - 6)  melatonin 5 milliGRAM(s) Oral at bedtime PRN Insomnia  sodium chloride 0.9% lock flush 10 milliLiter(s) IV Push every 1 hour PRN Pre/post blood products, medications, blood draw, and to maintain line patency        Vital Signs Last 24 Hrs  T(C): 37.3 (05 Oct 2023 09:10), Max: 37.6 (04 Oct 2023 14:21)  T(F): 99.2 (05 Oct 2023 09:10), Max: 99.7 (04 Oct 2023 14:21)  HR: 89 (05 Oct 2023 11:00) (80 - 123)  BP: --  BP(mean): --  RR: 16 (05 Oct 2023 11:00) (16 - 20)  SpO2: 100% (05 Oct 2023 11:00) (97% - 100%)    Parameters below as of 05 Oct 2023 11:00  Patient On (Oxygen Delivery Method): room air        10-04-23 @ 07:01  -  10-05-23 @ 07:00  --------------------------------------------------------  IN: 3261.7 mL / OUT: 4300 mL / NET: -1038.3 mL    10-05-23 @ 07:01  -  10-05-23 @ 11:51  --------------------------------------------------------  IN: 119.5 mL / OUT: 1400 mL / NET: -1280.5 mL      PHYSICAL EXAM:  Constitutional: alert, NAD  Eyes: the sclera and conjunctiva were normal.   ENT: the ears and nose were normal in appearance.   Pulmonary: no respiratory distress and lungs were clear to auscultation bilaterally. On RA  Heart: heart rate was normal and rhythm regular, normal S1 and S2  Vascular:. there was no peripheral edema  Abdomen: normal bowel sounds, soft, non-tender. less distention  : external catheter  Ext: no swelling, Resolved cord at L cephalic vein  Neurological: no focal deficits.   Psychiatric: the affect was normal  Vasc: BRANDON PICC c/d/i        LABS:                        9.8    8.49  )-----------( 391      ( 05 Oct 2023 04:48 )             29.4     10-05    136  |  101  |  8   ----------------------------<  155<H>  4.2   |  24  |  0.49<L>    Ca    9.2      05 Oct 2023 04:48  Phos  3.5     10-05  Mg     1.9     10-05      PT/INR - ( 05 Oct 2023 04:48 )   PT: 11.6 sec;   INR: 1.02          PTT - ( 05 Oct 2023 04:48 )  PTT:34.8 sec  Urinalysis Basic - ( 05 Oct 2023 04:48 )    Color: x / Appearance: x / SG: x / pH: x  Gluc: 155 mg/dL / Ketone: x  / Bili: x / Urobili: x   Blood: x / Protein: x / Nitrite: x   Leuk Esterase: x / RBC: x / WBC x   Sq Epi: x / Non Sq Epi: x / Bacteria: x        MICROBIOLOGY:  Reviewed    RADIOLOGY & ADDITIONAL STUDIES:  Reviewed

## 2023-10-06 ENCOUNTER — TRANSCRIPTION ENCOUNTER (OUTPATIENT)
Age: 43
End: 2023-10-06

## 2023-10-06 ENCOUNTER — RESULT REVIEW (OUTPATIENT)
Age: 43
End: 2023-10-06

## 2023-10-06 LAB
ANION GAP SERPL CALC-SCNC: 10 MMOL/L — SIGNIFICANT CHANGE UP (ref 5–17)
ANION GAP SERPL CALC-SCNC: 7 MMOL/L — SIGNIFICANT CHANGE UP (ref 5–17)
APTT BLD: 26.1 SEC — SIGNIFICANT CHANGE UP (ref 24.5–35.6)
BASE EXCESS BLDA CALC-SCNC: -0.8 MMOL/L — SIGNIFICANT CHANGE UP (ref -2–3)
BASE EXCESS BLDA CALC-SCNC: -8.1 MMOL/L — LOW (ref -2–3)
BASOPHILS # BLD AUTO: 0.03 K/UL — SIGNIFICANT CHANGE UP (ref 0–0.2)
BASOPHILS NFR BLD AUTO: 0.2 % — SIGNIFICANT CHANGE UP (ref 0–2)
BUN SERPL-MCNC: 6 MG/DL — LOW (ref 7–23)
BUN SERPL-MCNC: 7 MG/DL — SIGNIFICANT CHANGE UP (ref 7–23)
CA-I BLDA-SCNC: 1.01 MMOL/L — LOW (ref 1.15–1.33)
CA-I BLDA-SCNC: 1.2 MMOL/L — SIGNIFICANT CHANGE UP (ref 1.15–1.33)
CALCIUM SERPL-MCNC: 8.7 MG/DL — SIGNIFICANT CHANGE UP (ref 8.4–10.5)
CALCIUM SERPL-MCNC: 9.5 MG/DL — SIGNIFICANT CHANGE UP (ref 8.4–10.5)
CHLORIDE SERPL-SCNC: 102 MMOL/L — SIGNIFICANT CHANGE UP (ref 96–108)
CHLORIDE SERPL-SCNC: 106 MMOL/L — SIGNIFICANT CHANGE UP (ref 96–108)
CO2 BLDA-SCNC: 16 MMOL/L — LOW (ref 19–24)
CO2 BLDA-SCNC: 24 MMOL/L — SIGNIFICANT CHANGE UP (ref 19–24)
CO2 SERPL-SCNC: 23 MMOL/L — SIGNIFICANT CHANGE UP (ref 22–31)
CO2 SERPL-SCNC: 25 MMOL/L — SIGNIFICANT CHANGE UP (ref 22–31)
COHGB MFR BLDA: 0.9 % — SIGNIFICANT CHANGE UP
COHGB MFR BLDA: 1.3 % — SIGNIFICANT CHANGE UP
CREAT SERPL-MCNC: 0.39 MG/DL — LOW (ref 0.5–1.3)
CREAT SERPL-MCNC: 0.43 MG/DL — LOW (ref 0.5–1.3)
CULTURE RESULTS: SIGNIFICANT CHANGE UP
CULTURE RESULTS: SIGNIFICANT CHANGE UP
EGFR: 125 ML/MIN/1.73M2 — SIGNIFICANT CHANGE UP
EGFR: 128 ML/MIN/1.73M2 — SIGNIFICANT CHANGE UP
EOSINOPHIL # BLD AUTO: 0.02 K/UL — SIGNIFICANT CHANGE UP (ref 0–0.5)
EOSINOPHIL NFR BLD AUTO: 0.1 % — SIGNIFICANT CHANGE UP (ref 0–6)
GLUCOSE BLDA-MCNC: 116 MG/DL — HIGH (ref 70–99)
GLUCOSE BLDA-MCNC: 142 MG/DL — HIGH (ref 70–99)
GLUCOSE SERPL-MCNC: 121 MG/DL — HIGH (ref 70–99)
GLUCOSE SERPL-MCNC: 133 MG/DL — HIGH (ref 70–99)
HCO3 BLDA-SCNC: 16 MMOL/L — LOW (ref 21–28)
HCO3 BLDA-SCNC: 23 MMOL/L — SIGNIFICANT CHANGE UP (ref 21–28)
HCT VFR BLD CALC: 25.8 % — LOW (ref 34.5–45)
HCT VFR BLD CALC: 26.9 % — LOW (ref 34.5–45)
HCT VFR BLD CALC: 30.8 % — LOW (ref 34.5–45)
HGB BLD-MCNC: 10 G/DL — LOW (ref 11.5–15.5)
HGB BLD-MCNC: 8.6 G/DL — LOW (ref 11.5–15.5)
HGB BLD-MCNC: 8.8 G/DL — LOW (ref 11.5–15.5)
HGB BLDA-MCNC: 10.4 G/DL — LOW (ref 11.7–16.1)
HGB BLDA-MCNC: 9.2 G/DL — LOW (ref 11.7–16.1)
IMM GRANULOCYTES NFR BLD AUTO: 0.7 % — SIGNIFICANT CHANGE UP (ref 0–0.9)
INR BLD: 1.06 — SIGNIFICANT CHANGE UP (ref 0.85–1.18)
LYMPHOCYTES # BLD AUTO: 0.75 K/UL — LOW (ref 1–3.3)
LYMPHOCYTES # BLD AUTO: 5.3 % — LOW (ref 13–44)
MAGNESIUM SERPL-MCNC: 1.5 MG/DL — LOW (ref 1.6–2.6)
MAGNESIUM SERPL-MCNC: 2 MG/DL — SIGNIFICANT CHANGE UP (ref 1.6–2.6)
MCHC RBC-ENTMCNC: 28.1 PG — SIGNIFICANT CHANGE UP (ref 27–34)
MCHC RBC-ENTMCNC: 28.2 PG — SIGNIFICANT CHANGE UP (ref 27–34)
MCHC RBC-ENTMCNC: 28.6 PG — SIGNIFICANT CHANGE UP (ref 27–34)
MCHC RBC-ENTMCNC: 32.5 GM/DL — SIGNIFICANT CHANGE UP (ref 32–36)
MCHC RBC-ENTMCNC: 32.7 GM/DL — SIGNIFICANT CHANGE UP (ref 32–36)
MCHC RBC-ENTMCNC: 33.3 GM/DL — SIGNIFICANT CHANGE UP (ref 32–36)
MCV RBC AUTO: 84.3 FL — SIGNIFICANT CHANGE UP (ref 80–100)
MCV RBC AUTO: 86.8 FL — SIGNIFICANT CHANGE UP (ref 80–100)
MCV RBC AUTO: 87.3 FL — SIGNIFICANT CHANGE UP (ref 80–100)
METHGB MFR BLDA: 0.4 % — SIGNIFICANT CHANGE UP
METHGB MFR BLDA: 0.4 % — SIGNIFICANT CHANGE UP
MONOCYTES # BLD AUTO: 0.59 K/UL — SIGNIFICANT CHANGE UP (ref 0–0.9)
MONOCYTES NFR BLD AUTO: 4.1 % — SIGNIFICANT CHANGE UP (ref 2–14)
NEUTROPHILS # BLD AUTO: 12.73 K/UL — HIGH (ref 1.8–7.4)
NEUTROPHILS NFR BLD AUTO: 89.6 % — HIGH (ref 43–77)
NRBC # BLD: 0 /100 WBCS — SIGNIFICANT CHANGE UP (ref 0–0)
OXYHGB MFR BLDA: 97.6 % — HIGH (ref 90–95)
OXYHGB MFR BLDA: 97.7 % — HIGH (ref 90–95)
PCO2 BLDA: 26 MMHG — LOW (ref 32–45)
PCO2 BLDA: 36 MMHG — SIGNIFICANT CHANGE UP (ref 32–45)
PH BLDA: 7.39 — SIGNIFICANT CHANGE UP (ref 7.35–7.45)
PH BLDA: 7.42 — SIGNIFICANT CHANGE UP (ref 7.35–7.45)
PHOSPHATE SERPL-MCNC: 3 MG/DL — SIGNIFICANT CHANGE UP (ref 2.5–4.5)
PHOSPHATE SERPL-MCNC: 3.5 MG/DL — SIGNIFICANT CHANGE UP (ref 2.5–4.5)
PLATELET # BLD AUTO: 365 K/UL — SIGNIFICANT CHANGE UP (ref 150–400)
PLATELET # BLD AUTO: 392 K/UL — SIGNIFICANT CHANGE UP (ref 150–400)
PLATELET # BLD AUTO: 433 K/UL — HIGH (ref 150–400)
PO2 BLDA: 311 MMHG — HIGH (ref 83–108)
PO2 BLDA: 313 MMHG — HIGH (ref 83–108)
POTASSIUM BLDA-SCNC: 2.7 MMOL/L — CRITICAL LOW (ref 3.5–5.1)
POTASSIUM BLDA-SCNC: 3.7 MMOL/L — SIGNIFICANT CHANGE UP (ref 3.5–5.1)
POTASSIUM SERPL-MCNC: 4.2 MMOL/L — SIGNIFICANT CHANGE UP (ref 3.5–5.3)
POTASSIUM SERPL-MCNC: 4.4 MMOL/L — SIGNIFICANT CHANGE UP (ref 3.5–5.3)
POTASSIUM SERPL-SCNC: 4.2 MMOL/L — SIGNIFICANT CHANGE UP (ref 3.5–5.3)
POTASSIUM SERPL-SCNC: 4.4 MMOL/L — SIGNIFICANT CHANGE UP (ref 3.5–5.3)
PROTHROM AB SERPL-ACNC: 12.1 SEC — SIGNIFICANT CHANGE UP (ref 9.5–13)
RBC # BLD: 3.06 M/UL — LOW (ref 3.8–5.2)
RBC # BLD: 3.08 M/UL — LOW (ref 3.8–5.2)
RBC # BLD: 3.55 M/UL — LOW (ref 3.8–5.2)
RBC # FLD: 14.4 % — SIGNIFICANT CHANGE UP (ref 10.3–14.5)
RBC # FLD: 14.5 % — SIGNIFICANT CHANGE UP (ref 10.3–14.5)
RBC # FLD: 14.6 % — HIGH (ref 10.3–14.5)
SAO2 % BLDA: 99 % — HIGH (ref 94–98)
SAO2 % BLDA: 99.3 % — HIGH (ref 94–98)
SODIUM BLDA-SCNC: 135 MMOL/L — LOW (ref 136–145)
SODIUM BLDA-SCNC: 138 MMOL/L — SIGNIFICANT CHANGE UP (ref 136–145)
SODIUM SERPL-SCNC: 136 MMOL/L — SIGNIFICANT CHANGE UP (ref 135–145)
SODIUM SERPL-SCNC: 137 MMOL/L — SIGNIFICANT CHANGE UP (ref 135–145)
SPECIMEN SOURCE: SIGNIFICANT CHANGE UP
SPECIMEN SOURCE: SIGNIFICANT CHANGE UP
TROPONIN T, HIGH SENSITIVITY RESULT: 19 NG/L — SIGNIFICANT CHANGE UP (ref 0–51)
WBC # BLD: 11.18 K/UL — HIGH (ref 3.8–10.5)
WBC # BLD: 13.74 K/UL — HIGH (ref 3.8–10.5)
WBC # BLD: 14.22 K/UL — HIGH (ref 3.8–10.5)
WBC # FLD AUTO: 11.18 K/UL — HIGH (ref 3.8–10.5)
WBC # FLD AUTO: 13.74 K/UL — HIGH (ref 3.8–10.5)
WBC # FLD AUTO: 14.22 K/UL — HIGH (ref 3.8–10.5)

## 2023-10-06 PROCEDURE — 22849 REINSERT SPINAL FIXATION: CPT | Mod: AS,78

## 2023-10-06 PROCEDURE — 72128 CT CHEST SPINE W/O DYE: CPT | Mod: 26

## 2023-10-06 PROCEDURE — 72020 X-RAY EXAM OF SPINE 1 VIEW: CPT | Mod: 26

## 2023-10-06 PROCEDURE — 22812 ARTHRD ANT DFRM 8+ VRT SGM: CPT | Mod: AS,78

## 2023-10-06 PROCEDURE — 88300 SURGICAL PATH GROSS: CPT | Mod: 26

## 2023-10-06 PROCEDURE — 22854 INSJ BIOMECHANICAL DEVICE: CPT | Mod: AS,78,59

## 2023-10-06 PROCEDURE — 99291 CRITICAL CARE FIRST HOUR: CPT

## 2023-10-06 PROCEDURE — 22849 REINSERT SPINAL FIXATION: CPT | Mod: 78

## 2023-10-06 PROCEDURE — 22802 ARTHRD PST DFRM 7-12 VRT SGM: CPT | Mod: AS,78,59

## 2023-10-06 PROCEDURE — 22812 ARTHRD ANT DFRM 8+ VRT SGM: CPT | Mod: 78

## 2023-10-06 PROCEDURE — 22854 INSJ BIOMECHANICAL DEVICE: CPT | Mod: 78,59

## 2023-10-06 PROCEDURE — 20931 SP BONE ALGRFT STRUCT ADD-ON: CPT | Mod: 78

## 2023-10-06 PROCEDURE — 22802 ARTHRD PST DFRM 7-12 VRT SGM: CPT | Mod: 78,59

## 2023-10-06 DEVICE — IMPLANTABLE DEVICE: Type: IMPLANTABLE DEVICE | Status: FUNCTIONAL

## 2023-10-06 DEVICE — CLIP APPLIER ETHICON LIGACLIP 11.5" MEDIUM: Type: IMPLANTABLE DEVICE | Status: FUNCTIONAL

## 2023-10-06 DEVICE — GRAFT VITOSIS BIMODAL 10CC: Type: IMPLANTABLE DEVICE | Status: FUNCTIONAL

## 2023-10-06 DEVICE — BONE FILLER BIOCOMPOSITE STIMULAN RAPID CURE 10CC: Type: IMPLANTABLE DEVICE | Status: FUNCTIONAL

## 2023-10-06 RX ORDER — MIDODRINE HYDROCHLORIDE 2.5 MG/1
15 TABLET ORAL EVERY 8 HOURS
Refills: 0 | Status: ACTIVE | OUTPATIENT
Start: 2023-10-06 | End: 2024-09-03

## 2023-10-06 RX ORDER — ACETAMINOPHEN 500 MG
1000 TABLET ORAL EVERY 8 HOURS
Refills: 0 | Status: COMPLETED | OUTPATIENT
Start: 2023-10-06 | End: 2023-10-11

## 2023-10-06 RX ORDER — DIAZEPAM 5 MG
2 TABLET ORAL EVERY 8 HOURS
Refills: 0 | Status: DISCONTINUED | OUTPATIENT
Start: 2023-10-06 | End: 2023-10-07

## 2023-10-06 RX ORDER — SODIUM CHLORIDE 9 MG/ML
1000 INJECTION INTRAMUSCULAR; INTRAVENOUS; SUBCUTANEOUS
Refills: 0 | Status: DISCONTINUED | OUTPATIENT
Start: 2023-10-06 | End: 2023-10-07

## 2023-10-06 RX ORDER — ONDANSETRON 8 MG/1
4 TABLET, FILM COATED ORAL EVERY 6 HOURS
Refills: 0 | Status: ACTIVE | OUTPATIENT
Start: 2023-10-06 | End: 2023-10-11

## 2023-10-06 RX ORDER — LANOLIN ALCOHOL/MO/W.PET/CERES
5 CREAM (GRAM) TOPICAL AT BEDTIME
Refills: 0 | Status: DISCONTINUED | OUTPATIENT
Start: 2023-10-06 | End: 2023-10-20

## 2023-10-06 RX ORDER — HYDROMORPHONE HYDROCHLORIDE 2 MG/ML
0.5 INJECTION INTRAMUSCULAR; INTRAVENOUS; SUBCUTANEOUS
Refills: 0 | Status: DISCONTINUED | OUTPATIENT
Start: 2023-10-06 | End: 2023-10-09

## 2023-10-06 RX ORDER — HYDROMORPHONE HYDROCHLORIDE 2 MG/ML
4 INJECTION INTRAMUSCULAR; INTRAVENOUS; SUBCUTANEOUS EVERY 4 HOURS
Refills: 0 | Status: DISCONTINUED | OUTPATIENT
Start: 2023-10-06 | End: 2023-10-07

## 2023-10-06 RX ORDER — CEFEPIME 1 G/1
2000 INJECTION, POWDER, FOR SOLUTION INTRAMUSCULAR; INTRAVENOUS ONCE
Refills: 0 | Status: COMPLETED | OUTPATIENT
Start: 2023-10-06 | End: 2023-10-06

## 2023-10-06 RX ORDER — SODIUM CHLORIDE 9 MG/ML
10 INJECTION INTRAMUSCULAR; INTRAVENOUS; SUBCUTANEOUS
Refills: 0 | Status: DISCONTINUED | OUTPATIENT
Start: 2023-10-06 | End: 2023-10-20

## 2023-10-06 RX ORDER — NOREPINEPHRINE BITARTRATE/D5W 8 MG/250ML
0.05 PLASTIC BAG, INJECTION (ML) INTRAVENOUS
Qty: 16 | Refills: 0 | Status: DISCONTINUED | OUTPATIENT
Start: 2023-10-06 | End: 2023-10-07

## 2023-10-06 RX ORDER — PIPERACILLIN AND TAZOBACTAM 4; .5 G/20ML; G/20ML
4.5 INJECTION, POWDER, LYOPHILIZED, FOR SOLUTION INTRAVENOUS EVERY 8 HOURS
Refills: 0 | Status: DISCONTINUED | OUTPATIENT
Start: 2023-10-06 | End: 2023-10-08

## 2023-10-06 RX ORDER — HYDROMORPHONE HYDROCHLORIDE 2 MG/ML
2 INJECTION INTRAMUSCULAR; INTRAVENOUS; SUBCUTANEOUS EVERY 4 HOURS
Refills: 0 | Status: DISCONTINUED | OUTPATIENT
Start: 2023-10-06 | End: 2023-10-07

## 2023-10-06 RX ORDER — SIMETHICONE 80 MG/1
80 TABLET, CHEWABLE ORAL EVERY 6 HOURS
Refills: 0 | Status: ACTIVE | OUTPATIENT
Start: 2023-10-06 | End: 2024-09-03

## 2023-10-06 RX ORDER — SODIUM CHLORIDE 9 MG/ML
1000 INJECTION INTRAMUSCULAR; INTRAVENOUS; SUBCUTANEOUS ONCE
Refills: 0 | Status: COMPLETED | OUTPATIENT
Start: 2023-10-06 | End: 2023-10-06

## 2023-10-06 RX ORDER — MAGNESIUM SULFATE 500 MG/ML
4 VIAL (ML) INJECTION ONCE
Refills: 0 | Status: COMPLETED | OUTPATIENT
Start: 2023-10-06 | End: 2023-10-06

## 2023-10-06 RX ORDER — SENNA PLUS 8.6 MG/1
2 TABLET ORAL AT BEDTIME
Refills: 0 | Status: DISCONTINUED | OUTPATIENT
Start: 2023-10-06 | End: 2023-10-20

## 2023-10-06 RX ORDER — POLYETHYLENE GLYCOL 3350 17 G/17G
17 POWDER, FOR SOLUTION ORAL
Refills: 0 | Status: DISCONTINUED | OUTPATIENT
Start: 2023-10-06 | End: 2023-10-20

## 2023-10-06 RX ADMIN — MIDODRINE HYDROCHLORIDE 15 MILLIGRAM(S): 2.5 TABLET ORAL at 22:08

## 2023-10-06 RX ADMIN — Medication 10 MILLIGRAM(S): at 15:22

## 2023-10-06 RX ADMIN — Medication 2 MILLIGRAM(S): at 22:07

## 2023-10-06 RX ADMIN — Medication 25 GRAM(S): at 15:36

## 2023-10-06 RX ADMIN — SIMETHICONE 80 MILLIGRAM(S): 80 TABLET, CHEWABLE ORAL at 02:13

## 2023-10-06 RX ADMIN — HYDROMORPHONE HYDROCHLORIDE 4 MILLIGRAM(S): 2 INJECTION INTRAMUSCULAR; INTRAVENOUS; SUBCUTANEOUS at 02:13

## 2023-10-06 RX ADMIN — SIMETHICONE 80 MILLIGRAM(S): 80 TABLET, CHEWABLE ORAL at 23:50

## 2023-10-06 RX ADMIN — SODIUM CHLORIDE 1000 MILLILITER(S): 9 INJECTION INTRAMUSCULAR; INTRAVENOUS; SUBCUTANEOUS at 20:24

## 2023-10-06 RX ADMIN — APREPITANT 40 MILLIGRAM(S): 80 CAPSULE ORAL at 06:24

## 2023-10-06 RX ADMIN — Medication 1000 MILLIGRAM(S): at 22:37

## 2023-10-06 RX ADMIN — PIPERACILLIN AND TAZOBACTAM 25 GRAM(S): 4; .5 INJECTION, POWDER, LYOPHILIZED, FOR SOLUTION INTRAVENOUS at 06:19

## 2023-10-06 RX ADMIN — POLYETHYLENE GLYCOL 3350 17 GRAM(S): 17 POWDER, FOR SOLUTION ORAL at 17:57

## 2023-10-06 RX ADMIN — POLYETHYLENE GLYCOL 3350 17 GRAM(S): 17 POWDER, FOR SOLUTION ORAL at 06:18

## 2023-10-06 RX ADMIN — PIPERACILLIN AND TAZOBACTAM 25 GRAM(S): 4; .5 INJECTION, POWDER, LYOPHILIZED, FOR SOLUTION INTRAVENOUS at 22:08

## 2023-10-06 RX ADMIN — HYDROMORPHONE HYDROCHLORIDE 4 MILLIGRAM(S): 2 INJECTION INTRAMUSCULAR; INTRAVENOUS; SUBCUTANEOUS at 02:44

## 2023-10-06 RX ADMIN — HYDROMORPHONE HYDROCHLORIDE 4 MILLIGRAM(S): 2 INJECTION INTRAMUSCULAR; INTRAVENOUS; SUBCUTANEOUS at 23:44

## 2023-10-06 RX ADMIN — Medication 1000 MILLIGRAM(S): at 22:07

## 2023-10-06 RX ADMIN — HYDROMORPHONE HYDROCHLORIDE 4 MILLIGRAM(S): 2 INJECTION INTRAMUSCULAR; INTRAVENOUS; SUBCUTANEOUS at 18:13

## 2023-10-06 RX ADMIN — CEFEPIME 100 MILLIGRAM(S): 1 INJECTION, POWDER, FOR SOLUTION INTRAMUSCULAR; INTRAVENOUS at 17:58

## 2023-10-06 RX ADMIN — Medication 5 MILLIGRAM(S): at 23:47

## 2023-10-06 RX ADMIN — SIMETHICONE 80 MILLIGRAM(S): 80 TABLET, CHEWABLE ORAL at 06:18

## 2023-10-06 RX ADMIN — Medication 50 MILLIGRAM(S): at 02:13

## 2023-10-06 RX ADMIN — CHLORHEXIDINE GLUCONATE 1 APPLICATION(S): 213 SOLUTION TOPICAL at 06:23

## 2023-10-06 RX ADMIN — HYDROMORPHONE HYDROCHLORIDE 0.5 MILLIGRAM(S): 2 INJECTION INTRAMUSCULAR; INTRAVENOUS; SUBCUTANEOUS at 21:03

## 2023-10-06 RX ADMIN — MIDODRINE HYDROCHLORIDE 15 MILLIGRAM(S): 2.5 TABLET ORAL at 06:19

## 2023-10-06 RX ADMIN — Medication 2 MILLIGRAM(S): at 02:16

## 2023-10-06 RX ADMIN — SENNA PLUS 2 TABLET(S): 8.6 TABLET ORAL at 22:07

## 2023-10-06 RX ADMIN — Medication 1000 MILLIGRAM(S): at 06:58

## 2023-10-06 RX ADMIN — Medication 50 MILLIGRAM(S): at 22:08

## 2023-10-06 RX ADMIN — Medication 1000 MILLIGRAM(S): at 06:23

## 2023-10-06 RX ADMIN — SIMETHICONE 80 MILLIGRAM(S): 80 TABLET, CHEWABLE ORAL at 17:58

## 2023-10-06 RX ADMIN — Medication 3.22 MICROGRAM(S)/KG/MIN: at 14:43

## 2023-10-06 RX ADMIN — HYDROMORPHONE HYDROCHLORIDE 0.5 MILLIGRAM(S): 2 INJECTION INTRAMUSCULAR; INTRAVENOUS; SUBCUTANEOUS at 20:49

## 2023-10-06 RX ADMIN — HYDROMORPHONE HYDROCHLORIDE 4 MILLIGRAM(S): 2 INJECTION INTRAMUSCULAR; INTRAVENOUS; SUBCUTANEOUS at 20:24

## 2023-10-06 RX ADMIN — Medication 5 MILLIGRAM(S): at 06:18

## 2023-10-06 RX ADMIN — ONDANSETRON 4 MILLIGRAM(S): 8 TABLET, FILM COATED ORAL at 17:57

## 2023-10-06 RX ADMIN — ONDANSETRON 4 MILLIGRAM(S): 8 TABLET, FILM COATED ORAL at 23:50

## 2023-10-06 RX ADMIN — SODIUM CHLORIDE 70 MILLILITER(S): 9 INJECTION INTRAMUSCULAR; INTRAVENOUS; SUBCUTANEOUS at 14:44

## 2023-10-06 RX ADMIN — Medication 50 MILLIGRAM(S): at 15:22

## 2023-10-06 RX ADMIN — CELECOXIB 200 MILLIGRAM(S): 200 CAPSULE ORAL at 06:24

## 2023-10-06 RX ADMIN — Medication 1 APPLICATION(S): at 06:40

## 2023-10-06 RX ADMIN — Medication 2 MILLIGRAM(S): at 15:30

## 2023-10-06 RX ADMIN — CELECOXIB 200 MILLIGRAM(S): 200 CAPSULE ORAL at 06:58

## 2023-10-06 NOTE — PROGRESS NOTE ADULT - SUBJECTIVE AND OBJECTIVE BOX
INTERVAL HISTORY: HPI:  Taken from outpatient neurosurgery note on 9/13/2023 " The patient, a long-standing patient of my practice, reports a history of spinal issues that dates back to a childhood injury. Over the years she's experienced developing issues with walking in a straight line, overactive reflexes in her lower extremities, and unique difficulty with her lower extremities referred to as cloneness. Recent upturn in these symptoms has limited her ability to carry out her daily routines. The patient also reports no significant improvement in her condition since our last clinical encounter two years ago."    44 y/o female with no PMHx or PSHx presents for surgery today.  She reports ambulating with walker or baby stroller at home for years.  She reports intermittent falls.  She has right greater than left leg tingling, low back and mid back pain.  She denies arm or leg pain.  She reports urinary incontinence since having a baby 2 years ago where if she doesn't get to the bathroom fast enough, she has incontinence, but she is able to hold it for a short while.  She takes no pain medications.  She has taken ibuprofen in the past.  She denies saddle anesthesia, bowel incontinence.   (25 Sep 2023 06:51)    Drug Dosing Weight  Height (cm): 152.4 (25 Sep 2023 07:32)  Weight (kg): 68.6 (25 Sep 2023 07:32)  BMI (kg/m2): 29.5 (25 Sep 2023 07:32)  BSA (m2): 1.66 (25 Sep 2023 07:32)    PAST MEDICAL & SURGICAL HISTORY:  History of acute illness  childhood  No significant past surgical history      REVIEW OF SYSTEMS: [ ] Unable to Assess due to neurologic exam   [ ] All ROS addressed below are non-contributory, except:  Neuro: [ ] Headache [ ] Back pain [ ] Numbness [ ] Weakness [ ] Ataxia [ ] Dizziness [ ] Aphasia [ ] Dysarthria [ ] Visual disturbance  Resp: [ ] Shortness of breath/dyspnea, [ ] Orthopnea [ ] Cough  CV: [ ] Chest pain [ ] Palpitation [ ] Lightheadedness [ ] Syncope  Renal: [ ] Thirst [ ] Edema  GI: [ ] Nausea [ ] Emesis [ ] Abdominal pain [ ] Constipation [ ] Diarrhea  Hem: [ ] Hematemesis [ ] bright red blood per rectum  ID: [ ] Fever [ ] Chills [ ] Dysuria  ENT: [ ] Rhinorrhea    PHYSICAL EXAM:    Constitutional: No Acute Distress     Neurological: AOx3, Following Commands, Moving all Extremities     Motor exam:          Upper extremity                         Delt     Bicep     Tricep    HG                                                 R         5/5        5/5        5/5       5/5                                               L          5/5        5/5        5/5       5/5          Lower extremity              R & L : triple flexion              Sensation: [] intact to light touch  [x] decreased: T5 & below sensation L approx. 65-70% R 50% per pt   Pulmonary: Clear to Auscultation, No rales, No rhonchi, No wheezes   Cardiovascular: S1, S2, Regular rate and rhythm   Gastrointestinal: Soft, Non-tender, distended hyperactive BS  Extremities: No calf tenderness   Incision: CDI; drain in place             ICU Vital Signs Last 24 Hrs  T(C): 37.1 (07 Oct 2023 05:08), Max: 38.1 (06 Oct 2023 22:09)  T(F): 98.7 (07 Oct 2023 05:08), Max: 100.6 (06 Oct 2023 22:09)  HR: 96 (07 Oct 2023 07:00) (75 - 121)  BP: 141/81 (06 Oct 2023 21:00) (141/81 - 141/92)  BP(mean): 106 (06 Oct 2023 21:00) (106 - 111)  ABP: 158/81 (07 Oct 2023 07:00) (116/63 - 179/86)  ABP(mean): 114 (07 Oct 2023 07:00) (82 - 126)  RR: 17 (07 Oct 2023 07:00) (16 - 32)  SpO2: 96% (07 Oct 2023 07:00) (93% - 100%)      10-06-23 @ 07:01  -  10-07-23 @ 07:00  --------------------------------------------------------  IN: 2816 mL / OUT: 1615 mL / NET: 1201 mL    10-07-23 @ 07:01  -  10-07-23 @ 08:36  --------------------------------------------------------  IN: 70 mL / OUT: 50 mL / NET: 20 mL            acetaminophen     Tablet .. 1000 milliGRAM(s) Oral every 8 hours  bisacodyl Suppository 10 milliGRAM(s) Rectal daily  diazepam    Tablet 2 milliGRAM(s) Oral every 8 hours  HYDROmorphone   Tablet 4 milliGRAM(s) Oral every 4 hours PRN  HYDROmorphone   Tablet 2 milliGRAM(s) Oral every 4 hours PRN  HYDROmorphone  Injectable 0.5 milliGRAM(s) IV Push every 2 hours PRN  influenza   Vaccine 0.5 milliLiter(s) IntraMuscular once  lactated ringers. 1000 milliLiter(s) (30 mL/Hr) IV Continuous <Continuous>  magnesium sulfate  IVPB 2 Gram(s) IV Intermittent once  melatonin 5 milliGRAM(s) Oral at bedtime PRN  midodrine 15 milliGRAM(s) Oral every 8 hours  norepinephrine Infusion 0.05 MICROgram(s)/kG/Min (3.22 mL/Hr) IV Continuous <Continuous>  ondansetron   Disintegrating Tablet 4 milliGRAM(s) Oral every 6 hours  piperacillin/tazobactam IVPB.. 4.5 Gram(s) IV Intermittent every 8 hours  polyethylene glycol 3350 17 Gram(s) Oral two times a day  pregabalin 50 milliGRAM(s) Oral every 8 hours  senna 2 Tablet(s) Oral at bedtime  simethicone 80 milliGRAM(s) Chew every 6 hours  sodium chloride 0.9% lock flush 10 milliLiter(s) IV Push every 1 hour PRN      LABS:  Na: 135 (10-07 @ 05:24), 136 (10-06 @ 14:34), 137 (10-06 @ 04:54), 136 (10-05 @ 04:48)  K: 4.0 (10-07 @ 05:24), 4.4 (10-06 @ 14:34), 4.2 (10-06 @ 04:54), 4.2 (10-05 @ 04:48)  Cl: 102 (10-07 @ 05:24), 106 (10-06 @ 14:34), 102 (10-06 @ 04:54), 101 (10-05 @ 04:48)  CO2: 23 (10-07 @ 05:24), 23 (10-06 @ 14:34), 25 (10-06 @ 04:54), 24 (10-05 @ 04:48)  BUN: 6 (10-07 @ 05:24), 7 (10-06 @ 14:34), 6 (10-06 @ 04:54), 8 (10-05 @ 04:48)  Cr: 0.41 (10-07 @ 05:24), 0.39 (10-06 @ 14:34), 0.43 (10-06 @ 04:54), 0.49 (10-05 @ 04:48)  Glu: 144(10-07 @ 05:24), 121(10-06 @ 14:34), 133(10-06 @ 04:54), 155(10-05 @ 04:48)    Hgb: 8.6 (10-07 @ 05:24), 8.6 (10-06 @ 20:04), 8.8 (10-06 @ 14:34), 10.0 (10-06 @ 04:54), 9.8 (10-05 @ 04:48)  Hct: 26.2 (10-07 @ 05:24), 25.8 (10-06 @ 20:04), 26.9 (10-06 @ 14:34), 30.8 (10-06 @ 04:54), 29.4 (10-05 @ 04:48)  WBC: 17.54 (10-07 @ 05:24), 14.22 (10-06 @ 20:04), 13.74 (10-06 @ 14:34), 11.18 (10-06 @ 04:54), 8.49 (10-05 @ 04:48)  Plt: 423 (10-07 @ 05:24), 392 (10-06 @ 20:04), 365 (10-06 @ 14:34), 433 (10-06 @ 04:54), 391 (10-05 @ 04:48)    INR: 1.06 10-06-23 @ 14:34, 1.02 10-05-23 @ 04:48  PTT: 26.1 10-06-23 @ 14:34, 34.8 10-05-23 @ 04:48    ABG - ( 06 Oct 2023 11:07 )  pH, Arterial: 7.39  pH, Blood: x     /  pCO2: 26    /  pO2: 313   / HCO3: 16    / Base Excess: -8.1  /  SaO2: x

## 2023-10-06 NOTE — BRIEF OPERATIVE NOTE - COMMENTS
Marisabel CORDOBA first assisted for the entirety of the procedure as there was no qualified resident or fellow available.

## 2023-10-06 NOTE — PRE-ANESTHESIA EVALUATION ADULT - NSANTHOSAYNRD_GEN_A_CORE
No. DEE screening performed.  STOP BANG Legend: 0-2 = LOW Risk; 3-4 = INTERMEDIATE Risk; 5-8 = HIGH Risk
No. DEE screening performed.  STOP BANG Legend: 0-2 = LOW Risk; 3-4 = INTERMEDIATE Risk; 5-8 = HIGH Risk

## 2023-10-06 NOTE — PROGRESS NOTE ADULT - ASSESSMENT
ASSESSMENT/PLAN:     s/p 5-level vertebral column resection with fusion of C7-L1, put in a cage; stage 1; stable introp monitoring; pleura violated, repaired; post op with BLE weakness requring pressors to augment MAP>100, now pending additional imaging    NEURO:  - Neurochecks q1h  - Surgical drains per NSGY/plastics  - Augment map as per below  - Pain control  - Activity: bed rest for now, PT/OT when able    PULM:  - Incentive spirometry  - mobilize as tolerated  - Aspiration Precautions    CV:  - MAP>100 per nsg  - trop, ekg while on pressors daily  - c/w levophed  - midodrine 15q8h  - Rt axillary olimpia placed 9/29    RENAL:  - Fluids: NS 70cc/h while on pressors  - trend renal function  - salt tabs    GI:  - Diet: ADAT  - Bowel regimen standing  - polyethylene glycol 17G daily, senna, dulcolax       ENDO:   - Goal euglycemia (-180)    HEME/ONC:  s/p 700cc cell saver, 1.5 L EBL; 1500 5% albumin, given TXA;  post-op Hb 6.8 - give 2 units pRBC, repeat   - monitor H/H    VTE prophylaxis   - SCDs   - hold chemoppx d/t fresh post op      ID:  - febrile, pan cultured 9/27, UA negative, will continue to monitor  - zosyn (10/1 - )  - asymptomatic bacteruria         Dispo: ICU for post op/ BP augmentation

## 2023-10-06 NOTE — PROGRESS NOTE ADULT - ASSESSMENT
Assessment: 43F s/p traumatic spine injury w/o neurological deficit & severe thoracic kyphosis now s/p T3-8 VCR, C7-L1 fusion, correction of deformity, placement of anterior cage.  STAGE 1. plastics closure.      NEURO:  T3-8 VCR, C7-L1 fusion, correction of deformity, placement of anterior cage.  STAGE 1. plastics closure now s/p STAGE 2 POD 0   -neuro check q1  -pain management: oral Dilaudid 4mg/2mg PRN, valium 2mg PRN, lyrica 50mg qd   -PT/OT evaluation tomorrow   -post operative CT spine     PULMONARY:  saturating well on RA,   -continue to monitor on pulse o2   -incentive spirometry 10q/hr when awake     CARDIOVASCULAR:  monitor on telemetry   vitals q1  MAP goal >100; c/w midodrine 15mg qd & levophed   -olimpia in place;     GASTROENTEROLOGY:  regular diet   ensure BMs w/ Miralax & senna  GI ppx : Protonix 40mg qd  Daily stool count, LBM 10/3  abdomen distended KUB w/ non-obstructive gas pattern       RENAL/:  -check BMP qd  -strict i/o's ;straight cath PRN  -Na goal 135-145    ENDOCRINE:  glucose goal 100-180    HEME/ONC:  DVT ppx: Lovenox SQ  b/l SCDs  s/p PRBC transfusion  LE doppler negative     INFECTIOUS:   empiric abx for low grade fevers; blood culture negative, RVP negative, UA (+) likely asymptomatic bacteruria.   c/w zosyn 4.5g q8 (total 7 days end 10/8)   incision site evaluated no evidence of infection

## 2023-10-06 NOTE — BRIEF OPERATIVE NOTE - OPERATION/FINDINGS
>>> Revision of T3-T9 VCR, Revision of C7-L1 Posterior Fusion instrumentation with autograft and allograft, plastic surgery closure.
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
## 2023-10-06 NOTE — BRIEF OPERATIVE NOTE - NSICDXBRIEFPOSTOP_GEN_ALL_CORE_FT
POST-OP DIAGNOSIS:  Deformity of thoracic vertebra 25-Sep-2023 19:52:41  Paulo Wyman  Thoracic myelopathy 06-Oct-2023 06:57:46  Marisabel Moore

## 2023-10-06 NOTE — PROGRESS NOTE ADULT - SUBJECTIVE AND OBJECTIVE BOX
NEUROCRITICAL CARE EVENING NOTE    DAY EVENTS:    - POD0 s/p stage 2 Revision of T3-T9 VCR, revision of C7-L1 posterior fusion, plastics closure   - stable pressor requireemnts for MAP goals  - monitoring h/h      VITALS/IMAGING/DATA  - Reviewed    ALLERGIES:   - Reviewed      MEDICATIONS:  - Reviewed      PHYSICAL EXAM:  Exam unchanged from Day time, refer to progress note.

## 2023-10-06 NOTE — BRIEF OPERATIVE NOTE - NSICDXBRIEFPREOP_GEN_ALL_CORE_FT
PRE-OP DIAGNOSIS:  Deformity of thoracic vertebra 25-Sep-2023 19:52:32  Paulo Wyman  
PRE-OP DIAGNOSIS:  Deformity of thoracic vertebra 25-Sep-2023 19:52:32  Paulo Wyman  Thoracic myelopathy 06-Oct-2023 06:57:34  Marisabel Moore

## 2023-10-07 LAB
ANION GAP SERPL CALC-SCNC: 10 MMOL/L — SIGNIFICANT CHANGE UP (ref 5–17)
ANISOCYTOSIS BLD QL: SLIGHT — SIGNIFICANT CHANGE UP
BUN SERPL-MCNC: 6 MG/DL — LOW (ref 7–23)
BURR CELLS BLD QL SMEAR: SLIGHT — SIGNIFICANT CHANGE UP
CALCIUM SERPL-MCNC: 8.8 MG/DL — SIGNIFICANT CHANGE UP (ref 8.4–10.5)
CHLORIDE SERPL-SCNC: 102 MMOL/L — SIGNIFICANT CHANGE UP (ref 96–108)
CO2 SERPL-SCNC: 23 MMOL/L — SIGNIFICANT CHANGE UP (ref 22–31)
CREAT SERPL-MCNC: 0.41 MG/DL — LOW (ref 0.5–1.3)
EGFR: 127 ML/MIN/1.73M2 — SIGNIFICANT CHANGE UP
GIANT PLATELETS BLD QL SMEAR: PRESENT — SIGNIFICANT CHANGE UP
GLUCOSE SERPL-MCNC: 144 MG/DL — HIGH (ref 70–99)
HCT VFR BLD CALC: 25.3 % — LOW (ref 34.5–45)
HCT VFR BLD CALC: 26.2 % — LOW (ref 34.5–45)
HCT VFR BLD CALC: 30.2 % — LOW (ref 34.5–45)
HGB BLD-MCNC: 10.2 G/DL — LOW (ref 11.5–15.5)
HGB BLD-MCNC: 8.4 G/DL — LOW (ref 11.5–15.5)
HGB BLD-MCNC: 8.6 G/DL — LOW (ref 11.5–15.5)
MACROCYTES BLD QL: SLIGHT — SIGNIFICANT CHANGE UP
MAGNESIUM SERPL-MCNC: 1.8 MG/DL — SIGNIFICANT CHANGE UP (ref 1.6–2.6)
MANUAL SMEAR VERIFICATION: SIGNIFICANT CHANGE UP
MCHC RBC-ENTMCNC: 27.8 PG — SIGNIFICANT CHANGE UP (ref 27–34)
MCHC RBC-ENTMCNC: 28.2 PG — SIGNIFICANT CHANGE UP (ref 27–34)
MCHC RBC-ENTMCNC: 28.4 PG — SIGNIFICANT CHANGE UP (ref 27–34)
MCHC RBC-ENTMCNC: 32.8 GM/DL — SIGNIFICANT CHANGE UP (ref 32–36)
MCHC RBC-ENTMCNC: 33.2 GM/DL — SIGNIFICANT CHANGE UP (ref 32–36)
MCHC RBC-ENTMCNC: 33.8 GM/DL — SIGNIFICANT CHANGE UP (ref 32–36)
MCV RBC AUTO: 84.1 FL — SIGNIFICANT CHANGE UP (ref 80–100)
MCV RBC AUTO: 84.8 FL — SIGNIFICANT CHANGE UP (ref 80–100)
MCV RBC AUTO: 84.9 FL — SIGNIFICANT CHANGE UP (ref 80–100)
METAMYELOCYTES # FLD: 0.9 % — HIGH (ref 0–0)
NRBC # BLD: 0 /100 WBCS — SIGNIFICANT CHANGE UP (ref 0–0)
NRBC # BLD: 0 /100 WBCS — SIGNIFICANT CHANGE UP (ref 0–0)
PHOSPHATE SERPL-MCNC: 3.4 MG/DL — SIGNIFICANT CHANGE UP (ref 2.5–4.5)
PLAT MORPH BLD: NORMAL — SIGNIFICANT CHANGE UP
PLATELET # BLD AUTO: 397 K/UL — SIGNIFICANT CHANGE UP (ref 150–400)
PLATELET # BLD AUTO: 407 K/UL — HIGH (ref 150–400)
PLATELET # BLD AUTO: 423 K/UL — HIGH (ref 150–400)
POIKILOCYTOSIS BLD QL AUTO: SLIGHT — SIGNIFICANT CHANGE UP
POLYCHROMASIA BLD QL SMEAR: SLIGHT — SIGNIFICANT CHANGE UP
POTASSIUM SERPL-MCNC: 4 MMOL/L — SIGNIFICANT CHANGE UP (ref 3.5–5.3)
POTASSIUM SERPL-SCNC: 4 MMOL/L — SIGNIFICANT CHANGE UP (ref 3.5–5.3)
RBC # BLD: 2.98 M/UL — LOW (ref 3.8–5.2)
RBC # BLD: 3.09 M/UL — LOW (ref 3.8–5.2)
RBC # BLD: 3.59 M/UL — LOW (ref 3.8–5.2)
RBC # FLD: 14.6 % — HIGH (ref 10.3–14.5)
RBC BLD AUTO: ABNORMAL
SODIUM SERPL-SCNC: 135 MMOL/L — SIGNIFICANT CHANGE UP (ref 135–145)
SPHEROCYTES BLD QL SMEAR: SLIGHT — SIGNIFICANT CHANGE UP
VARIANT LYMPHS # BLD: 0.9 % — SIGNIFICANT CHANGE UP (ref 0–6)
WBC # BLD: 16.89 K/UL — HIGH (ref 3.8–10.5)
WBC # BLD: 17.52 K/UL — HIGH (ref 3.8–10.5)
WBC # BLD: 17.54 K/UL — HIGH (ref 3.8–10.5)
WBC # FLD AUTO: 16.89 K/UL — HIGH (ref 3.8–10.5)
WBC # FLD AUTO: 17.52 K/UL — HIGH (ref 3.8–10.5)
WBC # FLD AUTO: 17.54 K/UL — HIGH (ref 3.8–10.5)

## 2023-10-07 PROCEDURE — 74019 RADEX ABDOMEN 2 VIEWS: CPT | Mod: 26

## 2023-10-07 PROCEDURE — 99291 CRITICAL CARE FIRST HOUR: CPT

## 2023-10-07 PROCEDURE — 36000 PLACE NEEDLE IN VEIN: CPT

## 2023-10-07 RX ORDER — HYDROMORPHONE HYDROCHLORIDE 2 MG/ML
4 INJECTION INTRAMUSCULAR; INTRAVENOUS; SUBCUTANEOUS EVERY 4 HOURS
Refills: 0 | Status: DISCONTINUED | OUTPATIENT
Start: 2023-10-07 | End: 2023-10-14

## 2023-10-07 RX ORDER — DIAZEPAM 5 MG
2 TABLET ORAL EVERY 6 HOURS
Refills: 0 | Status: DISCONTINUED | OUTPATIENT
Start: 2023-10-07 | End: 2023-10-09

## 2023-10-07 RX ORDER — NOREPINEPHRINE BITARTRATE/D5W 8 MG/250ML
0.05 PLASTIC BAG, INJECTION (ML) INTRAVENOUS
Qty: 16 | Refills: 0 | Status: DISCONTINUED | OUTPATIENT
Start: 2023-10-07 | End: 2023-10-08

## 2023-10-07 RX ORDER — HYDROMORPHONE HYDROCHLORIDE 2 MG/ML
6 INJECTION INTRAMUSCULAR; INTRAVENOUS; SUBCUTANEOUS EVERY 4 HOURS
Refills: 0 | Status: DISCONTINUED | OUTPATIENT
Start: 2023-10-07 | End: 2023-10-14

## 2023-10-07 RX ORDER — MAGNESIUM SULFATE 500 MG/ML
2 VIAL (ML) INJECTION ONCE
Refills: 0 | Status: COMPLETED | OUTPATIENT
Start: 2023-10-07 | End: 2023-10-07

## 2023-10-07 RX ORDER — ALBUMIN HUMAN 25 %
250 VIAL (ML) INTRAVENOUS ONCE
Refills: 0 | Status: COMPLETED | OUTPATIENT
Start: 2023-10-07 | End: 2023-10-07

## 2023-10-07 RX ORDER — NOREPINEPHRINE BITARTRATE/D5W 8 MG/250ML
0.05 PLASTIC BAG, INJECTION (ML) INTRAVENOUS
Qty: 16 | Refills: 0 | Status: DISCONTINUED | OUTPATIENT
Start: 2023-10-07 | End: 2023-10-07

## 2023-10-07 RX ORDER — DIAZEPAM 5 MG
2 TABLET ORAL EVERY 8 HOURS
Refills: 0 | Status: DISCONTINUED | OUTPATIENT
Start: 2023-10-07 | End: 2023-10-09

## 2023-10-07 RX ORDER — SODIUM CHLORIDE 9 MG/ML
1000 INJECTION, SOLUTION INTRAVENOUS
Refills: 0 | Status: ACTIVE | OUTPATIENT
Start: 2023-10-07 | End: 2024-09-04

## 2023-10-07 RX ADMIN — HYDROMORPHONE HYDROCHLORIDE 6 MILLIGRAM(S): 2 INJECTION INTRAMUSCULAR; INTRAVENOUS; SUBCUTANEOUS at 20:07

## 2023-10-07 RX ADMIN — Medication 25 GRAM(S): at 09:32

## 2023-10-07 RX ADMIN — SIMETHICONE 80 MILLIGRAM(S): 80 TABLET, CHEWABLE ORAL at 17:18

## 2023-10-07 RX ADMIN — Medication 2 MILLIGRAM(S): at 17:18

## 2023-10-07 RX ADMIN — HYDROMORPHONE HYDROCHLORIDE 0.5 MILLIGRAM(S): 2 INJECTION INTRAMUSCULAR; INTRAVENOUS; SUBCUTANEOUS at 22:19

## 2023-10-07 RX ADMIN — Medication 50 MILLIGRAM(S): at 13:15

## 2023-10-07 RX ADMIN — HYDROMORPHONE HYDROCHLORIDE 4 MILLIGRAM(S): 2 INJECTION INTRAMUSCULAR; INTRAVENOUS; SUBCUTANEOUS at 16:49

## 2023-10-07 RX ADMIN — PIPERACILLIN AND TAZOBACTAM 25 GRAM(S): 4; .5 INJECTION, POWDER, LYOPHILIZED, FOR SOLUTION INTRAVENOUS at 22:18

## 2023-10-07 RX ADMIN — HYDROMORPHONE HYDROCHLORIDE 4 MILLIGRAM(S): 2 INJECTION INTRAMUSCULAR; INTRAVENOUS; SUBCUTANEOUS at 04:33

## 2023-10-07 RX ADMIN — HYDROMORPHONE HYDROCHLORIDE 4 MILLIGRAM(S): 2 INJECTION INTRAMUSCULAR; INTRAVENOUS; SUBCUTANEOUS at 11:00

## 2023-10-07 RX ADMIN — ONDANSETRON 4 MILLIGRAM(S): 8 TABLET, FILM COATED ORAL at 06:03

## 2023-10-07 RX ADMIN — ONDANSETRON 4 MILLIGRAM(S): 8 TABLET, FILM COATED ORAL at 12:00

## 2023-10-07 RX ADMIN — HYDROMORPHONE HYDROCHLORIDE 6 MILLIGRAM(S): 2 INJECTION INTRAMUSCULAR; INTRAVENOUS; SUBCUTANEOUS at 19:40

## 2023-10-07 RX ADMIN — Medication 10 MILLIGRAM(S): at 12:00

## 2023-10-07 RX ADMIN — HYDROMORPHONE HYDROCHLORIDE 6 MILLIGRAM(S): 2 INJECTION INTRAMUSCULAR; INTRAVENOUS; SUBCUTANEOUS at 14:00

## 2023-10-07 RX ADMIN — HYDROMORPHONE HYDROCHLORIDE 0.5 MILLIGRAM(S): 2 INJECTION INTRAMUSCULAR; INTRAVENOUS; SUBCUTANEOUS at 23:00

## 2023-10-07 RX ADMIN — Medication 1000 MILLIGRAM(S): at 14:00

## 2023-10-07 RX ADMIN — Medication 50 MILLIGRAM(S): at 05:55

## 2023-10-07 RX ADMIN — HYDROMORPHONE HYDROCHLORIDE 0.5 MILLIGRAM(S): 2 INJECTION INTRAMUSCULAR; INTRAVENOUS; SUBCUTANEOUS at 00:53

## 2023-10-07 RX ADMIN — PIPERACILLIN AND TAZOBACTAM 25 GRAM(S): 4; .5 INJECTION, POWDER, LYOPHILIZED, FOR SOLUTION INTRAVENOUS at 05:55

## 2023-10-07 RX ADMIN — Medication 1000 MILLIGRAM(S): at 22:19

## 2023-10-07 RX ADMIN — HYDROMORPHONE HYDROCHLORIDE 6 MILLIGRAM(S): 2 INJECTION INTRAMUSCULAR; INTRAVENOUS; SUBCUTANEOUS at 13:15

## 2023-10-07 RX ADMIN — Medication 2 MILLIGRAM(S): at 05:55

## 2023-10-07 RX ADMIN — Medication 50 MILLIGRAM(S): at 22:23

## 2023-10-07 RX ADMIN — HYDROMORPHONE HYDROCHLORIDE 4 MILLIGRAM(S): 2 INJECTION INTRAMUSCULAR; INTRAVENOUS; SUBCUTANEOUS at 18:16

## 2023-10-07 RX ADMIN — MIDODRINE HYDROCHLORIDE 15 MILLIGRAM(S): 2.5 TABLET ORAL at 22:19

## 2023-10-07 RX ADMIN — SENNA PLUS 2 TABLET(S): 8.6 TABLET ORAL at 22:19

## 2023-10-07 RX ADMIN — HYDROMORPHONE HYDROCHLORIDE 4 MILLIGRAM(S): 2 INJECTION INTRAMUSCULAR; INTRAVENOUS; SUBCUTANEOUS at 00:03

## 2023-10-07 RX ADMIN — SIMETHICONE 80 MILLIGRAM(S): 80 TABLET, CHEWABLE ORAL at 05:55

## 2023-10-07 RX ADMIN — Medication 2 MILLIGRAM(S): at 20:29

## 2023-10-07 RX ADMIN — POLYETHYLENE GLYCOL 3350 17 GRAM(S): 17 POWDER, FOR SOLUTION ORAL at 17:19

## 2023-10-07 RX ADMIN — SODIUM CHLORIDE 30 MILLILITER(S): 9 INJECTION, SOLUTION INTRAVENOUS at 09:31

## 2023-10-07 RX ADMIN — Medication 1000 MILLIGRAM(S): at 13:15

## 2023-10-07 RX ADMIN — HYDROMORPHONE HYDROCHLORIDE 4 MILLIGRAM(S): 2 INJECTION INTRAMUSCULAR; INTRAVENOUS; SUBCUTANEOUS at 23:00

## 2023-10-07 RX ADMIN — HYDROMORPHONE HYDROCHLORIDE 4 MILLIGRAM(S): 2 INJECTION INTRAMUSCULAR; INTRAVENOUS; SUBCUTANEOUS at 10:04

## 2023-10-07 RX ADMIN — HYDROMORPHONE HYDROCHLORIDE 0.5 MILLIGRAM(S): 2 INJECTION INTRAMUSCULAR; INTRAVENOUS; SUBCUTANEOUS at 21:00

## 2023-10-07 RX ADMIN — HYDROMORPHONE HYDROCHLORIDE 0.5 MILLIGRAM(S): 2 INJECTION INTRAMUSCULAR; INTRAVENOUS; SUBCUTANEOUS at 01:13

## 2023-10-07 RX ADMIN — Medication 2 MILLIGRAM(S): at 12:23

## 2023-10-07 RX ADMIN — HYDROMORPHONE HYDROCHLORIDE 0.5 MILLIGRAM(S): 2 INJECTION INTRAMUSCULAR; INTRAVENOUS; SUBCUTANEOUS at 20:30

## 2023-10-07 RX ADMIN — Medication 3.22 MICROGRAM(S)/KG/MIN: at 10:04

## 2023-10-07 RX ADMIN — Medication 1000 MILLIGRAM(S): at 23:00

## 2023-10-07 RX ADMIN — MIDODRINE HYDROCHLORIDE 15 MILLIGRAM(S): 2.5 TABLET ORAL at 05:56

## 2023-10-07 RX ADMIN — HYDROMORPHONE HYDROCHLORIDE 4 MILLIGRAM(S): 2 INJECTION INTRAMUSCULAR; INTRAVENOUS; SUBCUTANEOUS at 04:03

## 2023-10-07 RX ADMIN — HYDROMORPHONE HYDROCHLORIDE 4 MILLIGRAM(S): 2 INJECTION INTRAMUSCULAR; INTRAVENOUS; SUBCUTANEOUS at 22:19

## 2023-10-07 RX ADMIN — PIPERACILLIN AND TAZOBACTAM 25 GRAM(S): 4; .5 INJECTION, POWDER, LYOPHILIZED, FOR SOLUTION INTRAVENOUS at 13:37

## 2023-10-07 RX ADMIN — Medication 125 MILLILITER(S): at 22:20

## 2023-10-07 RX ADMIN — Medication 1000 MILLIGRAM(S): at 06:29

## 2023-10-07 RX ADMIN — MIDODRINE HYDROCHLORIDE 15 MILLIGRAM(S): 2.5 TABLET ORAL at 13:15

## 2023-10-07 RX ADMIN — Medication 1000 MILLIGRAM(S): at 05:55

## 2023-10-07 RX ADMIN — SIMETHICONE 80 MILLIGRAM(S): 80 TABLET, CHEWABLE ORAL at 12:00

## 2023-10-07 RX ADMIN — ONDANSETRON 4 MILLIGRAM(S): 8 TABLET, FILM COATED ORAL at 17:18
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
## 2023-10-07 NOTE — PROGRESS NOTE ADULT - SUBJECTIVE AND OBJECTIVE BOX
NEUROCRITICAL CARE EVENING NOTE    DAY EVENTS:    - POD1 s/p stage 2 Revision of T3-T9 VCR, revision of C7-L1 posterior fusion, plastics closure   - stable pressor requireemnts for MAP goals  - 1uprbc to maintain h/h>10 during day      VITALS/IMAGING/DATA  - Reviewed    ALLERGIES:   - Reviewed      MEDICATIONS:  - Reviewed      PHYSICAL EXAM:  Exam unchanged from Day time, refer to progress note.

## 2023-10-07 NOTE — CHART NOTE - NSCHARTNOTEFT_GEN_A_CORE
AXR completed in AM for abdominal distension. Dilaudid PO prn increased to 4 and 6 mg, valium increased to 2mg q6 for pain control. 1u PRBC given for Hb 8.4.
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
## 2023-10-07 NOTE — PROGRESS NOTE ADULT - SUBJECTIVE AND OBJECTIVE BOX
SUBJECTIVE: Patient reports her pain is in her neck but pain medication is helping. Denies new symptoms at this time.     HOSPITAL COURSE:  9/25: POD 0 s/p C7-L1 fusion, T3-8 vertebral column resection, placement of anterior cage.   9/26; POD1 H/H 6.8 postop and PLT 85. Given 2u PRBC and 1u PLT. Switched propofol to precedex gtt. Pt extubated to face mask. Pt noted to only be withdrawing to noxious stimuli on bilateral lower extremities with sensation decreased RLE per patient. Dr. Vieira notified and plan is to keep MAP>100 and obtain CT full spine in the morning. Phenylephrine started to maintain MAP>100. Advanced diet to regular. Passed TOV. Increased need for aurora, UO high.   9/27: POD2 C7-L1 fusion T3-8 vertebral column resection. increased pressor requirements o/n, started on levo additionally. lactate 1.5, BNP 1400 from 800, CK elevated. echo: EF 60-65%. given 250cc bolus albumin, started on midodrine 10q8 to wean off pressors. PICC placed by Alli.   9/28: POD3 s/p C7-L1 fusion, T3-8 vertebral column resection, placement of anterior cage. R radial A-line removed and replaced on the L, trops and EKG done for chest discomfort, WNL, resolved with treatment of overall pain, continues to be febrile, lyrica dc'd to help.   Lyrica restarted. Weaning phenylephrine. Serum Na uptrending, 3% saline discontinued. Pt febrile with uptrending WBC, ID consulted recommend monitoring for now off antibiotics.  9/29: POD4. started on 3% o/n for Na 132. remains on aurora and levo for MAP>100. Tachycardic, given 1L NS. Right axillary a-line placed. 3% dc'd. HMV dc'd. Aquacell dressing replaced. Passed TOV.   9/30: POD 5.  Remains on levo gtt. Increased midodrine to 15q8. Given enema. Had BM.  10/1: POD 6. MAP goal increased back to >100 due to worsened sensation loss, on levo gtt. afebrile however per ID Dr De Souza, desiree cx sent. UA+. started empirically on vanc/zosyn. b/l LE doppler and LUE doppler ordered per ID, +LUE superficial thrombophlebitis. BL LE dopplers negative for DVT.   10/2: POD 7, ANNA overnight. Maintaining MAP >100. 500cc albumin bolus, dilaudid lowered to 2/4mg, toradol [id number] added for pain, abd xray for distension shows gas pattern. aquacel dressing changed.  10/3: POD8 ANNA overnight. Remains on levophed to maintain MAP>100 in AM. Neuro exam unchanged. Tmax 100.9F in AM, given tylenol. Flexeril changed to valium 2q8 and lyrica 50q8 started per pain managment.  DC'd vanc, continue zosyn x 7 days per ID. 1u PRBC to maintain hbg >9.0. DANIELE drain removed. Salt tabs weaned to 2q8. MAP goal changed to >85 to help wean off levo gtt.   10/4: POD 9. MAP goal >100. Sodium chloride tabs discontinued.  10/5: POD 10, Given 2 L boluses o/n for tachycardia and negative fluid status. Given 1 mg IV valium in AM for muscle spasm. 1L bolus given in afternoon for high urine output. Preop for OR tomorrow.   10/6: POD 11 Pt endorsing incisional pain, given 1mg IV valium. Neuro exam stable. Plan for second stage today,  POD 0 revision of T3-T9 VCR, revision of C7-L1 posterior fusion, plastics closure. Post-op CT thoracic spine complete. 1 L bolus for soft BP and colapsable IVC on POCUS.   10/7: POD 12 C7-L1 fusion. POD 1 Revision fusion and T3-9 VCR. Pain controlled. Remains on Levo for MAP goal > 100. Trop WNL.     Vital Signs Last 24 Hrs  T(C): 38.1 (06 Oct 2023 22:09), Max: 38.1 (06 Oct 2023 22:09)  T(F): 100.6 (06 Oct 2023 22:09), Max: 100.6 (06 Oct 2023 22:09)  HR: 116 (07 Oct 2023 00:00) (67 - 121)  BP: 141/81 (06 Oct 2023 21:00) (141/81 - 150/82)  BP(mean): 106 (06 Oct 2023 21:00) (89 - 111)  RR: 22 (07 Oct 2023 00:00) (16 - 32)  SpO2: 97% (07 Oct 2023 00:00) (93% - 100%)    Parameters below as of 07 Oct 2023 00:00  Patient On (Oxygen Delivery Method): room air    I&O's Summary    05 Oct 2023 07:01  -  06 Oct 2023 07:00  --------------------------------------------------------  IN: 4088.1 mL / OUT: 6050 mL / NET: -1961.9 mL    06 Oct 2023 07:01  -  07 Oct 2023 00:44  --------------------------------------------------------  IN: 2124.9 mL / OUT: 930 mL / NET: 1194.9 mL    PHYSICAL EXAM:  General: Pt is laying flat in bed, in NAD, on RA  HEENT: PERRL 3mm, EOMI B/L, face symmetric  Cardiovascular: RRR, normal S1 and S2   Respiratory: non-labored breathing, symmetric chest rise   GI: abd soft, nontender, slightly distended    Neuro: A&O x 3, no aphasia, speech clear  Strength 5/5 BL UE, sensation to light touch intact throughout BL UE  BL LE 0/5 to noxious stimuli  Decreased sensation at and distal to T8 dermatome   Vascular: Distal pulses 2+ x4, no calf edema or erythema  Wounds: Posterior spine wound dressing C/D/I  WOUND/DRAINS: 2 deep HMV to full suction, 2 DANIELE to full suction    LABS:                        8.6    14.22 )-----------( 392      ( 06 Oct 2023 20:04 )             25.8     10-06    136  |  106  |  7   ----------------------------<  121<H>  4.4   |  23  |  0.39<L>    Ca    8.7      06 Oct 2023 14:34  Phos  3.0     10-06  Mg     1.5     10-06      PT/INR - ( 06 Oct 2023 14:34 )   PT: 12.1 sec;   INR: 1.06          PTT - ( 06 Oct 2023 14:34 )  PTT:26.1 sec  Urinalysis Basic - ( 06 Oct 2023 14:34 )    Color: x / Appearance: x / SG: x / pH: x  Gluc: 121 mg/dL / Ketone: x  / Bili: x / Urobili: x   Blood: x / Protein: x / Nitrite: x   Leuk Esterase: x / RBC: x / WBC x   Sq Epi: x / Non Sq Epi: x / Bacteria: x    CAPILLARY BLOOD GLUCOSE    Drug Levels: [] N/A  Vancomycin Level, Trough: 4.0 ug/mL (10-03 @ 05:50)  Vancomycin Level, Trough: 4.0 ug/mL (10-03 @ 05:05)    CSF Analysis: [] N/A    Allergies    No Known Allergies    Intolerances    MEDICATIONS:  Antibiotics:  piperacillin/tazobactam IVPB.. 4.5 Gram(s) IV Intermittent every 8 hours    Neuro:  acetaminophen     Tablet .. 1000 milliGRAM(s) Oral every 8 hours  diazepam    Tablet 2 milliGRAM(s) Oral every 8 hours  HYDROmorphone   Tablet 4 milliGRAM(s) Oral every 4 hours PRN  HYDROmorphone   Tablet 2 milliGRAM(s) Oral every 4 hours PRN  HYDROmorphone  Injectable 0.5 milliGRAM(s) IV Push every 2 hours PRN  melatonin 5 milliGRAM(s) Oral at bedtime PRN  ondansetron   Disintegrating Tablet 4 milliGRAM(s) Oral every 6 hours  pregabalin 50 milliGRAM(s) Oral every 8 hours    Anticoagulation:    OTHER:  bisacodyl Suppository 10 milliGRAM(s) Rectal daily  influenza   Vaccine 0.5 milliLiter(s) IntraMuscular once  midodrine 15 milliGRAM(s) Oral every 8 hours  norepinephrine Infusion 0.05 MICROgram(s)/kG/Min IV Continuous <Continuous>  polyethylene glycol 3350 17 Gram(s) Oral two times a day  senna 2 Tablet(s) Oral at bedtime  simethicone 80 milliGRAM(s) Chew every 6 hours    IVF:  sodium chloride 0.9%. 1000 milliLiter(s) IV Continuous <Continuous>    CULTURES:  Culture Results:   No growth at 5 days. (10-01 @ 11:25)  Culture Results:   No growth at 5 days. (10-01 @ 11:25)    RADIOLOGY & ADDITIONAL TESTS:    ASSESSMENT:  43F no PMHx suffered childhood injury resulting in spine injury and residual gait disturbance, hyperreflexia of BL LE. She has right greater than left leg tingling, low back and mid back pain. 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 S/p C7-L1 fusion, T3-8 vertebral column resection, placement of anterior cage (9/25/23). S/p revision of T3-T9 VCR, revision of C7-L1 posterior fusion, plastics closure (10/6/23).     NEURO:  - Neuro/spine checks q1/vitals q1hrs  - Pain control: tylenol 1g q8h prn, valium 2q8, dilaudid 2/4 PO, Lyrica 50q8, s/p toradol 30mg q8hr x2days  - stage 1 Post-op CT full spine: L1 screw not attached to the vertical yoli, epidural lipomatosis L2-3  - stage 2 post op CT T spine completed  - HMVx2, JPx2 (per plastics)    Cardio:  - MAP > 100, on levo gtt   - midodrine 15 mg q8h to wean off pressors  - echo 9/27: EF 60-65%    Pulm:  - room air  - incentive spirometry    GI:  - ADAT  - Bowel regimen   - BM 10/4  - simethicone PRN for gas    Renal:  - NS @ 30cc/hr  - +coronado    Endo:  - a1c 5.6  - ISS dc'd    Heme:  - SCDs for DVT ppx/ SQL held for OR  - 1.5L EBL, 1.5L albumin and cell saver intraop  - 2u PRBC and 1u PLT postop 9/25, 1u PRBC 10/3  - 500 cell saver post op stage 2  - b/l LE dopplers 10/1 negative  - LUE doppler 10/1: +cephalic thrombophlebitis    ID:  - febrile 9/27, 10/1 f/u panculture, +UA 10/1  - Vanc (10/1-10/3 ) dc'd, Zosyn (10/1- 10/8)  x 7 days   - ID signed off    Discussed with Dr. Vieira and Dr. Sutton

## 2023-10-07 NOTE — PROCEDURE NOTE - NSINDICATIONS_GEN_A_CORE
emergency venous access
monitoring purposes
antibiotic therapy/venous access
monitoring purposes
cannulation purposes/monitoring purposes

## 2023-10-07 NOTE — PROCEDURE NOTE - NSPROCDETAILS_GEN_ALL_CORE
location identified, draped/prepped, sterile technique used/blood seen on insertion/dressing applied/flushes easily/secured in place/sterile technique, catheter placed
location identified, draped/prepped, sterile technique used, needle inserted/introduced/positive blood return obtained via catheter/connected to a pressurized flush line/sutured in place/hemostasis with direct pressure, dressing applied/Seldinger technique/all materials/supplies accounted for at end of procedure
location identified, draped/prepped, sterile technique used/sterile dressing applied/sterile technique, catheter placed/supine position/ultrasound guidance

## 2023-10-07 NOTE — PROGRESS NOTE ADULT - ASSESSMENT
ASSESSMENT/PLAN:     s/p 5-level vertebral column resection with fusion of C7-L1, put in a cage; stage 1; stable introp monitoring; pleura violated, repaired; post op with BLE weakness requring pressors to augment MAP>100, now pending additional imaging    NEURO:  - Neurochecks q2h  - Surgical drains per NSGY/plastics  - Augment map as per below  - Pain control  - Activity: bed rest for now, PT/OT when able    PULM:  - Incentive spirometry  - mobilize as tolerated  - Aspiration Precautions    CV:  - MAP>100 per nsg  - trop, ekg while on pressors daily  - c/w levophed  - midodrine 15q8h  - Rt axillary olimpia placed 9/29    RENAL:  - Fluids: NS 30cc/h while on pressors  - trend renal function  - salt tabs    GI:  - Diet: regular  - Bowel regimen standing  - polyethylene glycol 17G daily, senna, dulcolax       ENDO:   - Goal euglycemia (-180)    HEME/ONC:  s/p 700cc cell saver, 1.5 L EBL; 1500 5% albumin, given TXA;  post-op Hb 6.8 - give 2 units pRBC, repeat   - monitor H/H  - s/p 1uprbc  - post transfusion cbc    VTE prophylaxis   - SCDs   - hold chemoppx d/t fresh post op      ID:  - febrile, pan cultured 9/27, UA negative, will continue to monitor  - zosyn (10/1 - )  - asymptomatic bacteruria         Dispo: ICU for post op/ BP augmentation

## 2023-10-08 LAB
ANION GAP SERPL CALC-SCNC: 9 MMOL/L — SIGNIFICANT CHANGE UP (ref 5–17)
BUN SERPL-MCNC: 6 MG/DL — LOW (ref 7–23)
CALCIUM SERPL-MCNC: 9.3 MG/DL — SIGNIFICANT CHANGE UP (ref 8.4–10.5)
CHLORIDE SERPL-SCNC: 99 MMOL/L — SIGNIFICANT CHANGE UP (ref 96–108)
CO2 SERPL-SCNC: 29 MMOL/L — SIGNIFICANT CHANGE UP (ref 22–31)
CREAT SERPL-MCNC: 0.4 MG/DL — LOW (ref 0.5–1.3)
EGFR: 127 ML/MIN/1.73M2 — SIGNIFICANT CHANGE UP
GLUCOSE SERPL-MCNC: 134 MG/DL — HIGH (ref 70–99)
HCT VFR BLD CALC: 29.2 % — LOW (ref 34.5–45)
HGB BLD-MCNC: 9.8 G/DL — LOW (ref 11.5–15.5)
LACTATE SERPL-SCNC: 0.7 MMOL/L — SIGNIFICANT CHANGE UP (ref 0.5–2)
MAGNESIUM SERPL-MCNC: 1.8 MG/DL — SIGNIFICANT CHANGE UP (ref 1.6–2.6)
MCHC RBC-ENTMCNC: 28.4 PG — SIGNIFICANT CHANGE UP (ref 27–34)
MCHC RBC-ENTMCNC: 33.6 GM/DL — SIGNIFICANT CHANGE UP (ref 32–36)
MCV RBC AUTO: 84.6 FL — SIGNIFICANT CHANGE UP (ref 80–100)
NRBC # BLD: 0 /100 WBCS — SIGNIFICANT CHANGE UP (ref 0–0)
PHOSPHATE SERPL-MCNC: 2.7 MG/DL — SIGNIFICANT CHANGE UP (ref 2.5–4.5)
PLATELET # BLD AUTO: 420 K/UL — HIGH (ref 150–400)
POTASSIUM SERPL-MCNC: 3.8 MMOL/L — SIGNIFICANT CHANGE UP (ref 3.5–5.3)
POTASSIUM SERPL-SCNC: 3.8 MMOL/L — SIGNIFICANT CHANGE UP (ref 3.5–5.3)
RBC # BLD: 3.45 M/UL — LOW (ref 3.8–5.2)
RBC # FLD: 14.7 % — HIGH (ref 10.3–14.5)
SODIUM SERPL-SCNC: 137 MMOL/L — SIGNIFICANT CHANGE UP (ref 135–145)
TROPONIN T, HIGH SENSITIVITY RESULT: 43 NG/L — SIGNIFICANT CHANGE UP (ref 0–51)
TROPONIN T, HIGH SENSITIVITY RESULT: 62 NG/L — CRITICAL HIGH (ref 0–51)
WBC # BLD: 14.93 K/UL — HIGH (ref 3.8–10.5)
WBC # FLD AUTO: 14.93 K/UL — HIGH (ref 3.8–10.5)

## 2023-10-08 PROCEDURE — 99291 CRITICAL CARE FIRST HOUR: CPT

## 2023-10-08 RX ORDER — ENOXAPARIN SODIUM 100 MG/ML
40 INJECTION SUBCUTANEOUS EVERY 24 HOURS
Refills: 0 | Status: DISCONTINUED | OUTPATIENT
Start: 2023-10-09 | End: 2023-10-20

## 2023-10-08 RX ORDER — SODIUM,POTASSIUM PHOSPHATES 278-250MG
1 POWDER IN PACKET (EA) ORAL
Refills: 0 | Status: COMPLETED | OUTPATIENT
Start: 2023-10-08 | End: 2023-10-08

## 2023-10-08 RX ORDER — NOREPINEPHRINE BITARTRATE/D5W 8 MG/250ML
0.05 PLASTIC BAG, INJECTION (ML) INTRAVENOUS
Qty: 16 | Refills: 0 | Status: DISCONTINUED | OUTPATIENT
Start: 2023-10-08 | End: 2023-10-09

## 2023-10-08 RX ORDER — POTASSIUM CHLORIDE 20 MEQ
20 PACKET (EA) ORAL ONCE
Refills: 0 | Status: COMPLETED | OUTPATIENT
Start: 2023-10-08 | End: 2023-10-08

## 2023-10-08 RX ORDER — MAGNESIUM SULFATE 500 MG/ML
2 VIAL (ML) INJECTION ONCE
Refills: 0 | Status: COMPLETED | OUTPATIENT
Start: 2023-10-08 | End: 2023-10-08

## 2023-10-08 RX ADMIN — HYDROMORPHONE HYDROCHLORIDE 0.5 MILLIGRAM(S): 2 INJECTION INTRAMUSCULAR; INTRAVENOUS; SUBCUTANEOUS at 20:54

## 2023-10-08 RX ADMIN — PIPERACILLIN AND TAZOBACTAM 25 GRAM(S): 4; .5 INJECTION, POWDER, LYOPHILIZED, FOR SOLUTION INTRAVENOUS at 13:38

## 2023-10-08 RX ADMIN — Medication 25 GRAM(S): at 06:59

## 2023-10-08 RX ADMIN — HYDROMORPHONE HYDROCHLORIDE 4 MILLIGRAM(S): 2 INJECTION INTRAMUSCULAR; INTRAVENOUS; SUBCUTANEOUS at 20:54

## 2023-10-08 RX ADMIN — HYDROMORPHONE HYDROCHLORIDE 4 MILLIGRAM(S): 2 INJECTION INTRAMUSCULAR; INTRAVENOUS; SUBCUTANEOUS at 23:20

## 2023-10-08 RX ADMIN — Medication 2 MILLIGRAM(S): at 16:37

## 2023-10-08 RX ADMIN — Medication 1000 MILLIGRAM(S): at 07:00

## 2023-10-08 RX ADMIN — SIMETHICONE 80 MILLIGRAM(S): 80 TABLET, CHEWABLE ORAL at 01:00

## 2023-10-08 RX ADMIN — HYDROMORPHONE HYDROCHLORIDE 6 MILLIGRAM(S): 2 INJECTION INTRAMUSCULAR; INTRAVENOUS; SUBCUTANEOUS at 07:30

## 2023-10-08 RX ADMIN — HYDROMORPHONE HYDROCHLORIDE 6 MILLIGRAM(S): 2 INJECTION INTRAMUSCULAR; INTRAVENOUS; SUBCUTANEOUS at 01:00

## 2023-10-08 RX ADMIN — HYDROMORPHONE HYDROCHLORIDE 0.5 MILLIGRAM(S): 2 INJECTION INTRAMUSCULAR; INTRAVENOUS; SUBCUTANEOUS at 07:30

## 2023-10-08 RX ADMIN — SENNA PLUS 2 TABLET(S): 8.6 TABLET ORAL at 21:04

## 2023-10-08 RX ADMIN — HYDROMORPHONE HYDROCHLORIDE 0.5 MILLIGRAM(S): 2 INJECTION INTRAMUSCULAR; INTRAVENOUS; SUBCUTANEOUS at 01:30

## 2023-10-08 RX ADMIN — Medication 2 MILLIGRAM(S): at 07:06

## 2023-10-08 RX ADMIN — HYDROMORPHONE HYDROCHLORIDE 4 MILLIGRAM(S): 2 INJECTION INTRAMUSCULAR; INTRAVENOUS; SUBCUTANEOUS at 08:28

## 2023-10-08 RX ADMIN — SIMETHICONE 80 MILLIGRAM(S): 80 TABLET, CHEWABLE ORAL at 11:40

## 2023-10-08 RX ADMIN — HYDROMORPHONE HYDROCHLORIDE 6 MILLIGRAM(S): 2 INJECTION INTRAMUSCULAR; INTRAVENOUS; SUBCUTANEOUS at 12:02

## 2023-10-08 RX ADMIN — Medication 1000 MILLIGRAM(S): at 07:30

## 2023-10-08 RX ADMIN — SODIUM CHLORIDE 30 MILLILITER(S): 9 INJECTION, SOLUTION INTRAVENOUS at 01:01

## 2023-10-08 RX ADMIN — HYDROMORPHONE HYDROCHLORIDE 4 MILLIGRAM(S): 2 INJECTION INTRAMUSCULAR; INTRAVENOUS; SUBCUTANEOUS at 22:31

## 2023-10-08 RX ADMIN — MIDODRINE HYDROCHLORIDE 15 MILLIGRAM(S): 2.5 TABLET ORAL at 13:39

## 2023-10-08 RX ADMIN — SIMETHICONE 80 MILLIGRAM(S): 80 TABLET, CHEWABLE ORAL at 07:00

## 2023-10-08 RX ADMIN — Medication 50 MILLIGRAM(S): at 13:23

## 2023-10-08 RX ADMIN — HYDROMORPHONE HYDROCHLORIDE 0.5 MILLIGRAM(S): 2 INJECTION INTRAMUSCULAR; INTRAVENOUS; SUBCUTANEOUS at 13:00

## 2023-10-08 RX ADMIN — HYDROMORPHONE HYDROCHLORIDE 4 MILLIGRAM(S): 2 INJECTION INTRAMUSCULAR; INTRAVENOUS; SUBCUTANEOUS at 04:42

## 2023-10-08 RX ADMIN — ONDANSETRON 4 MILLIGRAM(S): 8 TABLET, FILM COATED ORAL at 01:00

## 2023-10-08 RX ADMIN — SIMETHICONE 80 MILLIGRAM(S): 80 TABLET, CHEWABLE ORAL at 17:41

## 2023-10-08 RX ADMIN — Medication 1 TABLET(S): at 07:08

## 2023-10-08 RX ADMIN — Medication 50 MILLIGRAM(S): at 21:25

## 2023-10-08 RX ADMIN — HYDROMORPHONE HYDROCHLORIDE 6 MILLIGRAM(S): 2 INJECTION INTRAMUSCULAR; INTRAVENOUS; SUBCUTANEOUS at 01:30

## 2023-10-08 RX ADMIN — ONDANSETRON 4 MILLIGRAM(S): 8 TABLET, FILM COATED ORAL at 07:06

## 2023-10-08 RX ADMIN — HYDROMORPHONE HYDROCHLORIDE 0.5 MILLIGRAM(S): 2 INJECTION INTRAMUSCULAR; INTRAVENOUS; SUBCUTANEOUS at 17:41

## 2023-10-08 RX ADMIN — HYDROMORPHONE HYDROCHLORIDE 0.5 MILLIGRAM(S): 2 INJECTION INTRAMUSCULAR; INTRAVENOUS; SUBCUTANEOUS at 07:06

## 2023-10-08 RX ADMIN — Medication 3.22 MICROGRAM(S)/KG/MIN: at 03:13

## 2023-10-08 RX ADMIN — HYDROMORPHONE HYDROCHLORIDE 0.5 MILLIGRAM(S): 2 INJECTION INTRAMUSCULAR; INTRAVENOUS; SUBCUTANEOUS at 01:00

## 2023-10-08 RX ADMIN — Medication 1000 MILLIGRAM(S): at 21:04

## 2023-10-08 RX ADMIN — HYDROMORPHONE HYDROCHLORIDE 4 MILLIGRAM(S): 2 INJECTION INTRAMUSCULAR; INTRAVENOUS; SUBCUTANEOUS at 19:14

## 2023-10-08 RX ADMIN — ONDANSETRON 4 MILLIGRAM(S): 8 TABLET, FILM COATED ORAL at 17:47

## 2023-10-08 RX ADMIN — ONDANSETRON 4 MILLIGRAM(S): 8 TABLET, FILM COATED ORAL at 11:40

## 2023-10-08 RX ADMIN — Medication 1000 MILLIGRAM(S): at 14:00

## 2023-10-08 RX ADMIN — HYDROMORPHONE HYDROCHLORIDE 0.5 MILLIGRAM(S): 2 INJECTION INTRAMUSCULAR; INTRAVENOUS; SUBCUTANEOUS at 12:21

## 2023-10-08 RX ADMIN — Medication 1000 MILLIGRAM(S): at 21:25

## 2023-10-08 RX ADMIN — Medication 50 MILLIGRAM(S): at 07:05

## 2023-10-08 RX ADMIN — Medication 20 MILLIEQUIVALENT(S): at 07:00

## 2023-10-08 RX ADMIN — HYDROMORPHONE HYDROCHLORIDE 6 MILLIGRAM(S): 2 INJECTION INTRAMUSCULAR; INTRAVENOUS; SUBCUTANEOUS at 07:06

## 2023-10-08 RX ADMIN — MIDODRINE HYDROCHLORIDE 15 MILLIGRAM(S): 2.5 TABLET ORAL at 21:04

## 2023-10-08 RX ADMIN — ONDANSETRON 4 MILLIGRAM(S): 8 TABLET, FILM COATED ORAL at 23:16

## 2023-10-08 RX ADMIN — HYDROMORPHONE HYDROCHLORIDE 4 MILLIGRAM(S): 2 INJECTION INTRAMUSCULAR; INTRAVENOUS; SUBCUTANEOUS at 05:16

## 2023-10-08 RX ADMIN — MIDODRINE HYDROCHLORIDE 15 MILLIGRAM(S): 2.5 TABLET ORAL at 06:59

## 2023-10-08 RX ADMIN — HYDROMORPHONE HYDROCHLORIDE 0.5 MILLIGRAM(S): 2 INJECTION INTRAMUSCULAR; INTRAVENOUS; SUBCUTANEOUS at 21:04

## 2023-10-08 RX ADMIN — Medication 1000 MILLIGRAM(S): at 13:38

## 2023-10-08 RX ADMIN — HYDROMORPHONE HYDROCHLORIDE 6 MILLIGRAM(S): 2 INJECTION INTRAMUSCULAR; INTRAVENOUS; SUBCUTANEOUS at 11:40

## 2023-10-08 RX ADMIN — PIPERACILLIN AND TAZOBACTAM 25 GRAM(S): 4; .5 INJECTION, POWDER, LYOPHILIZED, FOR SOLUTION INTRAVENOUS at 06:59

## 2023-10-08 RX ADMIN — Medication 1 ENEMA: at 06:59

## 2023-10-08 RX ADMIN — SIMETHICONE 80 MILLIGRAM(S): 80 TABLET, CHEWABLE ORAL at 23:16

## 2023-10-08 RX ADMIN — Medication 1 TABLET(S): at 17:42

## 2023-10-08 RX ADMIN — Medication 2 MILLIGRAM(S): at 10:10

## 2023-10-08 RX ADMIN — Medication 2 MILLIGRAM(S): at 21:25

## 2023-10-08 RX ADMIN — HYDROMORPHONE HYDROCHLORIDE 4 MILLIGRAM(S): 2 INJECTION INTRAMUSCULAR; INTRAVENOUS; SUBCUTANEOUS at 09:00

## 2023-10-08 RX ADMIN — HYDROMORPHONE HYDROCHLORIDE 0.5 MILLIGRAM(S): 2 INJECTION INTRAMUSCULAR; INTRAVENOUS; SUBCUTANEOUS at 21:26

## 2023-10-08 NOTE — PHYSICAL THERAPY INITIAL EVALUATION ADULT - PERTINENT HX OF CURRENT PROBLEM, REHAB EVAL
Pt. is a 43 y.o female initially p/w worsening back pain, LE weakness and difficulty ambulating x several years. Pt. s/p C7-L1 fusion and T3-8 vertebral column resection on 9/25 with subsequent revision on 10/06, now POD#2 s/p C7=L1 PSF and T3-8 VCR.

## 2023-10-08 NOTE — OCCUPATIONAL THERAPY INITIAL EVALUATION ADULT - PERTINENT HX OF CURRENT PROBLEM, REHAB EVAL
44 y/o female with no PMHx or PSHx presents for surgery today.  She reports ambulating with walker or baby stroller at home for years.  She reports intermittent falls.  She has right greater than left leg tingling, low back and mid back pain.  She denies arm or leg pain.  She reports urinary incontinence since having a baby 2 years ago where if she doesn't get to the bathroom fast enough, she has incontinence, but she is able to hold it for a short while.  She takes no pain medications.  She has taken ibuprofen in the past.  She denies saddle anesthesia, bowel incontinence. Pt now s/p T3-8 VCR, C7-L1 fusion, correction of deformity, placement of anterior cage.

## 2023-10-08 NOTE — OCCUPATIONAL THERAPY INITIAL EVALUATION ADULT - MODALITIES TREATMENT COMMENTS
Pt able to tolerate sitting EOB for ~15min, requiring Min-Mod Ax1 to maintain balance 2/2 impaired postural control with R sided leaning observed during weight shifting. Pt unable to perform functional transfers/OOB mobility at this time 2/2 0/5 BLE strength.

## 2023-10-08 NOTE — PROGRESS NOTE ADULT - SUBJECTIVE AND OBJECTIVE BOX
SUBJECTIVE: Patient denies pain in her neck at the incision. Denies new weakness, numbness, tingling, nausea, vomiting, chest pain, palpitations, shortness of breath.     HOSPITAL COURSE:  9/25: POD 0 s/p C7-L1 fusion, T3-8 vertebral column resection, placement of anterior cage.   9/26; POD1 H/H 6.8 postop and PLT 85. Given 2u PRBC and 1u PLT. Switched propofol to precedex gtt. Pt extubated to face mask. Pt noted to only be withdrawing to noxious stimuli on bilateral lower extremities with sensation decreased RLE per patient. Dr. Vieira notified and plan is to keep MAP>100 and obtain CT full spine in the morning. Phenylephrine started to maintain MAP>100. Advanced diet to regular. Passed TOV. Increased need for aurora, UO high.   9/27: POD2 C7-L1 fusion T3-8 vertebral column resection. increased pressor requirements o/n, started on levo additionally. lactate 1.5, BNP 1400 from 800, CK elevated. echo: EF 60-65%. given 250cc bolus albumin, started on midodrine 10q8 to wean off pressors. PICC placed by Alli.   9/28: POD3 s/p C7-L1 fusion, T3-8 vertebral column resection, placement of anterior cage. R radial A-line removed and replaced on the L, trops and EKG done for chest discomfort, WNL, resolved with treatment of overall pain, continues to be febrile, lyrica dc'd to help.   Lyrica restarted. Weaning phenylephrine. Serum Na uptrending, 3% saline discontinued. Pt febrile with uptrending WBC, ID consulted recommend monitoring for now off antibiotics.  9/29: POD4. started on 3% o/n for Na 132. remains on aurora and levo for MAP>100. Tachycardic, given 1L NS. Right axillary a-line placed. 3% dc'd. HMV dc'd. Aquacell dressing replaced. Passed TOV.   9/30: POD 5.  Remains on levo gtt. Increased midodrine to 15q8. Given enema. Had BM.  10/1: POD 6. MAP goal increased back to >100 due to worsened sensation loss, on levo gtt. afebrile however per ID Dr De Souza, ramírez cx sent. UA+. started empirically on vanc/zosyn. b/l LE doppler and LUE doppler ordered per ID, +LUE superficial thrombophlebitis. BL LE dopplers negative for DVT.   10/2: POD 7, ANNA overnight. Maintaining MAP >100. 500cc albumin bolus, dilaudid lowered to 2/4mg, toradol [id number] added for pain, abd xray for distension shows gas pattern. aquacel dressing changed.  10/3: POD8 ANNA overnight. Remains on levophed to maintain MAP>100 in AM. Neuro exam unchanged. Tmax 100.9F in AM, given tylenol. Flexeril changed to valium 2q8 and lyrica 50q8 started per pain managment.  DC'd vanc, continue zosyn x 7 days per ID. 1u PRBC to maintain hbg >9.0. DANIELE drain removed. Salt tabs weaned to 2q8. MAP goal changed to >85 to help wean off levo gtt.   10/4: POD 9. MAP goal >100. Sodium chloride tabs discontinued.  10/5: POD 10, Given 2 L boluses o/n for tachycardia and negative fluid status. Given 1 mg IV valium in AM for muscle spasm. 1L bolus given in afternoon for high urine output. Preop for OR tomorrow.   10/6: POD 11 Pt endorsing incisional pain, given 1mg IV valium. Neuro exam stable. Plan for second stage today,  POD 0 revision of T3-T9 VCR, revision of C7-L1 posterior fusion, plastics closure. Post-op CT thoracic spine complete. 1 L bolus for soft BP and colapsable IVC on POCUS.   10/7: POD 12 C7-L1 fusion. POD 1 Revision fusion and T3-9 VCR. Pain controlled. Remains on Levo for MAP goal > 100. AXR completed in AM for abdominal distension. Dilaudid PO prn increased to 4 and 6 mg, valium increased to 2mg q6 for pain control. 1u PRBC given for Hb 8.4. Hgb elevated to 10.4. 250 cc albumin given for tachycardia.   10/8: POD 13 Fusion, POD 2 Revision. Pain controlled overnight.     Vital Signs Last 24 Hrs  T(C): 38.1 (07 Oct 2023 21:36), Max: 38.1 (07 Oct 2023 21:36)  T(F): 100.5 (07 Oct 2023 21:36), Max: 100.5 (07 Oct 2023 21:36)  HR: 114 (08 Oct 2023 00:00) (89 - 118)  BP: --  BP(mean): --  RR: 16 (08 Oct 2023 00:00) (16 - 21)  SpO2: 94% (08 Oct 2023 00:00) (91% - 98%)    Parameters below as of 08 Oct 2023 00:00  Patient On (Oxygen Delivery Method): room air    I&O's Summary    06 Oct 2023 07:01  -  07 Oct 2023 07:00  --------------------------------------------------------  IN: 2816 mL / OUT: 1615 mL / NET: 1201 mL    07 Oct 2023 07:01  -  08 Oct 2023 00:25  --------------------------------------------------------  IN: 2461.4 mL / OUT: 1570 mL / NET: 891.4 mL    PHYSICAL EXAM:  General: Pt is laying flat in bed, in NAD, on RA  HEENT: PERRL 3mm, EOMI B/L, face symmetric  Cardiovascular: RRR, normal S1 and S2   Respiratory: non-labored breathing, symmetric chest rise   GI: abd soft, nontender, slightly distended    Neuro: A&O x 3, no aphasia, speech clear  Strength 5/5 BL UE, sensation to light touch intact throughout BL UE  BL LE 0/5 to noxious stimuli  Decreased sensation at and distal to T8 dermatome   Vascular: Distal pulses 2+ x4, no calf edema or erythema  Wounds: Posterior spine wound dressing C/D/I  WOUND/DRAINS: 2 deep HMV to full suction, 2 DANIELE to full suction    LABS:                         10.2   17.52 )-----------( 407      ( 07 Oct 2023 16:39 )             30.2     10-07    135  |  102  |  6<L>  ----------------------------<  144<H>  4.0   |  23  |  0.41<L>    Ca    8.8      07 Oct 2023 05:24  Phos  3.4     10-07  Mg     1.8     10-07      PT/INR - ( 06 Oct 2023 14:34 )   PT: 12.1 sec;   INR: 1.06          PTT - ( 06 Oct 2023 14:34 )  PTT:26.1 sec  Urinalysis Basic - ( 07 Oct 2023 05:24 )    Color: x / Appearance: x / SG: x / pH: x  Gluc: 144 mg/dL / Ketone: x  / Bili: x / Urobili: x   Blood: x / Protein: x / Nitrite: x   Leuk Esterase: x / RBC: x / WBC x   Sq Epi: x / Non Sq Epi: x / Bacteria: x          CAPILLARY BLOOD GLUCOSE          Drug Levels: [] N/A  Vancomycin Level, Trough: 4.0 ug/mL (10-03 @ 05:50)  Vancomycin Level, Trough: 4.0 ug/mL (10-03 @ 05:05)    CSF Analysis: [] N/A      Allergies    No Known Allergies    Intolerances      MEDICATIONS:  Antibiotics:  piperacillin/tazobactam IVPB.. 4.5 Gram(s) IV Intermittent every 8 hours    Neuro:  acetaminophen     Tablet .. 1000 milliGRAM(s) Oral every 8 hours  diazepam    Tablet 2 milliGRAM(s) Oral every 6 hours  diazepam  Injectable 2 milliGRAM(s) IV Push every 8 hours PRN  HYDROmorphone   Tablet 6 milliGRAM(s) Oral every 4 hours PRN  HYDROmorphone   Tablet 4 milliGRAM(s) Oral every 4 hours PRN  HYDROmorphone  Injectable 0.5 milliGRAM(s) IV Push every 2 hours PRN  melatonin 5 milliGRAM(s) Oral at bedtime PRN  ondansetron   Disintegrating Tablet 4 milliGRAM(s) Oral every 6 hours  pregabalin 50 milliGRAM(s) Oral every 8 hours    Anticoagulation:    OTHER:  bisacodyl Suppository 10 milliGRAM(s) Rectal daily  influenza   Vaccine 0.5 milliLiter(s) IntraMuscular once  midodrine 15 milliGRAM(s) Oral every 8 hours  norepinephrine Infusion 0.05 MICROgram(s)/kG/Min IV Continuous <Continuous>  polyethylene glycol 3350 17 Gram(s) Oral two times a day  saline laxative (FLEET) Rectal Enema 1 Enema Rectal once PRN  senna 2 Tablet(s) Oral at bedtime  simethicone 80 milliGRAM(s) Chew every 6 hours    IVF:  lactated ringers. 1000 milliLiter(s) IV Continuous <Continuous>    CULTURES:  Culture Results:   No growth at 5 days. (10-01 @ 11:25)  Culture Results:   No growth at 5 days. (10-01 @ 11:25)    RADIOLOGY & ADDITIONAL TESTS:      ASSESSMENT:  43F no PMHx suffered childhood injury resulting in spine injury and residual gait disturbance, hyperreflexia of BL LE. She has right greater than left leg tingling, low back and mid back pain. 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 S/p C7-L1 fusion, T3-8 vertebral column resection, placement of anterior cage (9/25/23). S/p revision of T3-T9 VCR, revision of C7-L1 posterior fusion, plastics closure (10/6/23).     NEURO:  - Neuro/spine checks q4/vitals q1hrs  - Pain control: tylenol 1g q8h, valium 2q6 with prn IV for breakthrough, dilaudid 4/6 PO, Lyrica 50q8- stage 1 Post-op CT full spine: L1 screw not attached to the vertical yoli, epidural lipomatosis L2-3  - stage 2 post op CT T spine completed  - HMVx2, JPx2 (per plastics)    Cardio:  - MAP > 100, on levo gtt   - midodrine 15 mg q8h to wean off pressors  - echo 9/27: EF 60-65%    Pulm:  - room air  - incentive spirometry    GI:  - ADAT  - Bowel regimen   - BM 10/7  - simethicone 80q6 for gas    Renal:  - LR @ 30cc/hr  - voiding    Endo:  - a1c 5.6  - ISS dc'd    Heme:  - SCDs for DVT ppx/ SQL held for OR  - 1.5L EBL, 1.5L albumin and cell saver intraop  - 2u PRBC and 1u PLT postop 9/25, 1u PRBC 10/3, 1u PRBC 10/7  - 500 cell saver post op stage 2  - b/l LE dopplers 10/1 negative  - LUE doppler 10/1: +cephalic thrombophlebitis    ID:  - febrile 9/27, 10/1 f/u panculture, +UA 10/1  - Vanc (10/1-10/3 ) dc'd, Zosyn (10/1- 10/8)  x 7 days   - ID signed off    Discussed with Dr. Vieira and Dr. Sutton

## 2023-10-08 NOTE — OCCUPATIONAL THERAPY INITIAL EVALUATION ADULT - ADDITIONAL COMMENTS
Pt reporting that prior to admission, she was able to perform all mobility/ADLs independently with use of RW or stroller for ambulation. Pt has shower chair and grab bars inside of her tub. No additional AD/AE.

## 2023-10-08 NOTE — OCCUPATIONAL THERAPY INITIAL EVALUATION ADULT - LIGHT TOUCH SENSATION, RLE, REHAB EVAL
absent withdrawal response to noxious stimuli. Pt demo severe impairment to light touch with vision occluded/severe impairment

## 2023-10-08 NOTE — OCCUPATIONAL THERAPY INITIAL EVALUATION ADULT - DIAGNOSIS, OT EVAL
Pt presenting with impaired BLE strength/sensation/motor control, impaired postural control, impaired balance, and impaired functional activity tolerance, impacting ability to perform functional mobility/ADLs.

## 2023-10-08 NOTE — OCCUPATIONAL THERAPY INITIAL EVALUATION ADULT - BED MOBILITY LIMITATIONS, REHAB EVAL
Spoke with Dr. Julee Archuleta, informed that pt is refusing straight cath unless he is under anesthesia. Per Dr. Julee Archuleta, he is not going to sedate pt to put in catheter. RN to order flomax and continue IVF. decreased ability to use legs for bridging/pushing/impaired ability to control trunk for mobility

## 2023-10-08 NOTE — PHYSICAL THERAPY INITIAL EVALUATION ADULT - RANGE OF MOTION EXAMINATION, REHAB EVAL
extraction; pr office/outpt visit,procedure only (Left, 11/28/2018); and Cholecystectomy, laparoscopic (N/A, 1/3/2019). CURRENT MEDICATIONS       Previous Medications    ALBUTEROL SULFATE HFA (VENTOLIN HFA) 108 (90 BASE) MCG/ACT INHALER    Inhale 2 puffs into the lungs every 6 hours as needed for Wheezing    AMPHETAMINE-DEXTROAMPHETAMINE (ADDERALL XR) 20 MG XR CAPSULE    Take 20 mg by mouth every morning. AZITHROMYCIN (ZITHROMAX) 250 MG TABLET    Take 250 mg by mouth daily    BUDESONIDE (PULMICORT IN)    Inhale into the lungs    EPINEPHRINE (EPIPEN) 0.3 MG/0.3ML SOAJ INJECTION    Use as directed for allergic reaction       ALLERGIES     is allergic to bee venom and adhesive tape. FAMILY HISTORY     She indicated that her mother is alive. She indicated that her father is alive. She indicated that the status of her maternal grandmother is unknown.   family history includes Cancer in her maternal grandmother; Heart Disease in her mother. SOCIAL HISTORY      reports that she has never smoked. She has never used smokeless tobacco. She reports that she does not drink alcohol or use drugs. PHYSICAL EXAM     INITIAL VITALS:  weight is 200 lb (90.7 kg). Her temperature is 97.4 °F (36.3 °C). Her blood pressure is 134/88 and her pulse is 102. Her respiration is 20 and oxygen saturation is 96%. Physical Exam   Constitutional: She appears well-developed and well-nourished. No distress. HENT:   Head: Atraumatic. Nose: Mucosal edema present. Mouth/Throat: Oropharynx is clear and moist.   Eyes: Pupils are equal, round, and reactive to light. Conjunctivae are normal.   Neck: Neck supple. No JVD present. No tracheal deviation present. No thyromegaly present. Cardiovascular: Normal rate and regular rhythm. Exam reveals no gallop and no friction rub. No murmur heard. Pulmonary/Chest: Effort normal and breath sounds normal. No respiratory distress. She has no wheezes. She has no rales. Abdominal: Soft.
bilateral upper extremity ROM was WFL (within functional limits)/bilateral lower extremity ROM was WFL (within functional limits)

## 2023-10-08 NOTE — OCCUPATIONAL THERAPY INITIAL EVALUATION ADULT - LEVEL OF INDEPENDENCE: DRESS UPPER BODY, OT EVAL
adjusting gown while sitting EOB, requiring Mod Ax1 for trunk support 2/2 impaired postural control and decreased sitting balance./moderate assist (50% patients effort)

## 2023-10-08 NOTE — PHYSICAL THERAPY INITIAL EVALUATION ADULT - ADDITIONAL COMMENTS
Pt. resides alone with her 2 y.o daughter, ambulates with RW in the community; ambulates w/o AD indoors, + shower chair, no home care.

## 2023-10-08 NOTE — PHYSICAL THERAPY INITIAL EVALUATION ADULT - GENERAL OBSERVATIONS, REHAB EVAL
Pt. was received supine, on RA, +EKG, +IV, + DANIELE x2, +Hemovac, + R axillary Rosa Maria, + SCDs, + Chin, NAD.

## 2023-10-08 NOTE — PROGRESS NOTE ADULT - ASSESSMENT
Assessment: 43F s/p traumatic spine injury w/o neurological deficit & severe thoracic kyphosis now s/p T3-8 VCR, C7-L1 fusion, correction of deformity, placement of anterior cage.  STAGE 1. plastics closure. now s/p STAGE 2       NEURO:  T3-8 VCR, C7-L1 fusion, correction of deformity, placement of anterior cage.  STAGE 1. plastics closure now s/p STAGE 2   -neuro check q4  -pain management: oral Dilaudid 6mg/4mg PRN, valium 2mg PRN, lyrica 50mg qd   -PT/OT   -monitor drain output     PULMONARY:  saturating well on RA,   -continue to monitor on pulse o2   -incentive spirometry 10q/hr when awake     CARDIOVASCULAR:  monitor on telemetry   vitals q1  MAP goal >100; c/w midodrine 15mg qd & levophed   add on troponin to evaluate for heart strain while on levophed   -olimpia in place;     GASTROENTEROLOGY:  regular diet   ensure BMs w/ Miralax & senna  Daily stool count, LBM 10/7  KUB w/ bowel gas pattern   -c/w simethicone       RENAL/:  LR @30cc/hr while on pressors   -check BMP qd  -strict i/o's ; straight cath PRN; has permfit   -Na goal 135-145    ENDOCRINE:  glucose goal 100-180    HEME/ONC:  DVT ppx: Lovenox SQ  b/l SCDs  s/p PRBC transfusion x2;  LE doppler negative     INFECTIOUS:   empiric abx for low grade fevers; blood culture negative, RVP negative, UA (+) likely asymptomatic bacteruria.   c/w zosyn 4.5g q8 (total 7 days end 10/8)   incision site evaluated no evidence of infection

## 2023-10-08 NOTE — PROGRESS NOTE ADULT - SUBJECTIVE AND OBJECTIVE BOX
NEUROCRITICAL CARE EVENING NOTE    DAY EVENTS:    - POD2 s/p stage 2 Revision of T3-T9 VCR, revision of C7-L1 posterior fusion, plastics closure   - h/h stable  - liberalized map goals      VITALS/IMAGING/DATA  - Reviewed    ALLERGIES:   - Reviewed      MEDICATIONS:  - Reviewed      PHYSICAL EXAM:  Exam unchanged from Day time, refer to progress note.

## 2023-10-08 NOTE — OCCUPATIONAL THERAPY INITIAL EVALUATION ADULT - GENERAL OBSERVATIONS, REHAB EVAL
OT IE completed. Pt admitted to [facility] for T3-8 VCR, C7-L1 fusion, correction of deformity, placement of anterior cage. Orders received, chart reviewed, pt cleared for OT by RAMON Arredondo. Pt received semi supine in bed, NAD, +IV, +JPx2, +hemovac x1, +R axial a line, +tele, +spinal dressing C/D/I. Pt A&Ox4, agreeable to OT, and tolerated session well.

## 2023-10-08 NOTE — PHYSICAL THERAPY INITIAL EVALUATION ADULT - ADL SKILLS, REHAB EVAL
Left message for patient to call office back     Please transfer patient to triage nurse line    Orders placed    independent

## 2023-10-08 NOTE — OCCUPATIONAL THERAPY INITIAL EVALUATION ADULT - LIGHT TOUCH SENSATION, LLE, REHAB EVAL
absent withdrawal response to noxious stimuli. Pt demo moderate impairment to light touch with vision occluded/moderate impairment

## 2023-10-08 NOTE — OCCUPATIONAL THERAPY INITIAL EVALUATION ADULT - PLANNED THERAPY INTERVENTIONS, OT EVAL
Left arm;
ADL retraining/balance training/bed mobility training/motor coordination training/neuromuscular re-education/strengthening/stretching/transfer training

## 2023-10-08 NOTE — CHART NOTE - NSCHARTNOTEFT_GEN_A_CORE
+BM this morning. Right hemovac removed. MAPs liberalized to goal greater than 85. Troponin elevated this morning, now downtrending. EKG WNL. Neuro exam stable.

## 2023-10-08 NOTE — PROGRESS NOTE ADULT - ASSESSMENT
ASSESSMENT/PLAN:     s/p 5-level vertebral column resection with fusion of C7-L1, put in a cage; stage 1; stable introp monitoring; pleura violated, repaired; post op with BLE weakness requring pressors to augment MAP>100, now pending additional imaging    NEURO:  - Neurochecks q4h  - Surgical drains per NSGY/plastics  - Augment map as per below  - Pain control  - Activity: bed rest for now, PT/OT when able    PULM:  - Incentive spirometry  - mobilize as tolerated  - Aspiration Precautions    CV:  - MAP>80 per nsg  - trop, ekg while on pressors daily  - c/w levophed  - midodrine 15q8h  - Rt axillary olimpia placed 9/29    RENAL:  - Fluids: NS 30cc/h while on pressors  - trend renal function  - salt tabs    GI:  - Diet: regular  - Bowel regimen standing  - polyethylene glycol 17G daily, senna, dulcolax       ENDO:   - Goal euglycemia (-180)    HEME/ONC:  s/p 700cc cell saver, 1.5 L EBL; 1500 5% albumin, given TXA;  post-op Hb 6.8 - give 2 units pRBC, repeat   - monitor H/H  - s/p 1uprbc  - post transfusion cbc    VTE prophylaxis   - SCDs   - sql      ID:  - febrile, pan cultured 9/27, UA negative, will continue to monitor  - zosyn (10/1 - )  - asymptomatic bacteruria         Dispo: ICU for post op/ BP augmentation

## 2023-10-08 NOTE — PROGRESS NOTE ADULT - SUBJECTIVE AND OBJECTIVE BOX
INTERVAL HISTORY: HPI:  Taken from outpatient neurosurgery note on 9/13/2023 " The patient, a long-standing patient of my practice, reports a history of spinal issues that dates back to a childhood injury. Over the years she's experienced developing issues with walking in a straight line, overactive reflexes in her lower extremities, and unique difficulty with her lower extremities referred to as cloneness. Recent upturn in these symptoms has limited her ability to carry out her daily routines. The patient also reports no significant improvement in her condition since our last clinical encounter two years ago."    44 y/o female with no PMHx or PSHx presents for surgery today.  She reports ambulating with walker or baby stroller at home for years.  She reports intermittent falls.  She has right greater than left leg tingling, low back and mid back pain.  She denies arm or leg pain.  She reports urinary incontinence since having a baby 2 years ago where if she doesn't get to the bathroom fast enough, she has incontinence, but she is able to hold it for a short while.  She takes no pain medications.  She has taken ibuprofen in the past.  She denies saddle anesthesia, bowel incontinence.   (25 Sep 2023 06:51)    Drug Dosing Weight  Height (cm): 152.4 (25 Sep 2023 07:32)  Weight (kg): 68.6 (25 Sep 2023 07:32)  BMI (kg/m2): 29.5 (25 Sep 2023 07:32)  BSA (m2): 1.66 (25 Sep 2023 07:32)    PAST MEDICAL & SURGICAL HISTORY:  History of acute illness  childhood  No significant past surgical history      REVIEW OF SYSTEMS: [ ] Unable to Assess due to neurologic exam   [ ] All ROS addressed below are non-contributory, except:  Neuro: [ ] Headache [ ] Back pain [ ] Numbness [ ] Weakness [ ] Ataxia [ ] Dizziness [ ] Aphasia [ ] Dysarthria [ ] Visual disturbance  Resp: [ ] Shortness of breath/dyspnea, [ ] Orthopnea [ ] Cough  CV: [ ] Chest pain [ ] Palpitation [ ] Lightheadedness [ ] Syncope  Renal: [ ] Thirst [ ] Edema  GI: [ ] Nausea [ ] Emesis [ ] Abdominal pain [ ] Constipation [ ] Diarrhea  Hem: [ ] Hematemesis [ ] bright red blood per rectum  ID: [ ] Fever [ ] Chills [ ] Dysuria  ENT: [ ] Rhinorrhea    PHYSICAL EXAM:    Constitutional: No Acute Distress     Neurological: AOx3, Following Commands, Moving all Extremities     Motor exam:          Upper extremity                         Delt     Bicep     Tricep    HG                                                 R         5/5        5/5        5/5       5/5                                               L          5/5        5/5        5/5       5/5          Lower extremity              R & L : TF         Sensation: [] intact to light touch  [x] decreased: T5 & below sensation L approx. 65-70% R 50% per pt   Pulmonary: Clear to Auscultation, No rales, No rhonchi, No wheezes   Cardiovascular: S1, S2, Regular rate and rhythm   Gastrointestinal: Soft, Non-tender, distended hyperactive BS  Extremities: No calf tenderness   Incision: CDI; drain in place     ICU Vital Signs Last 24 Hrs  T(C): 37.1 (08 Oct 2023 06:02), Max: 38.1 (07 Oct 2023 21:36)  T(F): 98.8 (08 Oct 2023 06:02), Max: 100.5 (07 Oct 2023 21:36)  HR: 105 (08 Oct 2023 08:00) (87 - 118)  ABP: 140/83 (08 Oct 2023 08:00) (110/58 - 159/80)  ABP(mean): 109 (08 Oct 2023 08:00) (79 - 114)  RR: 16 (08 Oct 2023 08:00) (14 - 21)  SpO2: 100% (08 Oct 2023 08:00) (91% - 100%)      10-07-23 @ 07:01  -  10-08-23 @ 07:00  --------------------------------------------------------  IN: 3522.8 mL / OUT: 3625 mL / NET: -102.2 mL    10-08-23 @ 07:01  -  10-08-23 @ 08:26  --------------------------------------------------------  IN: 67.1 mL / OUT: 0 mL / NET: 67.1 mL      acetaminophen     Tablet .. 1000 milliGRAM(s) Oral every 8 hours  bisacodyl Suppository 10 milliGRAM(s) Rectal daily  diazepam    Tablet 2 milliGRAM(s) Oral every 6 hours  diazepam  Injectable 2 milliGRAM(s) IV Push every 8 hours PRN  HYDROmorphone   Tablet 6 milliGRAM(s) Oral every 4 hours PRN  HYDROmorphone   Tablet 4 milliGRAM(s) Oral every 4 hours PRN  HYDROmorphone  Injectable 0.5 milliGRAM(s) IV Push every 2 hours PRN  influenza   Vaccine 0.5 milliLiter(s) IntraMuscular once  lactated ringers. 1000 milliLiter(s) (30 mL/Hr) IV Continuous <Continuous>  melatonin 5 milliGRAM(s) Oral at bedtime PRN  midodrine 15 milliGRAM(s) Oral every 8 hours  norepinephrine Infusion 0.05 MICROgram(s)/kG/Min (3.22 mL/Hr) IV Continuous <Continuous>  ondansetron   Disintegrating Tablet 4 milliGRAM(s) Oral every 6 hours  piperacillin/tazobactam IVPB.. 4.5 Gram(s) IV Intermittent every 8 hours  polyethylene glycol 3350 17 Gram(s) Oral two times a day  potassium phosphate / sodium phosphate Tablet (K-PHOS No. 2) 1 Tablet(s) Oral two times a day  pregabalin 50 milliGRAM(s) Oral every 8 hours  senna 2 Tablet(s) Oral at bedtime  simethicone 80 milliGRAM(s) Chew every 6 hours  sodium chloride 0.9% lock flush 10 milliLiter(s) IV Push every 1 hour PRN      LABS:  Na: 137 (10-08 @ 05:35), 135 (10-07 @ 05:24), 136 (10-06 @ 14:34), 137 (10-06 @ 04:54)  K: 3.8 (10-08 @ 05:35), 4.0 (10-07 @ 05:24), 4.4 (10-06 @ 14:34), 4.2 (10-06 @ 04:54)  Cl: 99 (10-08 @ 05:35), 102 (10-07 @ 05:24), 106 (10-06 @ 14:34), 102 (10-06 @ 04:54)  CO2: 29 (10-08 @ 05:35), 23 (10-07 @ 05:24), 23 (10-06 @ 14:34), 25 (10-06 @ 04:54)  BUN: 6 (10-08 @ 05:35), 6 (10-07 @ 05:24), 7 (10-06 @ 14:34), 6 (10-06 @ 04:54)  Cr: 0.40 (10-08 @ 05:35), 0.41 (10-07 @ 05:24), 0.39 (10-06 @ 14:34), 0.43 (10-06 @ 04:54)  Glu: 134(10-08 @ 05:35), 144(10-07 @ 05:24), 121(10-06 @ 14:34), 133(10-06 @ 04:54)    Hgb: 9.8 (10-08 @ 05:35), 10.2 (10-07 @ 16:39), 8.4 (10-07 @ 11:42), 8.6 (10-07 @ 05:24), 8.6 (10-06 @ 20:04), 8.8 (10-06 @ 14:34), 10.0 (10-06 @ 04:54)  Hct: 29.2 (10-08 @ 05:35), 30.2 (10-07 @ 16:39), 25.3 (10-07 @ 11:42), 26.2 (10-07 @ 05:24), 25.8 (10-06 @ 20:04), 26.9 (10-06 @ 14:34), 30.8 (10-06 @ 04:54)  WBC: 14.93 (10-08 @ 05:35), 17.52 (10-07 @ 16:39), 16.89 (10-07 @ 11:42), 17.54 (10-07 @ 05:24), 14.22 (10-06 @ 20:04), 13.74 (10-06 @ 14:34), 11.18 (10-06 @ 04:54)  Plt: 420 (10-08 @ 05:35), 407 (10-07 @ 16:39), 397 (10-07 @ 11:42), 423 (10-07 @ 05:24), 392 (10-06 @ 20:04), 365 (10-06 @ 14:34), 433 (10-06 @ 04:54)    INR: 1.06 10-06-23 @ 14:34  PTT: 26.1 10-06-23 @ 14:34    ABG - ( 06 Oct 2023 11:07 )  pH, Arterial: 7.39  pH, Blood: x     /  pCO2: 26    /  pO2: 313   / HCO3: 16    / Base Excess: -8.1  /  SaO2: x

## 2023-10-09 LAB
ANION GAP SERPL CALC-SCNC: 8 MMOL/L — SIGNIFICANT CHANGE UP (ref 5–17)
BUN SERPL-MCNC: 6 MG/DL — LOW (ref 7–23)
CALCIUM SERPL-MCNC: 9.1 MG/DL — SIGNIFICANT CHANGE UP (ref 8.4–10.5)
CHLORIDE SERPL-SCNC: 100 MMOL/L — SIGNIFICANT CHANGE UP (ref 96–108)
CO2 SERPL-SCNC: 31 MMOL/L — SIGNIFICANT CHANGE UP (ref 22–31)
CREAT SERPL-MCNC: 0.4 MG/DL — LOW (ref 0.5–1.3)
EGFR: 127 ML/MIN/1.73M2 — SIGNIFICANT CHANGE UP
GLUCOSE SERPL-MCNC: 147 MG/DL — HIGH (ref 70–99)
HCT VFR BLD CALC: 26.8 % — LOW (ref 34.5–45)
HGB BLD-MCNC: 8.9 G/DL — LOW (ref 11.5–15.5)
MAGNESIUM SERPL-MCNC: 1.8 MG/DL — SIGNIFICANT CHANGE UP (ref 1.6–2.6)
MCHC RBC-ENTMCNC: 28.4 PG — SIGNIFICANT CHANGE UP (ref 27–34)
MCHC RBC-ENTMCNC: 33.2 GM/DL — SIGNIFICANT CHANGE UP (ref 32–36)
MCV RBC AUTO: 85.6 FL — SIGNIFICANT CHANGE UP (ref 80–100)
NRBC # BLD: 0 /100 WBCS — SIGNIFICANT CHANGE UP (ref 0–0)
PHOSPHATE SERPL-MCNC: 3 MG/DL — SIGNIFICANT CHANGE UP (ref 2.5–4.5)
PLATELET # BLD AUTO: 410 K/UL — HIGH (ref 150–400)
POTASSIUM SERPL-MCNC: 3.6 MMOL/L — SIGNIFICANT CHANGE UP (ref 3.5–5.3)
POTASSIUM SERPL-SCNC: 3.6 MMOL/L — SIGNIFICANT CHANGE UP (ref 3.5–5.3)
RBC # BLD: 3.13 M/UL — LOW (ref 3.8–5.2)
RBC # FLD: 14.6 % — HIGH (ref 10.3–14.5)
SODIUM SERPL-SCNC: 139 MMOL/L — SIGNIFICANT CHANGE UP (ref 135–145)
TROPONIN T, HIGH SENSITIVITY RESULT: 49 NG/L — SIGNIFICANT CHANGE UP (ref 0–51)
WBC # BLD: 11.81 K/UL — HIGH (ref 3.8–10.5)
WBC # FLD AUTO: 11.81 K/UL — HIGH (ref 3.8–10.5)

## 2023-10-09 PROCEDURE — 99291 CRITICAL CARE FIRST HOUR: CPT

## 2023-10-09 RX ORDER — KETOROLAC TROMETHAMINE 30 MG/ML
30 SYRINGE (ML) INJECTION ONCE
Refills: 0 | Status: DISCONTINUED | OUTPATIENT
Start: 2023-10-09 | End: 2023-10-09

## 2023-10-09 RX ORDER — NOREPINEPHRINE BITARTRATE/D5W 8 MG/250ML
0.05 PLASTIC BAG, INJECTION (ML) INTRAVENOUS
Qty: 8 | Refills: 0 | Status: DISCONTINUED | OUTPATIENT
Start: 2023-10-09 | End: 2023-10-09

## 2023-10-09 RX ORDER — MAGNESIUM SULFATE 500 MG/ML
2 VIAL (ML) INJECTION ONCE
Refills: 0 | Status: COMPLETED | OUTPATIENT
Start: 2023-10-09 | End: 2023-10-09

## 2023-10-09 RX ORDER — NOREPINEPHRINE BITARTRATE/D5W 8 MG/250ML
0.05 PLASTIC BAG, INJECTION (ML) INTRAVENOUS
Qty: 8 | Refills: 0 | Status: DISCONTINUED | OUTPATIENT
Start: 2023-10-09 | End: 2023-10-10

## 2023-10-09 RX ORDER — DIAZEPAM 5 MG
2 TABLET ORAL ONCE
Refills: 0 | Status: DISCONTINUED | OUTPATIENT
Start: 2023-10-09 | End: 2023-10-09

## 2023-10-09 RX ORDER — CHLORHEXIDINE GLUCONATE 213 G/1000ML
1 SOLUTION TOPICAL
Refills: 0 | Status: DISCONTINUED | OUTPATIENT
Start: 2023-10-09 | End: 2023-10-16

## 2023-10-09 RX ORDER — DIAZEPAM 5 MG
5 TABLET ORAL EVERY 8 HOURS
Refills: 0 | Status: DISCONTINUED | OUTPATIENT
Start: 2023-10-09 | End: 2023-10-16

## 2023-10-09 RX ORDER — POTASSIUM CHLORIDE 20 MEQ
40 PACKET (EA) ORAL ONCE
Refills: 0 | Status: COMPLETED | OUTPATIENT
Start: 2023-10-09 | End: 2023-10-09

## 2023-10-09 RX ADMIN — SIMETHICONE 80 MILLIGRAM(S): 80 TABLET, CHEWABLE ORAL at 17:27

## 2023-10-09 RX ADMIN — ENOXAPARIN SODIUM 40 MILLIGRAM(S): 100 INJECTION SUBCUTANEOUS at 21:00

## 2023-10-09 RX ADMIN — Medication 1000 MILLIGRAM(S): at 21:01

## 2023-10-09 RX ADMIN — SENNA PLUS 2 TABLET(S): 8.6 TABLET ORAL at 21:01

## 2023-10-09 RX ADMIN — Medication 30 MILLIGRAM(S): at 16:30

## 2023-10-09 RX ADMIN — SIMETHICONE 80 MILLIGRAM(S): 80 TABLET, CHEWABLE ORAL at 11:07

## 2023-10-09 RX ADMIN — Medication 5 MILLIGRAM(S): at 14:18

## 2023-10-09 RX ADMIN — Medication 1000 MILLIGRAM(S): at 14:18

## 2023-10-09 RX ADMIN — SIMETHICONE 80 MILLIGRAM(S): 80 TABLET, CHEWABLE ORAL at 05:33

## 2023-10-09 RX ADMIN — ONDANSETRON 4 MILLIGRAM(S): 8 TABLET, FILM COATED ORAL at 17:26

## 2023-10-09 RX ADMIN — Medication 1000 MILLIGRAM(S): at 06:24

## 2023-10-09 RX ADMIN — HYDROMORPHONE HYDROCHLORIDE 6 MILLIGRAM(S): 2 INJECTION INTRAMUSCULAR; INTRAVENOUS; SUBCUTANEOUS at 14:18

## 2023-10-09 RX ADMIN — Medication 40 MILLIEQUIVALENT(S): at 07:11

## 2023-10-09 RX ADMIN — Medication 1000 MILLIGRAM(S): at 05:32

## 2023-10-09 RX ADMIN — HYDROMORPHONE HYDROCHLORIDE 4 MILLIGRAM(S): 2 INJECTION INTRAMUSCULAR; INTRAVENOUS; SUBCUTANEOUS at 17:27

## 2023-10-09 RX ADMIN — Medication 2 MILLIGRAM(S): at 05:43

## 2023-10-09 RX ADMIN — HYDROMORPHONE HYDROCHLORIDE 0.5 MILLIGRAM(S): 2 INJECTION INTRAMUSCULAR; INTRAVENOUS; SUBCUTANEOUS at 02:55

## 2023-10-09 RX ADMIN — ONDANSETRON 4 MILLIGRAM(S): 8 TABLET, FILM COATED ORAL at 23:24

## 2023-10-09 RX ADMIN — Medication 50 MILLIGRAM(S): at 14:18

## 2023-10-09 RX ADMIN — HYDROMORPHONE HYDROCHLORIDE 0.5 MILLIGRAM(S): 2 INJECTION INTRAMUSCULAR; INTRAVENOUS; SUBCUTANEOUS at 04:09

## 2023-10-09 RX ADMIN — MIDODRINE HYDROCHLORIDE 15 MILLIGRAM(S): 2.5 TABLET ORAL at 21:00

## 2023-10-09 RX ADMIN — HYDROMORPHONE HYDROCHLORIDE 6 MILLIGRAM(S): 2 INJECTION INTRAMUSCULAR; INTRAVENOUS; SUBCUTANEOUS at 06:24

## 2023-10-09 RX ADMIN — ONDANSETRON 4 MILLIGRAM(S): 8 TABLET, FILM COATED ORAL at 11:07

## 2023-10-09 RX ADMIN — HYDROMORPHONE HYDROCHLORIDE 6 MILLIGRAM(S): 2 INJECTION INTRAMUSCULAR; INTRAVENOUS; SUBCUTANEOUS at 21:03

## 2023-10-09 RX ADMIN — HYDROMORPHONE HYDROCHLORIDE 4 MILLIGRAM(S): 2 INJECTION INTRAMUSCULAR; INTRAVENOUS; SUBCUTANEOUS at 17:42

## 2023-10-09 RX ADMIN — HYDROMORPHONE HYDROCHLORIDE 4 MILLIGRAM(S): 2 INJECTION INTRAMUSCULAR; INTRAVENOUS; SUBCUTANEOUS at 12:00

## 2023-10-09 RX ADMIN — HYDROMORPHONE HYDROCHLORIDE 6 MILLIGRAM(S): 2 INJECTION INTRAMUSCULAR; INTRAVENOUS; SUBCUTANEOUS at 15:00

## 2023-10-09 RX ADMIN — Medication 6.43 MICROGRAM(S)/KG/MIN: at 11:06

## 2023-10-09 RX ADMIN — HYDROMORPHONE HYDROCHLORIDE 6 MILLIGRAM(S): 2 INJECTION INTRAMUSCULAR; INTRAVENOUS; SUBCUTANEOUS at 20:29

## 2023-10-09 RX ADMIN — Medication 5 MILLIGRAM(S): at 22:30

## 2023-10-09 RX ADMIN — POLYETHYLENE GLYCOL 3350 17 GRAM(S): 17 POWDER, FOR SOLUTION ORAL at 05:34

## 2023-10-09 RX ADMIN — HYDROMORPHONE HYDROCHLORIDE 4 MILLIGRAM(S): 2 INJECTION INTRAMUSCULAR; INTRAVENOUS; SUBCUTANEOUS at 23:40

## 2023-10-09 RX ADMIN — Medication 30 MILLIGRAM(S): at 15:48

## 2023-10-09 RX ADMIN — HYDROMORPHONE HYDROCHLORIDE 4 MILLIGRAM(S): 2 INJECTION INTRAMUSCULAR; INTRAVENOUS; SUBCUTANEOUS at 11:09

## 2023-10-09 RX ADMIN — SODIUM CHLORIDE 30 MILLILITER(S): 9 INJECTION, SOLUTION INTRAVENOUS at 21:01

## 2023-10-09 RX ADMIN — HYDROMORPHONE HYDROCHLORIDE 6 MILLIGRAM(S): 2 INJECTION INTRAMUSCULAR; INTRAVENOUS; SUBCUTANEOUS at 05:19

## 2023-10-09 RX ADMIN — HYDROMORPHONE HYDROCHLORIDE 0.5 MILLIGRAM(S): 2 INJECTION INTRAMUSCULAR; INTRAVENOUS; SUBCUTANEOUS at 12:00

## 2023-10-09 RX ADMIN — HYDROMORPHONE HYDROCHLORIDE 0.5 MILLIGRAM(S): 2 INJECTION INTRAMUSCULAR; INTRAVENOUS; SUBCUTANEOUS at 07:38

## 2023-10-09 RX ADMIN — Medication 50 MILLIGRAM(S): at 21:00

## 2023-10-09 RX ADMIN — HYDROMORPHONE HYDROCHLORIDE 0.5 MILLIGRAM(S): 2 INJECTION INTRAMUSCULAR; INTRAVENOUS; SUBCUTANEOUS at 05:14

## 2023-10-09 RX ADMIN — Medication 1000 MILLIGRAM(S): at 15:00

## 2023-10-09 RX ADMIN — MIDODRINE HYDROCHLORIDE 15 MILLIGRAM(S): 2.5 TABLET ORAL at 14:18

## 2023-10-09 RX ADMIN — MIDODRINE HYDROCHLORIDE 15 MILLIGRAM(S): 2.5 TABLET ORAL at 05:34

## 2023-10-09 RX ADMIN — Medication 50 MILLIGRAM(S): at 05:36

## 2023-10-09 RX ADMIN — ONDANSETRON 4 MILLIGRAM(S): 8 TABLET, FILM COATED ORAL at 05:32

## 2023-10-09 RX ADMIN — Medication 2 MILLIGRAM(S): at 11:06

## 2023-10-09 RX ADMIN — Medication 2 MILLIGRAM(S): at 23:53

## 2023-10-09 RX ADMIN — HYDROMORPHONE HYDROCHLORIDE 4 MILLIGRAM(S): 2 INJECTION INTRAMUSCULAR; INTRAVENOUS; SUBCUTANEOUS at 22:29

## 2023-10-09 RX ADMIN — HYDROMORPHONE HYDROCHLORIDE 0.5 MILLIGRAM(S): 2 INJECTION INTRAMUSCULAR; INTRAVENOUS; SUBCUTANEOUS at 11:47

## 2023-10-09 RX ADMIN — HYDROMORPHONE HYDROCHLORIDE 0.5 MILLIGRAM(S): 2 INJECTION INTRAMUSCULAR; INTRAVENOUS; SUBCUTANEOUS at 02:00

## 2023-10-09 RX ADMIN — Medication 25 GRAM(S): at 07:11

## 2023-10-09 RX ADMIN — Medication 1000 MILLIGRAM(S): at 21:03

## 2023-10-09 RX ADMIN — SIMETHICONE 80 MILLIGRAM(S): 80 TABLET, CHEWABLE ORAL at 23:24

## 2023-10-09 RX ADMIN — HYDROMORPHONE HYDROCHLORIDE 0.5 MILLIGRAM(S): 2 INJECTION INTRAMUSCULAR; INTRAVENOUS; SUBCUTANEOUS at 07:42

## 2023-10-09 RX ADMIN — SODIUM CHLORIDE 30 MILLILITER(S): 9 INJECTION, SOLUTION INTRAVENOUS at 05:35

## 2023-10-09 RX ADMIN — Medication 5 MILLIGRAM(S): at 21:01

## 2023-10-09 RX ADMIN — POLYETHYLENE GLYCOL 3350 17 GRAM(S): 17 POWDER, FOR SOLUTION ORAL at 17:26

## 2023-10-09 NOTE — PROGRESS NOTE ADULT - SUBJECTIVE AND OBJECTIVE BOX
SUBJECTIVE: Patient reports regaining some sensation to her left thigh today compared to right. Denies new weakness, numbness, tingling, nausea, vomiting, chest pain, palpitations, shortness of breath, vision changes.     HOSPITAL COURSE:   9/25: POD 0 s/p C7-L1 fusion, T3-8 vertebral column resection, placement of anterior cage.   9/26; POD1 H/H 6.8 postop and PLT 85. Given 2u PRBC and 1u PLT. Switched propofol to precedex gtt. Pt extubated to face mask. Pt noted to only be withdrawing to noxious stimuli on bilateral lower extremities with sensation decreased RLE per patient. Dr. Vieira notified and plan is to keep MAP>100 and obtain CT full spine in the morning. Phenylephrine started to maintain MAP>100. Advanced diet to regular. Passed TOV. Increased need for aurora, UO high.   9/27: POD2 C7-L1 fusion T3-8 vertebral column resection. increased pressor requirements o/n, started on levo additionally. lactate 1.5, BNP 1400 from 800, CK elevated. echo: EF 60-65%. given 250cc bolus albumin, started on midodrine 10q8 to wean off pressors. PICC placed by Alli.   9/28: POD3 s/p C7-L1 fusion, T3-8 vertebral column resection, placement of anterior cage. R radial A-line removed and replaced on the L, trops and EKG done for chest discomfort, WNL, resolved with treatment of overall pain, continues to be febrile, lyrica dc'd to help.   Lyrica restarted. Weaning phenylephrine. Serum Na uptrending, 3% saline discontinued. Pt febrile with uptrending WBC, ID consulted recommend monitoring for now off antibiotics.  9/29: POD4. started on 3% o/n for Na 132. remains on aurora and levo for MAP>100. Tachycardic, given 1L NS. Right axillary a-line placed. 3% dc'd. HMV dc'd. Aquacell dressing replaced. Passed TOV.   9/30: POD 5.  Remains on levo gtt. Increased midodrine to 15q8. Given enema. Had BM.  10/1: POD 6. MAP goal increased back to >100 due to worsened sensation loss, on levo gtt. afebrile however per ID desiree Brewer cx sent. UA+. started empirically on vanc/zosyn. b/l LE doppler and LUE doppler ordered per ID, +LUE superficial thrombophlebitis. BL LE dopplers negative for DVT.   10/2: POD 7, ANNA overnight. Maintaining MAP >100. 500cc albumin bolus, dilaudid lowered to 2/4mg, toradol [id number] added for pain, abd xray for distension shows gas pattern. aquacel dressing changed.  10/3: POD8 ANNA overnight. Remains on levophed to maintain MAP>100 in AM. Neuro exam unchanged. Tmax 100.9F in AM, given tylenol. Flexeril changed to valium 2q8 and lyrica 50q8 started per pain managment.  DC'd vanc, continue zosyn x 7 days per ID. 1u PRBC to maintain hbg >9.0. DANIELE drain removed. Salt tabs weaned to 2q8. MAP goal changed to >85 to help wean off levo gtt.   10/4: POD 9. MAP goal >100. Sodium chloride tabs discontinued.  10/5: POD 10, Given 2 L boluses o/n for tachycardia and negative fluid status. Given 1 mg IV valium in AM for muscle spasm. 1L bolus given in afternoon for high urine output. Preop for OR tomorrow.   10/6: POD 11 Pt endorsing incisional pain, given 1mg IV valium. Neuro exam stable. Plan for second stage today,  POD 0 revision of T3-T9 VCR, revision of C7-L1 posterior fusion, plastics closure. Post-op CT thoracic spine complete. 1 L bolus for soft BP and colapsable IVC on POCUS.   10/7: POD 12 C7-L1 fusion. POD 1 Revision fusion and T3-9 VCR. Pain controlled. Remains on Levo for MAP goal > 100. AXR completed in AM for abdominal distension. Dilaudid PO prn increased to 4 and 6 mg, valium increased to 2mg q6 for pain control. 1u PRBC given for Hb 8.4. Hgb elevated to 10.4. 250 cc albumin given for tachycardia.   10/8: POD 13 Fusion, POD 2 Revision. Pain controlled overnight.   +BM this morning. Right hemovac removed. MAPs liberalized to goal greater than 85. Troponin elevated this morning, now downtrending. EKG WNL.   10/9: POD 13 C7-L1 fusion, POD 3 Revision and T3-9 VCR.     Vital Signs Last 24 Hrs  T(C): 36.6 (08 Oct 2023 22:00), Max: 37.3 (08 Oct 2023 17:54)  T(F): 97.9 (08 Oct 2023 22:00), Max: 99.1 (08 Oct 2023 17:54)  HR: 111 (08 Oct 2023 23:00) (87 - 128)  BP: 118/62 (08 Oct 2023 20:00) (114/67 - 135/84)  BP(mean): 85 (08 Oct 2023 20:00) (85 - 103)  RR: 16 (08 Oct 2023 23:00) (14 - 18)  SpO2: 98% (08 Oct 2023 23:00) (93% - 100%)    Parameters below as of 08 Oct 2023 23:00  Patient On (Oxygen Delivery Method): room air    I&O's Summary    07 Oct 2023 07:01  -  08 Oct 2023 07:00  --------------------------------------------------------  IN: 3522.8 mL / OUT: 3625 mL / NET: -102.2 mL    08 Oct 2023 07:01  -  09 Oct 2023 00:35  --------------------------------------------------------  IN: 1799.1 mL / OUT: 1850 mL / NET: -50.9 mL    PHYSICAL EXAM:  General: Pt is laying flat in bed, in NAD, on RA  HEENT: PERRL 3 mm, EOMI B/L, face symmetric   Cardiovascular: RRR, normal S1 and S2   Respiratory: non-labored breathing, symmetric chest rise   GI: abd soft, nontender, slightly distended    Neuro: A&O x 3, no aphasia, speech clear, Strength 5/5 BL UE, sensation to light touch intact throughout BL UE, BL LE 0/5 to noxious stimuli, decreased sensation at and distal to T8 dermatome   Vascular: Distal pulses 2+ x4, no calf edema or erythema   Wounds: Posterior spine wound dressing C/D/I   WOUND/DRAINS: 1 deep HMV to full suction, 2 DANIELE to full suction     LABS:                        9.8    14.93 )-----------( 420      ( 08 Oct 2023 05:35 )             29.2     10-08    137  |  99  |  6<L>  ----------------------------<  134<H>  3.8   |  29  |  0.40<L>    Ca    9.3      08 Oct 2023 05:35  Phos  2.7     10-08  Mg     1.8     10-08        Urinalysis Basic - ( 08 Oct 2023 05:35 )    Color: x / Appearance: x / SG: x / pH: x  Gluc: 134 mg/dL / Ketone: x  / Bili: x / Urobili: x   Blood: x / Protein: x / Nitrite: x   Leuk Esterase: x / RBC: x / WBC x   Sq Epi: x / Non Sq Epi: x / Bacteria: x          CAPILLARY BLOOD GLUCOSE          Drug Levels: [] N/A  Vancomycin Level, Trough: 4.0 ug/mL (10-03 @ 05:50)  Vancomycin Level, Trough: 4.0 ug/mL (10-03 @ 05:05)    CSF Analysis: [] N/A      Allergies    No Known Allergies    Intolerances      MEDICATIONS:  Antibiotics:    Neuro:  acetaminophen     Tablet .. 1000 milliGRAM(s) Oral every 8 hours  diazepam    Tablet 2 milliGRAM(s) Oral every 6 hours  diazepam  Injectable 2 milliGRAM(s) IV Push every 8 hours PRN  HYDROmorphone   Tablet 6 milliGRAM(s) Oral every 4 hours PRN  HYDROmorphone   Tablet 4 milliGRAM(s) Oral every 4 hours PRN  HYDROmorphone  Injectable 0.5 milliGRAM(s) IV Push every 2 hours PRN  melatonin 5 milliGRAM(s) Oral at bedtime PRN  ondansetron   Disintegrating Tablet 4 milliGRAM(s) Oral every 6 hours  pregabalin 50 milliGRAM(s) Oral every 8 hours    Anticoagulation:  enoxaparin Injectable 40 milliGRAM(s) SubCutaneous every 24 hours    OTHER:  bisacodyl Suppository 10 milliGRAM(s) Rectal daily  influenza   Vaccine 0.5 milliLiter(s) IntraMuscular once  midodrine 15 milliGRAM(s) Oral every 8 hours  norepinephrine Infusion 0.05 MICROgram(s)/kG/Min IV Continuous <Continuous>  polyethylene glycol 3350 17 Gram(s) Oral two times a day  senna 2 Tablet(s) Oral at bedtime  simethicone 80 milliGRAM(s) Chew every 6 hours    IVF:  lactated ringers. 1000 milliLiter(s) IV Continuous <Continuous>    CULTURES:  Culture Results:   No growth at 5 days. (10-01 @ 11:25)  Culture Results:   No growth at 5 days. (10-01 @ 11:25)    RADIOLOGY & ADDITIONAL TESTS:    ASSESSMENT:  43F no PMHx suffered childhood injury resulting in spine injury and residual gait disturbance, hyperreflexia of BL LE. She has right greater than left leg tingling, low back and mid back pain. 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 S/p C7-L1 fusion, T3-8 vertebral column resection, placement of anterior cage (9/25/23). S/p revision of T3-T9 VCR, revision of C7-L1 posterior fusion, plastics closure (10/6/23).     NEURO:  - Neuro/spine checks q4/vitals q1hrs  - Pain control: tylenol 1g q8h, valium 2q6 with prn IV for breakthrough, dilaudid 4/6 PO, Lyrica 50q8- stage 1 Post-op CT full spine: L1 screw not attached to the vertical yoli, epidural lipomatosis L2-3  - stage 2 post op CT T spine completed  - HMVx1, JPx2 (per plastics)    Cardio:  - MAP > 85, on levo gtt   - midodrine 15 mg q8h to wean off pressors  - echo 9/27: EF 60-65%    Pulm:  - room air  - incentive spirometry    GI:  - Full liquid diet per patient preference   - Bowel regimen   - BM 10/8  - simethicone 80q6 for gas    Renal:  - LR @ 30cc/hr  - voiding    Endo:  - a1c 5.6, ISS dc'd    Heme:  - SCDs for DVT ppx/ SQL   - 2u PRBC and 1u PLT postop 9/25, 1u PRBC 10/3, 1u PRBC 10/7  - b/l LE dopplers 10/1 negative  - LUE doppler 10/1: +cephalic thrombophlebitis    ID:  - 10/1 panculture, +UA 10/1  - Vanc (10/1-10/3 ) dc'd, Zosyn (10/1- 10/8)  x 7 days   - ID signed off    Discussed with Dr. Vieira and Dr. Villeda

## 2023-10-09 NOTE — PROGRESS NOTE ADULT - SUBJECTIVE AND OBJECTIVE BOX
=========================  NSICU ATTENDING PROGRESS NOTE  =========================    44 y/o female with no PMHx or PSHx presents for surgery today.  She reports ambulating with walker or baby stroller at home for years.  She reports intermittent falls.  She has right greater than left leg tingling, low back and mid back pain.  She denies arm or leg pain.  She reports urinary incontinence since having a baby 2 years ago where if she doesn't get to the bathroom fast enough, she has incontinence, but she is able to hold it for a short while.  She takes no pain medications.  She has taken ibuprofen in the past.  She denies saddle anesthesia, bowel incontinence.   (25 Sep 2023 06:51)      REVIEW OF SYSTEMS: [ ] Unable to Assess due to neurologic exam   [ ] All ROS addressed below are non-contributory, except:  Neuro: [ ] Headache [ ] Back pain [ ] Numbness [ ] Weakness [ ] Ataxia [ ] Dizziness [ ] Aphasia [ ] Dysarthria [ ] Visual disturbance  Resp: [ ] Shortness of breath/dyspnea, [ ] Orthopnea [ ] Cough  CV: [ ] Chest pain [ ] Palpitation [ ] Lightheadedness [ ] Syncope  Renal: [ ] Thirst [ ] Edema  GI: [ ] Nausea [ ] Emesis [ ] Abdominal pain [ ] Constipation [ ] Diarrhea  Hem: [ ] Hematemesis [ ] bright red blood per rectum  ID: [ ] Fever [ ] Chills [ ] Dysuria  ENT: [ ] Rhinorrhea        ICU Vital Signs Last 24 Hrs  T(C): 37.1 (09 Oct 2023 05:52), Max: 37.5 (09 Oct 2023 01:56)  T(F): 98.8 (09 Oct 2023 05:52), Max: 99.5 (09 Oct 2023 01:56)  HR: 99 (09 Oct 2023 07:00) (90 - 128)  BP: 118/62 (08 Oct 2023 20:00) (114/67 - 135/84)  BP(mean): 85 (08 Oct 2023 20:00) (85 - 103)  ABP: 146/82 (09 Oct 2023 07:00) (102/65 - 153/80)  ABP(mean): 109 (09 Oct 2023 07:00) (78 - 112)  RR: 17 (09 Oct 2023 07:00) (16 - 18)  SpO2: 98% (09 Oct 2023 07:00) (93% - 100%)      10-08-23 @ 07:01  -  10-09-23 @ 07:00  --------------------------------------------------------  IN: 3338.6 mL / OUT: 2890 mL / NET: 448.6 mL        PHYSICAL EXAM:  Constitutional: No Acute Distress     Neurological: AOx3, Following Commands, Moving all Extremities     Motor exam:          Upper extremity                         Delt     Bicep     Tricep    HG                                                 R         5/5        5/5        5/5       5/5                                               L          5/5        5/5        5/5       5/5          Lower extremity              R & L : TF otherwise 0/5        Sensation: [] intact to light touch  [x] decreased: T5 & below sensation L approx 70% R 50% per pt   Pulmonary: Clear to Auscultation, No rales, No rhonchi, No wheezes   Cardiovascular: S1, S2, Regular rate and rhythm   Gastrointestinal: Soft, Non-tender, distended hyperactive BS  Extremities: No calf tenderness   Incision: CDI; drain in place       MEDICATIONS:   acetaminophen     Tablet .. 1000 milliGRAM(s) Oral every 8 hours  bisacodyl Suppository 10 milliGRAM(s) Rectal daily  chlorhexidine 2% Cloths 1 Application(s) Topical <User Schedule>  diazepam    Tablet 2 milliGRAM(s) Oral every 6 hours  diazepam  Injectable 2 milliGRAM(s) IV Push every 8 hours PRN  enoxaparin Injectable 40 milliGRAM(s) SubCutaneous every 24 hours  HYDROmorphone   Tablet 6 milliGRAM(s) Oral every 4 hours PRN  HYDROmorphone   Tablet 4 milliGRAM(s) Oral every 4 hours PRN  HYDROmorphone  Injectable 0.5 milliGRAM(s) IV Push every 2 hours PRN  influenza   Vaccine 0.5 milliLiter(s) IntraMuscular once  lactated ringers. 1000 milliLiter(s) (30 mL/Hr) IV Continuous <Continuous>  melatonin 5 milliGRAM(s) Oral at bedtime PRN  midodrine 15 milliGRAM(s) Oral every 8 hours  norepinephrine Infusion 0.05 MICROgram(s)/kG/Min (3.22 mL/Hr) IV Continuous <Continuous>  ondansetron   Disintegrating Tablet 4 milliGRAM(s) Oral every 6 hours  polyethylene glycol 3350 17 Gram(s) Oral two times a day  pregabalin 50 milliGRAM(s) Oral every 8 hours  senna 2 Tablet(s) Oral at bedtime  simethicone 80 milliGRAM(s) Chew every 6 hours  sodium chloride 0.9% lock flush 10 milliLiter(s) IV Push every 1 hour PRN    LABS:                       8.9    11.81 )-----------( 410      ( 09 Oct 2023 05:10 )             26.8     10-09    139  |  100  |  6<L>  ----------------------------<  147<H>  3.6   |  31  |  0.40<L>    Ca    9.1      09 Oct 2023 05:10  Phos  3.0     10-09  Mg     1.8     10-09

## 2023-10-09 NOTE — PROGRESS NOTE ADULT - SUBJECTIVE AND OBJECTIVE BOX
NEUROSURGERY PAIN MANAGEMENT CONSULT NOTE    Chief Complaint: back pain    HPI:  Taken from outpatient neurosurgery note on 9/13/2023 " The patient, a long-standing patient of my practice, reports a history of spinal issues that dates back to a childhood injury. Over the years she's experienced developing issues with walking in a straight line, overactive reflexes in her lower extremities, and unique difficulty with her lower extremities referred to as cloneness. Recent upturn in these symptoms has limited her ability to carry out her daily routines. The patient also reports no significant improvement in her condition since our last clinical encounter two years ago."    42 y/o female with no PMHx or PSHx presents for surgery today.  She reports ambulating with walker or baby stroller at home for years.  She reports intermittent falls.  She has right greater than left leg tingling, low back and mid back pain.  She denies arm or leg pain.  She reports urinary incontinence since having a baby 2 years ago where if she doesn't get to the bathroom fast enough, she has incontinence, but she is able to hold it for a short while.  She takes no pain medications.  She has taken ibuprofen in the past.  She denies saddle anesthesia, bowel incontinence.   (25 Sep 2023 06:51)      PAST MEDICAL & SURGICAL HISTORY:  History of acute illness  childhood      No significant past surgical history          FAMILY HISTORY:  No pertinent family history in first degree relatives        SOCIAL HISTORY:  [ ] Denies Smoking, Alcohol, or Drug Use    HOME MEDICATIONS:   Please refer to initial HNP    PAIN HOME MEDICATIONS:    Allergies    No Known Allergies    Intolerances        PAIN MEDICATIONS:  acetaminophen     Tablet .. 1000 milliGRAM(s) Oral every 8 hours  diazepam    Tablet 5 milliGRAM(s) Oral every 8 hours  HYDROmorphone   Tablet 6 milliGRAM(s) Oral every 4 hours PRN  HYDROmorphone   Tablet 4 milliGRAM(s) Oral every 4 hours PRN  melatonin 5 milliGRAM(s) Oral at bedtime PRN  ondansetron   Disintegrating Tablet 4 milliGRAM(s) Oral every 6 hours  pregabalin 50 milliGRAM(s) Oral every 8 hours    Heme:  enoxaparin Injectable 40 milliGRAM(s) SubCutaneous every 24 hours    Antibiotics:    Cardiovascular:  midodrine 15 milliGRAM(s) Oral every 8 hours  norepinephrine Infusion 0.05 MICROgram(s)/kG/Min IV Continuous <Continuous>    GI:  bisacodyl Suppository 10 milliGRAM(s) Rectal daily  polyethylene glycol 3350 17 Gram(s) Oral two times a day  senna 2 Tablet(s) Oral at bedtime  simethicone 80 milliGRAM(s) Chew every 6 hours    Endocrine:    All Other Medications:  chlorhexidine 2% Cloths 1 Application(s) Topical <User Schedule>  influenza   Vaccine 0.5 milliLiter(s) IntraMuscular once  lactated ringers. 1000 milliLiter(s) IV Continuous <Continuous>  sodium chloride 0.9% lock flush 10 milliLiter(s) IV Push every 1 hour PRN      Vital Signs Last 24 Hrs  T(C): 36.9 (09 Oct 2023 10:00), Max: 37.5 (09 Oct 2023 01:56)  T(F): 98.4 (09 Oct 2023 10:00), Max: 99.5 (09 Oct 2023 01:56)  HR: 113 (09 Oct 2023 11:00) (90 - 128)  BP: 118/62 (08 Oct 2023 20:00) (114/67 - 135/84)  BP(mean): 85 (08 Oct 2023 20:00) (85 - 103)  RR: 18 (09 Oct 2023 11:00) (16 - 18)  SpO2: 100% (09 Oct 2023 11:00) (93% - 100%)    Parameters below as of 09 Oct 2023 11:00  Patient On (Oxygen Delivery Method): room air        LABS:                        8.9    11.81 )-----------( 410      ( 09 Oct 2023 05:10 )             26.8     10-09    139  |  100  |  6<L>  ----------------------------<  147<H>  3.6   |  31  |  0.40<L>    Ca    9.1      09 Oct 2023 05:10  Phos  3.0     10-09  Mg     1.8     10-09        Urinalysis Basic - ( 09 Oct 2023 05:10 )    Color: x / Appearance: x / SG: x / pH: x  Gluc: 147 mg/dL / Ketone: x  / Bili: x / Urobili: x   Blood: x / Protein: x / Nitrite: x   Leuk Esterase: x / RBC: x / WBC x   Sq Epi: x / Non Sq Epi: x / Bacteria: x        RADIOLOGY:    Drug Screen:        REVIEW OF SYSTEMS:  CONSTITUTIONAL: No fever or fatigue O/N.   EYES: No eye pain, visual disturbances  ENMT:  No difficulty hearing. No throat pain  NECK: No pain or stiffness  RESPIRATORY: No cough, wheezing; No shortness of breath  CARDIOVASCULAR: No chest pain, palpitations.   GASTROINTESTINAL: Pt reports  passing gas. No bowel movements. No abdominal or epigastric pain. No nausea, vomiting. GENITOURINARY: No dysuria, frequency, or incontinence  NEUROLOGICAL: No headaches, loss of strength, numbness, or tremors. No dizzinesss or lightheadedness with pain medications.   MUSCULOSKELETAL: Incisional back pain. No joint pain or swelling; No muscle, or extremity pain    PAIN ASSESSMENT: c/o LUE pain, feeling overly sedated from Oxycodone 10mg     PHYSICAL EXAM  GENERAL: Laying in bed, NAD  Neuro: CN II-XII PERRRL, EOMI  Cranial nerves grossly intact  Motor exam: MAEx4  Sensation intact to LT in UE/LE in 3 dermatomes  CHEST/LUNG: Clear to auscultation bilaterally; No rales, rhonchi, wheezing, or rubs  HEART: Regular rate and rhythm; No murmurs, rubs, or gallops  ABDOMEN: Soft, Nontender, Nondistended; Bowel sounds present  EXTREMITIES:  2+ Peripheral Pulses, No clubbing, cyanosis, or edema  SKIN: No rashes or lesions      ASSESSMENT:   43F no PMHx suffered childhood injury resulting in spine injury and residual gait disturbance, hyperreflexia of BL LE. She has right greater than left leg tingling, low back and mid back pain. 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 S/p C7-L1 fusion, T3-8 vertebral column resection, placement of anterior cage (9/25/23).       PLAN:   - Pain:  Tylenol 1000mg every 8 hours  Lyrica 50mg every 8 hours  Increase Valium PO to 5mg every 8 hours and stop IV Valium pushes prn  Dilaudid 4mg or 6mg every 4 hours as needed for moderate or severe pain, stop IV Dilaudid pushes prn    - Bowel regimen: Senna    - Nausea ppx: Zofran standing  - Functional Goals: Pt will get OOB with PT today. Pt will resume previous level of activity without impairment from surgery.   - Additional Consults: None recommended.   - Additional Labs/Imaging:  None recommended.   - Follow up, Discharge Planning: pending rehab  - Pain Management follow up plan: will continue to follow    d/w Dr. Birmingham    NEUROSURGERY PAIN MANAGEMENT CONSULT NOTE    Chief Complaint: back pain    HPI:  Taken from outpatient neurosurgery note on 9/13/2023 " The patient, a long-standing patient of my practice, reports a history of spinal issues that dates back to a childhood injury. Over the years she's experienced developing issues with walking in a straight line, overactive reflexes in her lower extremities, and unique difficulty with her lower extremities referred to as cloneness. Recent upturn in these symptoms has limited her ability to carry out her daily routines. The patient also reports no significant improvement in her condition since our last clinical encounter two years ago."    42 y/o female with no PMHx or PSHx presents for surgery today.  She reports ambulating with walker or baby stroller at home for years.  She reports intermittent falls.  She has right greater than left leg tingling, low back and mid back pain.  She denies arm or leg pain.  She reports urinary incontinence since having a baby 2 years ago where if she doesn't get to the bathroom fast enough, she has incontinence, but she is able to hold it for a short while.  She takes no pain medications.  She has taken ibuprofen in the past.  She denies saddle anesthesia, bowel incontinence.   (25 Sep 2023 06:51)      PAST MEDICAL & SURGICAL HISTORY:  History of acute illness  childhood      No significant past surgical history          FAMILY HISTORY:  No pertinent family history in first degree relatives        SOCIAL HISTORY:  [ ] Denies Smoking, Alcohol, or Drug Use    HOME MEDICATIONS:   Please refer to initial HNP    PAIN HOME MEDICATIONS:    Allergies    No Known Allergies    Intolerances        PAIN MEDICATIONS:  acetaminophen     Tablet .. 1000 milliGRAM(s) Oral every 8 hours  diazepam    Tablet 5 milliGRAM(s) Oral every 8 hours  HYDROmorphone   Tablet 6 milliGRAM(s) Oral every 4 hours PRN  HYDROmorphone   Tablet 4 milliGRAM(s) Oral every 4 hours PRN  melatonin 5 milliGRAM(s) Oral at bedtime PRN  ondansetron   Disintegrating Tablet 4 milliGRAM(s) Oral every 6 hours  pregabalin 50 milliGRAM(s) Oral every 8 hours    Heme:  enoxaparin Injectable 40 milliGRAM(s) SubCutaneous every 24 hours    Antibiotics:    Cardiovascular:  midodrine 15 milliGRAM(s) Oral every 8 hours  norepinephrine Infusion 0.05 MICROgram(s)/kG/Min IV Continuous <Continuous>    GI:  bisacodyl Suppository 10 milliGRAM(s) Rectal daily  polyethylene glycol 3350 17 Gram(s) Oral two times a day  senna 2 Tablet(s) Oral at bedtime  simethicone 80 milliGRAM(s) Chew every 6 hours    Endocrine:    All Other Medications:  chlorhexidine 2% Cloths 1 Application(s) Topical <User Schedule>  influenza   Vaccine 0.5 milliLiter(s) IntraMuscular once  lactated ringers. 1000 milliLiter(s) IV Continuous <Continuous>  sodium chloride 0.9% lock flush 10 milliLiter(s) IV Push every 1 hour PRN      Vital Signs Last 24 Hrs  T(C): 36.9 (09 Oct 2023 10:00), Max: 37.5 (09 Oct 2023 01:56)  T(F): 98.4 (09 Oct 2023 10:00), Max: 99.5 (09 Oct 2023 01:56)  HR: 113 (09 Oct 2023 11:00) (90 - 128)  BP: 118/62 (08 Oct 2023 20:00) (114/67 - 135/84)  BP(mean): 85 (08 Oct 2023 20:00) (85 - 103)  RR: 18 (09 Oct 2023 11:00) (16 - 18)  SpO2: 100% (09 Oct 2023 11:00) (93% - 100%)    Parameters below as of 09 Oct 2023 11:00  Patient On (Oxygen Delivery Method): room air        LABS:                        8.9    11.81 )-----------( 410      ( 09 Oct 2023 05:10 )             26.8     10-09    139  |  100  |  6<L>  ----------------------------<  147<H>  3.6   |  31  |  0.40<L>    Ca    9.1      09 Oct 2023 05:10  Phos  3.0     10-09  Mg     1.8     10-09        Urinalysis Basic - ( 09 Oct 2023 05:10 )    Color: x / Appearance: x / SG: x / pH: x  Gluc: 147 mg/dL / Ketone: x  / Bili: x / Urobili: x   Blood: x / Protein: x / Nitrite: x   Leuk Esterase: x / RBC: x / WBC x   Sq Epi: x / Non Sq Epi: x / Bacteria: x        RADIOLOGY:    Drug Screen:        REVIEW OF SYSTEMS:  CONSTITUTIONAL: No fever or fatigue O/N.   EYES: No eye pain, visual disturbances  ENMT:  No difficulty hearing. No throat pain  NECK: No pain or stiffness  RESPIRATORY: No cough, wheezing; No shortness of breath  CARDIOVASCULAR: No chest pain, palpitations.   GASTROINTESTINAL: Pt reports  passing gas. No bowel movements. No abdominal or epigastric pain. No nausea, vomiting. GENITOURINARY: No dysuria, frequency, or incontinence  NEUROLOGICAL: No headaches, loss of strength, numbness, or tremors. No dizzinesss or lightheadedness with pain medications.   MUSCULOSKELETAL: Incisional back pain. No joint pain or swelling; No muscle, or extremity pain    PAIN ASSESSMENT: c/o muscle tightness    PHYSICAL EXAM  GENERAL: Laying in bed, NAD  Neuro: CN II-XII PERRRL, EOMI  Cranial nerves grossly intact  Motor exam: b/l uppers strong, b/l lower 0/5  Sensation intact to LT in UE/LE in 3 dermatomes  CHEST/LUNG: Clear to auscultation bilaterally; No rales, rhonchi, wheezing, or rubs  HEART: Regular rate and rhythm; No murmurs, rubs, or gallops  ABDOMEN: Soft, Nontender, Nondistended; Bowel sounds present  EXTREMITIES:  2+ Peripheral Pulses, No clubbing, cyanosis, or edema  SKIN: No rashes or lesions      ASSESSMENT:   43F no PMHx suffered childhood injury resulting in spine injury and residual gait disturbance, hyperreflexia of BL LE. She has right greater than left leg tingling, low back and mid back pain. 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 S/p C7-L1 fusion, T3-8 vertebral column resection, placement of anterior cage (9/25/23).       PLAN:   - Pain:  Tylenol 1000mg every 8 hours  Lyrica 50mg every 8 hours  Increase Valium PO to 5mg every 8 hours and stop IV Valium pushes prn  Dilaudid 4mg or 6mg every 4 hours as needed for moderate or severe pain, stop IV Dilaudid pushes prn    - Bowel regimen: Senna    - Nausea ppx: Zofran standing  - Functional Goals: Pt will get OOB with PT today. Pt will resume previous level of activity without impairment from surgery.   - Additional Consults: None recommended.   - Additional Labs/Imaging:  None recommended.   - Follow up, Discharge Planning: pending rehab  - Pain Management follow up plan: will continue to follow    d/w Dr. Birmingham

## 2023-10-09 NOTE — PROGRESS NOTE ADULT - SUBJECTIVE AND OBJECTIVE BOX
INTERVAL HISTORY: HPI:  Taken from outpatient neurosurgery note on 9/13/2023 " The patient, a long-standing patient of my practice, reports a history of spinal issues that dates back to a childhood injury. Over the years she's experienced developing issues with walking in a straight line, overactive reflexes in her lower extremities, and unique difficulty with her lower extremities referred to as cloneness. Recent upturn in these symptoms has limited her ability to carry out her daily routines. The patient also reports no significant improvement in her condition since our last clinical encounter two years ago."    42 y/o female with no PMHx or PSHx presents for surgery today.  She reports ambulating with walker or baby stroller at home for years.  She reports intermittent falls.  She has right greater than left leg tingling, low back and mid back pain.  She denies arm or leg pain.  She reports urinary incontinence since having a baby 2 years ago where if she doesn't get to the bathroom fast enough, she has incontinence, but she is able to hold it for a short while.  She takes no pain medications.  She has taken ibuprofen in the past.  She denies saddle anesthesia, bowel incontinence.   (25 Sep 2023 06:51)      MEDICATIONS  (STANDING):  acetaminophen     Tablet .. 1000 milliGRAM(s) Oral every 8 hours  bisacodyl Suppository 10 milliGRAM(s) Rectal daily  chlorhexidine 2% Cloths 1 Application(s) Topical <User Schedule>  diazepam    Tablet 5 milliGRAM(s) Oral every 8 hours  diazepam  Injectable 2 milliGRAM(s) IV Push once  enoxaparin Injectable 40 milliGRAM(s) SubCutaneous every 24 hours  influenza   Vaccine 0.5 milliLiter(s) IntraMuscular once  lactated ringers. 1000 milliLiter(s) (30 mL/Hr) IV Continuous <Continuous>  midodrine 15 milliGRAM(s) Oral every 8 hours  norepinephrine Infusion 0.05 MICROgram(s)/kG/Min (6.43 mL/Hr) IV Continuous <Continuous>  ondansetron   Disintegrating Tablet 4 milliGRAM(s) Oral every 6 hours  polyethylene glycol 3350 17 Gram(s) Oral two times a day  pregabalin 50 milliGRAM(s) Oral every 8 hours  senna 2 Tablet(s) Oral at bedtime  simethicone 80 milliGRAM(s) Chew every 6 hours    MEDICATIONS  (PRN):  HYDROmorphone   Tablet 6 milliGRAM(s) Oral every 4 hours PRN Severe Pain (7 - 10)  HYDROmorphone   Tablet 4 milliGRAM(s) Oral every 4 hours PRN Moderate Pain (4 - 6)  melatonin 5 milliGRAM(s) Oral at bedtime PRN Insomnia  sodium chloride 0.9% lock flush 10 milliLiter(s) IV Push every 1 hour PRN Pre/post blood products, medications, blood draw, and to maintain line patency      Drug Dosing Weight  Height (cm): 152.4 (06 Oct 2023 07:34)  Weight (kg): 68.6 (06 Oct 2023 07:34)  BMI (kg/m2): 29.5 (06 Oct 2023 07:34)  BSA (m2): 1.66 (06 Oct 2023 07:34)    PAST MEDICAL & SURGICAL HISTORY:  History of acute illness  childhood      No significant past surgical history          REVIEW OF SYSTEMS: [ ] Unable to Assess due to neurologic exam   [ ] All ROS addressed below are non-contributory, except:  Neuro: [ ] Headache [ ] Back pain [ ] Numbness [ ] Weakness [ ] Ataxia [ ] Dizziness [ ] Aphasia [ ] Dysarthria [ ] Visual disturbance  Resp: [ ] Shortness of breath/dyspnea, [ ] Orthopnea [ ] Cough  CV: [ ] Chest pain [ ] Palpitation [ ] Lightheadedness [ ] Syncope  Renal: [ ] Thirst [ ] Edema  GI: [ ] Nausea [ ] Emesis [ ] Abdominal pain [ ] Constipation [ ] Diarrhea  Hem: [ ] Hematemesis [ ] bright red blood per rectum  ID: [ ] Fever [ ] Chills [ ] Dysuria  ENT: [ ] Rhinorrhea    PHYSICAL EXAM:    General: No Acute Distress     Neurological: aox3, follows command, 5/5 b/l UE, RLE trace TF to nox, LLE 0/5, no proprioception    Pulmonary: Clear to Auscultation, No Rales, No Rhonchi, No Wheezes     Cardiovascular: S1, S2, Regular Rate and Rhythm     Gastrointestinal: Soft, Nontender, Nondistended     Extremities: No calf tenderness     Incision: CDI     ICU Vital Signs Last 24 Hrs  T(C): 37.3 (09 Oct 2023 21:25), Max: 37.7 (09 Oct 2023 17:34)  T(F): 99.2 (09 Oct 2023 21:25), Max: 99.9 (09 Oct 2023 17:34)  HR: 94 (09 Oct 2023 23:00) (92 - 129)  BP: 115/69 (09 Oct 2023 21:00) (115/69 - 115/69)  BP(mean): 83 (09 Oct 2023 21:00) (83 - 83)  ABP: 123/63 (09 Oct 2023 23:00) (107/62 - 149/76)  ABP(mean): 90 (09 Oct 2023 23:00) (80 - 109)  RR: 15 (09 Oct 2023 23:00) (15 - 20)  SpO2: 98% (09 Oct 2023 23:00) (93% - 100%)    O2 Parameters below as of 09 Oct 2023 23:00  Patient On (Oxygen Delivery Method): room air    I&O's Detail    08 Oct 2023 07:01  -  09 Oct 2023 07:00  --------------------------------------------------------  IN:    Lactated Ringers: 720 mL    Norepinephrine: 38.1 mL    Norepinephrine: 100.5 mL    Oral Fluid: 2480 mL  Total IN: 3338.6 mL    OUT:    Accordian (mL): 250 mL    Bulb (mL): 10 mL    Bulb (mL): 30 mL    Voided (mL): 2600 mL  Total OUT: 2890 mL    Total NET: 448.6 mL      09 Oct 2023 07:01  -  09 Oct 2023 23:44  --------------------------------------------------------  IN:    Lactated Ringers: 510 mL    Norepinephrine: 9 mL    Norepinephrine: 96.4 mL    Oral Fluid: 1450 mL  Total IN: 2065.4 mL    OUT:    Voided (mL): 1900 mL  Total OUT: 1900 mL    Total NET: 165.4 mL    LABS:  CBC Full  -  ( 09 Oct 2023 05:10 )  WBC Count : 11.81 K/uL  RBC Count : 3.13 M/uL  Hemoglobin : 8.9 g/dL  Hematocrit : 26.8 %  Platelet Count - Automated : 410 K/uL  Mean Cell Volume : 85.6 fl  Mean Cell Hemoglobin : 28.4 pg  Mean Cell Hemoglobin Concentration : 33.2 gm/dL  Auto Neutrophil # : x  Auto Lymphocyte # : x  Auto Monocyte # : x  Auto Eosinophil # : x  Auto Basophil # : x  Auto Neutrophil % : x  Auto Lymphocyte % : x  Auto Monocyte % : x  Auto Eosinophil % : x  Auto Basophil % : x    10-09    139  |  100  |  6<L>  ----------------------------<  147<H>  3.6   |  31  |  0.40<L>    Ca    9.1      09 Oct 2023 05:10  Phos  3.0     10-09  Mg     1.8     10-09    Urinalysis Basic - ( 09 Oct 2023 05:10 )    Color: x / Appearance: x / SG: x / pH: x  Gluc: 147 mg/dL / Ketone: x  / Bili: x / Urobili: x   Blood: x / Protein: x / Nitrite: x   Leuk Esterase: x / RBC: x / WBC x   Sq Epi: x / Non Sq Epi: x / Bacteria: x

## 2023-10-09 NOTE — PROGRESS NOTE ADULT - ASSESSMENT
43F s/p traumatic spine injury w/o neurological deficit & severe thoracic kyphosis now s/p T3-8 VCR, C7-L1 fusion, correction of deformity, placement of anterior cage.  STAGE 1. plastics closure. now s/p STAGE 2       -NCq4, VS q1  -pain management w/ dilaudid, lyrica, valium   -MAP goal >65; wean levophed; c/w midodrine     refer to AM progress note for detailed plan

## 2023-10-09 NOTE — CHART NOTE - NSCHARTNOTEFT_GEN_A_CORE
MAP goals liberalized to keep >80. Neuro exam stable. Pt remains on levophed drip. Right DANIELE drain removed.

## 2023-10-09 NOTE — PROGRESS NOTE ADULT - ASSESSMENT
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 Plastics closure. POD   Now status post STAGE 2 POD      NEURO:  Neurochecks Q4  Pain management: Dilaudid 6mg/4mg PO PRN, valium 2mg PRN, lyrica 50mg qd   Monitor drain output  Activity: PT/OT      PULMONARY:  Saturating well on room air  Incentive spirometry 10q/hr when awake     CARDIOVASCULAR:  MAP goal >80; c/w midodrine 15mg qd & levophed   Vitals Q1  Monitor EKG and troponin while augmenting MAP on levophed   Rosa Maria    GASTROENTEROLOGY:  Diet: Regular  LBM 10/7. Bowel regimen  KUB w/ bowel gas pattern   Continue simethicone     RENAL/:  LR @30cc/hr while on pressors   Monitor BMPs. Na goal 135-145  Strict Is/Os    ENDOCRINE:  Monitor fingersticks: Goal -[id number]/DL    HEME/ONC:  DVT ppx: Lovenox SQ. B/L SCDs  LE doppler negative     INFECTIOUS:   S/P empiric abx for low grade fevers; blood culture negative, RVP negative, UA (+) likely asymptomatic bacteruria.   S/P zosyn 4.5g Q8 ( for 7 days; end date 10/8)   Incision site C/D/I.     MISC:    SOCIAL/FAMILY:  [] awaiting [] updated at bedside [] family meeting    CODE STATUS:  [] Full Code [] DNR [] DNI [] Palliative/Comfort Care    DISPOSITION:  [] ICU [] Stroke Unit [] Floor [] EMU [] RCU [] PCU    [] Patient is at high risk of neurologic deterioration/death due to:     Time spent: ___ [] critical care minutes

## 2023-10-10 LAB
ALBUMIN SERPL ELPH-MCNC: 3.2 G/DL — LOW (ref 3.3–5)
ALP SERPL-CCNC: 80 U/L — SIGNIFICANT CHANGE UP (ref 40–120)
ALT FLD-CCNC: 31 U/L — SIGNIFICANT CHANGE UP (ref 10–45)
ANION GAP SERPL CALC-SCNC: 9 MMOL/L — SIGNIFICANT CHANGE UP (ref 5–17)
AST SERPL-CCNC: 21 U/L — SIGNIFICANT CHANGE UP (ref 10–40)
BILIRUB DIRECT SERPL-MCNC: <0.2 MG/DL — SIGNIFICANT CHANGE UP (ref 0–0.3)
BILIRUB INDIRECT FLD-MCNC: SIGNIFICANT CHANGE UP MG/DL (ref 0.2–1)
BILIRUB SERPL-MCNC: 0.2 MG/DL — SIGNIFICANT CHANGE UP (ref 0.2–1.2)
BUN SERPL-MCNC: 5 MG/DL — LOW (ref 7–23)
CALCIUM SERPL-MCNC: 9.1 MG/DL — SIGNIFICANT CHANGE UP (ref 8.4–10.5)
CHLORIDE SERPL-SCNC: 98 MMOL/L — SIGNIFICANT CHANGE UP (ref 96–108)
CO2 SERPL-SCNC: 30 MMOL/L — SIGNIFICANT CHANGE UP (ref 22–31)
CREAT SERPL-MCNC: 0.41 MG/DL — LOW (ref 0.5–1.3)
EGFR: 127 ML/MIN/1.73M2 — SIGNIFICANT CHANGE UP
FERRITIN SERPL-MCNC: 137 NG/ML — SIGNIFICANT CHANGE UP (ref 15–150)
FOLATE SERPL-MCNC: 8 NG/ML — SIGNIFICANT CHANGE UP
GLUCOSE SERPL-MCNC: 121 MG/DL — HIGH (ref 70–99)
HAPTOGLOB SERPL-MCNC: 422 MG/DL — HIGH (ref 34–200)
HCT VFR BLD CALC: 26.7 % — LOW (ref 34.5–45)
HGB BLD-MCNC: 8.5 G/DL — LOW (ref 11.5–15.5)
IRON SATN MFR SERPL: 16 UG/DL — LOW (ref 30–160)
IRON SATN MFR SERPL: 7 % — LOW (ref 14–50)
MAGNESIUM SERPL-MCNC: 1.7 MG/DL — SIGNIFICANT CHANGE UP (ref 1.6–2.6)
MCHC RBC-ENTMCNC: 28.1 PG — SIGNIFICANT CHANGE UP (ref 27–34)
MCHC RBC-ENTMCNC: 31.8 GM/DL — LOW (ref 32–36)
MCV RBC AUTO: 88.1 FL — SIGNIFICANT CHANGE UP (ref 80–100)
NRBC # BLD: 0 /100 WBCS — SIGNIFICANT CHANGE UP (ref 0–0)
PHOSPHATE SERPL-MCNC: 4 MG/DL — SIGNIFICANT CHANGE UP (ref 2.5–4.5)
PLATELET # BLD AUTO: 419 K/UL — HIGH (ref 150–400)
POTASSIUM SERPL-MCNC: 4 MMOL/L — SIGNIFICANT CHANGE UP (ref 3.5–5.3)
POTASSIUM SERPL-SCNC: 4 MMOL/L — SIGNIFICANT CHANGE UP (ref 3.5–5.3)
PROT SERPL-MCNC: 6.4 G/DL — SIGNIFICANT CHANGE UP (ref 6–8.3)
RBC # BLD: 3.03 M/UL — LOW (ref 3.8–5.2)
RBC # BLD: 3.03 M/UL — LOW (ref 3.8–5.2)
RBC # FLD: 14.3 % — SIGNIFICANT CHANGE UP (ref 10.3–14.5)
RETICS #: 106.4 K/UL — SIGNIFICANT CHANGE UP (ref 25–125)
RETICS/RBC NFR: 3.5 % — HIGH (ref 0.5–2.5)
SODIUM SERPL-SCNC: 137 MMOL/L — SIGNIFICANT CHANGE UP (ref 135–145)
TIBC SERPL-MCNC: 223 UG/DL — SIGNIFICANT CHANGE UP (ref 220–430)
TRANSFERRIN SERPL-MCNC: 190 MG/DL — LOW (ref 200–360)
TROPONIN T, HIGH SENSITIVITY RESULT: 26 NG/L — SIGNIFICANT CHANGE UP (ref 0–51)
UIBC SERPL-MCNC: 207 UG/DL — SIGNIFICANT CHANGE UP (ref 110–370)
VIT B12 SERPL-MCNC: 788 PG/ML — SIGNIFICANT CHANGE UP (ref 232–1245)
WBC # BLD: 11.93 K/UL — HIGH (ref 3.8–10.5)
WBC # FLD AUTO: 11.93 K/UL — HIGH (ref 3.8–10.5)

## 2023-10-10 PROCEDURE — 99232 SBSQ HOSP IP/OBS MODERATE 35: CPT

## 2023-10-10 RX ORDER — DIAZEPAM 5 MG
2 TABLET ORAL ONCE
Refills: 0 | Status: DISCONTINUED | OUTPATIENT
Start: 2023-10-10 | End: 2023-10-10

## 2023-10-10 RX ORDER — MAGNESIUM SULFATE 500 MG/ML
2 VIAL (ML) INJECTION ONCE
Refills: 0 | Status: COMPLETED | OUTPATIENT
Start: 2023-10-10 | End: 2023-10-10

## 2023-10-10 RX ORDER — ASCORBIC ACID 60 MG
500 TABLET,CHEWABLE ORAL
Refills: 0 | Status: DISCONTINUED | OUTPATIENT
Start: 2023-10-10 | End: 2023-10-20

## 2023-10-10 RX ORDER — LIDOCAINE 4 G/100G
1 CREAM TOPICAL DAILY
Refills: 0 | Status: DISCONTINUED | OUTPATIENT
Start: 2023-10-10 | End: 2023-10-20

## 2023-10-10 RX ORDER — MIDODRINE HYDROCHLORIDE 2.5 MG/1
10 TABLET ORAL EVERY 8 HOURS
Refills: 0 | Status: DISCONTINUED | OUTPATIENT
Start: 2023-10-10 | End: 2023-10-11

## 2023-10-10 RX ORDER — FERROUS SULFATE 325(65) MG
325 TABLET ORAL
Refills: 0 | Status: DISCONTINUED | OUTPATIENT
Start: 2023-10-10 | End: 2023-10-20

## 2023-10-10 RX ORDER — HYDROMORPHONE HYDROCHLORIDE 2 MG/ML
0.5 INJECTION INTRAMUSCULAR; INTRAVENOUS; SUBCUTANEOUS EVERY 4 HOURS
Refills: 0 | Status: DISCONTINUED | OUTPATIENT
Start: 2023-10-10 | End: 2023-10-13

## 2023-10-10 RX ORDER — HYDROMORPHONE HYDROCHLORIDE 2 MG/ML
0.5 INJECTION INTRAMUSCULAR; INTRAVENOUS; SUBCUTANEOUS ONCE
Refills: 0 | Status: DISCONTINUED | OUTPATIENT
Start: 2023-10-10 | End: 2023-10-10

## 2023-10-10 RX ADMIN — HYDROMORPHONE HYDROCHLORIDE 6 MILLIGRAM(S): 2 INJECTION INTRAMUSCULAR; INTRAVENOUS; SUBCUTANEOUS at 11:31

## 2023-10-10 RX ADMIN — HYDROMORPHONE HYDROCHLORIDE 0.5 MILLIGRAM(S): 2 INJECTION INTRAMUSCULAR; INTRAVENOUS; SUBCUTANEOUS at 01:01

## 2023-10-10 RX ADMIN — MIDODRINE HYDROCHLORIDE 15 MILLIGRAM(S): 2.5 TABLET ORAL at 05:38

## 2023-10-10 RX ADMIN — POLYETHYLENE GLYCOL 3350 17 GRAM(S): 17 POWDER, FOR SOLUTION ORAL at 05:39

## 2023-10-10 RX ADMIN — HYDROMORPHONE HYDROCHLORIDE 4 MILLIGRAM(S): 2 INJECTION INTRAMUSCULAR; INTRAVENOUS; SUBCUTANEOUS at 09:26

## 2023-10-10 RX ADMIN — SIMETHICONE 80 MILLIGRAM(S): 80 TABLET, CHEWABLE ORAL at 05:38

## 2023-10-10 RX ADMIN — ONDANSETRON 4 MILLIGRAM(S): 8 TABLET, FILM COATED ORAL at 19:12

## 2023-10-10 RX ADMIN — Medication 1000 MILLIGRAM(S): at 22:26

## 2023-10-10 RX ADMIN — HYDROMORPHONE HYDROCHLORIDE 6 MILLIGRAM(S): 2 INJECTION INTRAMUSCULAR; INTRAVENOUS; SUBCUTANEOUS at 12:30

## 2023-10-10 RX ADMIN — Medication 1000 MILLIGRAM(S): at 06:22

## 2023-10-10 RX ADMIN — ONDANSETRON 4 MILLIGRAM(S): 8 TABLET, FILM COATED ORAL at 05:41

## 2023-10-10 RX ADMIN — HYDROMORPHONE HYDROCHLORIDE 4 MILLIGRAM(S): 2 INJECTION INTRAMUSCULAR; INTRAVENOUS; SUBCUTANEOUS at 15:44

## 2023-10-10 RX ADMIN — Medication 2 MILLIGRAM(S): at 20:12

## 2023-10-10 RX ADMIN — SODIUM CHLORIDE 30 MILLILITER(S): 9 INJECTION, SOLUTION INTRAVENOUS at 07:04

## 2023-10-10 RX ADMIN — Medication 5 MILLIGRAM(S): at 05:39

## 2023-10-10 RX ADMIN — HYDROMORPHONE HYDROCHLORIDE 6 MILLIGRAM(S): 2 INJECTION INTRAMUSCULAR; INTRAVENOUS; SUBCUTANEOUS at 07:17

## 2023-10-10 RX ADMIN — HYDROMORPHONE HYDROCHLORIDE 4 MILLIGRAM(S): 2 INJECTION INTRAMUSCULAR; INTRAVENOUS; SUBCUTANEOUS at 09:55

## 2023-10-10 RX ADMIN — HYDROMORPHONE HYDROCHLORIDE 4 MILLIGRAM(S): 2 INJECTION INTRAMUSCULAR; INTRAVENOUS; SUBCUTANEOUS at 22:26

## 2023-10-10 RX ADMIN — Medication 50 MILLIGRAM(S): at 05:39

## 2023-10-10 RX ADMIN — Medication 50 MILLIGRAM(S): at 14:24

## 2023-10-10 RX ADMIN — Medication 500 MILLIGRAM(S): at 19:17

## 2023-10-10 RX ADMIN — CHLORHEXIDINE GLUCONATE 1 APPLICATION(S): 213 SOLUTION TOPICAL at 05:41

## 2023-10-10 RX ADMIN — Medication 5 MILLIGRAM(S): at 14:24

## 2023-10-10 RX ADMIN — HYDROMORPHONE HYDROCHLORIDE 4 MILLIGRAM(S): 2 INJECTION INTRAMUSCULAR; INTRAVENOUS; SUBCUTANEOUS at 04:38

## 2023-10-10 RX ADMIN — ONDANSETRON 4 MILLIGRAM(S): 8 TABLET, FILM COATED ORAL at 11:31

## 2023-10-10 RX ADMIN — Medication 1000 MILLIGRAM(S): at 15:00

## 2023-10-10 RX ADMIN — SIMETHICONE 80 MILLIGRAM(S): 80 TABLET, CHEWABLE ORAL at 11:31

## 2023-10-10 RX ADMIN — HYDROMORPHONE HYDROCHLORIDE 4 MILLIGRAM(S): 2 INJECTION INTRAMUSCULAR; INTRAVENOUS; SUBCUTANEOUS at 16:30

## 2023-10-10 RX ADMIN — POLYETHYLENE GLYCOL 3350 17 GRAM(S): 17 POWDER, FOR SOLUTION ORAL at 19:10

## 2023-10-10 RX ADMIN — Medication 1000 MILLIGRAM(S): at 14:24

## 2023-10-10 RX ADMIN — HYDROMORPHONE HYDROCHLORIDE 0.5 MILLIGRAM(S): 2 INJECTION INTRAMUSCULAR; INTRAVENOUS; SUBCUTANEOUS at 05:32

## 2023-10-10 RX ADMIN — LIDOCAINE 1 PATCH: 4 CREAM TOPICAL at 19:10

## 2023-10-10 RX ADMIN — SIMETHICONE 80 MILLIGRAM(S): 80 TABLET, CHEWABLE ORAL at 19:11

## 2023-10-10 RX ADMIN — Medication 25 GRAM(S): at 07:04

## 2023-10-10 RX ADMIN — HYDROMORPHONE HYDROCHLORIDE 4 MILLIGRAM(S): 2 INJECTION INTRAMUSCULAR; INTRAVENOUS; SUBCUTANEOUS at 04:42

## 2023-10-10 RX ADMIN — Medication 1000 MILLIGRAM(S): at 05:38

## 2023-10-10 RX ADMIN — MIDODRINE HYDROCHLORIDE 15 MILLIGRAM(S): 2.5 TABLET ORAL at 14:24

## 2023-10-10 RX ADMIN — HYDROMORPHONE HYDROCHLORIDE 0.5 MILLIGRAM(S): 2 INJECTION INTRAMUSCULAR; INTRAVENOUS; SUBCUTANEOUS at 20:50

## 2023-10-10 RX ADMIN — HYDROMORPHONE HYDROCHLORIDE 0.5 MILLIGRAM(S): 2 INJECTION INTRAMUSCULAR; INTRAVENOUS; SUBCUTANEOUS at 20:32

## 2023-10-10 NOTE — CONSULT NOTE ADULT - ASSESSMENT
43F no pmh s/p T3-T9 revision, C7-L1 fusion on 10/6/23. Taking hydromorphone po every 2 hours with iv push overnight. Pain controlled on this regimen.     - hydromorphone 4mg/6mg po q4h prn  - hydromorphone 0.5mg iv prn  - acetaminophen 1000mg q8h  - diazepam 5mg q8h  - pregabalin 50mg q8h  - Bowel regimen + Nausea ppx

## 2023-10-10 NOTE — PROGRESS NOTE ADULT - ASSESSMENT
43F s/p traumatic spine injury w/o neurological deficit & severe thoracic kyphosis now s/p T3-8 VCR, C7-L1 fusion, correction of deformity, placement of anterior cage.  STAGE 1. plastics closure. now s/p STAGE 2       -NCq4, VS q1  -pain management w/ Dilaudid 4/6mg q4 PRN, breakthrough 0.5mg IV PRN q4,  lyrica, valium   -MAP goal >65; off levophed w/ down trending cardiac enzymes ; c/w midodrine wean as tolerated   -started on iron supplementation ; monitor for constipation   -DVT ppx w/ lovenox SQ   -regular diet; no indication for PPI at this time     refer to AM progress note for detailed plan

## 2023-10-10 NOTE — PROGRESS NOTE ADULT - SUBJECTIVE AND OBJECTIVE BOX
S/Overnight events:  intermittent breakthrough pain       Hospital Course:   9/25: POD 0 s/p C7-L1 fusion, T3-8 vertebral column resection, placement of anterior cage.   9/26; POD1 H/H 6.8 postop and PLT 85. Given 2u PRBC and 1u PLT. Switched propofol to precedex gtt. Pt extubated to face mask. Pt noted to only be withdrawing to noxious stimuli on bilateral lower extremities with sensation decreased RLE per patient. Dr. Vieira notified and plan is to keep MAP>100 and obtain CT full spine in the morning. Phenylephrine started to maintain MAP>100. Advanced diet to regular. Passed TOV. Increased need for aurora, UO high.   9/27: POD2 C7-L1 fusion T3-8 vertebral column resection. increased pressor requirements o/n, started on levo additionally. lactate 1.5, BNP 1400 from 800, CK elevated. echo: EF 60-65%. given 250cc bolus albumin, started on midodrine 10q8 to wean off pressors. PICC placed by Alli.   9/28: POD3 s/p C7-L1 fusion, T3-8 vertebral column resection, placement of anterior cage. R radial A-line removed and replaced on the L, trops and EKG done for chest discomfort, WNL, resolved with treatment of overall pain, continues to be febrile, lyrica dc'd to help.   Lyrica restarted. Weaning phenylephrine. Serum Na uptrending, 3% saline discontinued. Pt febrile with uptrending WBC, ID consulted recommend monitoring for now off antibiotics.  9/29: POD4. started on 3% o/n for Na 132. remains on aurora and levo for MAP>100. Tachycardic, given 1L NS. Right axillary a-line placed. 3% dc'd. HMV dc'd. Aquacell dressing replaced. Passed TOV.   9/30: POD 5.  Remains on levo gtt. Increased midodrine to 15q8. Given enema. Had BM.  10/1: POD 6. MAP goal increased back to >100 due to worsened sensation loss, on levo gtt. afebrile however per ID Dr De Souza, desiree cx sent. UA+. started empirically on vanc/zosyn. b/l LE doppler and LUE doppler ordered per ID, +LUE superficial thrombophlebitis. BL LE dopplers negative for DVT.   10/2: POD 7, ANNA overnight. Maintaining MAP >100. 500cc albumin bolus, dilaudid lowered to 2/4mg, toradol [id number] added for pain, abd xray for distension shows gas pattern. aquacel dressing changed.  10/3: POD8 ANNA overnight. Remains on levophed to maintain MAP>100 in AM. Neuro exam unchanged. Tmax 100.9F in AM, given tylenol. Flexeril changed to valium 2q8 and lyrica 50q8 started per pain managment.  DC'd vanc, continue zosyn x 7 days per ID. 1u PRBC to maintain hbg >9.0. DANIELE drain removed. Salt tabs weaned to 2q8. MAP goal changed to >85 to help wean off levo gtt.   10/4: POD 9. MAP goal >100. Sodium chloride tabs discontinued.  10/5: POD 10, Given 2 L boluses o/n for tachycardia and negative fluid status. Given 1 mg IV valium in AM for muscle spasm. 1L bolus given in afternoon for high urine output. Preop for OR tomorrow.   10/6: POD 11 Pt endorsing incisional pain, given 1mg IV valium. Neuro exam stable. Plan for second stage today,  POD 0 revision of T3-T9 VCR, revision of C7-L1 posterior fusion, plastics closure. Post-op CT thoracic spine complete. 1 L bolus for soft BP and colapsable IVC on POCUS.   10/7: POD 12 C7-L1 fusion. POD 1 Revision fusion and T3-9 VCR. Pain controlled. Remains on Levo for MAP goal > 100. AXR completed in AM for abdominal distension. Dilaudid PO prn increased to 4 and 6 mg, valium increased to 2mg q6 for pain control. 1u PRBC given for Hb 8.4. Hgb elevated to 10.4. 250 cc albumin given for tachycardia.   10/8: POD 13 Fusion, POD 2 Revision. Pain controlled overnight.   +BM this morning. Right hemovac removed. MAPs liberalized to goal greater than 85. Troponin elevated this morning, now downtrending. EKG WNL.   10/9: POD 13 C7-L1 fusion, POD 3 Revision and T3-9 VCR.   MAP goals liberalized to keep >80. Right DANIELE drain removed. IV valium given x1   10/10: POD 14 C7-L1 fusion, POD 4 revision and T3-9 VCR, liberalized MAP goal to >65     Vital Signs Last 24 Hrs  T(C): 37.3 (09 Oct 2023 21:25), Max: 37.7 (09 Oct 2023 17:34)  T(F): 99.2 (09 Oct 2023 21:25), Max: 99.9 (09 Oct 2023 17:34)  HR: 119 (10 Oct 2023 00:00) (92 - 129)  BP: 115/69 (09 Oct 2023 21:00) (115/69 - 115/69)  BP(mean): 83 (09 Oct 2023 21:00) (83 - 83)  RR: 16 (10 Oct 2023 00:00) (15 - 20)  SpO2: 98% (10 Oct 2023 00:00) (93% - 100%)    Parameters below as of 10 Oct 2023 00:00  Patient On (Oxygen Delivery Method): room air        I&O's Detail    08 Oct 2023 07:01  -  09 Oct 2023 07:00  --------------------------------------------------------  IN:    Lactated Ringers: 720 mL    Norepinephrine: 38.1 mL    Norepinephrine: 100.5 mL    Oral Fluid: 2480 mL  Total IN: 3338.6 mL    OUT:    Accordian (mL): 250 mL    Bulb (mL): 10 mL    Bulb (mL): 30 mL    Voided (mL): 2600 mL  Total OUT: 2890 mL    Total NET: 448.6 mL      09 Oct 2023 07:01  -  10 Oct 2023 01:36  --------------------------------------------------------  IN:    Lactated Ringers: 510 mL    Norepinephrine: 9 mL    Norepinephrine: 7.7 mL    Norepinephrine: 104.1 mL    Norepinephrine: 3.8 mL    Oral Fluid: 1450 mL  Total IN: 2084.6 mL    OUT:    Voided (mL): 3000 mL  Total OUT: 3000 mL    Total NET: -915.4 mL        I&O's Summary    08 Oct 2023 07:01  -  09 Oct 2023 07:00  --------------------------------------------------------  IN: 3338.6 mL / OUT: 2890 mL / NET: 448.6 mL    09 Oct 2023 07:01  -  10 Oct 2023 01:36  --------------------------------------------------------  IN: 2084.6 mL / OUT: 3000 mL / NET: -915.4 mL      PHYSICAL EXAM:  General: Pt is laying flat in bed, in NAD, on RA  HEENT: PERRL 3 mm, EOMI B/L, face symmetric   Cardiovascular: RRR, normal S1 and S2   Respiratory: non-labored breathing, symmetric chest rise   GI: abd soft, nontender, slightly distended    Neuro: A&O x 3, no aphasia, speech clear, Strength 5/5 BL UE, sensation to light touch intact throughout BL UE, RLE 0/5, LLE trace TF, decreased sensation at and distal to T8 dermatome   Vascular: Distal pulses 2+ x4, no calf edema or erythema   Wounds: Posterior spine wound dressing C/D/I   WOUND/DRAINS: 1 deep HMV to full suction,1 DANIELE to full suction     LABS:                        8.9    11.81 )-----------( 410      ( 09 Oct 2023 05:10 )             26.8     10-09    139  |  100  |  6<L>  ----------------------------<  147<H>  3.6   |  31  |  0.40<L>    Ca    9.1      09 Oct 2023 05:10  Phos  3.0     10-09  Mg     1.8     10-09        Urinalysis Basic - ( 09 Oct 2023 05:10 )    Color: x / Appearance: x / SG: x / pH: x  Gluc: 147 mg/dL / Ketone: x  / Bili: x / Urobili: x   Blood: x / Protein: x / Nitrite: x   Leuk Esterase: x / RBC: x / WBC x   Sq Epi: x / Non Sq Epi: x / Bacteria: x          CAPILLARY BLOOD GLUCOSE    Drug Levels: [] N/A  Vancomycin Level, Trough: 4.0 ug/mL (10-03 @ 05:50)  Vancomycin Level, Trough: 4.0 ug/mL (10-03 @ 05:05)        Allergies    No Known Allergies    Intolerances      MEDICATIONS:  Antibiotics:    Neuro:  acetaminophen     Tablet .. 1000 milliGRAM(s) Oral every 8 hours  diazepam    Tablet 5 milliGRAM(s) Oral every 8 hours  HYDROmorphone   Tablet 6 milliGRAM(s) Oral every 4 hours PRN  HYDROmorphone   Tablet 4 milliGRAM(s) Oral every 4 hours PRN  melatonin 5 milliGRAM(s) Oral at bedtime PRN  ondansetron   Disintegrating Tablet 4 milliGRAM(s) Oral every 6 hours  pregabalin 50 milliGRAM(s) Oral every 8 hours    Anticoagulation:  enoxaparin Injectable 40 milliGRAM(s) SubCutaneous every 24 hours    OTHER:  bisacodyl Suppository 10 milliGRAM(s) Rectal daily  chlorhexidine 2% Cloths 1 Application(s) Topical <User Schedule>  influenza   Vaccine 0.5 milliLiter(s) IntraMuscular once  midodrine 15 milliGRAM(s) Oral every 8 hours  norepinephrine Infusion 0.05 MICROgram(s)/kG/Min IV Continuous <Continuous>  polyethylene glycol 3350 17 Gram(s) Oral two times a day  senna 2 Tablet(s) Oral at bedtime  simethicone 80 milliGRAM(s) Chew every 6 hours    IVF:  lactated ringers. 1000 milliLiter(s) IV Continuous <Continuous>    CULTURES:  Culture Results:   No growth at 5 days. (10-01 @ 11:25)  Culture Results:   No growth at 5 days. (10-01 @ 11:25)    RADIOLOGY & ADDITIONAL TESTS:    43F no PMHx suffered childhood injury resulting in spine injury and residual gait disturbance, hyperreflexia of BL LE. She has right greater than left leg tingling, low back and mid back pain. 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 S/p C7-L1 fusion, T3-8 vertebral column resection, placement of anterior cage (9/25/23). S/p revision of T3-T9 VCR, revision of C7-L1 posterior fusion, plastics closure (10/6/23).     NEURO:  - Neuro/spine checks q4/vitals q1hrs  - Pain control: tylenol 1g q8h, valium 2q6 with prn IV for breakthrough, dilaudid 4/6 PO, Lyrica 50q8- stage 1 Post-op CT full spine: L1 screw not attached to the vertical yoli, epidural lipomatosis L2-3  - stage 2 post op CT T spine completed  - HMVx1, JPx1 (per plastics) (R HMV and R DANIELE dc'd)    Cardio:  - MAP > 65, on levo gtt   - midodrine 15 mg q8h to wean off pressors  - echo 9/27: EF 60-65%    Pulm:  - room air  - incentive spirometry    GI:  - Regular diet  - Bowel regimen   - BM 10/8  - simethicone 80q6 for gas    Renal:  - LR @ 30cc/hr  - voiding    Endo:  - a1c 5.6, ISS dc'd    Heme:  - SCDs for DVT ppx/ SQL   - 2u PRBC and 1u PLT postop 9/25, 1u PRBC 10/3, 1u PRBC 10/7  - b/l LE dopplers 10/1 negative  - LUE doppler 10/1: +cephalic thrombophlebitis    ID:  - 10/1 panculture, +UA 10/1  - Vanc (10/1-10/3 ) dc'd, Zosyn (10/1- 10/8)  x 7 days   - ID signed off    Dispo: ICU status, full code, PT/OT recs AR  Discussed with Dr. Vieira and Dr. Villeda

## 2023-10-10 NOTE — CHART NOTE - NSCHARTNOTEFT_GEN_A_CORE
Liberalized MAP goal to >65. Started PO iron and vit C every other day. HMV removed. Lidocaine patch given for left neck pain. Neuro exam stable.

## 2023-10-10 NOTE — PROCEDURE NOTE - GENERAL PROCEDURE NAME
Removal of HMV drain
Blood Culture
Removal of posterior thoracic DANIELE drain
Removal of posterior upper thoracic site hemovac drain
DANIELE drain removal
HMV removal

## 2023-10-10 NOTE — PROCEDURE NOTE - NSCOMPLICATION_GEN_A_CORE
no complications
Clear bilaterally, pupils equal, round and reactive to light.

## 2023-10-10 NOTE — CONSULT NOTE ADULT - SUBJECTIVE AND OBJECTIVE BOX
HPI:  Taken from outpatient neurosurgery note on 9/13/2023 " The patient, a long-standing patient of my practice, reports a history of spinal issues that dates back to a childhood injury. Over the years she's experienced developing issues with walking in a straight line, overactive reflexes in her lower extremities, and unique difficulty with her lower extremities referred to as cloneness. Recent upturn in these symptoms has limited her ability to carry out her daily routines. The patient also reports no significant improvement in her condition since our last clinical encounter two years ago."    42 y/o female with no PMHx or PSHx presents for surgery today.  She reports ambulating with walker or baby stroller at home for years.  She reports intermittent falls.  She has right greater than left leg tingling, low back and mid back pain.  She denies arm or leg pain.  She reports urinary incontinence since having a baby 2 years ago where if she doesn't get to the bathroom fast enough, she has incontinence, but she is able to hold it for a short while.  She takes no pain medications.  She has taken ibuprofen in the past.  She denies saddle anesthesia, bowel incontinence.   (25 Sep 2023 06:51)    Patient today reports 5/10 pain primarily incisional. Her pain can be 10/10 and goes down to a 0/10 after her medications. She states that she has to take her medication every 2 hours or her pain comes back.    She received 4x hydrmorphone 4mg and 4x hydromorphone 6 mg as well as 1x 0.5mg hydromorphone iv dose overnight.      PAST MEDICAL & SURGICAL HISTORY:  History of acute illness  childhood      No significant past surgical history          FAMILY HISTORY:  No pertinent family history in first degree relatives      PAIN MEDICATIONS:  acetaminophen     Tablet .. 1000 milliGRAM(s) Oral every 8 hours  diazepam    Tablet 5 milliGRAM(s) Oral every 8 hours  HYDROmorphone   Tablet 6 milliGRAM(s) Oral every 4 hours PRN  HYDROmorphone   Tablet 4 milliGRAM(s) Oral every 4 hours PRN  melatonin 5 milliGRAM(s) Oral at bedtime PRN  ondansetron   Disintegrating Tablet 4 milliGRAM(s) Oral every 6 hours  pregabalin 50 milliGRAM(s) Oral every 8 hours    Heme:  enoxaparin Injectable 40 milliGRAM(s) SubCutaneous every 24 hours    Antibiotics:    Cardiovascular:  midodrine 15 milliGRAM(s) Oral every 8 hours  norepinephrine Infusion 0.05 MICROgram(s)/kG/Min IV Continuous <Continuous>    GI:  bisacodyl Suppository 10 milliGRAM(s) Rectal daily  polyethylene glycol 3350 17 Gram(s) Oral two times a day  senna 2 Tablet(s) Oral at bedtime  simethicone 80 milliGRAM(s) Chew every 6 hours    Endocrine:    All Other Medications:  ascorbic acid 500 milliGRAM(s) Oral <User Schedule>  chlorhexidine 2% Cloths 1 Application(s) Topical <User Schedule>  ferrous    sulfate 325 milliGRAM(s) Oral <User Schedule>  influenza   Vaccine 0.5 milliLiter(s) IntraMuscular once  lactated ringers. 1000 milliLiter(s) IV Continuous <Continuous>  sodium chloride 0.9% lock flush 10 milliLiter(s) IV Push every 1 hour PRN      Vital Signs Last 24 Hrs  T(C): 36.5 (10 Oct 2023 09:10), Max: 37.7 (09 Oct 2023 17:34)  T(F): 97.7 (10 Oct 2023 09:10), Max: 99.9 (09 Oct 2023 17:34)  HR: 116 (10 Oct 2023 09:00) (92 - 129)  BP: 115/69 (09 Oct 2023 21:00) (115/69 - 115/69)  BP(mean): 83 (09 Oct 2023 21:00) (83 - 83)  RR: 16 (10 Oct 2023 09:00) (15 - 20)  SpO2: 98% (10 Oct 2023 09:00) (85% - 100%)    Parameters below as of 10 Oct 2023 09:00  Patient On (Oxygen Delivery Method): room air        LABS:                        8.5    11.93 )-----------( 419      ( 10 Oct 2023 05:30 )             26.7     10-10    137  |  98  |  5<L>  ----------------------------<  121<H>  4.0   |  30  |  0.41<L>    Ca    9.1      10 Oct 2023 05:30  Phos  4.0     10-10  Mg     1.7     10-10        Urinalysis Basic - ( 10 Oct 2023 05:30 )    Color: x / Appearance: x / SG: x / pH: x  Gluc: 121 mg/dL / Ketone: x  / Bili: x / Urobili: x   Blood: x / Protein: x / Nitrite: x   Leuk Esterase: x / RBC: x / WBC x   Sq Epi: x / Non Sq Epi: x / Bacteria: x

## 2023-10-10 NOTE — PROGRESS NOTE ADULT - ASSESSMENT
ASSESSMENT:   42 y/o F status post traumatic spine injury w/o neurological deficit & severe thoracic kyphosis   Status post T3-8 VCR, C7-L1 fusion, correction of deformity, placement of anterior cage.STAGE 1. Plastics closure. POD **  Now status post STAGE 2 POD *      NEURO:  Neurochecks Q4  Pain management: Dilaudid 6mg/4mg PO PRN, valium 2mg PRN, lyrica 50mg qd   Monitor drain output  Activity: PT/OT      PULMONARY:  Saturating well on room air  Incentive spirometry 10q/hr when awake     CARDIOVASCULAR:  MAP goal >65; c/w midodrine 15mg qd & levophed   Vitals Q1  Monitor EKG and troponin while augmenting MAP on levophed   Rosa Maria  Troponin    GASTROENTEROLOGY:  Diet: Regular  LBM 10/7. Bowel regimen  KUB w/ bowel gas pattern   Continue simethicone     RENAL/:  LR @30cc/hr while on pressors   Monitor BMPs. Na goal 135-145  Strict Is/Os    ENDOCRINE:  Monitor fingersticks: Goal -[id number]    HEME/ONC:  DVT ppx: Lovenox SQ. B/L SCDs  LE doppler negative     INFECTIOUS:   S/P empiric abx for low grade fevers; blood culture negative, RVP negative, UA (+) likely asymptomatic bacteruria.   S/P zosyn 4.5g Q8 ( for 7 days; end date 10/8)   Incision site C/D/I.     MISC:    SOCIAL/FAMILY:  [x] awaiting [] updated at bedside [] family meeting    CODE STATUS:  [x] Full Code [] DNR [] DNI [] Palliative/Comfort Care    DISPOSITION:  [x] ICU [] Stroke Unit [] Floor [] EMU [] RCU [] PCU         ASSESSMENT:   44 y/o F status post traumatic spine injury w/o neurological deficit and severe thoracic kyphosis   Status post T3-8 VCR, C7-L1 fusion, correction of deformity, placement of anterior cage. STAGE 1. Plastics closure. POD 14  Now status post STAGE 2 POD 4    NEURO:  Neurochecks Q4  Pain management: Dilaudid 6mg/4mg PO PRN, valium standing, lyrica 50mg daily  Monitor drain output (HMV x 1, DANIELE x1)  Activity: Aggressive PT/OT      PULMONARY:  Saturating well on room air  Incentive spirometry 10q/hr when awake     CARDIOVASCULAR:  MAP goal >65; continue midodrine 15mg qd  Wean off levophed   Rosa Maria  Troponins downtrending 62->26    GASTROENTEROLOGY:  Diet: Regular  LBM 10/10. Bowel regimen  KUB w/ bowel gas pattern   Continue simethicone   Check LFTs on standing Tylenol    RENAL/:  LR @30cc/hr while on pressors   Monitor BMPs. Na goal 135-145  Strict Is/Os    ENDOCRINE:  Monitor fingersticks: Goal -[id number]/DL    HEME/ONC: Anemia  DVT ppx: Lovenox SQ. B/L SCDs  LE doppler negative   Reticulocyte index <2 (1.78). Start ferrous sulfate with vitamin C    INFECTIOUS:   S/P empiric abx for low grade fevers; blood culture negative, RVP negative, UA (+) likely asymptomatic bacteruria.   S/P zosyn 4.5g Q8 ( for 7 days; ended on 10/8)   Incision site C/D/I    MISC:    SOCIAL/FAMILY:  [x] awaiting [] updated at bedside [] family meeting    CODE STATUS:  [x] Full Code [] DNR [] DNI [] Palliative/Comfort Care    DISPOSITION:  [x] ICU [] Stroke Unit [] Floor [] EMU [] RCU [] PCU  Can downgrade once off pressors.

## 2023-10-10 NOTE — PROCEDURE NOTE - ATTENDING PROVIDER
Dr. Vieira
Dr. Adonis Vieira
Dr. Vieira
Dr. Vieira
Dr Vieira
Dr. Vieira
Dariel

## 2023-10-10 NOTE — PROCEDURE NOTE - GENERAL PROCEDURE DETAILS
Hemovac incision site was first prepped in a sterile fashion. Anchoring suture was removed. Hemovac catheter was removed in its entirety without resistance. No further drainage noted. Steri strips and dressing applied.
DANIELE drain removed from self suction. Anchoring suture removed. Catheter removed without resistance. Steri strips and clean dressing placed to exit site.
HMV drain taken off self suction. Site prepped with chlorhexidine. Anchoring suture removed. HMV drain removed without difficulty. Wound approximated closed with steristrips.
DANIELE drain incision site was first prepped in a sterile fashion. Anchoring suture was removed. DANIELE drain catheter was removed in its entirety without resistance and with tip visualized. Steri strip applied. No further drainage noted.

## 2023-10-10 NOTE — PROGRESS NOTE ADULT - SUBJECTIVE AND OBJECTIVE BOX
INTERVAL HISTORY: HPI:  Taken from outpatient neurosurgery note on 9/13/2023 " The patient, a long-standing patient of my practice, reports a history of spinal issues that dates back to a childhood injury. Over the years she's experienced developing issues with walking in a straight line, overactive reflexes in her lower extremities, and unique difficulty with her lower extremities referred to as cloneness. Recent upturn in these symptoms has limited her ability to carry out her daily routines. The patient also reports no significant improvement in her condition since our last clinical encounter two years ago."    44 y/o female with no PMHx or PSHx presents for surgery today.  She reports ambulating with walker or baby stroller at home for years.  She reports intermittent falls.  She has right greater than left leg tingling, low back and mid back pain.  She denies arm or leg pain.  She reports urinary incontinence since having a baby 2 years ago where if she doesn't get to the bathroom fast enough, she has incontinence, but she is able to hold it for a short while.  She takes no pain medications.  She has taken ibuprofen in the past.  She denies saddle anesthesia, bowel incontinence.   (25 Sep 2023 06:51)    Drug Dosing Weight  Height (cm): 152.4 (06 Oct 2023 07:34)  Weight (kg): 68.6 (06 Oct 2023 07:34)  BMI (kg/m2): 29.5 (06 Oct 2023 07:34)  BSA (m2): 1.66 (06 Oct 2023 07:34)    PAST MEDICAL & SURGICAL HISTORY:  History of acute illness  childhood  No significant past surgical history    REVIEW OF SYSTEMS: [ ] Unable to Assess due to neurologic exam   [ ] All ROS addressed below are non-contributory, except:  Neuro: [ ] Headache [ ] Back pain [ ] Numbness [ ] Weakness [ ] Ataxia [ ] Dizziness [ ] Aphasia [ ] Dysarthria [ ] Visual disturbance  Resp: [ ] Shortness of breath/dyspnea, [ ] Orthopnea [ ] Cough  CV: [ ] Chest pain [ ] Palpitation [ ] Lightheadedness [ ] Syncope  Renal: [ ] Thirst [ ] Edema  GI: [ ] Nausea [ ] Emesis [ ] Abdominal pain [ ] Constipation [ ] Diarrhea  Hem: [ ] Hematemesis [ ] bright red blood per rectum  ID: [ ] Fever [ ] Chills [ ] Dysuria  ENT: [ ] Rhinorrhea    PHYSICAL EXAM:    General: No Acute Distress   Neurological: aox3, follows command, 5/5 b/l UE, RLE trace TF to nox, LLE 0/5, no proprioception  Pulmonary: Clear to Auscultation, No Rales, No Rhonchi, No Wheezes   Cardiovascular: S1, S2, Regular Rate and Rhythm   Gastrointestinal: Soft, Nontender, Nondistended   Extremities: No calf tenderness   Incision: CDI           ICU Vital Signs Last 24 Hrs  T(C): 37.3 (10 Oct 2023 18:10), Max: 37.3 (09 Oct 2023 21:25)  T(F): 99.1 (10 Oct 2023 18:10), Max: 99.2 (09 Oct 2023 21:25)  HR: 136 (10 Oct 2023 20:00) (87 - 136)  BP: 115/69 (09 Oct 2023 21:00) (115/69 - 115/69)  BP(mean): 83 (09 Oct 2023 21:00) (83 - 83)  ABP: 92/50 (10 Oct 2023 20:00) (83/72 - 166/160)  ABP(mean): 68 (10 Oct 2023 20:00) (68 - 163)  RR: 16 (10 Oct 2023 20:00) (15 - 19)  SpO2: 96% (10 Oct 2023 20:00) (94% - 100%)      10-09-23 @ 07:01  -  10-10-23 @ 07:00  --------------------------------------------------------  IN: 2366.1 mL / OUT: 4060 mL / NET: -1693.9 mL    10-10-23 @ 07:01  -  10-10-23 @ 20:54  --------------------------------------------------------  IN: 280 mL / OUT: 1970 mL / NET: -1690 mL      acetaminophen     Tablet .. 1000 milliGRAM(s) Oral every 8 hours  ascorbic acid 500 milliGRAM(s) Oral <User Schedule>  bisacodyl Suppository 10 milliGRAM(s) Rectal daily  chlorhexidine 2% Cloths 1 Application(s) Topical <User Schedule>  diazepam    Tablet 5 milliGRAM(s) Oral every 8 hours  enoxaparin Injectable 40 milliGRAM(s) SubCutaneous every 24 hours  ferrous    sulfate 325 milliGRAM(s) Oral <User Schedule>  HYDROmorphone   Tablet 6 milliGRAM(s) Oral every 4 hours PRN  HYDROmorphone   Tablet 4 milliGRAM(s) Oral every 4 hours PRN  HYDROmorphone  Injectable 0.5 milliGRAM(s) IV Push every 4 hours PRN  influenza   Vaccine 0.5 milliLiter(s) IntraMuscular once  lidocaine   4% Patch 1 Patch Transdermal daily  melatonin 5 milliGRAM(s) Oral at bedtime PRN  midodrine 15 milliGRAM(s) Oral every 8 hours  ondansetron   Disintegrating Tablet 4 milliGRAM(s) Oral every 6 hours  polyethylene glycol 3350 17 Gram(s) Oral two times a day  pregabalin 50 milliGRAM(s) Oral every 8 hours  senna 2 Tablet(s) Oral at bedtime  simethicone 80 milliGRAM(s) Chew every 6 hours  sodium chloride 0.9% lock flush 10 milliLiter(s) IV Push every 1 hour PRN      LABS:  Na: 137 (10-10 @ 05:30), 139 (10-09 @ 05:10), 137 (10-08 @ 05:35)  K: 4.0 (10-10 @ 05:30), 3.6 (10-09 @ 05:10), 3.8 (10-08 @ 05:35)  Cl: 98 (10-10 @ 05:30), 100 (10-09 @ 05:10), 99 (10-08 @ 05:35)  CO2: 30 (10-10 @ 05:30), 31 (10-09 @ 05:10), 29 (10-08 @ 05:35)  BUN: 5 (10-10 @ 05:30), 6 (10-09 @ 05:10), 6 (10-08 @ 05:35)  Cr: 0.41 (10-10 @ 05:30), 0.40 (10-09 @ 05:10), 0.40 (10-08 @ 05:35)  Glu: 121(10-10 @ 05:30), 147(10-09 @ 05:10), 134(10-08 @ 05:35)    Hgb: 8.5 (10-10 @ 05:30), 8.9 (10-09 @ 05:10), 9.8 (10-08 @ 05:35)  Hct: 26.7 (10-10 @ 05:30), 26.8 (10-09 @ 05:10), 29.2 (10-08 @ 05:35)  WBC: 11.93 (10-10 @ 05:30), 11.81 (10-09 @ 05:10), 14.93 (10-08 @ 05:35)  Plt: 419 (10-10 @ 05:30), 410 (10-09 @ 05:10), 420 (10-08 @ 05:35)      LIVER FUNCTIONS - ( 10 Oct 2023 05:30 )  Alb: 3.2 g/dL / Pro: 6.4 g/dL / ALK PHOS: 80 U/L / ALT: 31 U/L / AST: 21 U/L / GGT: x

## 2023-10-11 LAB
ANION GAP SERPL CALC-SCNC: 11 MMOL/L — SIGNIFICANT CHANGE UP (ref 5–17)
ANION GAP SERPL CALC-SCNC: 7 MMOL/L — SIGNIFICANT CHANGE UP (ref 5–17)
BUN SERPL-MCNC: 7 MG/DL — SIGNIFICANT CHANGE UP (ref 7–23)
BUN SERPL-MCNC: 9 MG/DL — SIGNIFICANT CHANGE UP (ref 7–23)
CALCIUM SERPL-MCNC: 9 MG/DL — SIGNIFICANT CHANGE UP (ref 8.4–10.5)
CALCIUM SERPL-MCNC: 9.4 MG/DL — SIGNIFICANT CHANGE UP (ref 8.4–10.5)
CHLORIDE SERPL-SCNC: 104 MMOL/L — SIGNIFICANT CHANGE UP (ref 96–108)
CHLORIDE SERPL-SCNC: 99 MMOL/L — SIGNIFICANT CHANGE UP (ref 96–108)
CO2 SERPL-SCNC: 27 MMOL/L — SIGNIFICANT CHANGE UP (ref 22–31)
CO2 SERPL-SCNC: 28 MMOL/L — SIGNIFICANT CHANGE UP (ref 22–31)
CREAT SERPL-MCNC: 0.4 MG/DL — LOW (ref 0.5–1.3)
CREAT SERPL-MCNC: 0.43 MG/DL — LOW (ref 0.5–1.3)
EGFR: 125 ML/MIN/1.73M2 — SIGNIFICANT CHANGE UP
EGFR: 127 ML/MIN/1.73M2 — SIGNIFICANT CHANGE UP
GLUCOSE SERPL-MCNC: 107 MG/DL — HIGH (ref 70–99)
GLUCOSE SERPL-MCNC: 94 MG/DL — SIGNIFICANT CHANGE UP (ref 70–99)
HCT VFR BLD CALC: 25.7 % — LOW (ref 34.5–45)
HCT VFR BLD CALC: 26.1 % — LOW (ref 34.5–45)
HGB BLD-MCNC: 8.2 G/DL — LOW (ref 11.5–15.5)
HGB BLD-MCNC: 8.3 G/DL — LOW (ref 11.5–15.5)
LACTATE SERPL-SCNC: 0.4 MMOL/L — LOW (ref 0.5–2)
MAGNESIUM SERPL-MCNC: 1.7 MG/DL — SIGNIFICANT CHANGE UP (ref 1.6–2.6)
MAGNESIUM SERPL-MCNC: 1.9 MG/DL — SIGNIFICANT CHANGE UP (ref 1.6–2.6)
MCHC RBC-ENTMCNC: 27.5 PG — SIGNIFICANT CHANGE UP (ref 27–34)
MCHC RBC-ENTMCNC: 27.7 PG — SIGNIFICANT CHANGE UP (ref 27–34)
MCHC RBC-ENTMCNC: 31.8 GM/DL — LOW (ref 32–36)
MCHC RBC-ENTMCNC: 31.9 GM/DL — LOW (ref 32–36)
MCV RBC AUTO: 86.4 FL — SIGNIFICANT CHANGE UP (ref 80–100)
MCV RBC AUTO: 86.8 FL — SIGNIFICANT CHANGE UP (ref 80–100)
NRBC # BLD: 0 /100 WBCS — SIGNIFICANT CHANGE UP (ref 0–0)
NRBC # BLD: 0 /100 WBCS — SIGNIFICANT CHANGE UP (ref 0–0)
PHOSPHATE SERPL-MCNC: 3.2 MG/DL — SIGNIFICANT CHANGE UP (ref 2.5–4.5)
PHOSPHATE SERPL-MCNC: 4.1 MG/DL — SIGNIFICANT CHANGE UP (ref 2.5–4.5)
PLATELET # BLD AUTO: 420 K/UL — HIGH (ref 150–400)
PLATELET # BLD AUTO: 426 K/UL — HIGH (ref 150–400)
POTASSIUM SERPL-MCNC: 3.9 MMOL/L — SIGNIFICANT CHANGE UP (ref 3.5–5.3)
POTASSIUM SERPL-MCNC: 3.9 MMOL/L — SIGNIFICANT CHANGE UP (ref 3.5–5.3)
POTASSIUM SERPL-SCNC: 3.9 MMOL/L — SIGNIFICANT CHANGE UP (ref 3.5–5.3)
POTASSIUM SERPL-SCNC: 3.9 MMOL/L — SIGNIFICANT CHANGE UP (ref 3.5–5.3)
RBC # BLD: 2.96 M/UL — LOW (ref 3.8–5.2)
RBC # BLD: 3.02 M/UL — LOW (ref 3.8–5.2)
RBC # FLD: 14.1 % — SIGNIFICANT CHANGE UP (ref 10.3–14.5)
RBC # FLD: 14.2 % — SIGNIFICANT CHANGE UP (ref 10.3–14.5)
SODIUM SERPL-SCNC: 137 MMOL/L — SIGNIFICANT CHANGE UP (ref 135–145)
SODIUM SERPL-SCNC: 139 MMOL/L — SIGNIFICANT CHANGE UP (ref 135–145)
TROPONIN T, HIGH SENSITIVITY RESULT: 18 NG/L — SIGNIFICANT CHANGE UP (ref 0–51)
WBC # BLD: 7.64 K/UL — SIGNIFICANT CHANGE UP (ref 3.8–10.5)
WBC # BLD: 9.8 K/UL — SIGNIFICANT CHANGE UP (ref 3.8–10.5)
WBC # FLD AUTO: 7.64 K/UL — SIGNIFICANT CHANGE UP (ref 3.8–10.5)
WBC # FLD AUTO: 9.8 K/UL — SIGNIFICANT CHANGE UP (ref 3.8–10.5)

## 2023-10-11 PROCEDURE — 71275 CT ANGIOGRAPHY CHEST: CPT | Mod: 26

## 2023-10-11 PROCEDURE — 99291 CRITICAL CARE FIRST HOUR: CPT

## 2023-10-11 RX ORDER — ALBUMIN HUMAN 25 %
250 VIAL (ML) INTRAVENOUS ONCE
Refills: 0 | Status: COMPLETED | OUTPATIENT
Start: 2023-10-11 | End: 2023-10-11

## 2023-10-11 RX ORDER — SODIUM CHLORIDE 9 MG/ML
1000 INJECTION INTRAMUSCULAR; INTRAVENOUS; SUBCUTANEOUS ONCE
Refills: 0 | Status: COMPLETED | OUTPATIENT
Start: 2023-10-11 | End: 2023-10-11

## 2023-10-11 RX ORDER — MIDODRINE HYDROCHLORIDE 2.5 MG/1
15 TABLET ORAL EVERY 8 HOURS
Refills: 0 | Status: DISCONTINUED | OUTPATIENT
Start: 2023-10-11 | End: 2023-10-13

## 2023-10-11 RX ORDER — FUROSEMIDE 40 MG
10 TABLET ORAL ONCE
Refills: 0 | Status: COMPLETED | OUTPATIENT
Start: 2023-10-11 | End: 2023-10-11

## 2023-10-11 RX ORDER — PHENYLEPHRINE HYDROCHLORIDE 10 MG/ML
0.1 INJECTION INTRAVENOUS
Qty: 160 | Refills: 0 | Status: DISCONTINUED | OUTPATIENT
Start: 2023-10-11 | End: 2023-10-11

## 2023-10-11 RX ORDER — POTASSIUM CHLORIDE 20 MEQ
20 PACKET (EA) ORAL ONCE
Refills: 0 | Status: COMPLETED | OUTPATIENT
Start: 2023-10-11 | End: 2023-10-11

## 2023-10-11 RX ORDER — MIDODRINE HYDROCHLORIDE 2.5 MG/1
5 TABLET ORAL ONCE
Refills: 0 | Status: COMPLETED | OUTPATIENT
Start: 2023-10-11 | End: 2023-10-11

## 2023-10-11 RX ORDER — PHENYLEPHRINE HYDROCHLORIDE 10 MG/ML
0.1 INJECTION INTRAVENOUS
Qty: 40 | Refills: 0 | Status: DISCONTINUED | OUTPATIENT
Start: 2023-10-11 | End: 2023-10-14

## 2023-10-11 RX ORDER — MAGNESIUM SULFATE 500 MG/ML
2 VIAL (ML) INJECTION ONCE
Refills: 0 | Status: COMPLETED | OUTPATIENT
Start: 2023-10-11 | End: 2023-10-11

## 2023-10-11 RX ORDER — SODIUM CHLORIDE 9 MG/ML
500 INJECTION INTRAMUSCULAR; INTRAVENOUS; SUBCUTANEOUS ONCE
Refills: 0 | Status: COMPLETED | OUTPATIENT
Start: 2023-10-11 | End: 2023-10-11

## 2023-10-11 RX ADMIN — Medication 50 MILLIGRAM(S): at 13:23

## 2023-10-11 RX ADMIN — POLYETHYLENE GLYCOL 3350 17 GRAM(S): 17 POWDER, FOR SOLUTION ORAL at 18:01

## 2023-10-11 RX ADMIN — Medication 5 MILLIGRAM(S): at 13:24

## 2023-10-11 RX ADMIN — ENOXAPARIN SODIUM 40 MILLIGRAM(S): 100 INJECTION SUBCUTANEOUS at 22:31

## 2023-10-11 RX ADMIN — LIDOCAINE 1 PATCH: 4 CREAM TOPICAL at 19:41

## 2023-10-11 RX ADMIN — HYDROMORPHONE HYDROCHLORIDE 4 MILLIGRAM(S): 2 INJECTION INTRAMUSCULAR; INTRAVENOUS; SUBCUTANEOUS at 11:44

## 2023-10-11 RX ADMIN — HYDROMORPHONE HYDROCHLORIDE 6 MILLIGRAM(S): 2 INJECTION INTRAMUSCULAR; INTRAVENOUS; SUBCUTANEOUS at 02:25

## 2023-10-11 RX ADMIN — Medication 5 MILLIGRAM(S): at 22:32

## 2023-10-11 RX ADMIN — HYDROMORPHONE HYDROCHLORIDE 6 MILLIGRAM(S): 2 INJECTION INTRAMUSCULAR; INTRAVENOUS; SUBCUTANEOUS at 22:40

## 2023-10-11 RX ADMIN — LIDOCAINE 1 PATCH: 4 CREAM TOPICAL at 23:20

## 2023-10-11 RX ADMIN — LIDOCAINE 1 PATCH: 4 CREAM TOPICAL at 07:08

## 2023-10-11 RX ADMIN — Medication 5 MILLIGRAM(S): at 05:33

## 2023-10-11 RX ADMIN — Medication 1000 MILLIGRAM(S): at 06:08

## 2023-10-11 RX ADMIN — HYDROMORPHONE HYDROCHLORIDE 0.5 MILLIGRAM(S): 2 INJECTION INTRAMUSCULAR; INTRAVENOUS; SUBCUTANEOUS at 21:10

## 2023-10-11 RX ADMIN — MIDODRINE HYDROCHLORIDE 10 MILLIGRAM(S): 2.5 TABLET ORAL at 05:33

## 2023-10-11 RX ADMIN — POLYETHYLENE GLYCOL 3350 17 GRAM(S): 17 POWDER, FOR SOLUTION ORAL at 05:33

## 2023-10-11 RX ADMIN — Medication 20 MILLIEQUIVALENT(S): at 06:49

## 2023-10-11 RX ADMIN — Medication 325 MILLIGRAM(S): at 05:33

## 2023-10-11 RX ADMIN — LIDOCAINE 1 PATCH: 4 CREAM TOPICAL at 11:44

## 2023-10-11 RX ADMIN — HYDROMORPHONE HYDROCHLORIDE 6 MILLIGRAM(S): 2 INJECTION INTRAMUSCULAR; INTRAVENOUS; SUBCUTANEOUS at 01:55

## 2023-10-11 RX ADMIN — Medication 50 MILLIGRAM(S): at 22:32

## 2023-10-11 RX ADMIN — MIDODRINE HYDROCHLORIDE 5 MILLIGRAM(S): 2.5 TABLET ORAL at 11:43

## 2023-10-11 RX ADMIN — MIDODRINE HYDROCHLORIDE 15 MILLIGRAM(S): 2.5 TABLET ORAL at 22:34

## 2023-10-11 RX ADMIN — HYDROMORPHONE HYDROCHLORIDE 6 MILLIGRAM(S): 2 INJECTION INTRAMUSCULAR; INTRAVENOUS; SUBCUTANEOUS at 14:55

## 2023-10-11 RX ADMIN — SODIUM CHLORIDE 500 MILLILITER(S): 9 INJECTION INTRAMUSCULAR; INTRAVENOUS; SUBCUTANEOUS at 14:53

## 2023-10-11 RX ADMIN — SODIUM CHLORIDE 500 MILLILITER(S): 9 INJECTION INTRAMUSCULAR; INTRAVENOUS; SUBCUTANEOUS at 18:41

## 2023-10-11 RX ADMIN — HYDROMORPHONE HYDROCHLORIDE 4 MILLIGRAM(S): 2 INJECTION INTRAMUSCULAR; INTRAVENOUS; SUBCUTANEOUS at 04:15

## 2023-10-11 RX ADMIN — MIDODRINE HYDROCHLORIDE 15 MILLIGRAM(S): 2.5 TABLET ORAL at 13:23

## 2023-10-11 RX ADMIN — HYDROMORPHONE HYDROCHLORIDE 0.5 MILLIGRAM(S): 2 INJECTION INTRAMUSCULAR; INTRAVENOUS; SUBCUTANEOUS at 13:50

## 2023-10-11 RX ADMIN — Medication 1000 MILLIGRAM(S): at 05:33

## 2023-10-11 RX ADMIN — HYDROMORPHONE HYDROCHLORIDE 4 MILLIGRAM(S): 2 INJECTION INTRAMUSCULAR; INTRAVENOUS; SUBCUTANEOUS at 18:30

## 2023-10-11 RX ADMIN — Medication 125 MILLILITER(S): at 14:54

## 2023-10-11 RX ADMIN — Medication 1000 MILLIGRAM(S): at 23:11

## 2023-10-11 RX ADMIN — HYDROMORPHONE HYDROCHLORIDE 4 MILLIGRAM(S): 2 INJECTION INTRAMUSCULAR; INTRAVENOUS; SUBCUTANEOUS at 03:35

## 2023-10-11 RX ADMIN — HYDROMORPHONE HYDROCHLORIDE 6 MILLIGRAM(S): 2 INJECTION INTRAMUSCULAR; INTRAVENOUS; SUBCUTANEOUS at 23:11

## 2023-10-11 RX ADMIN — SENNA PLUS 2 TABLET(S): 8.6 TABLET ORAL at 22:32

## 2023-10-11 RX ADMIN — HYDROMORPHONE HYDROCHLORIDE 4 MILLIGRAM(S): 2 INJECTION INTRAMUSCULAR; INTRAVENOUS; SUBCUTANEOUS at 18:01

## 2023-10-11 RX ADMIN — CHLORHEXIDINE GLUCONATE 1 APPLICATION(S): 213 SOLUTION TOPICAL at 05:29

## 2023-10-11 RX ADMIN — Medication 10 MILLIGRAM(S): at 22:32

## 2023-10-11 RX ADMIN — Medication 1000 MILLIGRAM(S): at 13:23

## 2023-10-11 RX ADMIN — Medication 500 MILLIGRAM(S): at 05:33

## 2023-10-11 RX ADMIN — HYDROMORPHONE HYDROCHLORIDE 0.5 MILLIGRAM(S): 2 INJECTION INTRAMUSCULAR; INTRAVENOUS; SUBCUTANEOUS at 21:30

## 2023-10-11 RX ADMIN — HYDROMORPHONE HYDROCHLORIDE 6 MILLIGRAM(S): 2 INJECTION INTRAMUSCULAR; INTRAVENOUS; SUBCUTANEOUS at 14:18

## 2023-10-11 RX ADMIN — HYDROMORPHONE HYDROCHLORIDE 4 MILLIGRAM(S): 2 INJECTION INTRAMUSCULAR; INTRAVENOUS; SUBCUTANEOUS at 12:30

## 2023-10-11 RX ADMIN — Medication 50 MILLIGRAM(S): at 05:34

## 2023-10-11 RX ADMIN — Medication 25 GRAM(S): at 06:50

## 2023-10-11 RX ADMIN — HYDROMORPHONE HYDROCHLORIDE 6 MILLIGRAM(S): 2 INJECTION INTRAMUSCULAR; INTRAVENOUS; SUBCUTANEOUS at 09:30

## 2023-10-11 RX ADMIN — HYDROMORPHONE HYDROCHLORIDE 6 MILLIGRAM(S): 2 INJECTION INTRAMUSCULAR; INTRAVENOUS; SUBCUTANEOUS at 08:51

## 2023-10-11 RX ADMIN — Medication 1000 MILLIGRAM(S): at 14:00

## 2023-10-11 RX ADMIN — HYDROMORPHONE HYDROCHLORIDE 0.5 MILLIGRAM(S): 2 INJECTION INTRAMUSCULAR; INTRAVENOUS; SUBCUTANEOUS at 13:24

## 2023-10-11 RX ADMIN — SODIUM CHLORIDE 1000 MILLILITER(S): 9 INJECTION INTRAMUSCULAR; INTRAVENOUS; SUBCUTANEOUS at 10:53

## 2023-10-11 RX ADMIN — Medication 1000 MILLIGRAM(S): at 22:32

## 2023-10-11 NOTE — CHART NOTE - NSCHARTNOTEFT_GEN_A_CORE
Admitting Diagnosis:   Patient is a 43y old  Female who presents with a chief complaint of leg weakness, difficulty walking and worsening back pain x years (11 Oct 2023 11:55)    HPI:  43F no PMHx suffered childhood injury resulting in spine injury and residual gait disturbance, hyperreflexia of BL LE. She has right greater than left leg tingling, low back and mid back pain. MRI 2023 showed 90 degree kyphosis of T3-8 and thoracic myelopathy. S/p C7-L1 fusion, T3-8 vertebral column resection, placement of anterior cage (23). S/p revision of T3-T9 VCR, revision of C7-L1 posterior fusion, plastics closure (10/6/23).     PAST MEDICAL & SURGICAL HISTORY:  History of acute illness childhood  No significant past surgical history    Current Nutrition Order: Diet, Regular (10-09-23 @ 10:42) [Active]    PO Intake: Good (%) [   ]  Fair (50-75%) [  xx ] Poor (<25%) [   ]    GI Issues: distended, soft/nontender, last bowel movement 10/10/23    Pain: reports pain in neck and back; denies it affecting her PO intake    Skin Integrity: Kameron scale score 14; intact except for surgical incision back and skin tear Rt thigh with DANIELE drain to Lt upper back    MAP: 74 mm Hg, not on pressors as of 10/10    Labs:   10-11    137  |  99  |  7   ----------------------------<  94  3.9   |  27  |  0.40<L>    Ca    9.4      11 Oct 2023 04:58  Phos  4.1     10-  Mg     1.7     10-11    TPro  6.4  /  Alb  3.2<L>  /  TBili  0.2  /  DBili  <0.2  /  AST  21  /  ALT  31  /  AlkPhos  80  10-10      MEDICATIONS  (STANDING):  acetaminophen     Tablet .. 1000 milliGRAM(s) Oral every 8 hours  ascorbic acid 500 milliGRAM(s) Oral <User Schedule>  bisacodyl Suppository 10 milliGRAM(s) Rectal daily  chlorhexidine 2% Cloths 1 Application(s) Topical <User Schedule>  diazepam    Tablet 5 milliGRAM(s) Oral every 8 hours  enoxaparin Injectable 40 milliGRAM(s) SubCutaneous every 24 hours  ferrous    sulfate 325 milliGRAM(s) Oral <User Schedule>  influenza   Vaccine 0.5 milliLiter(s) IntraMuscular once  lidocaine   4% Patch 1 Patch Transdermal daily  midodrine 15 milliGRAM(s) Oral every 8 hours  polyethylene glycol 3350 17 Gram(s) Oral two times a day  pregabalin 50 milliGRAM(s) Oral every 8 hours  senna 2 Tablet(s) Oral at bedtime    MEDICATIONS  (PRN):  HYDROmorphone   Tablet 6 milliGRAM(s) Oral every 4 hours PRN Severe Pain (7 - 10)  HYDROmorphone   Tablet 4 milliGRAM(s) Oral every 4 hours PRN Moderate Pain (4 - 6)  HYDROmorphone  Injectable 0.5 milliGRAM(s) IV Push every 4 hours PRN breakthrough pain  melatonin 5 milliGRAM(s) Oral at bedtime PRN Insomnia  sodium chloride 0.9% lock flush 10 milliLiter(s) IV Push every 1 hour PRN Pre/post blood products, medications, blood draw, and to maintain line patency    ANTHROPOMETRICS   Height: 5'0"  Weight: 151 pounds ()  BMI: 29.5 kg/m^2, overweight   IBW: 100lb/45.5kg +/-10%   %IBW: 151 %     Weight Change: No new weights obtained since admission. Recommend nursing to obtain current weight and trend weights weekly. RD to continue monitoring weights as able.     Estimated energy needs:   Calories: 25-30kcal/k-1365kcal/d  Protein: 1.3-1.5g/k-68g/d  Defer fluids to team.  Estimated needs based on IBW as CBW >100% IBW in ICU setting. Needs adjusted for age, BMI, and status post OR.     Subjective:   Chart and meds reviewed. Met with Pt bedside. A&Ox4. On room air. Labs significant for low H/H, low total iron, low iron % saturation, low transferrin; on vit C and ferrous sulfate. On bowel regimen with good results. Breakfast tray observed with 50% intake. Reports she previously had been receiving Ensure Enlive (350 kcals, 20 g prot each) and enjoyed it. May benefit from oral nutrition supplement again 2/2 reduced PO intake.     Previous Nutrition Diagnosis: Increased nutrient needs (protein) related to post-op healing as evidenced by increased metabolic demand for nutrients     Active [ xx  ]  Resolved [   ]    Goal: Consistently meet >75% EER    Recommendations:  Add Ensure Enlive (350 kcals, 20 g prot each) daily   Follow intake closely, adjust prn  Monitor electrolytes, adjust and replete prn  Pain and bowel regimen per team   RD to remain available for additional nutrition interventions as needed    Education: Pt educated on medical nutrition therapy specific to the diagnosis; Pt expressed understanding; all questions and concerns addressed to Pt satisfaction    Risk Level: High [ x  ] Moderate [   ] Low [   ].

## 2023-10-11 NOTE — CONSULT NOTE ADULT - ASSESSMENT
43F no pmh s/p T3-T9 revision, C7-L1 fusion on 10/6/23. Taking hydromorphone po every 2 hours, pain controlled on this regimen.    - hydromorphone 4mg/6mg po q4h prn  - hydromorphone 0.5mg iv prn  - acetaminophen 1000mg q8h  - diazepam 5mg q8h  - pregabalin 50mg q8h  - Bowel regimen + Nausea ppx

## 2023-10-11 NOTE — PROGRESS NOTE ADULT - SUBJECTIVE AND OBJECTIVE BOX
S/Overnight events:    Patient reports pain is well managed with PO pain meds. Reports she has noticed some increased sensation in UEs. Denies chest pain, SOB, N/V    Hospital Course:   POD#   9/25: POD 0 s/p C7-L1 fusion, T3-8 vertebral column resection, placement of anterior cage.   9/26; POD1 H/H 6.8 postop and PLT 85. Given 2u PRBC and 1u PLT. Switched propofol to precedex gtt. Pt extubated to face mask. Pt noted to only be withdrawing to noxious stimuli on bilateral lower extremities with sensation decreased RLE per patient. Dr. Vieira notified and plan is to keep MAP>100 and obtain CT full spine in the morning. Phenylephrine started to maintain MAP>100. Advanced diet to regular. Passed TOV. Increased need for aurora, UO high.   9/27: POD2 C7-L1 fusion T3-8 vertebral column resection. increased pressor requirements o/n, started on levo additionally. lactate 1.5, BNP 1400 from 800, CK elevated. echo: EF 60-65%. given 250cc bolus albumin, started on midodrine 10q8 to wean off pressors. PICC placed by Alli.   9/28: POD3 s/p C7-L1 fusion, T3-8 vertebral column resection, placement of anterior cage. R radial A-line removed and replaced on the L, trops and EKG done for chest discomfort, WNL, resolved with treatment of overall pain, continues to be febrile, lyrica dc'd to help.   Lyrica restarted. Weaning phenylephrine. Serum Na uptrending, 3% saline discontinued. Pt febrile with uptrending WBC, ID consulted recommend monitoring for now off antibiotics.  9/29: POD4. started on 3% o/n for Na 132. remains on aurora and levo for MAP>100. Tachycardic, given 1L NS. Right axillary a-line placed. 3% dc'd. HMV dc'd. Aquacell dressing replaced. Passed TOV.   9/30: POD 5.  Remains on levo gtt. Increased midodrine to 15q8. Given enema. Had BM.  10/1: POD 6. MAP goal increased back to >100 due to worsened sensation loss, on levo gtt. afebrile however per ID Dr De Souza, ramírez cx sent. UA+. started empirically on vanc/zosyn. b/l LE doppler and LUE doppler ordered per ID, +LUE superficial thrombophlebitis. BL LE dopplers negative for DVT.   10/2: POD 7, ANNA overnight. Maintaining MAP >100. 500cc albumin bolus, dilaudid lowered to 2/4mg, toradol [id number] added for pain, abd xray for distension shows gas pattern. aquacel dressing changed.  10/3: POD8 ANNA overnight. Remains on levophed to maintain MAP>100 in AM. Neuro exam unchanged. Tmax 100.9F in AM, given tylenol. Flexeril changed to valium 2q8 and lyrica 50q8 started per pain managment.  DC'd vanc, continue zosyn x 7 days per ID. 1u PRBC to maintain hbg >9.0. DANIELE drain removed. Salt tabs weaned to 2q8. MAP goal changed to >85 to help wean off levo gtt.   10/4: POD 9. MAP goal >100. Sodium chloride tabs discontinued.  10/5: POD 10, Given 2 L boluses o/n for tachycardia and negative fluid status. Given 1 mg IV valium in AM for muscle spasm. 1L bolus given in afternoon for high urine output. Preop for OR tomorrow.   10/6: POD 11 Pt endorsing incisional pain, given 1mg IV valium. Neuro exam stable. Plan for second stage today,  POD 0 revision of T3-T9 VCR, revision of C7-L1 posterior fusion, plastics closure. Post-op CT thoracic spine complete. 1 L bolus for soft BP and colapsable IVC on POCUS.   10/7: POD 12 C7-L1 fusion. POD 1 Revision fusion and T3-9 VCR. Pain controlled. Remains on Levo for MAP goal > 100. AXR completed in AM for abdominal distension. Dilaudid PO prn increased to 4 and 6 mg, valium increased to 2mg q6 for pain control. 1u PRBC given for Hb 8.4. Hgb elevated to 10.4. 250 cc albumin given for tachycardia.   10/8: POD 13 Fusion, POD 2 Revision. Pain controlled overnight.   +BM this morning. Right hemovac removed. MAPs liberalized to goal greater than 85. Troponin elevated this morning, now downtrending. EKG WNL.   10/9: POD 13 C7-L1 fusion, POD 3 Revision and T3-9 VCR.   MAP goals liberalized to keep >80. Right DANIELE drain removed. IV valium given x1   10/10: POD 14 C7-L1 fusion, POD 4 revision and T3-9 VCR, liberalized MAP goal to >65. Started PO iron and vit C every other day. HMV removed. Lidocaine patch given for left neck pain. Midodrine weaned to 10q8.   10/11: POD 15 C7-L1 fusion, POD 5 revision and T3-9 VCR. SBP 80s when working with PT, given 1L bolus NS. Increased midodrine 15q8.       Vital Signs Last 24 Hrs  T(C): 37.1 (11 Oct 2023 09:00), Max: 37.3 (10 Oct 2023 18:10)  T(F): 98.8 (11 Oct 2023 09:00), Max: 99.1 (10 Oct 2023 18:10)  HR: 129 (11 Oct 2023 11:00) (87 - 136)  BP: 98/62 (11 Oct 2023 10:35) (98/62 - 98/62)  BP(mean): 74 (11 Oct 2023 10:35) (74 - 74)  RR: 16 (11 Oct 2023 11:00) (16 - 18)  SpO2: 100% (11 Oct 2023 11:00) (93% - 100%)    Parameters below as of 11 Oct 2023 11:00  Patient On (Oxygen Delivery Method): room air        I&O's Detail    10 Oct 2023 07:01  -  11 Oct 2023 07:00  --------------------------------------------------------  IN:    IV PiggyBack: 50 mL    Lactated Ringers: 270 mL    Norepinephrine: 10 mL    Oral Fluid: 800 mL  Total IN: 1130 mL    OUT:    Accordian (mL): 250 mL    Bulb (mL): 40 mL    Voided (mL): 2550 mL  Total OUT: 2840 mL    Total NET: -1710 mL      11 Oct 2023 07:01  -  11 Oct 2023 11:26  --------------------------------------------------------  IN:    Sodium Chloride 0.9% Bolus: 1000 mL  Total IN: 1000 mL    OUT:    Voided (mL): 1100 mL  Total OUT: 1100 mL    Total NET: -100 mL        I&O's Summary    10 Oct 2023 07:01  -  11 Oct 2023 07:00  --------------------------------------------------------  IN: 1130 mL / OUT: 2840 mL / NET: -1710 mL    11 Oct 2023 07:01  -  11 Oct 2023 11:26  --------------------------------------------------------  IN: 1000 mL / OUT: 1100 mL / NET: -100 mL        PHYSICAL EXAM:  Constitutional: NAD, laying in bed  Neurological: AOx3, Following commands, speech clear and fluent. CN II-CNXII: PERRL, EOMI B/L, face symmetric  Motor exam:          Upper extremity                         Delt     Bicep     Tricep    HG                                                 R         5/5        5/5        5/5       5/5                                               L          5/5        5/5        5/5       5/5          Lower extremity              R & L : TF otherwise 0/5      No pronatory drift noted   Sensation: Light touch intact throughout BL UE, decreased sensation distal to level T5  Pulmonary: Clear to Auscultation, no wheezes, symmetric chest rise   Cardiovascular: RRR, S1, S2, no murmurs   Gastrointestinal: Soft, Non-tender, distended, +BS in all 4 quadrants   Extremities: No calf tenderness or edema, distal pulses 2+ x4,  Incision/Wound: Posterior spine wound dressing C/D/I     DEVICE/DRAIN DRESSING:    TUBES/LINES:  [x] cliff (9/25-9/29)  [ ] AUSTIN doan (9/25- 9/28) LRAL (9-28-9/29) R axillary a-line (9/29 - )   [x] HMX 1 deep (9/25-9/29)  [x] DANIELE x1 super (9/25- 10/3)  [x] R IJ (placed intraop) (9/25-9/28)  [ ] PICC LUE brachial 38cm length, Alli (9/27- )  [ ] DANIELE L (10/6- )  [x] HMV L (10/6-10/10)  [x] HMR R (10/6-10/8)    DIET:  Regular    LABS:                        8.2    7.64  )-----------( 420      ( 11 Oct 2023 04:58 )             25.7     10-11    137  |  99  |  7   ----------------------------<  94  3.9   |  27  |  0.40<L>    Ca    9.4      11 Oct 2023 04:58  Phos  4.1     10-11  Mg     1.7     10-11    TPro  6.4  /  Alb  3.2<L>  /  TBili  0.2  /  DBili  <0.2  /  AST  21  /  ALT  31  /  AlkPhos  80  10-10      Urinalysis Basic - ( 11 Oct 2023 04:58 )    Color: x / Appearance: x / SG: x / pH: x  Gluc: 94 mg/dL / Ketone: x  / Bili: x / Urobili: x   Blood: x / Protein: x / Nitrite: x   Leuk Esterase: x / RBC: x / WBC x   Sq Epi: x / Non Sq Epi: x / Bacteria: x          CAPILLARY BLOOD GLUCOSE          Drug Levels: [] N/A    CSF Analysis: [] N/A      Allergies    No Known Allergies    Intolerances      MEDICATIONS:  Antibiotics:    Neuro:  acetaminophen     Tablet .. 1000 milliGRAM(s) Oral every 8 hours  diazepam    Tablet 5 milliGRAM(s) Oral every 8 hours  HYDROmorphone   Tablet 4 milliGRAM(s) Oral every 4 hours PRN  HYDROmorphone   Tablet 6 milliGRAM(s) Oral every 4 hours PRN  HYDROmorphone  Injectable 0.5 milliGRAM(s) IV Push every 4 hours PRN  melatonin 5 milliGRAM(s) Oral at bedtime PRN  pregabalin 50 milliGRAM(s) Oral every 8 hours    Anticoagulation:  enoxaparin Injectable 40 milliGRAM(s) SubCutaneous every 24 hours    OTHER:  bisacodyl Suppository 10 milliGRAM(s) Rectal daily  chlorhexidine 2% Cloths 1 Application(s) Topical <User Schedule>  influenza   Vaccine 0.5 milliLiter(s) IntraMuscular once  lidocaine   4% Patch 1 Patch Transdermal daily  midodrine 10 milliGRAM(s) Oral every 8 hours  polyethylene glycol 3350 17 Gram(s) Oral two times a day  senna 2 Tablet(s) Oral at bedtime    IVF:  ascorbic acid 500 milliGRAM(s) Oral <User Schedule>  ferrous    sulfate 325 milliGRAM(s) Oral <User Schedule>    CULTURES:  Culture Results:   No growth at 5 days. (10-01 @ 11:25)  Culture Results:   No growth at 5 days. 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 STAGE 1. Plastics closure. POD 14  Now status post STAGE 2 POD 4    M40.205    S31.000A    S31.000A M40.205 S22.463PP90.009G    S31.000A M40.205 S22.009S22.009G    No pertinent family history in first degree relatives    Handoff    No pertinent past medical history    History of acute illness    Deformity of thoracic vertebra    Thoracic myelopathy    Deformity of thoracic vertebra    Thoracic myelopathy    Corpectomy, spine, lumbar, 4 levels    No significant past surgical history    Room Service Assist    Room Service Assist    SysAdmin_VstLnk        PLAN:  NEURO:  - Neuro/spine checks q4/vitals q4hrs  - Pain control: tylenol 1g q8h, dilaudid 4/6 PO, Lyrica 50q8- stage 1 Post-op CT full spine: L1 screw not attached to the vertical yoli, epidural lipomatosis L2-3  - stage 2 post op CT T spine completed  - JPx1 (per plastics) (L HMV, R HMV and R DANIELE dc'd)    Cardio:  - MAP > 65, levo gtt dc'd  - midodrine 15 mg q8h   - echo 9/27: EF 60-65%    Pulm:  - room air  - incentive spirometry    GI:  - Regular diet  - Bowel regimen   - BM 10/10    Renal:  - IVL  - voiding    Endo:  - a1c 5.6, ISS dc'd    Heme:  - SCDs for DVT ppx/ SQL   - 2u PRBC and 1u PLT postop 9/25, 1u PRBC 10/3, 1u PRBC 10/7  - b/l LE dopplers 10/1 negative  - LUE doppler 10/1: +cephalic thrombophlebitis  - iron and vitamin c every other day started 10/10     ID:  - 10/1 panculture, +UA 10/1  - Vanc (10/1-10/3 ) dc'd, Zosyn (10/1- 10/8)  x 7 days   - ID signed off    Dispo: ICU status, full code, PT/OT recs AR  Discussed with Dr. Vieira and Dr. Villeda          Assessment:  Present when checked    []  GCS  E   V  M     Heart Failure: []Acute, [] acute on chronic , []chronic  Heart Failure:  [] Diastolic (HFpEF), [] Systolic (HFrEF), []Combined (HFpEF and HFrEF), [] RHF, [] Pulm HTN, [] Other    [] CELE, [] ATN, [] AIN, [] other  [] CKD1, [] CKD2, [] CKD 3, [] CKD 4, [] CKD 5, []ESRD    Encephalopathy: [] Metabolic, [] Hepatic, [] toxic, [] Neurological, [] Other    Abnormal Nurtitional Status: [] malnurtition (see nutrition note), [ ]underweight: BMI < 19, [] morbid obesity: BMI >40, [] Cachexia    [] Sepsis  [] hypovolemic shock,[] cardiogenic shock, [] hemorrhagic shock, [] neuogenic shock  [] Acute Respiratory Failure  []Cerebral edema, [] Brain compression/ herniation,   [] Functional quadriplegia  [] Acute blood loss anemia   S/Overnight events:    Patient reports pain is well managed with PO pain meds. Reports she has noticed some increased sensation in UEs, and has been sitting up in the chair more often. Denies chest pain, SOB, N/V    Hospital Course:   POD#   9/25: POD 0 s/p C7-L1 fusion, T3-8 vertebral column resection, placement of anterior cage.   9/26; POD1 H/H 6.8 postop and PLT 85. Given 2u PRBC and 1u PLT. Switched propofol to precedex gtt. Pt extubated to face mask. Pt noted to only be withdrawing to noxious stimuli on bilateral lower extremities with sensation decreased RLE per patient. Dr. Vieira notified and plan is to keep MAP>100 and obtain CT full spine in the morning. Phenylephrine started to maintain MAP>100. Advanced diet to regular. Passed TOV. Increased need for aurora, UO high.   9/27: POD2 C7-L1 fusion T3-8 vertebral column resection. increased pressor requirements o/n, started on levo additionally. lactate 1.5, BNP 1400 from 800, CK elevated. echo: EF 60-65%. given 250cc bolus albumin, started on midodrine 10q8 to wean off pressors. PICC placed by Alli.   9/28: POD3 s/p C7-L1 fusion, T3-8 vertebral column resection, placement of anterior cage. R radial A-line removed and replaced on the L, trops and EKG done for chest discomfort, WNL, resolved with treatment of overall pain, continues to be febrile, lyrica dc'd to help.   Lyrica restarted. Weaning phenylephrine. Serum Na uptrending, 3% saline discontinued. Pt febrile with uptrending WBC, ID consulted recommend monitoring for now off antibiotics.  9/29: POD4. started on 3% o/n for Na 132. remains on aurora and levo for MAP>100. Tachycardic, given 1L NS. Right axillary a-line placed. 3% dc'd. HMV dc'd. Aquacell dressing replaced. Passed TOV.   9/30: POD 5.  Remains on levo gtt. Increased midodrine to 15q8. Given enema. Had BM.  10/1: POD 6. MAP goal increased back to >100 due to worsened sensation loss, on levo gtt. afebrile however per ID Dr De Souza, desiree cx sent. UA+. started empirically on vanc/zosyn. b/l LE doppler and LUE doppler ordered per ID, +LUE superficial thrombophlebitis. BL LE dopplers negative for DVT.   10/2: POD 7, ANNA overnight. Maintaining MAP >100. 500cc albumin bolus, dilaudid lowered to 2/4mg, toradol [id number] added for pain, abd xray for distension shows gas pattern. aquacel dressing changed.  10/3: POD8 ANNA overnight. Remains on levophed to maintain MAP>100 in AM. Neuro exam unchanged. Tmax 100.9F in AM, given tylenol. Flexeril changed to valium 2q8 and lyrica 50q8 started per pain managment.  DC'd vanc, continue zosyn x 7 days per ID. 1u PRBC to maintain hbg >9.0. DANIELE drain removed. Salt tabs weaned to 2q8. MAP goal changed to >85 to help wean off levo gtt.   10/4: POD 9. MAP goal >100. Sodium chloride tabs discontinued.  10/5: POD 10, Given 2 L boluses o/n for tachycardia and negative fluid status. Given 1 mg IV valium in AM for muscle spasm. 1L bolus given in afternoon for high urine output. Preop for OR tomorrow.   10/6: POD 11 Pt endorsing incisional pain, given 1mg IV valium. Neuro exam stable. Plan for second stage today,  POD 0 revision of T3-T9 VCR, revision of C7-L1 posterior fusion, plastics closure. Post-op CT thoracic spine complete. 1 L bolus for soft BP and colapsable IVC on POCUS.   10/7: POD 12 C7-L1 fusion. POD 1 Revision fusion and T3-9 VCR. Pain controlled. Remains on Levo for MAP goal > 100. AXR completed in AM for abdominal distension. Dilaudid PO prn increased to 4 and 6 mg, valium increased to 2mg q6 for pain control. 1u PRBC given for Hb 8.4. Hgb elevated to 10.4. 250 cc albumin given for tachycardia.   10/8: POD 13 Fusion, POD 2 Revision. Pain controlled overnight.   +BM this morning. Right hemovac removed. MAPs liberalized to goal greater than 85. Troponin elevated this morning, now downtrending. EKG WNL.   10/9: POD 13 C7-L1 fusion, POD 3 Revision and T3-9 VCR.   MAP goals liberalized to keep >80. Right DANIELE drain removed. IV valium given x1   10/10: POD 14 C7-L1 fusion, POD 4 revision and T3-9 VCR, liberalized MAP goal to >65. Started PO iron and vit C every other day. HMV removed. Lidocaine patch given for left neck pain. Midodrine weaned to 10q8.   10/11: POD 15 C7-L1 fusion, POD 5 revision and T3-9 VCR. SBP 80s when working with PT, given 1L bolus NS. Increased midodrine 15q8.       Vital Signs Last 24 Hrs  T(C): 37.1 (11 Oct 2023 09:00), Max: 37.3 (10 Oct 2023 18:10)  T(F): 98.8 (11 Oct 2023 09:00), Max: 99.1 (10 Oct 2023 18:10)  HR: 129 (11 Oct 2023 11:00) (87 - 136)  BP: 98/62 (11 Oct 2023 10:35) (98/62 - 98/62)  BP(mean): 74 (11 Oct 2023 10:35) (74 - 74)  RR: 16 (11 Oct 2023 11:00) (16 - 18)  SpO2: 100% (11 Oct 2023 11:00) (93% - 100%)    Parameters below as of 11 Oct 2023 11:00  Patient On (Oxygen Delivery Method): room air        I&O's Detail    10 Oct 2023 07:01  -  11 Oct 2023 07:00  --------------------------------------------------------  IN:    IV PiggyBack: 50 mL    Lactated Ringers: 270 mL    Norepinephrine: 10 mL    Oral Fluid: 800 mL  Total IN: 1130 mL    OUT:    Accordian (mL): 250 mL    Bulb (mL): 40 mL    Voided (mL): 2550 mL  Total OUT: 2840 mL    Total NET: -1710 mL      11 Oct 2023 07:01  -  11 Oct 2023 11:26  --------------------------------------------------------  IN:    Sodium Chloride 0.9% Bolus: 1000 mL  Total IN: 1000 mL    OUT:    Voided (mL): 1100 mL  Total OUT: 1100 mL    Total NET: -100 mL        I&O's Summary    10 Oct 2023 07:01  -  11 Oct 2023 07:00  --------------------------------------------------------  IN: 1130 mL / OUT: 2840 mL / NET: -1710 mL    11 Oct 2023 07:01  -  11 Oct 2023 11:26  --------------------------------------------------------  IN: 1000 mL / OUT: 1100 mL / NET: -100 mL        PHYSICAL EXAM:  Constitutional: NAD, laying in bed  Neurological: AOx3, Following commands, speech clear and fluent. CN II-CNXII: PERRL, EOMI B/L, face symmetric  Motor exam:          Upper extremity                         Delt     Bicep     Tricep    HG                                                 R         5/5        5/5        5/5       5/5                                               L          5/5        5/5        5/5       5/5          Lower extremity              R & L : TF otherwise 0/5      No pronatory drift noted   Sensation: Light touch intact throughout BL UE, decreased sensation distal to level T5  Pulmonary: Clear to Auscultation, no wheezes, symmetric chest rise   Cardiovascular: RRR, S1, S2, no murmurs   Gastrointestinal: Soft, Non-tender, distended, +BS in all 4 quadrants   Extremities: No calf tenderness or edema, distal pulses 2+ x4,  Incision/Wound: Posterior spine wound dressing C/D/I     DEVICE/DRAIN DRESSING:    TUBES/LINES:  [x] cliff (9/25-9/29)  [ ] R R olimpia (9/25- 9/28) LRAL (9-28-9/29) R axillary a-line (9/29 - )   [x] HMX 1 deep (9/25-9/29)  [x] DANIELE x1 super (9/25- 10/3)  [x] R IJ (placed intraop) (9/25-9/28)  [ ] PICC LUE brachial 38cm length, Alli (9/27- )  [ ] DANIELE L (10/6- )  [x] HMV L (10/6-10/10)  [x] HMR R (10/6-10/8)    DIET:  Regular    LABS:                        8.2    7.64  )-----------( 420      ( 11 Oct 2023 04:58 )             25.7     10-11    137  |  99  |  7   ----------------------------<  94  3.9   |  27  |  0.40<L>    Ca    9.4      11 Oct 2023 04:58  Phos  4.1     10-11  Mg     1.7     10-11    TPro  6.4  /  Alb  3.2<L>  /  TBili  0.2  /  DBili  <0.2  /  AST  21  /  ALT  31  /  AlkPhos  80  10-10      Urinalysis Basic - ( 11 Oct 2023 04:58 )    Color: x / Appearance: x / SG: x / pH: x  Gluc: 94 mg/dL / Ketone: x  / Bili: x / Urobili: x   Blood: x / Protein: x / Nitrite: x   Leuk Esterase: x / RBC: x / WBC x   Sq Epi: x / Non Sq Epi: x / Bacteria: x          CAPILLARY BLOOD GLUCOSE          Drug Levels: [] N/A    CSF Analysis: [] N/A      Allergies    No Known Allergies    Intolerances      MEDICATIONS:  Antibiotics:    Neuro:  acetaminophen     Tablet .. 1000 milliGRAM(s) Oral every 8 hours  diazepam    Tablet 5 milliGRAM(s) Oral every 8 hours  HYDROmorphone   Tablet 4 milliGRAM(s) Oral every 4 hours PRN  HYDROmorphone   Tablet 6 milliGRAM(s) Oral every 4 hours PRN  HYDROmorphone  Injectable 0.5 milliGRAM(s) IV Push every 4 hours PRN  melatonin 5 milliGRAM(s) Oral at bedtime PRN  pregabalin 50 milliGRAM(s) Oral every 8 hours    Anticoagulation:  enoxaparin Injectable 40 milliGRAM(s) SubCutaneous every 24 hours    OTHER:  bisacodyl Suppository 10 milliGRAM(s) Rectal daily  chlorhexidine 2% Cloths 1 Application(s) Topical <User Schedule>  influenza   Vaccine 0.5 milliLiter(s) IntraMuscular once  lidocaine   4% Patch 1 Patch Transdermal daily  midodrine 10 milliGRAM(s) Oral every 8 hours  polyethylene glycol 3350 17 Gram(s) Oral two times a day  senna 2 Tablet(s) Oral at bedtime    IVF:  ascorbic acid 500 milliGRAM(s) Oral <User Schedule>  ferrous    sulfate 325 milliGRAM(s) Oral <User Schedule>    CULTURES:  Culture Results:   No growth at 5 days. (10-01 @ 11:25)  Culture Results:   No growth at 5 days. 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 STAGE 1. Plastics closure. POD 14  Now status post STAGE 2 POD 4    M40.205    S31.000A    S31.000A M40.205 [id number]    S31.000A M40.205 S22.009S22.009G    No pertinent family history in first degree relatives    Handoff    No pertinent past medical history    History of acute illness    Deformity of thoracic vertebra    Thoracic myelopathy    Deformity of thoracic vertebra    Thoracic myelopathy    Corpectomy, spine, lumbar, 4 levels    No significant past surgical history    Room Service Assist    Room Service Assist    SysAdmin_VstLnk        PLAN:  NEURO:  - Neuro/spine checks q4/vitals q4hrs  - Pain control: tylenol 1g q8h, dilaudid 4/6 PO, Lyrica 50q8- stage 1 Post-op CT full spine: L1 screw not attached to the vertical yoli, epidural lipomatosis L2-3  - stage 2 post op CT T spine completed  - JPx1 (per plastics) (L HMV, R HMV and R DANIELE dc'd)    Cardio:  - MAP > 65, levo gtt dc'd  - midodrine 15 mg q8h   - echo 9/27: EF 60-65%    Pulm:  - room air  - incentive spirometry    GI:  - Regular diet  - Bowel regimen   - BM 10/10    Renal:  - IVL  - voiding    Endo:  - a1c 5.6, ISS dc'd    Heme:  - SCDs for DVT ppx/ SQL   - 2u PRBC and 1u PLT postop 9/25, 1u PRBC 10/3, 1u PRBC 10/7  - b/l LE dopplers 10/1 negative  - LUE doppler 10/1: +cephalic thrombophlebitis  - iron and vitamin c every other day started 10/10     ID:  - 10/1 panculture, +UA 10/1  - Vanc (10/1-10/3 ) dc'd, Zosyn (10/1- 10/8)  x 7 days   - ID signed off    Dispo: ICU status, full code, PT/OT recs AR  Discussed with Dr. Vieira and Dr. Villeda          Assessment:  Present when checked    []  GCS  E   V  M     Heart Failure: []Acute, [] acute on chronic , []chronic  Heart Failure:  [] Diastolic (HFpEF), [] Systolic (HFrEF), []Combined (HFpEF and HFrEF), [] RHF, [] Pulm HTN, [] Other    [] CELE, [] ATN, [] AIN, [] other  [] CKD1, [] CKD2, [] CKD 3, [] CKD 4, [] CKD 5, []ESRD    Encephalopathy: [] Metabolic, [] Hepatic, [] toxic, [] Neurological, [] Other    Abnormal Nurtitional Status: [] malnurtition (see nutrition note), [ ]underweight: BMI < 19, [] morbid obesity: BMI >40, [] Cachexia    [] Sepsis  [] hypovolemic shock,[] cardiogenic shock, [] hemorrhagic shock, [] neuogenic shock  [] Acute Respiratory Failure  []Cerebral edema, [] Brain compression/ herniation,   [] Functional quadriplegia  [] Acute blood loss anemia   NEUROCRITICAL CARE PROGRESS NOTE    NGUYEN GROVER   MRN-[id number]  Summary:  /  HPI:  Taken from outpatient neurosurgery note on 9/13/2023 " The patient, a long-standing patient of my practice, reports a history of spinal issues that dates back to a childhood injury. Over the years she's experienced developing issues with walking in a straight line, overactive reflexes in her lower extremities, and unique difficulty with her lower extremities referred to as cloneness. Recent upturn in these symptoms has limited her ability to carry out her daily routines. The patient also reports no significant improvement in her condition since our last clinical encounter two years ago."    44 y/o female with no PMHx or PSHx presents for surgery today.  She reports ambulating with walker or baby stroller at home for years.  She reports intermittent falls.  She has right greater than left leg tingling, low back and mid back pain.  She denies arm or leg pain.  She reports urinary incontinence since having a baby 2 years ago where if she doesn't get to the bathroom fast enough, she has incontinence, but she is able to hold it for a short while.  She takes no pain medications.  She has taken ibuprofen in the past.  She denies saddle anesthesia, bowel incontinence.   (25 Sep 2023 06:51)      S/Overnight events:  POD 15 C7-L1 fusion, POD 5 revision and T3-9 VCR. SBP 80s when working with PT, given 1L bolus NS. Increased midodrine 15q8.       Hospital Course:  9/25: POD 0 s/p C7-L1 fusion, T3-8 vertebral column resection, placement of anterior cage.   9/26; POD1 H/H 6.8 postop and PLT 85. Given 2u PRBC and 1u PLT. Switched propofol to precedex gtt. Pt extubated to face mask. Pt noted to only be withdrawing to noxious stimuli on bilateral lower extremities with sensation decreased RLE per patient. Dr. Vieira notified and plan is to keep MAP>100 and obtain CT full spine in the morning. Phenylephrine started to maintain MAP>100. Advanced diet to regular. Passed TOV. Increased need for aurora, UO high.   9/27: POD2 C7-L1 fusion T3-8 vertebral column resection. increased pressor requirements o/n, started on levo additionally. lactate 1.5, BNP 1400 from 800, CK elevated. echo: EF 60-65%. given 250cc bolus albumin, started on midodrine 10q8 to wean off pressors. PICC placed by Alli.   9/28: POD3 s/p C7-L1 fusion, T3-8 vertebral column resection, placement of anterior cage. R radial A-line removed and replaced on the L, trops and EKG done for chest discomfort, WNL, resolved with treatment of overall pain, continues to be febrile, lyrica dc'd to help.   Lyrica restarted. Weaning phenylephrine. Serum Na uptrending, 3% saline discontinued. Pt febrile with uptrending WBC, ID consulted recommend monitoring for now off antibiotics.  9/29: POD4. started on 3% o/n for Na 132. remains on aurora and levo for MAP>100. Tachycardic, given 1L NS. Right axillary a-line placed. 3% dc'd. HMV dc'd. Aquacell dressing replaced. Passed TOV.   9/30: POD 5.  Remains on levo gtt. Increased midodrine to 15q8. Given enema. Had BM.  10/1: POD 6. MAP goal increased back to >100 due to worsened sensation loss, on levo gtt. afebrile however per ID Dr De Souza, desiree cx sent. UA+. started empirically on vanc/zosyn. b/l LE doppler and LUE doppler ordered per ID, +LUE superficial thrombophlebitis. BL LE dopplers negative for DVT.   10/2: POD 7, ANNA overnight. Maintaining MAP >100. 500cc albumin bolus, dilaudid lowered to 2/4mg, toradol [id number] added for pain, abd xray for distension shows gas pattern. aquacel dressing changed.  10/3: POD8 ANNA overnight. Remains on levophed to maintain MAP>100 in AM. Neuro exam unchanged. Tmax 100.9F in AM, given tylenol. Flexeril changed to valium 2q8 and lyrica 50q8 started per pain managment.  DC'd vanc, continue zosyn x 7 days per ID. 1u PRBC to maintain hbg >9.0. DANIELE drain removed. Salt tabs weaned to 2q8. MAP goal changed to >85 to help wean off levo gtt.   10/4: POD 9. MAP goal >100. Sodium chloride tabs discontinued.  10/5: POD 10, Given 2 L boluses o/n for tachycardia and negative fluid status. Given 1 mg IV valium in AM for muscle spasm. 1L bolus given in afternoon for high urine output. Preop for OR tomorrow.   10/6: POD 11 Pt endorsing incisional pain, given 1mg IV valium. Neuro exam stable. Plan for second stage today,  POD 0 revision of T3-T9 VCR, revision of C7-L1 posterior fusion, plastics closure. Post-op CT thoracic spine complete. 1 L bolus for soft BP and colapsable IVC on POCUS.   10/7: POD 12 C7-L1 fusion. POD 1 Revision fusion and T3-9 VCR. Pain controlled. Remains on Levo for MAP goal > 100. AXR completed in AM for abdominal distension. Dilaudid PO prn increased to 4 and 6 mg, valium increased to 2mg q6 for pain control. 1u PRBC given for Hb 8.4. Hgb elevated to 10.4. 250 cc albumin given for tachycardia.   10/8: POD 13 Fusion, POD 2 Revision. Pain controlled overnight.   +BM this morning. Right hemovac removed. MAPs liberalized to goal greater than 85. Troponin elevated this morning, now downtrending. EKG WNL.   10/9: POD 13 C7-L1 fusion, POD 3 Revision and T3-9 VCR.   MAP goals liberalized to keep >80. Right DANIELE drain removed. IV valium given x1   10/10: POD 14 C7-L1 fusion, POD 4 revision and T3-9 VCR, liberalized MAP goal to >65. Started PO iron and vit C every other day. HMV removed. Lidocaine patch given for left neck pain. Midodrine weaned to 10q8.   10/11: POD 15 C7-L1 fusion, POD 5 revision and T3-9 VCR. SBP 80s when working with PT, given 1L bolus NS. Increased midodrine 15q8.     Vital Signs Last 24 Hrs  T(C): 37.1 (11 Oct 2023 09:00), Max: 37.3 (10 Oct 2023 18:10)  T(F): 98.8 (11 Oct 2023 09:00), Max: 99.1 (10 Oct 2023 18:10)  HR: 129 (11 Oct 2023 11:00) (87 - 136)  BP: 98/62 (11 Oct 2023 10:35) (98/62 - 98/62)  BP(mean): 74 (11 Oct 2023 10:35) (74 - 74)  RR: 16 (11 Oct 2023 11:00) (16 - 18)  SpO2: 100% (11 Oct 2023 11:00) (93% - 100%)    Parameters below as of 11 Oct 2023 11:00  Patient On (Oxygen Delivery Method): room air        I&O's Detail    10 Oct 2023 07:01  -  11 Oct 2023 07:00  --------------------------------------------------------  IN:    IV PiggyBack: 50 mL    Lactated Ringers: 270 mL    Norepinephrine: 10 mL    Oral Fluid: 800 mL  Total IN: 1130 mL    OUT:    Accordian (mL): 250 mL    Bulb (mL): 40 mL    Voided (mL): 2550 mL  Total OUT: 2840 mL    Total NET: -1710 mL      11 Oct 2023 07:01  -  11 Oct 2023 11:26  --------------------------------------------------------  IN:    Sodium Chloride 0.9% Bolus: 1000 mL  Total IN: 1000 mL    OUT:    Voided (mL): 1100 mL  Total OUT: 1100 mL    Total NET: -100 mL        I&O's Summary    10 Oct 2023 07:01  -  11 Oct 2023 07:00  --------------------------------------------------------  IN: 1130 mL / OUT: 2840 mL / NET: -1710 mL    11 Oct 2023 07:01  -  11 Oct 2023 11:26  --------------------------------------------------------  IN: 1000 mL / OUT: 1100 mL / NET: -100 mL        PHYSICAL EXAM:  Constitutional: NAD, laying in bed  Neurological: AOx3, Following commands, speech clear and fluent. CN II-CNXII: PERRL, EOMI B/L, face symmetric  Motor exam:          Upper extremity                         Delt     Bicep     Tricep    HG                                                 R         5/5        5/5        5/5       5/5                                               L          5/5        5/5        5/5       5/5          Lower extremity              R & L : TF otherwise 0/5      No pronatory drift noted   Sensation: Light touch intact throughout BL UE, decreased sensation distal to level T5  Pulmonary: Clear to Auscultation, no wheezes, symmetric chest rise   Cardiovascular: RRR, S1, S2, no murmurs   Gastrointestinal: Soft, Non-tender, distended, +BS in all 4 quadrants   Extremities: No calf tenderness or edema, distal pulses 2+ x4,  Incision/Wound: Posterior spine wound dressing C/D/I     DEVICE/DRAIN DRESSING:    TUBES/LINES:  [x] coronado (9/25-9/29)  [ ] R R olimpia (9/25- 9/28) LRAL (9-28-9/29) R axillary a-line (9/29 - )   [x] HMX 1 deep (9/25-9/29)  [x] DANIELE x1 super (9/25- 10/3)  [x] R IJ (placed intraop) (9/25-9/28)  [ ] PICC LUE brachial 38cm length, Alli (9/27- )  [ ] DANIELE L (10/6- )  [x] HMV L (10/6-10/10)  [x] HMR R (10/6-10/8)    DIET:  Regular    LABS:                        8.2    7.64  )-----------( 420      ( 11 Oct 2023 04:58 )             25.7     10-11    137  |  99  |  7   ----------------------------<  94  3.9   |  27  |  0.40<L>    Ca    9.4      11 Oct 2023 04:58  Phos  4.1     10-11  Mg     1.7     10-11    TPro  6.4  /  Alb  3.2<L>  /  TBili  0.2  /  DBili  <0.2  /  AST  21  /  ALT  31  /  AlkPhos  80  10-10      Urinalysis Basic - ( 11 Oct 2023 04:58 )    Color: x / Appearance: x / SG: x / pH: x  Gluc: 94 mg/dL / Ketone: x  / Bili: x / Urobili: x   Blood: x / Protein: x / Nitrite: x   Leuk Esterase: x / RBC: x / WBC x   Sq Epi: x / Non Sq Epi: x / Bacteria: x          CAPILLARY BLOOD GLUCOSE          Drug Levels: [] N/A    CSF Analysis: [] N/A      Allergies    No Known Allergies    Intolerances      MEDICATIONS:  Antibiotics:    Neuro:  acetaminophen     Tablet .. 1000 milliGRAM(s) Oral every 8 hours  diazepam    Tablet 5 milliGRAM(s) Oral every 8 hours  HYDROmorphone   Tablet 4 milliGRAM(s) Oral every 4 hours PRN  HYDROmorphone   Tablet 6 milliGRAM(s) Oral every 4 hours PRN  HYDROmorphone  Injectable 0.5 milliGRAM(s) IV Push every 4 hours PRN  melatonin 5 milliGRAM(s) Oral at bedtime PRN  pregabalin 50 milliGRAM(s) Oral every 8 hours    Anticoagulation:  enoxaparin Injectable 40 milliGRAM(s) SubCutaneous every 24 hours    OTHER:  bisacodyl Suppository 10 milliGRAM(s) Rectal daily  chlorhexidine 2% Cloths 1 Application(s) Topical <User Schedule>  influenza   Vaccine 0.5 milliLiter(s) IntraMuscular once  lidocaine   4% Patch 1 Patch Transdermal daily  midodrine 10 milliGRAM(s) Oral every 8 hours  polyethylene glycol 3350 17 Gram(s) Oral two times a day  senna 2 Tablet(s) Oral at bedtime    IVF:  ascorbic acid 500 milliGRAM(s) Oral <User Schedule>  ferrous    sulfate 325 milliGRAM(s) Oral <User Schedule>    CULTURES:  Culture Results:   No growth at 5 days. (10-01 @ 11:25)  Culture Results:   No growth at 5 days. 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 STAGE 1. Plastics closure. POD 14  Now status post STAGE 2 POD 4    M40.205    S31.000A    S31.000A M40.205 S22.154HH41.009G    S31.000A M40.205 S22.009S22.009G    No pertinent family history in first degree relatives    Handoff    No pertinent past medical history    History of acute illness    Deformity of thoracic vertebra    Thoracic myelopathy    Deformity of thoracic vertebra    Thoracic myelopathy    Corpectomy, spine, lumbar, 4 levels    No significant past surgical history    Room Service Assist    Room Service Assist    [id number]        PLAN:  NEURO:  - Neuro/spine checks q4/vitals q4hrs  - Pain control: tylenol 1g q8h, dilaudid 4/6 PO, Lyrica 50q8- stage 1 Post-op CT full spine: L1 screw not attached to the vertical yoli, epidural lipomatosis L2-3  - stage 2 post op CT T spine completed  - JPx1 (per plastics) (L HMV, R HMV and R DANIELE dc'd)    Cardio:  - MAP > 65, levo gtt dc'd  - midodrine 15 mg q8h   - echo 9/27: EF 60-65%    Pulm:  - room air  - incentive spirometry    GI:  - Regular diet  - Bowel regimen   - BM 10/10    Renal:  - IVL  - voiding    Endo:  - a1c 5.6, ISS dc'd    Heme:  - SCDs for DVT ppx/ SQL   - 2u PRBC and 1u PLT postop 9/25, 1u PRBC 10/3, 1u PRBC 10/7  - b/l LE dopplers 10/1 negative  - LUE doppler 10/1: +cephalic thrombophlebitis  - iron and vitamin c every other day started 10/10     ID:  - 10/1 panculture, +UA 10/1  - Vanc (10/1-10/3 ) dc'd, Zosyn (10/1- 10/8)  x 7 days   - ID signed off    Dispo: ICU status, full code, PT/OT recs AR  Discussed with Dr. Vieira and Dr. Villeda          Assessment:  Present when checked    []  GCS  E   V  M     Heart Failure: []Acute, [] acute on chronic , []chronic  Heart Failure:  [] Diastolic (HFpEF), [] Systolic (HFrEF), []Combined (HFpEF and HFrEF), [] RHF, [] Pulm HTN, [] Other    [] CELE, [] ATN, [] AIN, [] other  [] CKD1, [] CKD2, [] CKD 3, [] CKD 4, [] CKD 5, []ESRD    Encephalopathy: [] Metabolic, [] Hepatic, [] toxic, [] Neurological, [] Other    Abnormal Nurtitional Status: [] malnurtition (see nutrition note), [ ]underweight: BMI < 19, [] morbid obesity: BMI >40, [] Cachexia    [] Sepsis  [] hypovolemic shock,[] cardiogenic shock, [] hemorrhagic shock, [] neuogenic shock  [] Acute Respiratory Failure  []Cerebral edema, [] Brain compression/ herniation,   [] Functional quadriplegia  [] Acute blood loss anemia

## 2023-10-11 NOTE — PROGRESS NOTE ADULT - ASSESSMENT
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 STAGE 1. Plastics closure. POD 14  Now status post STAGE 2 POD 4    NEURO:  Neurochecks Q4  Pain management: Dilaudid 6mg/4mg PO PRN, valium standing, lyrica 50mg daily  Pain management following  Monitor drain output ( DANIELE x1)  Insomnia- melatonin prn  Activity: Aggressive PT/OT      PULMONARY:  Saturating well on room air  Incentive spirometry 10q/hr when awake     CARDIOVASCULAR:  MAP goal >65; on midodrine 10mg qd (from 15mg); wean  No longer on pressors as of 10/10  DC Rosa Maria  Troponins downtrending 62->26    GASTROENTEROLOGY:  Diet: Regular  LBM 10/10. Bowel regimen  KUB w/ bowel gas pattern   Continue simethicone   LFTs wnl    RENAL/:  IVL  Monitor BMPs. Na goal 135-145    ENDOCRINE:  Monitor fingersticks: Goal -[id number]    HEME/ONC: Anemia  DVT ppx: Lovenox SQ. B/L SCDs  LE doppler negative   Reticulocyte index <2 (1.78). Continue ferrous sulfate with vitamin C    INFECTIOUS:   S/P empiric abx for low grade fevers; blood culture negative, RVP negative, UA (+) likely asymptomatic bacteruria.   S/P zosyn 4.5g Q8 ( for 7 days; ended on 10/8)   Incision site C/D/I    MISC:    SOCIAL/FAMILY:  [x] awaiting [] updated at bedside [] family meeting    CODE STATUS:  [x] Full Code [] DNR [] DNI [] Palliative/Comfort Care    DISPOSITION:  [] ICU [] Stroke Unit [x] Floor [] EMU [] RCU [] PCU      ICU stepdown Checklist:    Completed: 10-11 @ 10:22    [X] hemodynamically stable – VS WNL and stable x 24hours, UO adequate  [n/a ] if  previously on HDA - off pressors x 24h with stable neuro exam    [X] no new symptoms x 24h (i.e. new fever, new-onset nausea/vomiting)  [X] stable labs: (i.e. WBC not rising, sodium not dropping)  [X] patient not at high risk for aspiration, if high risk then:                  [ ] should have definitive plans for trach/PEG (alternative option is to discharge from ICU to facilty)                  [ ] stepdown to bed close to nurse’s station  [n/a] low suctioning requirements (i.e. q4h or less)  [X] sign-off from primary RN*  [X] drains do not require ICU level of care  [X] if patient previously agitated or with behavioral issues – controlled   [X] pain controlled

## 2023-10-11 NOTE — PROGRESS NOTE ADULT - ASSESSMENT
43F s/p traumatic spine injury w/o neurological deficit & severe thoracic kyphosis now s/p T3-8 VCR, C7-L1 fusion, correction of deformity, placement of anterior cage.  STAGE 1. plastics closure. now s/p STAGE 2       -NCq4, VS q1  -pain management w/ Dilaudid 4/6mg q4 PRN, breakthrough 0.5mg IV PRN q4,  lyrica, valium   -MAP goal >65; off levophed w/ down trending cardiac enzymes ; c/w midodrine wean as tolerated   -started on iron supplementation ; monitor for constipation   -DVT ppx w/ lovenox SQ   -regular diet; no indication for PPI at this time     refer to AM progress note for detailed plan  43F s/p traumatic spine injury w/o neurological deficit & severe thoracic kyphosis now s/p T3-8 VCR, C7-L1 fusion, correction of deformity, placement of anterior cage.  STAGE 1. plastics closure. now s/p STAGE 2       -NCq4, VS q1  -pain management w/ Dilaudid 4/6mg q4 PRN, breakthrough 0.5mg IV PRN q4,  lyrica, valium   -MAP goal >65; phenylephrine wean off c/w midodrine wean as tolerated ; hypotension likely vasoplegia 2/2 spinal shock  -CTA negative for PE; mild b/l effusion provide 10mg IV lasix x1  -started on iron supplementation ; monitor for constipation   -DVT ppx w/ lovenox SQ   -regular diet; no indication for PPI at this time     refer to AM progress note for detailed plan

## 2023-10-11 NOTE — PROGRESS NOTE ADULT - SUBJECTIVE AND OBJECTIVE BOX
=========================  NSICU ATTENDING PROGRESS NOTE  =========================    42 y/o female with no PMHx or PSHx presents for surgery today.  She reports ambulating with walker or baby stroller at home for years.  She reports intermittent falls.  She has right greater than left leg tingling, low back and mid back pain.  She denies arm or leg pain.  She reports urinary incontinence since having a baby 2 years ago where if she doesn't get to the bathroom fast enough, she has incontinence, but she is able to hold it for a short while.  She takes no pain medications.  She has taken ibuprofen in the past.  She denies saddle anesthesia, bowel incontinence.   (25 Sep 2023 06:51)      REVIEW OF SYSTEMS: [ ] Unable to Assess due to neurologic exam   [ ] All ROS addressed below are non-contributory, except:  Neuro: [ ] Headache [ ] Back pain [ ] Numbness [ ] Weakness [ ] Ataxia [ ] Dizziness [ ] Aphasia [ ] Dysarthria [ ] Visual disturbance  Resp: [ ] Shortness of breath/dyspnea, [ ] Orthopnea [ ] Cough  CV: [ ] Chest pain [ ] Palpitation [ ] Lightheadedness [ ] Syncope  Renal: [ ] Thirst [ ] Edema  GI: [ ] Nausea [ ] Emesis [ ] Abdominal pain [ ] Constipation [ ] Diarrhea  Hem: [ ] Hematemesis [ ] bright red blood per rectum  ID: [ ] Fever [ ] Chills [ ] Dysuria  ENT: [ ] Rhinorrhea      ICU Vital Signs Last 24 Hrs  T(C): 37.1 (11 Oct 2023 09:00), Max: 37.3 (10 Oct 2023 18:10)  T(F): 98.8 (11 Oct 2023 09:00), Max: 99.1 (10 Oct 2023 18:10)  HR: 118 (11 Oct 2023 09:00) (87 - 136)  ABP: 104/63 (11 Oct 2023 09:00) (83/72 - 134/74)  ABP(mean): 81 (11 Oct 2023 09:00) (68 - 100)  RR: 16 (11 Oct 2023 09:00) (16 - 18)  SpO2: 100% (11 Oct 2023 09:00) (93% - 100%)      10-10-23 @ 07:01  -  10-11-23 @ 07:00  --------------------------------------------------------  IN: 1130 mL / OUT: 2840 mL / NET: -1710 mL    10-11-23 @ 07:01  -  10-11-23 @ 10:15  --------------------------------------------------------  IN: 0 mL / OUT: 400 mL / NET: -400 mL      PHYSICAL EXAM:  Constitutional: No Acute Distress     Neurological: AOx3, Following Commands, Moving all Extremities     Motor exam:          Upper extremity                         Delt     Bicep     Tricep    HG                                                 R         5/5        5/5        5/5       5/5                                               L          5/5        5/5        5/5       5/5          Lower extremity              R & L : TF otherwise 0/5        Sensation: [] intact to light touch  [x] decreased: sensory level at T5  Pulmonary: Clear to Auscultation, No rales, No rhonchi, No wheezes   Cardiovascular: S1, S2, Regular rate and rhythm   Gastrointestinal: Soft, Non-tender, distended, +BS  Extremities: No calf tenderness   Incision: CDI; drain in place       MEDICATIONS:   acetaminophen     Tablet .. 1000 milliGRAM(s) Oral every 8 hours  ascorbic acid 500 milliGRAM(s) Oral <User Schedule>  bisacodyl Suppository 10 milliGRAM(s) Rectal daily  chlorhexidine 2% Cloths 1 Application(s) Topical <User Schedule>  diazepam    Tablet 5 milliGRAM(s) Oral every 8 hours  enoxaparin Injectable 40 milliGRAM(s) SubCutaneous every 24 hours  ferrous    sulfate 325 milliGRAM(s) Oral <User Schedule>  HYDROmorphone   Tablet 6 milliGRAM(s) Oral every 4 hours PRN  HYDROmorphone   Tablet 4 milliGRAM(s) Oral every 4 hours PRN  HYDROmorphone  Injectable 0.5 milliGRAM(s) IV Push every 4 hours PRN  influenza   Vaccine 0.5 milliLiter(s) IntraMuscular once  lidocaine   4% Patch 1 Patch Transdermal daily  melatonin 5 milliGRAM(s) Oral at bedtime PRN  midodrine 10 milliGRAM(s) Oral every 8 hours  polyethylene glycol 3350 17 Gram(s) Oral two times a day  pregabalin 50 milliGRAM(s) Oral every 8 hours  senna 2 Tablet(s) Oral at bedtime  sodium chloride 0.9% lock flush 10 milliLiter(s) IV Push every 1 hour PRN      LABS:                       8.2    7.64  )-----------( 420      ( 11 Oct 2023 04:58 )             25.7     10-11    137  |  99  |  7   ----------------------------<  94  3.9   |  27  |  0.40<L>    Ca    9.4      11 Oct 2023 04:58  Phos  4.1     10-11  Mg     1.7     10-11    TPro  6.4  /  Alb  3.2<L>  /  TBili  0.2  /  DBili  <0.2  /  AST  21  /  ALT  31  /  AlkPhos  80  10-10    LIVER FUNCTIONS - ( 10 Oct 2023 05:30 )  Alb: 3.2 g/dL / Pro: 6.4 g/dL / ALK PHOS: 80 U/L / ALT: 31 U/L / AST: 21 U/L / GGT: x

## 2023-10-11 NOTE — CONSULT NOTE ADULT - SUBJECTIVE AND OBJECTIVE BOX
HPI:  Taken from outpatient neurosurgery note on 9/13/2023 " The patient, a long-standing patient of my practice, reports a history of spinal issues that dates back to a childhood injury. Over the years she's experienced developing issues with walking in a straight line, overactive reflexes in her lower extremities, and unique difficulty with her lower extremities referred to as cloneness. Recent upturn in these symptoms has limited her ability to carry out her daily routines. The patient also reports no significant improvement in her condition since our last clinical encounter two years ago."    44 y/o female with no PMHx or PSHx presents for surgery today.  She reports ambulating with walker or baby stroller at home for years.  She reports intermittent falls.  She has right greater than left leg tingling, low back and mid back pain.  She denies arm or leg pain.  She reports urinary incontinence since having a baby 2 years ago where if she doesn't get to the bathroom fast enough, she has incontinence, but she is able to hold it for a short while.  She takes no pain medications.  She has taken ibuprofen in the past.  She denies saddle anesthesia, bowel incontinence.   (25 Sep 2023 06:51)    Patient has primarily incisional pain.   She continues to take medication every 2 hours, which brings her pain scale down to managable levels but relief recedes quickly.    She received 4x hydrmorphone 4mg and 4x hydromorphone 6 mg in the past 24h.      PAST MEDICAL & SURGICAL HISTORY:  History of acute illness  childhood      No significant past surgical history          FAMILY HISTORY:  No pertinent family history in first degree relatives      PAIN MEDICATIONS:  acetaminophen     Tablet .. 1000 milliGRAM(s) Oral every 8 hours  diazepam    Tablet 5 milliGRAM(s) Oral every 8 hours  HYDROmorphone   Tablet 6 milliGRAM(s) Oral every 4 hours PRN  HYDROmorphone   Tablet 4 milliGRAM(s) Oral every 4 hours PRN  melatonin 5 milliGRAM(s) Oral at bedtime PRN  ondansetron   Disintegrating Tablet 4 milliGRAM(s) Oral every 6 hours  pregabalin 50 milliGRAM(s) Oral every 8 hours    Heme:  enoxaparin Injectable 40 milliGRAM(s) SubCutaneous every 24 hours    Antibiotics:    Cardiovascular:  midodrine 15 milliGRAM(s) Oral every 8 hours  norepinephrine Infusion 0.05 MICROgram(s)/kG/Min IV Continuous <Continuous>    GI:  bisacodyl Suppository 10 milliGRAM(s) Rectal daily  polyethylene glycol 3350 17 Gram(s) Oral two times a day  senna 2 Tablet(s) Oral at bedtime  simethicone 80 milliGRAM(s) Chew every 6 hours    Endocrine:    All Other Medications:  ascorbic acid 500 milliGRAM(s) Oral <User Schedule>  chlorhexidine 2% Cloths 1 Application(s) Topical <User Schedule>  ferrous    sulfate 325 milliGRAM(s) Oral <User Schedule>  influenza   Vaccine 0.5 milliLiter(s) IntraMuscular once  lactated ringers. 1000 milliLiter(s) IV Continuous <Continuous>  sodium chloride 0.9% lock flush 10 milliLiter(s) IV Push every 1 hour PRN      Vital Signs Last 24 Hrs  T(C): 36.5 (10 Oct 2023 09:10), Max: 37.7 (09 Oct 2023 17:34)  T(F): 97.7 (10 Oct 2023 09:10), Max: 99.9 (09 Oct 2023 17:34)  HR: 116 (10 Oct 2023 09:00) (92 - 129)  BP: 115/69 (09 Oct 2023 21:00) (115/69 - 115/69)  BP(mean): 83 (09 Oct 2023 21:00) (83 - 83)  RR: 16 (10 Oct 2023 09:00) (15 - 20)  SpO2: 98% (10 Oct 2023 09:00) (85% - 100%)    Parameters below as of 10 Oct 2023 09:00  Patient On (Oxygen Delivery Method): room air        LABS:                        8.5    11.93 )-----------( 419      ( 10 Oct 2023 05:30 )             26.7     10-10    137  |  98  |  5<L>  ----------------------------<  121<H>  4.0   |  30  |  0.41<L>    Ca    9.1      10 Oct 2023 05:30  Phos  4.0     10-10  Mg     1.7     10-10        Urinalysis Basic - ( 10 Oct 2023 05:30 )    Color: x / Appearance: x / SG: x / pH: x  Gluc: 121 mg/dL / Ketone: x  / Bili: x / Urobili: x   Blood: x / Protein: x / Nitrite: x   Leuk Esterase: x / RBC: x / WBC x   Sq Epi: x / Non Sq Epi: x / Bacteria: x

## 2023-10-11 NOTE — PROGRESS NOTE ADULT - SUBJECTIVE AND OBJECTIVE BOX
INTERVAL HISTORY: HPI:  Taken from outpatient neurosurgery note on 9/13/2023 " The patient, a long-standing patient of my practice, reports a history of spinal issues that dates back to a childhood injury. Over the years she's experienced developing issues with walking in a straight line, overactive reflexes in her lower extremities, and unique difficulty with her lower extremities referred to as cloneness. Recent upturn in these symptoms has limited her ability to carry out her daily routines. The patient also reports no significant improvement in her condition since our last clinical encounter two years ago."    44 y/o female with no PMHx or PSHx presents for surgery today.  She reports ambulating with walker or baby stroller at home for years.  She reports intermittent falls.  She has right greater than left leg tingling, low back and mid back pain.  She denies arm or leg pain.  She reports urinary incontinence since having a baby 2 years ago where if she doesn't get to the bathroom fast enough, she has incontinence, but she is able to hold it for a short while.  She takes no pain medications.  She has taken ibuprofen in the past.  She denies saddle anesthesia, bowel incontinence.   (25 Sep 2023 06:51)    Drug Dosing Weight  Height (cm): 152.4 (06 Oct 2023 07:34)  Weight (kg): 68.6 (06 Oct 2023 07:34)  BMI (kg/m2): 29.5 (06 Oct 2023 07:34)  BSA (m2): 1.66 (06 Oct 2023 07:34)    PAST MEDICAL & SURGICAL HISTORY:  History of acute illness  childhood  No significant past surgical history    REVIEW OF SYSTEMS: [ ] Unable to Assess due to neurologic exam   [ ] All ROS addressed below are non-contributory, except:  Neuro: [ ] Headache [ ] Back pain [ ] Numbness [ ] Weakness [ ] Ataxia [ ] Dizziness [ ] Aphasia [ ] Dysarthria [ ] Visual disturbance  Resp: [ ] Shortness of breath/dyspnea, [ ] Orthopnea [ ] Cough  CV: [ ] Chest pain [ ] Palpitation [ ] Lightheadedness [ ] Syncope  Renal: [ ] Thirst [ ] Edema  GI: [ ] Nausea [ ] Emesis [ ] Abdominal pain [ ] Constipation [ ] Diarrhea  Hem: [ ] Hematemesis [ ] bright red blood per rectum  ID: [ ] Fever [ ] Chills [ ] Dysuria  ENT: [ ] Rhinorrhea    PHYSICAL EXAM:    General: No Acute Distress   Neurological: aox3, follows command, 5/5 b/l UE, RLE trace TF to nox, LLE 0/5, no proprioception  Pulmonary: Clear to Auscultation, No Rales, No Rhonchi, No Wheezes   Cardiovascular: S1, S2, Regular Rate and Rhythm   Gastrointestinal: Soft, Nontender, Nondistended   Extremities: No calf tenderness   Incision: CDI     ICU Vital Signs Last 24 Hrs  T(C): 36.6 (11 Oct 2023 18:17), Max: 37.2 (10 Oct 2023 22:06)  T(F): 97.8 (11 Oct 2023 18:17), Max: 99 (10 Oct 2023 22:06)  HR: 118 (11 Oct 2023 18:00) (87 - 138)  BP: 97/51 (11 Oct 2023 17:00) (97/51 - 98/62)  BP(mean): 70 (11 Oct 2023 17:00) (70 - 74)  ABP: 103/57 (11 Oct 2023 18:00) (85/43 - 123/70)  ABP(mean): 78 (11 Oct 2023 18:00) (60 - 97)  RR: 18 (11 Oct 2023 18:00) (16 - 18)  SpO2: 97% (11 Oct 2023 18:00) (93% - 100%)      10-10-23 @ 07:01  -  10-11-23 @ 07:00  --------------------------------------------------------  IN: 1130 mL / OUT: 2840 mL / NET: -1710 mL    10-11-23 @ 07:01  -  10-11-23 @ 18:29  --------------------------------------------------------  IN: 2250 mL / OUT: 1800 mL / NET: 450 mL    acetaminophen     Tablet .. 1000 milliGRAM(s) Oral every 8 hours  ascorbic acid 500 milliGRAM(s) Oral <User Schedule>  bisacodyl Suppository 10 milliGRAM(s) Rectal daily  chlorhexidine 2% Cloths 1 Application(s) Topical <User Schedule>  diazepam    Tablet 5 milliGRAM(s) Oral every 8 hours  enoxaparin Injectable 40 milliGRAM(s) SubCutaneous every 24 hours  ferrous    sulfate 325 milliGRAM(s) Oral <User Schedule>  HYDROmorphone   Tablet 6 milliGRAM(s) Oral every 4 hours PRN  HYDROmorphone   Tablet 4 milliGRAM(s) Oral every 4 hours PRN  HYDROmorphone  Injectable 0.5 milliGRAM(s) IV Push every 4 hours PRN  influenza   Vaccine 0.5 milliLiter(s) IntraMuscular once  lidocaine   4% Patch 1 Patch Transdermal daily  melatonin 5 milliGRAM(s) Oral at bedtime PRN  midodrine 15 milliGRAM(s) Oral every 8 hours  phenylephrine    Infusion 0.1 MICROgram(s)/kG/Min (2.57 mL/Hr) IV Continuous <Continuous>  polyethylene glycol 3350 17 Gram(s) Oral two times a day  pregabalin 50 milliGRAM(s) Oral every 8 hours  senna 2 Tablet(s) Oral at bedtime  sodium chloride 0.9% Bolus 500 milliLiter(s) IV Bolus once  sodium chloride 0.9% lock flush 10 milliLiter(s) IV Push every 1 hour PRN      LABS:  Na: 139 (10-11 @ 17:34), 137 (10-11 @ 04:58), 137 (10-10 @ 05:30), 139 (10-09 @ 05:10)  K: 3.9 (10-11 @ 17:34), 3.9 (10-11 @ 04:58), 4.0 (10-10 @ 05:30), 3.6 (10-09 @ 05:10)  Cl: 104 (10-11 @ 17:34), 99 (10-11 @ 04:58), 98 (10-10 @ 05:30), 100 (10-09 @ 05:10)  CO2: 28 (10-11 @ 17:34), 27 (10-11 @ 04:58), 30 (10-10 @ 05:30), 31 (10-09 @ 05:10)  BUN: 9 (10-11 @ 17:34), 7 (10-11 @ 04:58), 5 (10-10 @ 05:30), 6 (10-09 @ 05:10)  Cr: 0.43 (10-11 @ 17:34), 0.40 (10-11 @ 04:58), 0.41 (10-10 @ 05:30), 0.40 (10-09 @ 05:10)  Glu: 107(10-11 @ 17:34), 94(10-11 @ 04:58), 121(10-10 @ 05:30), 147(10-09 @ 05:10)    Hgb: 8.3 (10-11 @ 17:34), 8.2 (10-11 @ 04:58), 8.5 (10-10 @ 05:30), 8.9 (10-09 @ 05:10)  Hct: 26.1 (10-11 @ 17:34), 25.7 (10-11 @ 04:58), 26.7 (10-10 @ 05:30), 26.8 (10-09 @ 05:10)  WBC: 9.80 (10-11 @ 17:34), 7.64 (10-11 @ 04:58), 11.93 (10-10 @ 05:30), 11.81 (10-09 @ 05:10)  Plt: 426 (10-11 @ 17:34), 420 (10-11 @ 04:58), 419 (10-10 @ 05:30), 410 (10-09 @ 05:10)    LIVER FUNCTIONS - ( 10 Oct 2023 05:30 )  Alb: 3.2 g/dL / Pro: 6.4 g/dL / ALK PHOS: 80 U/L / ALT: 31 U/L / AST: 21 U/L / GGT: x

## 2023-10-12 LAB
ANION GAP SERPL CALC-SCNC: 8 MMOL/L — SIGNIFICANT CHANGE UP (ref 5–17)
ANION GAP SERPL CALC-SCNC: 9 MMOL/L — SIGNIFICANT CHANGE UP (ref 5–17)
BUN SERPL-MCNC: 6 MG/DL — LOW (ref 7–23)
BUN SERPL-MCNC: 8 MG/DL — SIGNIFICANT CHANGE UP (ref 7–23)
CALCIUM SERPL-MCNC: 9.2 MG/DL — SIGNIFICANT CHANGE UP (ref 8.4–10.5)
CALCIUM SERPL-MCNC: 9.6 MG/DL — SIGNIFICANT CHANGE UP (ref 8.4–10.5)
CHLORIDE SERPL-SCNC: 101 MMOL/L — SIGNIFICANT CHANGE UP (ref 96–108)
CHLORIDE SERPL-SCNC: 98 MMOL/L — SIGNIFICANT CHANGE UP (ref 96–108)
CO2 SERPL-SCNC: 29 MMOL/L — SIGNIFICANT CHANGE UP (ref 22–31)
CO2 SERPL-SCNC: 29 MMOL/L — SIGNIFICANT CHANGE UP (ref 22–31)
CREAT SERPL-MCNC: 0.37 MG/DL — LOW (ref 0.5–1.3)
CREAT SERPL-MCNC: 0.38 MG/DL — LOW (ref 0.5–1.3)
EGFR: 129 ML/MIN/1.73M2 — SIGNIFICANT CHANGE UP
EGFR: 130 ML/MIN/1.73M2 — SIGNIFICANT CHANGE UP
GLUCOSE SERPL-MCNC: 116 MG/DL — HIGH (ref 70–99)
GLUCOSE SERPL-MCNC: 132 MG/DL — HIGH (ref 70–99)
HCT VFR BLD CALC: 27.4 % — LOW (ref 34.5–45)
HGB BLD-MCNC: 8.9 G/DL — LOW (ref 11.5–15.5)
MAGNESIUM SERPL-MCNC: 1.8 MG/DL — SIGNIFICANT CHANGE UP (ref 1.6–2.6)
MCHC RBC-ENTMCNC: 27.8 PG — SIGNIFICANT CHANGE UP (ref 27–34)
MCHC RBC-ENTMCNC: 32.5 GM/DL — SIGNIFICANT CHANGE UP (ref 32–36)
MCV RBC AUTO: 85.6 FL — SIGNIFICANT CHANGE UP (ref 80–100)
NRBC # BLD: 0 /100 WBCS — SIGNIFICANT CHANGE UP (ref 0–0)
NT-PROBNP SERPL-SCNC: 133 PG/ML — SIGNIFICANT CHANGE UP (ref 0–300)
PHOSPHATE SERPL-MCNC: 3.4 MG/DL — SIGNIFICANT CHANGE UP (ref 2.5–4.5)
PLATELET # BLD AUTO: 461 K/UL — HIGH (ref 150–400)
POTASSIUM SERPL-MCNC: 3.7 MMOL/L — SIGNIFICANT CHANGE UP (ref 3.5–5.3)
POTASSIUM SERPL-MCNC: 3.9 MMOL/L — SIGNIFICANT CHANGE UP (ref 3.5–5.3)
POTASSIUM SERPL-SCNC: 3.7 MMOL/L — SIGNIFICANT CHANGE UP (ref 3.5–5.3)
POTASSIUM SERPL-SCNC: 3.9 MMOL/L — SIGNIFICANT CHANGE UP (ref 3.5–5.3)
RBC # BLD: 3.2 M/UL — LOW (ref 3.8–5.2)
RBC # FLD: 14.1 % — SIGNIFICANT CHANGE UP (ref 10.3–14.5)
SODIUM SERPL-SCNC: 135 MMOL/L — SIGNIFICANT CHANGE UP (ref 135–145)
SODIUM SERPL-SCNC: 139 MMOL/L — SIGNIFICANT CHANGE UP (ref 135–145)
TROPONIN T, HIGH SENSITIVITY RESULT: 27 NG/L — SIGNIFICANT CHANGE UP (ref 0–51)
WBC # BLD: 10.26 K/UL — SIGNIFICANT CHANGE UP (ref 3.8–10.5)
WBC # FLD AUTO: 10.26 K/UL — SIGNIFICANT CHANGE UP (ref 3.8–10.5)

## 2023-10-12 PROCEDURE — 99024 POSTOP FOLLOW-UP VISIT: CPT

## 2023-10-12 PROCEDURE — 99291 CRITICAL CARE FIRST HOUR: CPT

## 2023-10-12 PROCEDURE — 71045 X-RAY EXAM CHEST 1 VIEW: CPT | Mod: 26

## 2023-10-12 PROCEDURE — 93306 TTE W/DOPPLER COMPLETE: CPT | Mod: 26

## 2023-10-12 RX ORDER — POTASSIUM CHLORIDE 20 MEQ
40 PACKET (EA) ORAL ONCE
Refills: 0 | Status: COMPLETED | OUTPATIENT
Start: 2023-10-12 | End: 2023-10-12

## 2023-10-12 RX ORDER — ALBUMIN HUMAN 25 %
250 VIAL (ML) INTRAVENOUS ONCE
Refills: 0 | Status: COMPLETED | OUTPATIENT
Start: 2023-10-12 | End: 2023-10-12

## 2023-10-12 RX ORDER — FUROSEMIDE 40 MG
20 TABLET ORAL ONCE
Refills: 0 | Status: COMPLETED | OUTPATIENT
Start: 2023-10-12 | End: 2023-10-12

## 2023-10-12 RX ORDER — SIMETHICONE 80 MG/1
80 TABLET, CHEWABLE ORAL EVERY 6 HOURS
Refills: 0 | Status: DISCONTINUED | OUTPATIENT
Start: 2023-10-12 | End: 2023-10-12

## 2023-10-12 RX ORDER — MAGNESIUM SULFATE 500 MG/ML
2 VIAL (ML) INJECTION ONCE
Refills: 0 | Status: COMPLETED | OUTPATIENT
Start: 2023-10-12 | End: 2023-10-12

## 2023-10-12 RX ORDER — POTASSIUM CHLORIDE 20 MEQ
10 PACKET (EA) ORAL ONCE
Refills: 0 | Status: COMPLETED | OUTPATIENT
Start: 2023-10-12 | End: 2023-10-12

## 2023-10-12 RX ADMIN — Medication 5 MILLIGRAM(S): at 15:28

## 2023-10-12 RX ADMIN — HYDROMORPHONE HYDROCHLORIDE 6 MILLIGRAM(S): 2 INJECTION INTRAMUSCULAR; INTRAVENOUS; SUBCUTANEOUS at 10:54

## 2023-10-12 RX ADMIN — Medication 40 MILLIEQUIVALENT(S): at 17:31

## 2023-10-12 RX ADMIN — MIDODRINE HYDROCHLORIDE 15 MILLIGRAM(S): 2.5 TABLET ORAL at 05:25

## 2023-10-12 RX ADMIN — LIDOCAINE 1 PATCH: 4 CREAM TOPICAL at 23:15

## 2023-10-12 RX ADMIN — SIMETHICONE 80 MILLIGRAM(S): 80 TABLET, CHEWABLE ORAL at 12:19

## 2023-10-12 RX ADMIN — Medication 5 MILLIGRAM(S): at 21:28

## 2023-10-12 RX ADMIN — Medication 75 MILLIGRAM(S): at 21:28

## 2023-10-12 RX ADMIN — HYDROMORPHONE HYDROCHLORIDE 0.5 MILLIGRAM(S): 2 INJECTION INTRAMUSCULAR; INTRAVENOUS; SUBCUTANEOUS at 19:17

## 2023-10-12 RX ADMIN — Medication 20 MILLIGRAM(S): at 18:06

## 2023-10-12 RX ADMIN — HYDROMORPHONE HYDROCHLORIDE 0.5 MILLIGRAM(S): 2 INJECTION INTRAMUSCULAR; INTRAVENOUS; SUBCUTANEOUS at 15:00

## 2023-10-12 RX ADMIN — HYDROMORPHONE HYDROCHLORIDE 0.5 MILLIGRAM(S): 2 INJECTION INTRAMUSCULAR; INTRAVENOUS; SUBCUTANEOUS at 14:29

## 2023-10-12 RX ADMIN — SIMETHICONE 80 MILLIGRAM(S): 80 TABLET, CHEWABLE ORAL at 17:31

## 2023-10-12 RX ADMIN — Medication 25 GRAM(S): at 07:02

## 2023-10-12 RX ADMIN — HYDROMORPHONE HYDROCHLORIDE 6 MILLIGRAM(S): 2 INJECTION INTRAMUSCULAR; INTRAVENOUS; SUBCUTANEOUS at 05:26

## 2023-10-12 RX ADMIN — HYDROMORPHONE HYDROCHLORIDE 6 MILLIGRAM(S): 2 INJECTION INTRAMUSCULAR; INTRAVENOUS; SUBCUTANEOUS at 11:30

## 2023-10-12 RX ADMIN — HYDROMORPHONE HYDROCHLORIDE 6 MILLIGRAM(S): 2 INJECTION INTRAMUSCULAR; INTRAVENOUS; SUBCUTANEOUS at 06:14

## 2023-10-12 RX ADMIN — HYDROMORPHONE HYDROCHLORIDE 4 MILLIGRAM(S): 2 INJECTION INTRAMUSCULAR; INTRAVENOUS; SUBCUTANEOUS at 07:45

## 2023-10-12 RX ADMIN — ENOXAPARIN SODIUM 40 MILLIGRAM(S): 100 INJECTION SUBCUTANEOUS at 21:27

## 2023-10-12 RX ADMIN — MIDODRINE HYDROCHLORIDE 15 MILLIGRAM(S): 2.5 TABLET ORAL at 21:28

## 2023-10-12 RX ADMIN — HYDROMORPHONE HYDROCHLORIDE 0.5 MILLIGRAM(S): 2 INJECTION INTRAMUSCULAR; INTRAVENOUS; SUBCUTANEOUS at 09:15

## 2023-10-12 RX ADMIN — Medication 20 MILLIGRAM(S): at 07:02

## 2023-10-12 RX ADMIN — CHLORHEXIDINE GLUCONATE 1 APPLICATION(S): 213 SOLUTION TOPICAL at 05:27

## 2023-10-12 RX ADMIN — Medication 50 MILLIGRAM(S): at 05:25

## 2023-10-12 RX ADMIN — HYDROMORPHONE HYDROCHLORIDE 4 MILLIGRAM(S): 2 INJECTION INTRAMUSCULAR; INTRAVENOUS; SUBCUTANEOUS at 12:19

## 2023-10-12 RX ADMIN — Medication 10 MILLIEQUIVALENT(S): at 10:53

## 2023-10-12 RX ADMIN — Medication 50 MILLIGRAM(S): at 15:28

## 2023-10-12 RX ADMIN — HYDROMORPHONE HYDROCHLORIDE 6 MILLIGRAM(S): 2 INJECTION INTRAMUSCULAR; INTRAVENOUS; SUBCUTANEOUS at 22:50

## 2023-10-12 RX ADMIN — HYDROMORPHONE HYDROCHLORIDE 0.5 MILLIGRAM(S): 2 INJECTION INTRAMUSCULAR; INTRAVENOUS; SUBCUTANEOUS at 10:00

## 2023-10-12 RX ADMIN — Medication 5 MILLIGRAM(S): at 05:24

## 2023-10-12 RX ADMIN — HYDROMORPHONE HYDROCHLORIDE 4 MILLIGRAM(S): 2 INJECTION INTRAMUSCULAR; INTRAVENOUS; SUBCUTANEOUS at 07:02

## 2023-10-12 RX ADMIN — HYDROMORPHONE HYDROCHLORIDE 4 MILLIGRAM(S): 2 INJECTION INTRAMUSCULAR; INTRAVENOUS; SUBCUTANEOUS at 13:00

## 2023-10-12 RX ADMIN — LIDOCAINE 1 PATCH: 4 CREAM TOPICAL at 12:19

## 2023-10-12 RX ADMIN — HYDROMORPHONE HYDROCHLORIDE 6 MILLIGRAM(S): 2 INJECTION INTRAMUSCULAR; INTRAVENOUS; SUBCUTANEOUS at 17:31

## 2023-10-12 RX ADMIN — Medication 125 MILLILITER(S): at 23:00

## 2023-10-12 RX ADMIN — LIDOCAINE 1 PATCH: 4 CREAM TOPICAL at 21:42

## 2023-10-12 RX ADMIN — SENNA PLUS 2 TABLET(S): 8.6 TABLET ORAL at 21:28

## 2023-10-12 RX ADMIN — SIMETHICONE 80 MILLIGRAM(S): 80 TABLET, CHEWABLE ORAL at 23:02

## 2023-10-12 RX ADMIN — POLYETHYLENE GLYCOL 3350 17 GRAM(S): 17 POWDER, FOR SOLUTION ORAL at 05:27

## 2023-10-12 RX ADMIN — HYDROMORPHONE HYDROCHLORIDE 6 MILLIGRAM(S): 2 INJECTION INTRAMUSCULAR; INTRAVENOUS; SUBCUTANEOUS at 18:00

## 2023-10-12 RX ADMIN — HYDROMORPHONE HYDROCHLORIDE 6 MILLIGRAM(S): 2 INJECTION INTRAMUSCULAR; INTRAVENOUS; SUBCUTANEOUS at 21:28

## 2023-10-12 NOTE — PROGRESS NOTE ADULT - SUBJECTIVE AND OBJECTIVE BOX
INTERVAL HISTORY: HPI:  Taken from outpatient neurosurgery note on 9/13/2023 " The patient, a long-standing patient of my practice, reports a history of spinal issues that dates back to a childhood injury. Over the years she's experienced developing issues with walking in a straight line, overactive reflexes in her lower extremities, and unique difficulty with her lower extremities referred to as cloneness. Recent upturn in these symptoms has limited her ability to carry out her daily routines. The patient also reports no significant improvement in her condition since our last clinical encounter two years ago."    44 y/o female with no PMHx or PSHx presents for surgery today.  She reports ambulating with walker or baby stroller at home for years.  She reports intermittent falls.  She has right greater than left leg tingling, low back and mid back pain.  She denies arm or leg pain.  She reports urinary incontinence since having a baby 2 years ago where if she doesn't get to the bathroom fast enough, she has incontinence, but she is able to hold it for a short while.  She takes no pain medications.  She has taken ibuprofen in the past.  She denies saddle anesthesia, bowel incontinence.   (25 Sep 2023 06:51)    Drug Dosing Weight  Height (cm): 152.4 (06 Oct 2023 07:34)  Weight (kg): 68.6 (06 Oct 2023 07:34)  BMI (kg/m2): 29.5 (06 Oct 2023 07:34)  BSA (m2): 1.66 (06 Oct 2023 07:34)    PAST MEDICAL & SURGICAL HISTORY:  History of acute illness  childhood  No significant past surgical history    REVIEW OF SYSTEMS: [ ] Unable to Assess due to neurologic exam   [x] All ROS addressed below are non-contributory, except:  Neuro: [ ] Headache [ ] Back pain [ ] Numbness [ ] Weakness [ ] Ataxia [ ] Dizziness [ ] Aphasia [ ] Dysarthria [ ] Visual disturbance  Resp: [ ] Shortness of breath/dyspnea, [ ] Orthopnea [ ] Cough  CV: [ ] Chest pain [ ] Palpitation [ ] Lightheadedness [ ] Syncope  Renal: [ ] Thirst [ ] Edema  GI: [ ] Nausea [ ] Emesis [ ] Abdominal pain [ ] Constipation [ ] Diarrhea  Hem: [ ] Hematemesis [ ] bright red blood per rectum  ID: [ ] Fever [ ] Chills [ ] Dysuria  ENT: [ ] Rhinorrhea    PHYSICAL EXAM:    General: No Acute Distress   Neurological: aox3, follows command, 5/5 b/l UE, RLE trace TF to nox, LLE 0/5, no proprioception  Pulmonary: Clear to Auscultation, No Rales, No Rhonchi, No Wheezes   Cardiovascular: S1, S2, Regular Rate and Rhythm   Gastrointestinal: Soft, Nontender, Nondistended   Extremities: No calf tenderness   Incision: CDI     ICU Vital Signs Last 24 Hrs  T(C): 36.7 (12 Oct 2023 18:01), Max: 38.1 (12 Oct 2023 08:58)  T(F): 98 (12 Oct 2023 18:01), Max: 100.5 (12 Oct 2023 08:58)  HR: 108 (12 Oct 2023 21:00) (87 - 133)  BP: 94/50 (12 Oct 2023 09:20) (94/50 - 94/50)  BP(mean): 65 (12 Oct 2023 09:20) (65 - 65)  ABP: 103/67 (12 Oct 2023 21:00) (71/35 - 136/78)  ABP(mean): 84 (12 Oct 2023 21:00) (50 - 102)  RR: 17 (12 Oct 2023 21:00) (15 - 18)  SpO2: 98% (12 Oct 2023 21:00) (94% - 100%)      10-11-23 @ 07:01  -  10-12-23 @ 07:00  --------------------------------------------------------  IN: 2626 mL / OUT: 5730 mL / NET: -3104 mL    10-12-23 @ 07:01  -  10-12-23 @ 21:49  --------------------------------------------------------  IN: 223.5 mL / OUT: 1820 mL / NET: -1596.5 mL      Ascorbic acid 500 milliGRAM(s) Oral <User Schedule>  bisacodyl Suppository 10 milliGRAM(s) Rectal daily  chlorhexidine 2% Cloths 1 Application(s) Topical <User Schedule>  diazepam    Tablet 5 milliGRAM(s) Oral every 8 hours  enoxaparin Injectable 40 milliGRAM(s) SubCutaneous every 24 hours  ferrous    sulfate 325 milliGRAM(s) Oral <User Schedule>  HYDROmorphone   Tablet 6 milliGRAM(s) Oral every 4 hours PRN  HYDROmorphone   Tablet 4 milliGRAM(s) Oral every 4 hours PRN  HYDROmorphone  Injectable 0.5 milliGRAM(s) IV Push every 4 hours PRN  influenza   Vaccine 0.5 milliLiter(s) IntraMuscular once  lidocaine   4% Patch 1 Patch Transdermal daily  melatonin 5 milliGRAM(s) Oral at bedtime PRN  midodrine 15 milliGRAM(s) Oral every 8 hours  phenylephrine    Infusion 0.1 MICROgram(s)/kG/Min (2.57 mL/Hr) IV Continuous <Continuous>  polyethylene glycol 3350 17 Gram(s) Oral two times a day  pregabalin 75 milliGRAM(s) Oral every 8 hours  senna 2 Tablet(s) Oral at bedtime  simethicone 80 milliGRAM(s) Chew every 6 hours  sodium chloride 0.9% lock flush 10 milliLiter(s) IV Push every 1 hour PRN      LABS:  Na: 135 (10-12 @ 15:43), 139 (10-12 @ 05:30), 139 (10-11 @ 17:34), 137 (10-11 @ 04:58), 137 (10-10 @ 05:30)  K: 3.7 (10-12 @ 15:43), 3.9 (10-12 @ 05:30), 3.9 (10-11 @ 17:34), 3.9 (10-11 @ 04:58), 4.0 (10-10 @ 05:30)  Cl: 98 (10-12 @ 15:43), 101 (10-12 @ 05:30), 104 (10-11 @ 17:34), 99 (10-11 @ 04:58), 98 (10-10 @ 05:30)  CO2: 29 (10-12 @ 15:43), 29 (10-12 @ 05:30), 28 (10-11 @ 17:34), 27 (10-11 @ 04:58), 30 (10-10 @ 05:30)  BUN: 8 (10-12 @ 15:43), 6 (10-12 @ 05:30), 9 (10-11 @ 17:34), 7 (10-11 @ 04:58), 5 (10-10 @ 05:30)  Cr: 0.37 (10-12 @ 15:43), 0.38 (10-12 @ 05:30), 0.43 (10-11 @ 17:34), 0.40 (10-11 @ 04:58), 0.41 (10-10 @ 05:30)  Glu: 132(10-12 @ 15:43), 116(10-12 @ 05:30), 107(10-11 @ 17:34), 94(10-11 @ 04:58), 121(10-10 @ 05:30)    Hgb: 8.9 (10-12 @ 05:30), 8.3 (10-11 @ 17:34), 8.2 (10-11 @ 04:58), 8.5 (10-10 @ 05:30)  Hct: 27.4 (10-12 @ 05:30), 26.1 (10-11 @ 17:34), 25.7 (10-11 @ 04:58), 26.7 (10-10 @ 05:30)  WBC: 10.26 (10-12 @ 05:30), 9.80 (10-11 @ 17:34), 7.64 (10-11 @ 04:58), 11.93 (10-10 @ 05:30)  Plt: 461 (10-12 @ 05:30), 426 (10-11 @ 17:34), 420 (10-11 @ 04:58), 419 (10-10 @ 05:30)

## 2023-10-12 NOTE — CONSULT NOTE ADULT - ASSESSMENT
43F no pmh s/p T3-T9 revision, C7-L1 fusion on 10/6/23. Taking hydromorphone po every 2 hours. Her medication use is increasing. Off of norepi but still on phenylephrine. She spiked a fever which is being worked up.    - hydromorphone 4mg/6mg po q4h prn  - hydromorphone 0.5mg iv prn - will need to wean  - acetaminophen 1000mg q8h  - increase diazepam to 10mg q8h  - pregabalin 50mg q8h  - Bowel regimen + Nausea ppx   43F no pmh s/p T3-T9 revision, C7-L1 fusion on 10/6/23. Taking hydromorphone po every 2 hours. Her medication use is increasing. Off of norepi but still on phenylephrine. She spiked a fever which is being worked up.    - hydromorphone 4mg/6mg po q4h prn  - hydromorphone 0.5mg iv prn - will need to wean  - acetaminophen 1000mg q8h  - diazepam 5mg q8h  - increase pregabalin to 75mg q8h  - Bowel regimen + Nausea ppx

## 2023-10-12 NOTE — PROGRESS NOTE ADULT - SUBJECTIVE AND OBJECTIVE BOX
=========================  NSICU ATTENDING PROGRESS NOTE  =========================    42 y/o female with no PMHx or PSHx presents for surgery today.  She reports ambulating with walker or baby stroller at home for years.  She reports intermittent falls.  She has right greater than left leg tingling, low back and mid back pain.  She denies arm or leg pain.  She reports urinary incontinence since having a baby 2 years ago where if she doesn't get to the bathroom fast enough, she has incontinence, but she is able to hold it for a short while.  She takes no pain medications.  She has taken ibuprofen in the past.  She denies saddle anesthesia, bowel incontinence.   (25 Sep 2023 06:51)      REVIEW OF SYSTEMS: [ ] Unable to Assess due to neurologic exam   [ x] All ROS addressed below are non-contributory, except:  Neuro: [ ] Headache [ x] Back pain [ ] Numbness [ x] Weakness [ ] Ataxia [ ] Dizziness [ ] Aphasia [ ] Dysarthria [ ] Visual disturbance  Resp: [ ] Shortness of breath/dyspnea, [ ] Orthopnea [ ] Cough  CV: [ ] Chest pain [ ] Palpitation [ ] Lightheadedness [ ] Syncope  Renal: [ ] Thirst [ ] Edema  GI: [ ] Nausea [ ] Emesis [ ] Abdominal pain [ ] Constipation [ ] Diarrhea  Hem: [ ] Hematemesis [ ] bright red blood per rectum  ID: [ ] Fever [ ] Chills [ ] Dysuria  ENT: [ ] Rhinorrhea        ICU Vital Signs Last 24 Hrs  T(C): 37.1 (12 Oct 2023 05:14), Max: 37.1 (11 Oct 2023 09:00)  T(F): 98.8 (12 Oct 2023 05:14), Max: 98.8 (11 Oct 2023 09:00)  HR: 121 (12 Oct 2023 08:00) (87 - 138)  BP: 97/51 (11 Oct 2023 17:00) (97/51 - 98/62)  BP(mean): 70 (11 Oct 2023 17:00) (70 - 74)  ABP: 107/70 (12 Oct 2023 08:00) (85/43 - 135/77)  ABP(mean): 85 (12 Oct 2023 08:00) (60 - 105)  RR: 15 (12 Oct 2023 07:00) (15 - 20)  SpO2: 100% (12 Oct 2023 08:00) (94% - 100%)      10-11-23 @ 07:01  -  10-12-23 @ 07:00  --------------------------------------------------------  IN: 2626 mL / OUT: 5730 mL / NET: -3104 mL      PHYSICAL EXAM:  Constitutional: No Acute Distress     Neurological: AOx3, Following Commands, Moving all Extremities     Motor exam:          Upper extremity                         Delt     Bicep     Tricep    HG                                                 R         5/5        5/5        5/5       5/5                                               L          5/5        5/5        5/5       5/5          Lower extremity              R & L : TF otherwise 0/5        Sensation: [] intact to light touch  [x] decreased: sensory level at T5  Pulmonary: Clear to Auscultation, No rales, No rhonchi, No wheezes   Cardiovascular: S1, S2, Regular rate and rhythm   Gastrointestinal: Soft, Non-tender, distended, +BS  Extremities: No calf tenderness   Incision: CDI; drain in place         MEDICATIONS:   ascorbic acid 500 milliGRAM(s) Oral <User Schedule>  bisacodyl Suppository 10 milliGRAM(s) Rectal daily  chlorhexidine 2% Cloths 1 Application(s) Topical <User Schedule>  diazepam    Tablet 5 milliGRAM(s) Oral every 8 hours  enoxaparin Injectable 40 milliGRAM(s) SubCutaneous every 24 hours  ferrous    sulfate 325 milliGRAM(s) Oral <User Schedule>  HYDROmorphone   Tablet 4 milliGRAM(s) Oral every 4 hours PRN  HYDROmorphone   Tablet 6 milliGRAM(s) Oral every 4 hours PRN  HYDROmorphone  Injectable 0.5 milliGRAM(s) IV Push every 4 hours PRN  influenza   Vaccine 0.5 milliLiter(s) IntraMuscular once  lidocaine   4% Patch 1 Patch Transdermal daily  melatonin 5 milliGRAM(s) Oral at bedtime PRN  midodrine 15 milliGRAM(s) Oral every 8 hours  phenylephrine    Infusion 0.1 MICROgram(s)/kG/Min (2.57 mL/Hr) IV Continuous <Continuous>  polyethylene glycol 3350 17 Gram(s) Oral two times a day  potassium chloride    Tablet ER 10 milliEquivalent(s) Oral once  pregabalin 50 milliGRAM(s) Oral every 8 hours  senna 2 Tablet(s) Oral at bedtime  sodium chloride 0.9% lock flush 10 milliLiter(s) IV Push every 1 hour PRN    LABS:                       8.9    10.26 )-----------( 461      ( 12 Oct 2023 05:30 )             27.4     10-12    139  |  101  |  6<L>  ----------------------------<  116<H>  3.9   |  29  |  0.38<L>    Ca    9.6      12 Oct 2023 05:30  Phos  3.4     10-12  Mg     1.8     10-12                                       =========================  NSICU ATTENDING PROGRESS NOTE  =========================    44 y/o female with no PMHx or PSHx presents for surgery today.  She reports ambulating with walker or baby stroller at home for years.  She reports intermittent falls.  She has right greater than left leg tingling, low back and mid back pain.  She denies arm or leg pain.  She reports urinary incontinence since having a baby 2 years ago where if she doesn't get to the bathroom fast enough, she has incontinence, but she is able to hold it for a short while.  She takes no pain medications.  She has taken ibuprofen in the past.  She denies saddle anesthesia, bowel incontinence.   (25 Sep 2023 06:51)      REVIEW OF SYSTEMS: [ ] Unable to Assess due to neurologic exam   [ x] All ROS addressed below are non-contributory, except:  Neuro: [ ] Headache [ x] Back pain [ ] Numbness [ x] Weakness [ ] Ataxia [ ] Dizziness [ ] Aphasia [ ] Dysarthria [ ] Visual disturbance  Resp: [ ] Shortness of breath/dyspnea, [ ] Orthopnea [ ] Cough  CV: [ ] Chest pain [ ] Palpitation [ ] Lightheadedness [ ] Syncope  Renal: [ ] Thirst [ ] Edema  GI: [ ] Nausea [ ] Emesis [ ] Abdominal pain [ ] Constipation [ ] Diarrhea  Hem: [ ] Hematemesis [ ] bright red blood per rectum  ID: [ ] Fever [ ] Chills [ ] Dysuria  ENT: [ ] Rhinorrhea      ICU Vital Signs Last 24 Hrs  T(C): 37.1 (12 Oct 2023 05:14), Max: 37.1 (11 Oct 2023 09:00)  T(F): 98.8 (12 Oct 2023 05:14), Max: 98.8 (11 Oct 2023 09:00)  HR: 121 (12 Oct 2023 08:00) (87 - 138)  BP: 97/51 (11 Oct 2023 17:00) (97/51 - 98/62)  BP(mean): 70 (11 Oct 2023 17:00) (70 - 74)  ABP: 107/70 (12 Oct 2023 08:00) (85/43 - 135/77)  ABP(mean): 85 (12 Oct 2023 08:00) (60 - 105)  RR: 15 (12 Oct 2023 07:00) (15 - 20)  SpO2: 100% (12 Oct 2023 08:00) (94% - 100%)      10-11-23 @ 07:01  -  10-12-23 @ 07:00  --------------------------------------------------------  IN: 2626 mL / OUT: 5730 mL / NET: -3104 mL      PHYSICAL EXAM:  Constitutional: No Acute Distress     Neurological: AOx3, Following Commands, Moving all Extremities     Motor exam:          Upper extremity                         Delt     Bicep     Tricep    HG                                                 R         5/5        5/5        5/5       5/5                                               L          5/5        5/5        5/5       5/5          Lower extremity              R and L : TF otherwise 0/5        Sensation: [] intact to light touch  [x] decreased: sensory level at T5 though at times able to sense deep touch on dorsum of B/L feet  Pulmonary: Clear to Auscultation, No rales, No rhonchi, No wheezes   Cardiovascular: S1, S2, Regular rate and rhythm   Gastrointestinal: Soft, Non-tender, distended, +BS  Extremities: No calf tenderness   Incision: CDI; drain in place         MEDICATIONS:   ascorbic acid 500 milliGRAM(s) Oral <User Schedule>  bisacodyl Suppository 10 milliGRAM(s) Rectal daily  chlorhexidine 2% Cloths 1 Application(s) Topical <User Schedule>  diazepam    Tablet 5 milliGRAM(s) Oral every 8 hours  enoxaparin Injectable 40 milliGRAM(s) SubCutaneous every 24 hours  ferrous    sulfate 325 milliGRAM(s) Oral <User Schedule>  HYDROmorphone   Tablet 4 milliGRAM(s) Oral every 4 hours PRN  HYDROmorphone   Tablet 6 milliGRAM(s) Oral every 4 hours PRN  HYDROmorphone  Injectable 0.5 milliGRAM(s) IV Push every 4 hours PRN  influenza   Vaccine 0.5 milliLiter(s) IntraMuscular once  lidocaine   4% Patch 1 Patch Transdermal daily  melatonin 5 milliGRAM(s) Oral at bedtime PRN  midodrine 15 milliGRAM(s) Oral every 8 hours  phenylephrine    Infusion 0.1 MICROgram(s)/kG/Min (2.57 mL/Hr) IV Continuous <Continuous>  polyethylene glycol 3350 17 Gram(s) Oral two times a day  potassium chloride    Tablet ER 10 milliEquivalent(s) Oral once  pregabalin 50 milliGRAM(s) Oral every 8 hours  senna 2 Tablet(s) Oral at bedtime  sodium chloride 0.9% lock flush 10 milliLiter(s) IV Push every 1 hour PRN    LABS:                       8.9    10.26 )-----------( 461      ( 12 Oct 2023 05:30 )             27.4     10-12    139  |  101  |  6<L>  ----------------------------<  116<H>  3.9   |  29  |  0.38<L>    Ca    9.6      12 Oct 2023 05:30  Phos  3.4     10-12  Mg     1.8     10-12

## 2023-10-12 NOTE — PROVIDER CONTACT NOTE (OTHER) - REASON
HR sustaining 120s-130s
Pt. c/o pain to LUE PICC site, low SBP
DANIELE site appears leaking
Drainage from surgical site

## 2023-10-12 NOTE — PROGRESS NOTE ADULT - ASSESSMENT
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 STAGE 1. Plastics closure. POD 15**  Now status post STAGE 2 POD 5**    NEURO:  Neurochecks Q4  Pain management: Dilaudid 6mg/4mg PO PRN, valium standing, lyrica 50mg daily  Pain management following  Monitor drain output ( DANIELE x1)  Insomnia- melatonin prn  Activity: Aggressive PT/OT      PULMONARY:  Saturating well on room air  Incentive spirometry 10q/hr when awake   CTPE protocol negative for PE    CARDIOVASCULAR: Troponinemia; new onset B/L pleural effusions and pulm vasc congestion; R/O takotsubo cardiomyopathy  MAP goal >65; on midodrine 15mg Q8. On neosynephrine; wean  Maintain Moira  Troponins:   POCUS, proBNP, EKG  Repeat TTE pending   Lactate:     GASTROENTEROLOGY:  Diet: Regular  LBM 10/10. Bowel regimen  KUB w/ bowel gas pattern   Continue simethicone   LFTs wnl    RENAL/:  IVL  Monitor BMPs. Na goal 135-145    ENDOCRINE:  Monitor fingersticks: Goal blood glucose 140-180mg/dL    HEME/ONC: Anemia  DVT ppx: Lovenox SQ. B/L SCDs  LE doppler negative   Reticulocyte index <2 (1.78). Continue ferrous sulfate with vitamin C    INFECTIOUS:   S/P empiric abx for low grade fevers; blood culture negative, RVP negative, UA (+) likely asymptomatic bacteruria.   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 STAGE 1. Plastics closure. POD 16  Now status post STAGE 2 POD 6    NEURO:  Neurochecks Q4  Pain management: Dilaudid 6mg/4mg PO PRN, valium standing, lyrica 50mg daily  Pain management followingl; reassess  Monitor drain output ( DANIELE x1)  Insomnia- melatonin prn  Activity: Aggressive PT/OT      PULMONARY:  Saturating well on room air  Incentive spirometry 10q/hr when awake   CTPE protocol negative for PE, does show B/L pleural effusion    CARDIOVASCULAR: Troponinemia; new onset B/L pleural effusions and pulm vasc congestion; R/O takotsubo cardiomyopathy  MAP goal >65; on midodrine 15mg Q8. On neosynephrine; wean  Maintain Rosa Maria  Troponins: 27  POCUS, proBNP, EKG  Repeat TTE pending   Lactate: 0.4    GASTROENTEROLOGY:  Diet: Regular  LBM 10/12. Bowel regimen  KUB w/ bowel gas pattern   Continue simethicone   LFTs wnl    RENAL/:  IVL; diurescing  Monitor BMPs. Na goal 135-145  Repeat at 1:00pm    ENDOCRINE:  Monitor fingersticks: Goal blood glucose 140-180mg/dL    HEME/ONC: Anemia  DVT ppx: Lovenox SQ. B/L SCDs  LE doppler negative, no PE  Reticulocyte index <2 (1.78). Continue ferrous sulfate with vitamin C    INFECTIOUS: Low grade temps  S/P Empiric abx for low grade fevers; blood culture negative, RVP negative, UA (+) likely asymptomatic bacteruria.   S/P Zosyn 4.5g Q8 ( for 7 days; ended on 10/8)   Incision site C/D/I  Culture 10/12 if febrile    MISC:    SOCIAL/FAMILY:  [x] awaiting [] updated at bedside [] family meeting    CODE STATUS:  [x] Full Code [] DNR [] DNI [] Palliative/Comfort Care    DISPOSITION:  [x] ICU [] Stroke Unit [] Floor [] EMU [] RCU [] PCU    Monitor In ICU for now

## 2023-10-12 NOTE — PROVIDER CONTACT NOTE (OTHER) - SITUATION
serosanguineous drainage noted from surgical site
pts heart rate is sustaining 120s-130s. all other Vital signs stable

## 2023-10-12 NOTE — PROGRESS NOTE ADULT - REASON FOR ADMISSION
leg weakness, difficulty walking and worsening back pain x years Leg weakness, difficulty walking and worsening back pain x years

## 2023-10-12 NOTE — PROVIDER CONTACT NOTE (OTHER) - ASSESSMENT
neuro remained the same. noticed the chug has blood stained on the left site after am care. The dressing for the DANIELE felt wet. hemovac dressing cdi on the right site. both remained patent with ss output.
Moderate amount of serosanguineous drainage saturated to linen. DANIELE to L upper back maintaining self-suction, scant amount of serosanguineous drainage noted within bulb.
Pt. with decreasing BP, remains A&O.
pts heart rate is sustaining 120s-130s. SBP and MAP WDL. neuro exam stable

## 2023-10-12 NOTE — CONSULT NOTE ADULT - SUBJECTIVE AND OBJECTIVE BOX
HPI:  Taken from outpatient neurosurgery note on 9/13/2023 " The patient, a long-standing patient of my practice, reports a history of spinal issues that dates back to a childhood injury. Over the years she's experienced developing issues with walking in a straight line, overactive reflexes in her lower extremities, and unique difficulty with her lower extremities referred to as cloneness. Recent upturn in these symptoms has limited her ability to carry out her daily routines. The patient also reports no significant improvement in her condition since our last clinical encounter two years ago."    42 y/o female with no PMHx or PSHx presents for surgery today.  She reports ambulating with walker or baby stroller at home for years.  She reports intermittent falls.  She has right greater than left leg tingling, low back and mid back pain.  She denies arm or leg pain.  She reports urinary incontinence since having a baby 2 years ago where if she doesn't get to the bathroom fast enough, she has incontinence, but she is able to hold it for a short while.  She takes no pain medications.  She has taken ibuprofen in the past.  She denies saddle anesthesia, bowel incontinence.   (25 Sep 2023 06:51)    Patient describes her pain to me as someone "poking" or "stabbing" her neck and her left shoulder.   She continues to take medication every 2 hours (alternating hydromorphone 4mg and 6mg), which brings her pain scale down to manageable levels but the relief recedes quickly.  She received 4x hydrmorphone 4mg and 3x hydromorphone 6 mg in the past 24h, in addition to 3x hydromorphone iv 0.5mg.     Per ICU, her pain has a muscular spasm component.      PAST MEDICAL & SURGICAL HISTORY:  History of acute illness  childhood      No significant past surgical history          FAMILY HISTORY:  No pertinent family history in first degree relatives      PAIN MEDICATIONS:  acetaminophen     Tablet .. 1000 milliGRAM(s) Oral every 8 hours  diazepam    Tablet 5 milliGRAM(s) Oral every 8 hours  HYDROmorphone   Tablet 6 milliGRAM(s) Oral every 4 hours PRN  HYDROmorphone   Tablet 4 milliGRAM(s) Oral every 4 hours PRN  melatonin 5 milliGRAM(s) Oral at bedtime PRN  ondansetron   Disintegrating Tablet 4 milliGRAM(s) Oral every 6 hours  pregabalin 50 milliGRAM(s) Oral every 8 hours    Heme:  enoxaparin Injectable 40 milliGRAM(s) SubCutaneous every 24 hours    Antibiotics:    Cardiovascular:  midodrine 15 milliGRAM(s) Oral every 8 hours  norepinephrine Infusion 0.05 MICROgram(s)/kG/Min IV Continuous <Continuous>    GI:  bisacodyl Suppository 10 milliGRAM(s) Rectal daily  polyethylene glycol 3350 17 Gram(s) Oral two times a day  senna 2 Tablet(s) Oral at bedtime  simethicone 80 milliGRAM(s) Chew every 6 hours    Endocrine:    All Other Medications:  ascorbic acid 500 milliGRAM(s) Oral <User Schedule>  chlorhexidine 2% Cloths 1 Application(s) Topical <User Schedule>  ferrous    sulfate 325 milliGRAM(s) Oral <User Schedule>  influenza   Vaccine 0.5 milliLiter(s) IntraMuscular once  lactated ringers. 1000 milliLiter(s) IV Continuous <Continuous>  sodium chloride 0.9% lock flush 10 milliLiter(s) IV Push every 1 hour PRN      Vital Signs Last 24 Hrs  T(C): 36.5 (10 Oct 2023 09:10), Max: 37.7 (09 Oct 2023 17:34)  T(F): 97.7 (10 Oct 2023 09:10), Max: 99.9 (09 Oct 2023 17:34)  HR: 116 (10 Oct 2023 09:00) (92 - 129)  BP: 115/69 (09 Oct 2023 21:00) (115/69 - 115/69)  BP(mean): 83 (09 Oct 2023 21:00) (83 - 83)  RR: 16 (10 Oct 2023 09:00) (15 - 20)  SpO2: 98% (10 Oct 2023 09:00) (85% - 100%)    Parameters below as of 10 Oct 2023 09:00  Patient On (Oxygen Delivery Method): room air        LABS:                        8.5    11.93 )-----------( 419      ( 10 Oct 2023 05:30 )             26.7     10-10    137  |  98  |  5<L>  ----------------------------<  121<H>  4.0   |  30  |  0.41<L>    Ca    9.1      10 Oct 2023 05:30  Phos  4.0     10-10  Mg     1.7     10-10        Urinalysis Basic - ( 10 Oct 2023 05:30 )    Color: x / Appearance: x / SG: x / pH: x  Gluc: 121 mg/dL / Ketone: x  / Bili: x / Urobili: x   Blood: x / Protein: x / Nitrite: x   Leuk Esterase: x / RBC: x / WBC x   Sq Epi: x / Non Sq Epi: x / Bacteria: x

## 2023-10-12 NOTE — PROGRESS NOTE ADULT - SUBJECTIVE AND OBJECTIVE BOX
HPI:  Taken from outpatient neurosurgery note on 9/13/2023 " The patient, a long-standing patient of my practice, reports a history of spinal issues that dates back to a childhood injury. Over the years she's experienced developing issues with walking in a straight line, overactive reflexes in her lower extremities, and unique difficulty with her lower extremities referred to as cloneness. Recent upturn in these symptoms has limited her ability to carry out her daily routines. The patient also reports no significant improvement in her condition since our last clinical encounter two years ago."    42 y/o female with no PMHx or PSHx presents for surgery today.  She reports ambulating with walker or baby stroller at home for years.  She reports intermittent falls.  She has right greater than left leg tingling, low back and mid back pain.  She denies arm or leg pain.  She reports urinary incontinence since having a baby 2 years ago where if she doesn't get to the bathroom fast enough, she has incontinence, but she is able to hold it for a short while.  She takes no pain medications.  She has taken ibuprofen in the past.  She denies saddle anesthesia, bowel incontinence.   (25 Sep 2023 06:51)    INTERVAL EVENTS:  Tachycardic/hypotensive, CTA chest neg for PE, +b/l effusions, given lasix    HOSPITAL COURSE:  9/25: POD 0 s/p C7-L1 fusion, T3-8 vertebral column resection, placement of anterior cage.   9/26; POD1 H/H 6.8 postop and PLT 85. Given 2u PRBC and 1u PLT. Switched propofol to precedex gtt. Pt extubated to face mask. Pt noted to only be withdrawing to noxious stimuli on bilateral lower extremities with sensation decreased RLE per patient. Dr. Vieira notified and plan is to keep MAP>100 and obtain CT full spine in the morning. Phenylephrine started to maintain MAP>100. Advanced diet to regular. Passed TOV. Increased need for aurora, UO high.   9/27: POD2 C7-L1 fusion T3-8 vertebral column resection. increased pressor requirements o/n, started on levo additionally. lactate 1.5, BNP 1400 from 800, CK elevated. echo: EF 60-65%. given 250cc bolus albumin, started on midodrine 10q8 to wean off pressors. PICC placed by Alli.   9/28: POD3 s/p C7-L1 fusion, T3-8 vertebral column resection, placement of anterior cage. R radial A-line removed and replaced on the L, trops and EKG done for chest discomfort, WNL, resolved with treatment of overall pain, continues to be febrile, lyrica dc'd to help.   Lyrica restarted. Weaning phenylephrine. Serum Na uptrending, 3% saline discontinued. Pt febrile with uptrending WBC, ID consulted recommend monitoring for now off antibiotics.  9/29: POD4. started on 3% o/n for Na 132. remains on aurora and levo for MAP>100. Tachycardic, given 1L NS. Right axillary a-line placed. 3% dc'd. HMV dc'd. Aquacell dressing replaced. Passed TOV.   9/30: POD 5.  Remains on levo gtt. Increased midodrine to 15q8. Given enema. Had BM.  10/1: POD 6. MAP goal increased back to >100 due to worsened sensation loss, on levo gtt. afebrile however per ID Dr De Souza, desiree cx sent. UA+. started empirically on vanc/zosyn. b/l LE doppler and LUE doppler ordered per ID, +LUE superficial thrombophlebitis. BL LE dopplers negative for DVT.   10/2: POD 7, ANNA overnight. Maintaining MAP >100. 500cc albumin bolus, dilaudid lowered to 2/4mg, toradol [id number] added for pain, abd xray for distension shows gas pattern. aquacel dressing changed.  10/3: POD8 ANNA overnight. Remains on levophed to maintain MAP>100 in AM. Neuro exam unchanged. Tmax 100.9F in AM, given tylenol. Flexeril changed to valium 2q8 and lyrica 50q8 started per pain managment.  DC'd vanc, continue zosyn x 7 days per ID. 1u PRBC to maintain hbg >9.0. DANIELE drain removed. Salt tabs weaned to 2q8. MAP goal changed to >85 to help wean off levo gtt.   10/4: POD 9. MAP goal >100. Sodium chloride tabs discontinued.  10/5: POD 10, Given 2 L boluses o/n for tachycardia and negative fluid status. Given 1 mg IV valium in AM for muscle spasm. 1L bolus given in afternoon for high urine output. Preop for OR tomorrow.   10/6: POD 11 Pt endorsing incisional pain, given 1mg IV valium. Neuro exam stable. Plan for second stage today,  POD 0 revision of T3-T9 VCR, revision of C7-L1 posterior fusion, plastics closure. Post-op CT thoracic spine complete. 1 L bolus for soft BP and colapsable IVC on POCUS.   10/7: POD 12 C7-L1 fusion. POD 1 Revision fusion and T3-9 VCR. Pain controlled. Remains on Levo for MAP goal > 100. AXR completed in AM for abdominal distension. Dilaudid PO prn increased to 4 and 6 mg, valium increased to 2mg q6 for pain control. 1u PRBC given for Hb 8.4. Hgb elevated to 10.4. 250 cc albumin given for tachycardia.   10/8: POD 13 Fusion, POD 2 Revision. Pain controlled overnight.   +BM this morning. Right hemovac removed. MAPs liberalized to goal greater than 85. Troponin elevated this morning, now downtrending. EKG WNL.   10/9: POD 13 C7-L1 fusion, POD 3 Revision and T3-9 VCR.   MAP goals liberalized to keep >80. Right DANIELE drain removed. IV valium given x1   10/10: POD 14 C7-L1 fusion, POD 4 revision and T3-9 VCR, liberalized MAP goal to >65. Started PO iron and vit C every other day. HMV removed. Lidocaine patch given for left neck pain. Midodrine weaned to 10q8.   10/11: POD 15 C7-L1 fusion, POD 5 revision and T3-9 VCR. SBP 80s when working with PT, given 1L bolus NS. Increased midodrine 15q8. Given additional 500cc bolus NS and 250cc albumin for soft pressures. Started aurora gtt for MAP>65.Given additional 500cc NS. CTA chest neg for PE, notable for b/l pleural effusions. Lasix 10mg IV.  10/12: POD 16/6. Remains on aurora gtt.       Vital Signs Last 24 Hrs  T(C): 36.8 (12 Oct 2023 01:05), Max: 37.1 (11 Oct 2023 09:00)  T(F): 98.2 (12 Oct 2023 01:05), Max: 98.8 (11 Oct 2023 09:00)  HR: 100 (12 Oct 2023 00:00) (87 - 138)  BP: 97/51 (11 Oct 2023 17:00) (97/51 - 98/62)  BP(mean): 70 (11 Oct 2023 17:00) (70 - 74)  RR: 18 (11 Oct 2023 23:00) (16 - 20)  SpO2: 96% (12 Oct 2023 00:00) (94% - 100%)    Parameters below as of 12 Oct 2023 00:00  Patient On (Oxygen Delivery Method): room air        I&O's Summary    10 Oct 2023 07:01  -  11 Oct 2023 07:00  --------------------------------------------------------  IN: 1130 mL / OUT: 2840 mL / NET: -1710 mL    11 Oct 2023 07:01  -  12 Oct 2023 01:29  --------------------------------------------------------  IN: 2465.6 mL / OUT: 3250 mL / NET: -784.4 mL        PHYSICAL EXAM:  General: Pt is laying flat in bed, in NAD, on RA  HEENT: PERRL 3 mm, EOMI B/L, face symmetric   Cardiovascular: RRR, normal S1 and S2   Respiratory: non-labored breathing, symmetric chest rise   GI: abd soft, nontender, slightly distended    Neuro: A&O x 3, no aphasia, speech clear, Strength 5/5 BL UE, sensation to light touch intact throughout BL UE, RLE 0/5, LLE trace TF, decreased sensation at and distal to T8 dermatome   Vascular: Distal pulses 2+ x4, no calf edema or erythema   Wounds: Posterior spine wound dressing C/D/I   WOUND/DRAINS: 1 DANIELE to full suction       LABS:                        8.3    9.80  )-----------( 426      ( 11 Oct 2023 17:34 )             26.1     10-11    139  |  104  |  9   ----------------------------<  107<H>  3.9   |  28  |  0.43<L>    Ca    9.0      11 Oct 2023 17:34  Phos  3.2     10-11  Mg     1.9     10-11    TPro  6.4  /  Alb  3.2<L>  /  TBili  0.2  /  DBili  <0.2  /  AST  21  /  ALT  31  /  AlkPhos  80  10-10      Urinalysis Basic - ( 11 Oct 2023 17:34 )    Color: x / Appearance: x / SG: x / pH: x  Gluc: 107 mg/dL / Ketone: x  / Bili: x / Urobili: x   Blood: x / Protein: x / Nitrite: x   Leuk Esterase: x / RBC: x / WBC x   Sq Epi: x / Non Sq Epi: x / Bacteria: x          CAPILLARY BLOOD GLUCOSE          Drug Levels: [] N/A    CSF Analysis: [] N/A      Allergies    No Known Allergies    Intolerances      MEDICATIONS:  Antibiotics:    Neuro:  diazepam    Tablet 5 milliGRAM(s) Oral every 8 hours  HYDROmorphone   Tablet 4 milliGRAM(s) Oral every 4 hours PRN  HYDROmorphone   Tablet 6 milliGRAM(s) Oral every 4 hours PRN  HYDROmorphone  Injectable 0.5 milliGRAM(s) IV Push every 4 hours PRN  melatonin 5 milliGRAM(s) Oral at bedtime PRN  pregabalin 50 milliGRAM(s) Oral every 8 hours    Anticoagulation:  enoxaparin Injectable 40 milliGRAM(s) SubCutaneous every 24 hours    OTHER:  bisacodyl Suppository 10 milliGRAM(s) Rectal daily  chlorhexidine 2% Cloths 1 Application(s) Topical <User Schedule>  influenza   Vaccine 0.5 milliLiter(s) IntraMuscular once  lidocaine   4% Patch 1 Patch Transdermal daily  midodrine 15 milliGRAM(s) Oral every 8 hours  phenylephrine    Infusion 0.1 MICROgram(s)/kG/Min IV Continuous <Continuous>  polyethylene glycol 3350 17 Gram(s) Oral two times a day  senna 2 Tablet(s) Oral at bedtime    IVF:  ascorbic acid 500 milliGRAM(s) Oral <User Schedule>  ferrous    sulfate 325 milliGRAM(s) Oral <User Schedule>    CULTURES:  Culture Results:   No growth at 5 days. (10-01 @ 11:25)  Culture Results:   No growth at 5 days. (10-01 @ 11:25)    RADIOLOGY & ADDITIONAL TESTS:      ASSESSMENT:  43F no PMHx suffered childhood injury resulting in spine injury and residual gait disturbance, hyperreflexia of BL LE. She has right greater than left leg tingling, low back and mid back pain. 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 S/p C7-L1 fusion, T3-8 vertebral column resection, placement of anterior cage (9/25/23). S/p revision of T3-T9 VCR, revision of C7-L1 posterior fusion, plastics closure (10/6/23).     NEURO:  - Neuro/spine checks q4/vitals q1hrs  - Pain control: tylenol 1g q8h, dilaudid 4/6 PO, Lyrica 50q8- stage 1 Post-op CT full spine: L1 screw not attached to the vertical yoli, epidural lipomatosis L2-3  - stage 2 post op CT T spine completed  - JPx1 (per plastics) (L HMV, R HMV and R DANIELE dc'd)    Cardio:  - MAP > 65, aurora gtt  - midodrine 15 mg q8h   - echo 9/27: EF 60-65%    Pulm:  - room air  - incentive spirometry  - CTA chest 10/11 neg for PE, notable for b/l pleural effusions    GI:  - Regular diet  - Bowel regimen   - BM 10/10    Renal:  - IVL  - voiding    Endo:  - a1c 5.6, ISS dc'd    Heme:  - SCDs for DVT ppx/ SQL   - 2u PRBC and 1u PLT postop 9/25, 1u PRBC 10/3, 1u PRBC 10/7  - b/l LE dopplers 10/1 negative  - LUE doppler 10/1: +cephalic thrombophlebitis  - iron and vitamin c every other day started 10/10     ID:  - 10/1 panculture, +UA 10/1  - Vanc (10/1-10/3 ) dc'd, Zosyn (10/1- 10/8)  x 7 days   - ID signed off    Dispo: ICU status, full code, PT/OT recs AR  Discussed with Dr. Vieira and Dr. Sutton

## 2023-10-12 NOTE — PROVIDER CONTACT NOTE (OTHER) - ACTION/TREATMENT ORDERED:
Team to change surgical dsg
no actions at this time. will continue to monitor
Ultrasound performed., 1 liter NS given

## 2023-10-13 LAB
ALBUMIN SERPL ELPH-MCNC: 4 G/DL — SIGNIFICANT CHANGE UP (ref 3.3–5)
ALP SERPL-CCNC: 82 U/L — SIGNIFICANT CHANGE UP (ref 40–120)
ALT FLD-CCNC: 28 U/L — SIGNIFICANT CHANGE UP (ref 10–45)
ANION GAP SERPL CALC-SCNC: 11 MMOL/L — SIGNIFICANT CHANGE UP (ref 5–17)
AST SERPL-CCNC: 23 U/L — SIGNIFICANT CHANGE UP (ref 10–40)
BILIRUB DIRECT SERPL-MCNC: <0.2 MG/DL — SIGNIFICANT CHANGE UP (ref 0–0.3)
BILIRUB INDIRECT FLD-MCNC: SIGNIFICANT CHANGE UP MG/DL (ref 0.2–1)
BILIRUB SERPL-MCNC: 0.2 MG/DL — SIGNIFICANT CHANGE UP (ref 0.2–1.2)
BUN SERPL-MCNC: 8 MG/DL — SIGNIFICANT CHANGE UP (ref 7–23)
CALCIUM SERPL-MCNC: 9.9 MG/DL — SIGNIFICANT CHANGE UP (ref 8.4–10.5)
CHLORIDE SERPL-SCNC: 97 MMOL/L — SIGNIFICANT CHANGE UP (ref 96–108)
CO2 SERPL-SCNC: 28 MMOL/L — SIGNIFICANT CHANGE UP (ref 22–31)
CREAT SERPL-MCNC: 0.41 MG/DL — LOW (ref 0.5–1.3)
EGFR: 127 ML/MIN/1.73M2 — SIGNIFICANT CHANGE UP
GLUCOSE SERPL-MCNC: 108 MG/DL — HIGH (ref 70–99)
HCT VFR BLD CALC: 28.5 % — LOW (ref 34.5–45)
HGB BLD-MCNC: 9 G/DL — LOW (ref 11.5–15.5)
LACTATE SERPL-SCNC: 0.7 MMOL/L — SIGNIFICANT CHANGE UP (ref 0.5–2)
MAGNESIUM SERPL-MCNC: 1.9 MG/DL — SIGNIFICANT CHANGE UP (ref 1.6–2.6)
MCHC RBC-ENTMCNC: 27.3 PG — SIGNIFICANT CHANGE UP (ref 27–34)
MCHC RBC-ENTMCNC: 31.6 GM/DL — LOW (ref 32–36)
MCV RBC AUTO: 86.4 FL — SIGNIFICANT CHANGE UP (ref 80–100)
NRBC # BLD: 0 /100 WBCS — SIGNIFICANT CHANGE UP (ref 0–0)
NT-PROBNP SERPL-SCNC: 63 PG/ML — SIGNIFICANT CHANGE UP (ref 0–300)
PHOSPHATE SERPL-MCNC: 4.2 MG/DL — SIGNIFICANT CHANGE UP (ref 2.5–4.5)
PLATELET # BLD AUTO: 426 K/UL — HIGH (ref 150–400)
POTASSIUM SERPL-MCNC: 4.1 MMOL/L — SIGNIFICANT CHANGE UP (ref 3.5–5.3)
POTASSIUM SERPL-SCNC: 4.1 MMOL/L — SIGNIFICANT CHANGE UP (ref 3.5–5.3)
PROT SERPL-MCNC: 7.4 G/DL — SIGNIFICANT CHANGE UP (ref 6–8.3)
RBC # BLD: 3.3 M/UL — LOW (ref 3.8–5.2)
RBC # FLD: 14.3 % — SIGNIFICANT CHANGE UP (ref 10.3–14.5)
SODIUM SERPL-SCNC: 136 MMOL/L — SIGNIFICANT CHANGE UP (ref 135–145)
TROPONIN T, HIGH SENSITIVITY RESULT: 16 NG/L — SIGNIFICANT CHANGE UP (ref 0–51)
WBC # BLD: 10.97 K/UL — HIGH (ref 3.8–10.5)
WBC # FLD AUTO: 10.97 K/UL — HIGH (ref 3.8–10.5)

## 2023-10-13 PROCEDURE — 99291 CRITICAL CARE FIRST HOUR: CPT

## 2023-10-13 PROCEDURE — 71045 X-RAY EXAM CHEST 1 VIEW: CPT | Mod: 26

## 2023-10-13 RX ORDER — HYDROMORPHONE HYDROCHLORIDE 2 MG/ML
0.5 INJECTION INTRAMUSCULAR; INTRAVENOUS; SUBCUTANEOUS ONCE
Refills: 0 | Status: DISCONTINUED | OUTPATIENT
Start: 2023-10-13 | End: 2023-10-13

## 2023-10-13 RX ORDER — FUROSEMIDE 40 MG
40 TABLET ORAL ONCE
Refills: 0 | Status: COMPLETED | OUTPATIENT
Start: 2023-10-13 | End: 2023-10-13

## 2023-10-13 RX ORDER — ACETAMINOPHEN 500 MG
1000 TABLET ORAL EVERY 8 HOURS
Refills: 0 | Status: COMPLETED | OUTPATIENT
Start: 2023-10-13 | End: 2023-10-15

## 2023-10-13 RX ORDER — MIDODRINE HYDROCHLORIDE 2.5 MG/1
20 TABLET ORAL EVERY 8 HOURS
Refills: 0 | Status: DISCONTINUED | OUTPATIENT
Start: 2023-10-13 | End: 2023-10-15

## 2023-10-13 RX ORDER — MAGNESIUM SULFATE 500 MG/ML
1 VIAL (ML) INJECTION ONCE
Refills: 0 | Status: COMPLETED | OUTPATIENT
Start: 2023-10-13 | End: 2023-10-13

## 2023-10-13 RX ORDER — SIMETHICONE 80 MG/1
80 TABLET, CHEWABLE ORAL THREE TIMES A DAY
Refills: 0 | Status: COMPLETED | OUTPATIENT
Start: 2023-10-13 | End: 2023-10-16

## 2023-10-13 RX ADMIN — HYDROMORPHONE HYDROCHLORIDE 0.5 MILLIGRAM(S): 2 INJECTION INTRAMUSCULAR; INTRAVENOUS; SUBCUTANEOUS at 04:00

## 2023-10-13 RX ADMIN — HYDROMORPHONE HYDROCHLORIDE 6 MILLIGRAM(S): 2 INJECTION INTRAMUSCULAR; INTRAVENOUS; SUBCUTANEOUS at 06:50

## 2023-10-13 RX ADMIN — Medication 325 MILLIGRAM(S): at 05:26

## 2023-10-13 RX ADMIN — Medication 5 MILLIGRAM(S): at 21:43

## 2023-10-13 RX ADMIN — Medication 5 MILLIGRAM(S): at 21:34

## 2023-10-13 RX ADMIN — HYDROMORPHONE HYDROCHLORIDE 0.5 MILLIGRAM(S): 2 INJECTION INTRAMUSCULAR; INTRAVENOUS; SUBCUTANEOUS at 06:49

## 2023-10-13 RX ADMIN — Medication 500 MILLIGRAM(S): at 05:26

## 2023-10-13 RX ADMIN — LIDOCAINE 1 PATCH: 4 CREAM TOPICAL at 22:20

## 2023-10-13 RX ADMIN — POLYETHYLENE GLYCOL 3350 17 GRAM(S): 17 POWDER, FOR SOLUTION ORAL at 17:03

## 2023-10-13 RX ADMIN — Medication 5 MILLIGRAM(S): at 05:26

## 2023-10-13 RX ADMIN — HYDROMORPHONE HYDROCHLORIDE 0.5 MILLIGRAM(S): 2 INJECTION INTRAMUSCULAR; INTRAVENOUS; SUBCUTANEOUS at 02:48

## 2023-10-13 RX ADMIN — SIMETHICONE 80 MILLIGRAM(S): 80 TABLET, CHEWABLE ORAL at 21:34

## 2023-10-13 RX ADMIN — Medication 40 MILLIGRAM(S): at 12:09

## 2023-10-13 RX ADMIN — MIDODRINE HYDROCHLORIDE 20 MILLIGRAM(S): 2.5 TABLET ORAL at 21:34

## 2023-10-13 RX ADMIN — HYDROMORPHONE HYDROCHLORIDE 4 MILLIGRAM(S): 2 INJECTION INTRAMUSCULAR; INTRAVENOUS; SUBCUTANEOUS at 21:43

## 2023-10-13 RX ADMIN — Medication 1000 MILLIGRAM(S): at 13:36

## 2023-10-13 RX ADMIN — HYDROMORPHONE HYDROCHLORIDE 0.5 MILLIGRAM(S): 2 INJECTION INTRAMUSCULAR; INTRAVENOUS; SUBCUTANEOUS at 18:49

## 2023-10-13 RX ADMIN — HYDROMORPHONE HYDROCHLORIDE 4 MILLIGRAM(S): 2 INJECTION INTRAMUSCULAR; INTRAVENOUS; SUBCUTANEOUS at 10:05

## 2023-10-13 RX ADMIN — Medication 1000 MILLIGRAM(S): at 21:34

## 2023-10-13 RX ADMIN — HYDROMORPHONE HYDROCHLORIDE 6 MILLIGRAM(S): 2 INJECTION INTRAMUSCULAR; INTRAVENOUS; SUBCUTANEOUS at 07:00

## 2023-10-13 RX ADMIN — POLYETHYLENE GLYCOL 3350 17 GRAM(S): 17 POWDER, FOR SOLUTION ORAL at 05:26

## 2023-10-13 RX ADMIN — HYDROMORPHONE HYDROCHLORIDE 4 MILLIGRAM(S): 2 INJECTION INTRAMUSCULAR; INTRAVENOUS; SUBCUTANEOUS at 22:20

## 2023-10-13 RX ADMIN — ENOXAPARIN SODIUM 40 MILLIGRAM(S): 100 INJECTION SUBCUTANEOUS at 21:36

## 2023-10-13 RX ADMIN — HYDROMORPHONE HYDROCHLORIDE 0.5 MILLIGRAM(S): 2 INJECTION INTRAMUSCULAR; INTRAVENOUS; SUBCUTANEOUS at 18:52

## 2023-10-13 RX ADMIN — MIDODRINE HYDROCHLORIDE 15 MILLIGRAM(S): 2.5 TABLET ORAL at 05:27

## 2023-10-13 RX ADMIN — Medication 75 MILLIGRAM(S): at 13:30

## 2023-10-13 RX ADMIN — SENNA PLUS 2 TABLET(S): 8.6 TABLET ORAL at 21:34

## 2023-10-13 RX ADMIN — LIDOCAINE 1 PATCH: 4 CREAM TOPICAL at 16:39

## 2023-10-13 RX ADMIN — HYDROMORPHONE HYDROCHLORIDE 6 MILLIGRAM(S): 2 INJECTION INTRAMUSCULAR; INTRAVENOUS; SUBCUTANEOUS at 04:00

## 2023-10-13 RX ADMIN — Medication 75 MILLIGRAM(S): at 05:26

## 2023-10-13 RX ADMIN — LIDOCAINE 1 PATCH: 4 CREAM TOPICAL at 11:00

## 2023-10-13 RX ADMIN — HYDROMORPHONE HYDROCHLORIDE 6 MILLIGRAM(S): 2 INJECTION INTRAMUSCULAR; INTRAVENOUS; SUBCUTANEOUS at 18:48

## 2023-10-13 RX ADMIN — SIMETHICONE 80 MILLIGRAM(S): 80 TABLET, CHEWABLE ORAL at 13:30

## 2023-10-13 RX ADMIN — HYDROMORPHONE HYDROCHLORIDE 6 MILLIGRAM(S): 2 INJECTION INTRAMUSCULAR; INTRAVENOUS; SUBCUTANEOUS at 12:16

## 2023-10-13 RX ADMIN — MIDODRINE HYDROCHLORIDE 20 MILLIGRAM(S): 2.5 TABLET ORAL at 13:30

## 2023-10-13 RX ADMIN — Medication 5 MILLIGRAM(S): at 13:30

## 2023-10-13 RX ADMIN — HYDROMORPHONE HYDROCHLORIDE 0.5 MILLIGRAM(S): 2 INJECTION INTRAMUSCULAR; INTRAVENOUS; SUBCUTANEOUS at 07:00

## 2023-10-13 RX ADMIN — HYDROMORPHONE HYDROCHLORIDE 6 MILLIGRAM(S): 2 INJECTION INTRAMUSCULAR; INTRAVENOUS; SUBCUTANEOUS at 02:48

## 2023-10-13 RX ADMIN — CHLORHEXIDINE GLUCONATE 1 APPLICATION(S): 213 SOLUTION TOPICAL at 05:25

## 2023-10-13 RX ADMIN — HYDROMORPHONE HYDROCHLORIDE 4 MILLIGRAM(S): 2 INJECTION INTRAMUSCULAR; INTRAVENOUS; SUBCUTANEOUS at 10:51

## 2023-10-13 RX ADMIN — HYDROMORPHONE HYDROCHLORIDE 6 MILLIGRAM(S): 2 INJECTION INTRAMUSCULAR; INTRAVENOUS; SUBCUTANEOUS at 13:25

## 2023-10-13 RX ADMIN — Medication 1000 MILLIGRAM(S): at 13:30

## 2023-10-13 RX ADMIN — HYDROMORPHONE HYDROCHLORIDE 6 MILLIGRAM(S): 2 INJECTION INTRAMUSCULAR; INTRAVENOUS; SUBCUTANEOUS at 18:52

## 2023-10-13 RX ADMIN — Medication 75 MILLIGRAM(S): at 21:34

## 2023-10-13 RX ADMIN — Medication 1000 MILLIGRAM(S): at 22:20

## 2023-10-13 RX ADMIN — Medication 100 GRAM(S): at 06:49

## 2023-10-13 NOTE — PROGRESS NOTE ADULT - ASSESSMENT
43F s/p traumatic spine injury w/o neurological deficit & severe thoracic kyphosis now s/p T3-8 VCR, C7-L1 fusion, correction of deformity, placement of anterior cage.  STAGE 1. plastics closure. now s/p STAGE 2       -NCq4, VS q1- protected sleep time 10pm to 5am   -pain management w/ Dilaudid 4/6mg q4 PRN, breakthrough 0.5mg IV PRN q4,  lyrica 75mg, valium   -MAP goal >65; still requiring phenylephrine continue to titrate off c/w midodrine increased to 20mg q8 wean as tolerated ; hypotension likely vasoplegia 2/2 spinal shock   -check cortisol level in AM   -DVT ppx w/ lovenox SQ   -regular diet; no indication for PPI at this time     refer to AM progress note for detailed plan

## 2023-10-13 NOTE — PROGRESS NOTE ADULT - SUBJECTIVE AND OBJECTIVE BOX
=========================  NSICU ATTENDING PROGRESS NOTE  =========================    44 y/o female with no PMHx or PSHx presents for surgery today.  She reports ambulating with walker or baby stroller at home for years.  She reports intermittent falls.  She has right greater than left leg tingling, low back and mid back pain.  She denies arm or leg pain.  She reports urinary incontinence since having a baby 2 years ago where if she doesn't get to the bathroom fast enough, she has incontinence, but she is able to hold it for a short while.  She takes no pain medications.  She has taken ibuprofen in the past.  She denies saddle anesthesia, bowel incontinence.   (25 Sep 2023 06:51)      REVIEW OF SYSTEMS: [ ] Unable to Assess due to neurologic exam   [ x] All ROS addressed below are non-contributory, except:  Neuro: [ ] Headache [ x] Back pain [ ] Numbness [ x] Weakness [ ] Ataxia [ ] Dizziness [ ] Aphasia [ ] Dysarthria [ ] Visual disturbance  Resp: [ ] Shortness of breath/dyspnea, [ ] Orthopnea [ ] Cough  CV: [ ] Chest pain [ ] Palpitation [ ] Lightheadedness [ ] Syncope  Renal: [ ] Thirst [ ] Edema  GI: [ ] Nausea [ ] Emesis [ ] Abdominal pain [ ] Constipation [ ] Diarrhea  Hem: [ ] Hematemesis [ ] bright red blood per rectum  ID: [ ] Fever [ ] Chills [ ] Dysuria  ENT: [ ] Rhinorrhea      ICU Vital Signs Last 24 Hrs  T(C): 36.8 (13 Oct 2023 05:42), Max: 38.1 (12 Oct 2023 08:58)  T(F): 98.2 (13 Oct 2023 05:42), Max: 100.5 (12 Oct 2023 08:58)  HR: 108 (13 Oct 2023 07:00) (85 - 133)  BP: 94/50 (12 Oct 2023 09:20) (94/50 - 94/50)  BP(mean): 65 (12 Oct 2023 09:20) (65 - 65)  ABP: 99/60 (13 Oct 2023 07:00) (71/35 - 136/78)  ABP(mean): 79 (13 Oct 2023 07:00) (50 - 102)  RR: 16 (13 Oct 2023 07:00) (16 - 18)  SpO2: 97% (13 Oct 2023 07:00) (96% - 100%)      10-12-23 @ 07:01  -  10-13-23 @ 07:00  --------------------------------------------------------  IN: 1232.4 mL / OUT: 2270 mL / NET: -1037.6 mL      PHYSICAL EXAM:  Constitutional: No Acute Distress     Neurological: AOx3, Following Commands, Moving all Extremities     Motor exam:          Upper extremity                         Delt     Bicep     Tricep    HG                                                 R         5/5        5/5        5/5       5/5                                               L          5/5        5/5        5/5       5/5          Lower extremity              R and L : TF otherwise 0/5        Sensation: [] intact to light touch  [x] decreased: sensory level at T5 though at times able to sense deep touch on dorsum of B/L feet  Pulmonary: Clear to Auscultation, No rales, No rhonchi, No wheezes   Cardiovascular: S1, S2, Regular rate and rhythm   Gastrointestinal: Soft, Non-tender, distended, +BS  Extremities: No calf tenderness   Incision: CDI; drain in place         MEDICATIONS:   ascorbic acid 500 milliGRAM(s) Oral <User Schedule>  bisacodyl Suppository 10 milliGRAM(s) Rectal daily  chlorhexidine 2% Cloths 1 Application(s) Topical <User Schedule>  diazepam    Tablet 5 milliGRAM(s) Oral every 8 hours  enoxaparin Injectable 40 milliGRAM(s) SubCutaneous every 24 hours  ferrous    sulfate 325 milliGRAM(s) Oral <User Schedule>  HYDROmorphone   Tablet 6 milliGRAM(s) Oral every 4 hours PRN  HYDROmorphone   Tablet 4 milliGRAM(s) Oral every 4 hours PRN  HYDROmorphone  Injectable 0.5 milliGRAM(s) IV Push every 4 hours PRN  influenza   Vaccine 0.5 milliLiter(s) IntraMuscular once  lidocaine   4% Patch 1 Patch Transdermal daily  melatonin 5 milliGRAM(s) Oral at bedtime PRN  midodrine 15 milliGRAM(s) Oral every 8 hours  phenylephrine    Infusion 0.1 MICROgram(s)/kG/Min (2.57 mL/Hr) IV Continuous <Continuous>  polyethylene glycol 3350 17 Gram(s) Oral two times a day  pregabalin 75 milliGRAM(s) Oral every 8 hours  senna 2 Tablet(s) Oral at bedtime  sodium chloride 0.9% lock flush 10 milliLiter(s) IV Push every 1 hour PRN    LABS:                       9.0    10.97 )-----------( 426      ( 13 Oct 2023 05:19 )             28.5     10-13    136  |  97  |  8   ----------------------------<  108<H>  4.1   |  28  |  0.41<L>    Ca    9.9      13 Oct 2023 05:19  Phos  4.2     10-13  Mg     1.9     10-13                                     =========================  NSICU ATTENDING PROGRESS NOTE  =========================    44 y/o female with no PMHx or PSHx presents for surgery today.  She reports ambulating with walker or baby stroller at home for years.  She reports intermittent falls.  She has right greater than left leg tingling, low back and mid back pain.  She denies arm or leg pain.  She reports urinary incontinence since having a baby 2 years ago where if she doesn't get to the bathroom fast enough, she has incontinence, but she is able to hold it for a short while.  She takes no pain medications.  She has taken ibuprofen in the past.  She denies saddle anesthesia, bowel incontinence.   (25 Sep 2023 06:51)      REVIEW OF SYSTEMS: [ ] Unable to Assess due to neurologic exam   [ x] All ROS addressed below are non-contributory, except:  Neuro: [ ] Headache [ x] Back pain [ ] Numbness [ x] Weakness [ ] Ataxia [ ] Dizziness [ ] Aphasia [ ] Dysarthria [ ] Visual disturbance  Resp: [ ] Shortness of breath/dyspnea, [ ] Orthopnea [ ] Cough  CV: [ ] Chest pain [ ] Palpitation [ ] Lightheadedness [ ] Syncope  Renal: [ ] Thirst [ ] Edema  GI: [ ] Nausea [ ] Emesis [ ] Abdominal pain [ ] Constipation [ ] Diarrhea  Hem: [ ] Hematemesis [ ] bright red blood per rectum  ID: [ ] Fever [ ] Chills [ ] Dysuria  ENT: [ ] Rhinorrhea      ICU Vital Signs Last 24 Hrs  T(C): 36.8 (13 Oct 2023 05:42), Max: 38.1 (12 Oct 2023 08:58)  T(F): 98.2 (13 Oct 2023 05:42), Max: 100.5 (12 Oct 2023 08:58)  HR: 108 (13 Oct 2023 07:00) (85 - 133)  BP: 94/50 (12 Oct 2023 09:20) (94/50 - 94/50)  BP(mean): 65 (12 Oct 2023 09:20) (65 - 65)  ABP: 99/60 (13 Oct 2023 07:00) (71/35 - 136/78)  ABP(mean): 79 (13 Oct 2023 07:00) (50 - 102)  RR: 16 (13 Oct 2023 07:00) (16 - 18)  SpO2: 97% (13 Oct 2023 07:00) (96% - 100%)      10-12-23 @ 07:01  -  10-13-23 @ 07:00  --------------------------------------------------------  IN: 1232.4 mL / OUT: 2270 mL / NET: -1037.6 mL      PHYSICAL EXAM:  Constitutional: No Acute Distress     Neurological: AOx3, Following Commands, Moving all Extremities     Motor exam:          Upper extremity                         Delt     Bicep     Tricep    HG                                                 R         5/5        5/5        5/5       5/5                                               L          5/5        5/5        5/5       5/5          Lower extremity              R and L : TF otherwise 0/5        Sensation: [] intact to light touch  [x] decreased: sensory level at T5 though at times able to sense deep touch on dorsum of B/L feet  Pulmonary: Clear to Auscultation, No rales, No rhonchi, No wheezes   Cardiovascular: S1, S2, Regular rate and rhythm   Gastrointestinal: Soft, Non-tender, distended, +BS  Extremities: No calf tenderness   Incision: CDI; drain in place       MEDICATIONS:   ascorbic acid 500 milliGRAM(s) Oral <User Schedule>  bisacodyl Suppository 10 milliGRAM(s) Rectal daily  chlorhexidine 2% Cloths 1 Application(s) Topical <User Schedule>  diazepam    Tablet 5 milliGRAM(s) Oral every 8 hours  enoxaparin Injectable 40 milliGRAM(s) SubCutaneous every 24 hours  ferrous    sulfate 325 milliGRAM(s) Oral <User Schedule>  HYDROmorphone   Tablet 6 milliGRAM(s) Oral every 4 hours PRN  HYDROmorphone   Tablet 4 milliGRAM(s) Oral every 4 hours PRN  HYDROmorphone  Injectable 0.5 milliGRAM(s) IV Push every 4 hours PRN  influenza   Vaccine 0.5 milliLiter(s) IntraMuscular once  lidocaine   4% Patch 1 Patch Transdermal daily  melatonin 5 milliGRAM(s) Oral at bedtime PRN  midodrine 15 milliGRAM(s) Oral every 8 hours  phenylephrine    Infusion 0.1 MICROgram(s)/kG/Min (2.57 mL/Hr) IV Continuous <Continuous>  polyethylene glycol 3350 17 Gram(s) Oral two times a day  pregabalin 75 milliGRAM(s) Oral every 8 hours  senna 2 Tablet(s) Oral at bedtime  sodium chloride 0.9% lock flush 10 milliLiter(s) IV Push every 1 hour PRN    LABS:                       9.0    10.97 )-----------( 426      ( 13 Oct 2023 05:19 )             28.5     10-13    136  |  97  |  8   ----------------------------<  108<H>  4.1   |  28  |  0.41<L>    Ca    9.9      13 Oct 2023 05:19  Phos  4.2     10-13  Mg     1.9     10-13

## 2023-10-13 NOTE — PROGRESS NOTE ADULT - ASSESSMENT
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 STAGE 1. Plastics closure. POD 17  Now status post STAGE 2 POD 7    NEURO:  Neurochecks Q4  Pain management: Dilaudid 6mg/4mg PO PRN, valium standing, lyrica 50mg daily  Pain management followingl; reassess  Monitor drain output ( DANIELE x1)  Insomnia- melatonin prn  Activity: Aggressive PT/OT      PULMONARY:  Saturating well on room air  Incentive spirometry 10q/hr when awake   CTPE protocol negative for PE, does show B/L pleural effusion    CARDIOVASCULAR: Troponinemia; new onset B/L pleural effusions and pulm vasc congestion; R/O takotsubo cardiomyopathy  MAP goal >65; on midodrine 15mg Q8. On neosynephrine; wean  Maintain Rosa Maria  Troponin: 27->  POCUS, proBNP, EKG  Repeat TTE:   Lactate: 0.4  B/L pleural effusions. Diurescing with lasix. Continue.     GASTROENTEROLOGY:  Diet: Regular  LBM 10/12. Bowel regimen  KUB w/ bowel gas pattern   Continue simethicone   LFTs wnl    RENAL/:  IVL; diurescing with lasix  Monitor BMPs. Na goal 135-145  Replete lytes prn    ENDOCRINE:  Monitor fingersticks: Goal blood glucose 140-180mg/dL    HEME/ONC: Anemia  DVT ppx: Lovenox SQ. B/L SCDs  LE doppler negative, no PE  Reticulocyte index <2 (1.78). Continue ferrous sulfate with vitamin C    INFECTIOUS: Low grade temps  S/P Empiric abx for low grade fevers; blood culture negative, RVP negative, UA (+) likely asymptomatic bacteruria.   S/P Zosyn 4.5g Q8 ( for 7 days; ended on 10/8)   Incision site C/D/I  Culture 10/12 if febrile *    MISC:    SOCIAL/FAMILY:  [x] awaiting [] updated at bedside [] family meeting    CODE STATUS:  [x] Full Code [] DNR [] DNI [] Palliative/Comfort Care    DISPOSITION:  [x] ICU [] Stroke Unit [] Floor [] EMU [] RCU [] PCU    Monitor in ICU. Remains on pressor.  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 STAGE 1. Plastics closure. POD 17  Now status post STAGE 2 POD 7    NEURO:  Neurochecks Q4  Pain management: Dilaudid 6mg/4mg PO PRN, valium standing, lyrica 75mg Q8  Pain management following  Monitor drain output ( DANIELE x1)  Insomnia- melatonin prn  Activity: Aggressive PT/OT      PULMONARY:  Saturating well on room air  Incentive spirometry 10q/hr when awake   CTPE protocol negative for PE, does show B/L pleural effusion- see cards    CARDIOVASCULAR: Troponinemia; new onset B/L pleural effusions and pulm vasc congestion; R/O takotsubo cardiomyopathy  MAP goal >65; on midodrine 20mg Q8. On neosynephrine; wean  Maintain Rosa Maria  Troponin negative, proBNP wnl  Repeat TTE: EF 54%   Lactate: 0.4  B/L pleural effusions. Diurescing with lasix 40mg IV. Continue.     GASTROENTEROLOGY:  Diet: Regular  LBM 10/13. Bowel regimen  KUB w/ bowel gas pattern   Continue simethicone   LFTs 10/13    RENAL/:  IVL; diurescing with lasix  Monitor BMPs. Na goal 135-145  Replete lytes prn    ENDOCRINE:  Monitor fingersticks: Goal blood glucose 140-180mg/dL    HEME/ONC: Anemia  DVT ppx: Lovenox SQ. B/L SCDs  LE doppler negative, no PE  Reticulocyte index <2 (1.78). Continue ferrous sulfate with vitamin C    INFECTIOUS: Low grade temps  S/P Empiric abx for low grade fevers; blood culture negative, RVP negative, UA (+) likely asymptomatic bacteruria.   S/P Zosyn 4.5g Q8 ( for 7 days; ended on 10/8)   Incision site C/D/I  Culture 10/13 if febrile     MISC:    SOCIAL/FAMILY:  [x] awaiting [] updated at bedside [] family meeting    CODE STATUS:  [x] Full Code [] DNR [] DNI [] Palliative/Comfort Care    DISPOSITION:  [x] ICU [] Stroke Unit [] Floor [] EMU [] RCU [] PCU    Monitor in ICU. Remains on pressor.

## 2023-10-13 NOTE — PROGRESS NOTE ADULT - SUBJECTIVE AND OBJECTIVE BOX
INTERVAL HISTORY: HPI:  Taken from outpatient neurosurgery note on 9/13/2023 " The patient, a long-standing patient of my practice, reports a history of spinal issues that dates back to a childhood injury. Over the years she's experienced developing issues with walking in a straight line, overactive reflexes in her lower extremities, and unique difficulty with her lower extremities referred to as cloneness. Recent upturn in these symptoms has limited her ability to carry out her daily routines. The patient also reports no significant improvement in her condition since our last clinical encounter two years ago."    42 y/o female with no PMHx or PSHx presents for surgery today.  She reports ambulating with walker or baby stroller at home for years.  She reports intermittent falls.  She has right greater than left leg tingling, low back and mid back pain.  She denies arm or leg pain.  She reports urinary incontinence since having a baby 2 years ago where if she doesn't get to the bathroom fast enough, she has incontinence, but she is able to hold it for a short while.  She takes no pain medications.  She has taken ibuprofen in the past.  She denies saddle anesthesia, bowel incontinence.   (25 Sep 2023 06:51)    Drug Dosing Weight  Height (cm): 152.4 (06 Oct 2023 07:34)  Weight (kg): 68.6 (06 Oct 2023 07:34)  BMI (kg/m2): 29.5 (06 Oct 2023 07:34)  BSA (m2): 1.66 (06 Oct 2023 07:34)    PAST MEDICAL & SURGICAL HISTORY:  History of acute illness  childhood  No significant past surgical history    REVIEW OF SYSTEMS: [ ] Unable to Assess due to neurologic exam   [x] All ROS addressed below are non-contributory, except:  Neuro: [ ] Headache [ ] Back pain [ ] Numbness [ ] Weakness [ ] Ataxia [ ] Dizziness [ ] Aphasia [ ] Dysarthria [ ] Visual disturbance  Resp: [ ] Shortness of breath/dyspnea, [ ] Orthopnea [ ] Cough  CV: [ ] Chest pain [ ] Palpitation [ ] Lightheadedness [ ] Syncope  Renal: [ ] Thirst [ ] Edema  GI: [ ] Nausea [ ] Emesis [ ] Abdominal pain [ ] Constipation [ ] Diarrhea  Hem: [ ] Hematemesis [ ] bright red blood per rectum  ID: [ ] Fever [ ] Chills [ ] Dysuria  ENT: [ ] Rhinorrhea    PHYSICAL EXAM:    General: No Acute Distress   Neurological: aox3, follows command, 5/5 b/l UE, RLE trace TF to nox, LLE 0/5, no proprioception  Pulmonary: Clear to Auscultation, No Rales, No Rhonchi, No Wheezes   Cardiovascular: S1, S2, Regular Rate and Rhythm   Gastrointestinal: Soft, Nontender, Nondistended   Extremities: No calf tenderness   Incision: CDI             ICU Vital Signs Last 24 Hrs  T(C): 36.7 (13 Oct 2023 22:12), Max: 37.1 (13 Oct 2023 09:00)  T(F): 98.1 (13 Oct 2023 22:12), Max: 98.8 (13 Oct 2023 09:00)  HR: 99 (13 Oct 2023 22:00) (85 - 128)  BP: --  BP(mean): --  ABP: 105/65 (13 Oct 2023 22:00) (79/41 - 117/65)  ABP(mean): 83 (13 Oct 2023 22:00) (56 - 94)  RR: 17 (13 Oct 2023 22:00) (14 - 20)  SpO2: 95% (13 Oct 2023 22:00) (95% - 99%)      10-12-23 @ 07:01  -  10-13-23 @ 07:00  --------------------------------------------------------  IN: 1232.4 mL / OUT: 2270 mL / NET: -1037.6 mL    10-13-23 @ 07:01  -  10-13-23 @ 22:46  --------------------------------------------------------  IN: 1032 mL / OUT: 1310 mL / NET: -278 mL            acetaminophen     Tablet .. 1000 milliGRAM(s) Oral every 8 hours  ascorbic acid 500 milliGRAM(s) Oral <User Schedule>  bisacodyl Suppository 10 milliGRAM(s) Rectal daily  chlorhexidine 2% Cloths 1 Application(s) Topical <User Schedule>  diazepam    Tablet 5 milliGRAM(s) Oral every 8 hours  enoxaparin Injectable 40 milliGRAM(s) SubCutaneous every 24 hours  ferrous    sulfate 325 milliGRAM(s) Oral <User Schedule>  HYDROmorphone   Tablet 4 milliGRAM(s) Oral every 4 hours PRN  HYDROmorphone   Tablet 6 milliGRAM(s) Oral every 4 hours PRN  influenza   Vaccine 0.5 milliLiter(s) IntraMuscular once  lidocaine   4% Patch 1 Patch Transdermal daily  melatonin 5 milliGRAM(s) Oral at bedtime PRN  midodrine 20 milliGRAM(s) Oral every 8 hours  phenylephrine    Infusion 0.1 MICROgram(s)/kG/Min (2.57 mL/Hr) IV Continuous <Continuous>  polyethylene glycol 3350 17 Gram(s) Oral two times a day  pregabalin 75 milliGRAM(s) Oral every 8 hours  senna 2 Tablet(s) Oral at bedtime  simethicone 80 milliGRAM(s) Chew three times a day  sodium chloride 0.9% lock flush 10 milliLiter(s) IV Push every 1 hour PRN      LABS:  Na: 136 (10-13 @ 05:19), 135 (10-12 @ 15:43), 139 (10-12 @ 05:30), 139 (10-11 @ 17:34), 137 (10-11 @ 04:58)  K: 4.1 (10-13 @ 05:19), 3.7 (10-12 @ 15:43), 3.9 (10-12 @ 05:30), 3.9 (10-11 @ 17:34), 3.9 (10-11 @ 04:58)  Cl: 97 (10-13 @ 05:19), 98 (10-12 @ 15:43), 101 (10-12 @ 05:30), 104 (10-11 @ 17:34), 99 (10-11 @ 04:58)  CO2: 28 (10-13 @ 05:19), 29 (10-12 @ 15:43), 29 (10-12 @ 05:30), 28 (10-11 @ 17:34), 27 (10-11 @ 04:58)  BUN: 8 (10-13 @ 05:19), 8 (10-12 @ 15:43), 6 (10-12 @ 05:30), 9 (10-11 @ 17:34), 7 (10-11 @ 04:58)  Cr: 0.41 (10-13 @ 05:19), 0.37 (10-12 @ 15:43), 0.38 (10-12 @ 05:30), 0.43 (10-11 @ 17:34), 0.40 (10-11 @ 04:58)  Glu: 108(10-13 @ 05:19), 132(10-12 @ 15:43), 116(10-12 @ 05:30), 107(10-11 @ 17:34), 94(10-11 @ 04:58)    Hgb: 9.0 (10-13 @ 05:19), 8.9 (10-12 @ 05:30), 8.3 (10-11 @ 17:34), 8.2 (10-11 @ 04:58)  Hct: 28.5 (10-13 @ 05:19), 27.4 (10-12 @ 05:30), 26.1 (10-11 @ 17:34), 25.7 (10-11 @ 04:58)  WBC: 10.97 (10-13 @ 05:19), 10.26 (10-12 @ 05:30), 9.80 (10-11 @ 17:34), 7.64 (10-11 @ 04:58)  Plt: 426 (10-13 @ 05:19), 461 (10-12 @ 05:30), 426 (10-11 @ 17:34), 420 (10-11 @ 04:58)    LIVER FUNCTIONS - ( 13 Oct 2023 05:19 )  Alb: 4.0 g/dL / Pro: 7.4 g/dL / ALK PHOS: 82 U/L / ALT: 28 U/L / AST: 23 U/L / GGT: x

## 2023-10-13 NOTE — CONSULT NOTE ADULT - SUBJECTIVE AND OBJECTIVE BOX
HPI:  Taken from outpatient neurosurgery note on 9/13/2023 " The patient, a long-standing patient of my practice, reports a history of spinal issues that dates back to a childhood injury. Over the years she's experienced developing issues with walking in a straight line, overactive reflexes in her lower extremities, and unique difficulty with her lower extremities referred to as cloneness. Recent upturn in these symptoms has limited her ability to carry out her daily routines. The patient also reports no significant improvement in her condition since our last clinical encounter two years ago."    42 y/o female with no PMHx or PSHx presents for surgery today.  She reports ambulating with walker or baby stroller at home for years.  She reports intermittent falls.  She has right greater than left leg tingling, low back and mid back pain.  She denies arm or leg pain.  She reports urinary incontinence since having a baby 2 years ago where if she doesn't get to the bathroom fast enough, she has incontinence, but she is able to hold it for a short while.  She takes no pain medications.  She has taken ibuprofen in the past.  She denies saddle anesthesia, bowel incontinence.   (25 Sep 2023 06:51)    Today patient describes her pain to me as puling and stabbing in her neck and her left shoulder.   Her pain medication requirements have been increasing and she continues to take medication alternating every 2 hours (hydromorphone 6mg and 0.5mg iv), which brings her pain scale down to manageable levels but the relief recedes quickly.  She appears comfortable to me during the interview but she had recently received medication.  She received 6x hydromorphone 6 mg in the past 24h, in addition to 5x hydromorphone iv 0.5mg.       PAST MEDICAL & SURGICAL HISTORY:  History of acute illness  childhood      No significant past surgical history          FAMILY HISTORY:  No pertinent family history in first degree relatives      PAIN MEDICATIONS:  acetaminophen     Tablet .. 1000 milliGRAM(s) Oral every 8 hours  diazepam    Tablet 5 milliGRAM(s) Oral every 8 hours  HYDROmorphone   Tablet 6 milliGRAM(s) Oral every 4 hours PRN  HYDROmorphone   Tablet 4 milliGRAM(s) Oral every 4 hours PRN  melatonin 5 milliGRAM(s) Oral at bedtime PRN  ondansetron   Disintegrating Tablet 4 milliGRAM(s) Oral every 6 hours  pregabalin 50 milliGRAM(s) Oral every 8 hours    Heme:  enoxaparin Injectable 40 milliGRAM(s) SubCutaneous every 24 hours    Antibiotics:    Cardiovascular:  midodrine 15 milliGRAM(s) Oral every 8 hours  norepinephrine Infusion 0.05 MICROgram(s)/kG/Min IV Continuous <Continuous>    GI:  bisacodyl Suppository 10 milliGRAM(s) Rectal daily  polyethylene glycol 3350 17 Gram(s) Oral two times a day  senna 2 Tablet(s) Oral at bedtime  simethicone 80 milliGRAM(s) Chew every 6 hours    Endocrine:    All Other Medications:  ascorbic acid 500 milliGRAM(s) Oral <User Schedule>  chlorhexidine 2% Cloths 1 Application(s) Topical <User Schedule>  ferrous    sulfate 325 milliGRAM(s) Oral <User Schedule>  influenza   Vaccine 0.5 milliLiter(s) IntraMuscular once  lactated ringers. 1000 milliLiter(s) IV Continuous <Continuous>  sodium chloride 0.9% lock flush 10 milliLiter(s) IV Push every 1 hour PRN      Vital Signs Last 24 Hrs  T(C): 36.5 (10 Oct 2023 09:10), Max: 37.7 (09 Oct 2023 17:34)  T(F): 97.7 (10 Oct 2023 09:10), Max: 99.9 (09 Oct 2023 17:34)  HR: 116 (10 Oct 2023 09:00) (92 - 129)  BP: 115/69 (09 Oct 2023 21:00) (115/69 - 115/69)  BP(mean): 83 (09 Oct 2023 21:00) (83 - 83)  RR: 16 (10 Oct 2023 09:00) (15 - 20)  SpO2: 98% (10 Oct 2023 09:00) (85% - 100%)    Parameters below as of 10 Oct 2023 09:00  Patient On (Oxygen Delivery Method): room air        LABS:                        8.5    11.93 )-----------( 419      ( 10 Oct 2023 05:30 )             26.7     10-10    137  |  98  |  5<L>  ----------------------------<  121<H>  4.0   |  30  |  0.41<L>    Ca    9.1      10 Oct 2023 05:30  Phos  4.0     10-10  Mg     1.7     10-10        Urinalysis Basic - ( 10 Oct 2023 05:30 )    Color: x / Appearance: x / SG: x / pH: x  Gluc: 121 mg/dL / Ketone: x  / Bili: x / Urobili: x   Blood: x / Protein: x / Nitrite: x   Leuk Esterase: x / RBC: x / WBC x   Sq Epi: x / Non Sq Epi: x / Bacteria: x

## 2023-10-13 NOTE — CONSULT NOTE ADULT - ASSESSMENT
43F no pmh s/p T3-T9 revision, C7-L1 fusion on 10/6/23. Taking hydromorphone po every 2 hours. Her medication use is increasing. Continues to be on phenylephrine.     - hydromorphone 4mg/6mg po q4h prn  - discontinue hydromorphone 0.5mg iv prn  - acetaminophen 1000mg q8h  - diazepam 5mg q8h  - increase pregabalin to 75mg q8h  - Bowel regimen + Nausea ppx

## 2023-10-13 NOTE — PROGRESS NOTE ADULT - SUBJECTIVE AND OBJECTIVE BOX
HPI:  Taken from outpatient neurosurgery note on 9/13/2023 " The patient, a long-standing patient of my practice, reports a history of spinal issues that dates back to a childhood injury. Over the years she's experienced developing issues with walking in a straight line, overactive reflexes in her lower extremities, and unique difficulty with her lower extremities referred to as cloneness. Recent upturn in these symptoms has limited her ability to carry out her daily routines. The patient also reports no significant improvement in her condition since our last clinical encounter two years ago."    42 y/o female with no PMHx or PSHx presents for surgery today.  She reports ambulating with walker or baby stroller at home for years.  She reports intermittent falls.  She has right greater than left leg tingling, low back and mid back pain.  She denies arm or leg pain.  She reports urinary incontinence since having a baby 2 years ago where if she doesn't get to the bathroom fast enough, she has incontinence, but she is able to hold it for a short while.  She takes no pain medications.  She has taken ibuprofen in the past.  She denies saddle anesthesia, bowel incontinence.   (25 Sep 2023 06:51)    OVERNIGHT EVENTS: albumin given o/n to help wean from pressers. neuro stable    Hospital Course:   9/25: POD 0 s/p C7-L1 fusion, T3-8 vertebral column resection, placement of anterior cage.   9/26; POD1 H/H 6.8 postop and PLT 85. Given 2u PRBC and 1u PLT. Switched propofol to precedex gtt. Pt extubated to face mask. Pt noted to only be withdrawing to noxious stimuli on bilateral lower extremities with sensation decreased RLE per patient. Dr. Vieira notified and plan is to keep MAP>100 and obtain CT full spine in the morning. Phenylephrine started to maintain MAP>100. Advanced diet to regular. Passed TOV. Increased need for aurora, UO high.   9/27: POD2 C7-L1 fusion T3-8 vertebral column resection. increased pressor requirements o/n, started on levo additionally. lactate 1.5, BNP 1400 from 800, CK elevated. echo: EF 60-65%. given 250cc bolus albumin, started on midodrine 10q8 to wean off pressors. PICC placed by Alli.   9/28: POD3 s/p C7-L1 fusion, T3-8 vertebral column resection, placement of anterior cage. R radial A-line removed and replaced on the L, trops and EKG done for chest discomfort, WNL, resolved with treatment of overall pain, continues to be febrile, lyrica dc'd to help.   Lyrica restarted. Weaning phenylephrine. Serum Na uptrending, 3% saline discontinued. Pt febrile with uptrending WBC, ID consulted recommend monitoring for now off antibiotics.  9/29: POD4. started on 3% o/n for Na 132. remains on aurora and levo for MAP>100. Tachycardic, given 1L NS. Right axillary a-line placed. 3% dc'd. HMV dc'd. Aquacell dressing replaced. Passed TOV.   9/30: POD 5.  Remains on levo gtt. Increased midodrine to 15q8. Given enema. Had BM.  10/1: POD 6. MAP goal increased back to >100 due to worsened sensation loss, on levo gtt. afebrile however per ID Dr De Souza, desiree cx sent. UA+. started empirically on vanc/zosyn. b/l LE doppler and LUE doppler ordered per ID, +LUE superficial thrombophlebitis. BL LE dopplers negative for DVT.   10/2: POD 7, ANNA overnight. Maintaining MAP >100. 500cc albumin bolus, dilaudid lowered to 2/4mg, toradol [id number] added for pain, abd xray for distension shows gas pattern. aquacel dressing changed.  10/3: POD8 ANNA overnight. Remains on levophed to maintain MAP>100 in AM. Neuro exam unchanged. Tmax 100.9F in AM, given tylenol. Flexeril changed to valium 2q8 and lyrica 50q8 started per pain managment.  DC'd vanc, continue zosyn x 7 days per ID. 1u PRBC to maintain hbg >9.0. DANIELE drain removed. Salt tabs weaned to 2q8. MAP goal changed to >85 to help wean off levo gtt.   10/4: POD 9. MAP goal >100. Sodium chloride tabs discontinued.  10/5: POD 10, Given 2 L boluses o/n for tachycardia and negative fluid status. Given 1 mg IV valium in AM for muscle spasm. 1L bolus given in afternoon for high urine output. Preop for OR tomorrow.   10/6: POD 11 Pt endorsing incisional pain, given 1mg IV valium. Neuro exam stable. Plan for second stage today,  POD 0 revision of T3-T9 VCR, revision of C7-L1 posterior fusion, plastics closure. Post-op CT thoracic spine complete. 1 L bolus for soft BP and colapsable IVC on POCUS.   10/7: POD 12 C7-L1 fusion. POD 1 Revision fusion and T3-9 VCR. Pain controlled. Remains on Levo for MAP goal > 100. AXR completed in AM for abdominal distension. Dilaudid PO prn increased to 4 and 6 mg, valium increased to 2mg q6 for pain control. 1u PRBC given for Hb 8.4. Hgb elevated to 10.4. 250 cc albumin given for tachycardia.   10/8: POD 13 Fusion, POD 2 Revision. Pain controlled overnight.   +BM this morning. Right hemovac removed. MAPs liberalized to goal greater than 85. Troponin elevated this morning, now downtrending. EKG WNL.   10/9: POD 13 C7-L1 fusion, POD 3 Revision and T3-9 VCR.   MAP goals liberalized to keep >80. Right DANIELE drain removed. IV valium given x1   10/10: POD 14 C7-L1 fusion, POD 4 revision and T3-9 VCR, liberalized MAP goal to >65. Started PO iron and vit C every other day. HMV removed. Lidocaine patch given for left neck pain. Midodrine weaned to 10q8.   10/11: POD 15 C7-L1 fusion, POD 5 revision and T3-9 VCR. SBP 80s when working with PT, given 1L bolus NS. Increased midodrine 15q8. Given additional 500cc bolus NS and 250cc albumin for soft pressures. Started aurora gtt for MAP>65.Given additional 500cc NS. CTA chest neg for PE, notable for b/l pleural effusions. Lasix 10mg IV.  10/12: POD 16/6. Remains on aurora gtt. CXR showing b/l pleural effusions. Given additional 20mg IV lasix. TTE normal, EF 54%. increased lyrica to 75q8. Given additional 20 IV lasix.  10/13: POD17/7. ANNA o/n neuro stable. remains on aurora gtt.     Vital Signs Last 24 Hrs  T(C): 37.1 (12 Oct 2023 22:01), Max: 38.1 (12 Oct 2023 08:58)  T(F): 98.7 (12 Oct 2023 22:01), Max: 100.5 (12 Oct 2023 08:58)  HR: 89 (13 Oct 2023 00:00) (87 - 133)  BP: 94/50 (12 Oct 2023 09:20) (94/50 - 94/50)  BP(mean): 65 (12 Oct 2023 09:20) (65 - 65)  RR: 17 (12 Oct 2023 23:00) (15 - 18)  SpO2: 97% (13 Oct 2023 00:00) (96% - 100%)    Parameters below as of 12 Oct 2023 23:00  Patient On (Oxygen Delivery Method): room air        I&O's Summary    11 Oct 2023 07:01  -  12 Oct 2023 07:00  --------------------------------------------------------  IN: 2626 mL / OUT: 5730 mL / NET: -3104 mL    12 Oct 2023 07:01  -  13 Oct 2023 00:23  --------------------------------------------------------  IN: 731.6 mL / OUT: 2070 mL / NET: -1338.4 mL        PHYSICAL EXAM:  GEN: laying in bed, NAD  NEURO: AOx3. FC, OE spont, speech intact, face symmetric. CNII-XII intact. PERRL, EOMI. No pronator drift. b/l UE 5/5. b/l LE 0. decreased sensation below t10 level.  CV: RRR +S1/S2  PULM: CTAB  GI: Abd soft, NT/ND  EXT: ext warm, dry, nontender  WOUND: c/t/l spine incision c/d/i    TUBES/LINES:  [] Elsy  [] Lumbar Drain  [x] Wound Drains: JPx1   [] Others      DIET:  [] NPO  [x] Mechanical  [] Tube feeds    LABS:                        8.9    10.26 )-----------( 461      ( 12 Oct 2023 05:30 )             27.4     10-12    135  |  98  |  8   ----------------------------<  132<H>  3.7   |  29  |  0.37<L>    Ca    9.2      12 Oct 2023 15:43  Phos  3.4     10-12  Mg     1.8     10-12        Urinalysis Basic - ( 12 Oct 2023 15:43 )    Color: x / Appearance: x / SG: x / pH: x  Gluc: 132 mg/dL / Ketone: x  / Bili: x / Urobili: x   Blood: x / Protein: x / Nitrite: x   Leuk Esterase: x / RBC: x / WBC x   Sq Epi: x / Non Sq Epi: x / Bacteria: x          CAPILLARY BLOOD GLUCOSE          Drug Levels: [] N/A    CSF Analysis: [] N/A      Allergies    No Known Allergies    Intolerances      MEDICATIONS:  Antibiotics:    Neuro:  diazepam    Tablet 5 milliGRAM(s) Oral every 8 hours  HYDROmorphone   Tablet 6 milliGRAM(s) Oral every 4 hours PRN  HYDROmorphone   Tablet 4 milliGRAM(s) Oral every 4 hours PRN  HYDROmorphone  Injectable 0.5 milliGRAM(s) IV Push every 4 hours PRN  melatonin 5 milliGRAM(s) Oral at bedtime PRN  pregabalin 75 milliGRAM(s) Oral every 8 hours    Anticoagulation:  enoxaparin Injectable 40 milliGRAM(s) SubCutaneous every 24 hours    OTHER:  bisacodyl Suppository 10 milliGRAM(s) Rectal daily  chlorhexidine 2% Cloths 1 Application(s) Topical <User Schedule>  influenza   Vaccine 0.5 milliLiter(s) IntraMuscular once  lidocaine   4% Patch 1 Patch Transdermal daily  midodrine 15 milliGRAM(s) Oral every 8 hours  phenylephrine    Infusion 0.1 MICROgram(s)/kG/Min IV Continuous <Continuous>  polyethylene glycol 3350 17 Gram(s) Oral two times a day  senna 2 Tablet(s) Oral at bedtime    IVF:  ascorbic acid 500 milliGRAM(s) Oral <User Schedule>  ferrous    sulfate 325 milliGRAM(s) Oral <User Schedule>    CULTURES:  Culture Results:   No growth at 5 days. (10-01 @ 11:25)  Culture Results:   No growth at 5 days. (10-01 @ 11:25)    RADIOLOGY & ADDITIONAL TESTS:      ASSESSMENT:  43F no PMHx suffered childhood injury resulting in spine injury and residual gait disturbance, hyperreflexia of BL LE. She has right greater than left leg tingling, low back and mid back pain. 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 S/p C7-L1 fusion, T3-8 vertebral column resection, placement of anterior cage (9/25/23). S/p revision of T3-T9 VCR, revision of C7-L1 posterior fusion, plastics closure (10/6/23).     M40.205    S31.000A    S31.000A M40.205 S22.716ZZ30.009G    S31.000A M40.205 S22.009S22.009G    No pertinent family history in first degree relatives    Handoff    No pertinent past medical history    History of acute illness    Deformity of thoracic vertebra    Thoracic myelopathy    Deformity of thoracic vertebra    Thoracic myelopathy    Corpectomy, spine, lumbar, 4 levels    No significant past surgical history    Room Service Assist    Room Service Assist    [id number]        PLAN:  NEURO:  - Neuro/spine checks q4/vitals q1hrs  - Pain control: tylenol 1g q8h, dilaudid 4/6 PO, Lyrica 75q8,- stage 1 Post-op CT full spine: L1 screw not attached to the vertical yoli, epidural lipomatosis L2-3  - stage 2 post op CT T spine completed  - JPx1 (per plastics) (L HMV, R HMV and R DANIELE dc'd)    Cardio:  - MAP > 65, aurora gtt  - midodrine 15 mg q8h   - echo 9/27: EF 60-65%, 10/12 nml EF 54%     Pulm:  - room air  - incentive spirometry  - CTA chest 10/11 neg for PE, notable for b/l pleural effusions  - CXR 10/12 b/l pleural effusions     GI:  - Regular diet  - Bowel regimen   - BM 10/12    Renal:  - IVL  - voiding    Endo:  - a1c 5.6, ISS dc'd    Heme:  - SCDs for DVT ppx/ SQL   - 2u PRBC and 1u PLT postop 9/25, 1u PRBC 10/3, 1u PRBC 10/7  - b/l LE dopplers 10/1 negative  - LUE doppler 10/1: +cephalic thrombophlebitis  - iron and vitamin c every other day started 10/10     ID:  - 10/1 panculture, +UA 10/1  - Vanc (10/1-10/3 ) dc'd, Zosyn (10/1- 10/8)    - ID signed off    Dispo: ICU status, full code, PT/OT recs AR  Discussed with Dr. Vieira and Dr. Sutton    Assessment:  Present when checked    []  GCS  E   V  M     Heart Failure: []Acute, [] acute on chronic , []chronic  Heart Failure:  [] Diastolic (HFpEF), [] Systolic (HFrEF), []Combined (HFpEF and HFrEF), [] RHF, [] Pulm HTN, [] Other    [] CELE, [] ATN, [] AIN, [] other  [] CKD1, [] CKD2, [] CKD 3, [] CKD 4, [] CKD 5, []ESRD    Encephalopathy: [] Metabolic, [] Hepatic, [] toxic, [] Neurological, [] Other    Abnormal Nurtitional Status: [] malnurtition (see nutrition note), [ ]underweight: BMI < 19, [] morbid obesity: BMI >40, [] Cachexia    [] Sepsis  [] hypovolemic shock,[] cardiogenic shock, [] hemorrhagic shock, [] neuogenic shock  [] Acute Respiratory Failure  []Cerebral edema, [] Brain compression/ herniation,   [] Functional quadriplegia  [] Acute blood loss anemia

## 2023-10-14 LAB
ANION GAP SERPL CALC-SCNC: 12 MMOL/L — SIGNIFICANT CHANGE UP (ref 5–17)
BUN SERPL-MCNC: 10 MG/DL — SIGNIFICANT CHANGE UP (ref 7–23)
CALCIUM SERPL-MCNC: 10 MG/DL — SIGNIFICANT CHANGE UP (ref 8.4–10.5)
CHLORIDE SERPL-SCNC: 96 MMOL/L — SIGNIFICANT CHANGE UP (ref 96–108)
CO2 SERPL-SCNC: 28 MMOL/L — SIGNIFICANT CHANGE UP (ref 22–31)
CORTIS AM PEAK SERPL-MCNC: 9.62 UG/DL — SIGNIFICANT CHANGE UP (ref 6.02–18.4)
CREAT SERPL-MCNC: 0.4 MG/DL — LOW (ref 0.5–1.3)
EGFR: 127 ML/MIN/1.73M2 — SIGNIFICANT CHANGE UP
GLUCOSE SERPL-MCNC: 107 MG/DL — HIGH (ref 70–99)
HCT VFR BLD CALC: 30.9 % — LOW (ref 34.5–45)
HGB BLD-MCNC: 10 G/DL — LOW (ref 11.5–15.5)
MAGNESIUM SERPL-MCNC: 1.9 MG/DL — SIGNIFICANT CHANGE UP (ref 1.6–2.6)
MCHC RBC-ENTMCNC: 27.7 PG — SIGNIFICANT CHANGE UP (ref 27–34)
MCHC RBC-ENTMCNC: 32.4 GM/DL — SIGNIFICANT CHANGE UP (ref 32–36)
MCV RBC AUTO: 85.6 FL — SIGNIFICANT CHANGE UP (ref 80–100)
NRBC # BLD: 0 /100 WBCS — SIGNIFICANT CHANGE UP (ref 0–0)
PHOSPHATE SERPL-MCNC: 3.8 MG/DL — SIGNIFICANT CHANGE UP (ref 2.5–4.5)
PLATELET # BLD AUTO: 422 K/UL — HIGH (ref 150–400)
POTASSIUM SERPL-MCNC: 3.9 MMOL/L — SIGNIFICANT CHANGE UP (ref 3.5–5.3)
POTASSIUM SERPL-SCNC: 3.9 MMOL/L — SIGNIFICANT CHANGE UP (ref 3.5–5.3)
RBC # BLD: 3.61 M/UL — LOW (ref 3.8–5.2)
RBC # FLD: 14.1 % — SIGNIFICANT CHANGE UP (ref 10.3–14.5)
SODIUM SERPL-SCNC: 136 MMOL/L — SIGNIFICANT CHANGE UP (ref 135–145)
TROPONIN T, HIGH SENSITIVITY RESULT: 13 NG/L — SIGNIFICANT CHANGE UP (ref 0–51)
WBC # BLD: 9.6 K/UL — SIGNIFICANT CHANGE UP (ref 3.8–10.5)
WBC # FLD AUTO: 9.6 K/UL — SIGNIFICANT CHANGE UP (ref 3.8–10.5)

## 2023-10-14 PROCEDURE — 99291 CRITICAL CARE FIRST HOUR: CPT

## 2023-10-14 PROCEDURE — 71045 X-RAY EXAM CHEST 1 VIEW: CPT | Mod: 26

## 2023-10-14 RX ORDER — FUROSEMIDE 40 MG
40 TABLET ORAL DAILY
Refills: 0 | Status: DISCONTINUED | OUTPATIENT
Start: 2023-10-14 | End: 2023-10-15

## 2023-10-14 RX ORDER — POTASSIUM CHLORIDE 20 MEQ
20 PACKET (EA) ORAL ONCE
Refills: 0 | Status: COMPLETED | OUTPATIENT
Start: 2023-10-14 | End: 2023-10-14

## 2023-10-14 RX ORDER — MAGNESIUM SULFATE 500 MG/ML
2 VIAL (ML) INJECTION ONCE
Refills: 0 | Status: COMPLETED | OUTPATIENT
Start: 2023-10-14 | End: 2023-10-14

## 2023-10-14 RX ADMIN — Medication 1000 MILLIGRAM(S): at 21:44

## 2023-10-14 RX ADMIN — SENNA PLUS 2 TABLET(S): 8.6 TABLET ORAL at 21:45

## 2023-10-14 RX ADMIN — HYDROMORPHONE HYDROCHLORIDE 4 MILLIGRAM(S): 2 INJECTION INTRAMUSCULAR; INTRAVENOUS; SUBCUTANEOUS at 21:45

## 2023-10-14 RX ADMIN — HYDROMORPHONE HYDROCHLORIDE 6 MILLIGRAM(S): 2 INJECTION INTRAMUSCULAR; INTRAVENOUS; SUBCUTANEOUS at 18:23

## 2023-10-14 RX ADMIN — SIMETHICONE 80 MILLIGRAM(S): 80 TABLET, CHEWABLE ORAL at 05:06

## 2023-10-14 RX ADMIN — HYDROMORPHONE HYDROCHLORIDE 4 MILLIGRAM(S): 2 INJECTION INTRAMUSCULAR; INTRAVENOUS; SUBCUTANEOUS at 15:32

## 2023-10-14 RX ADMIN — HYDROMORPHONE HYDROCHLORIDE 6 MILLIGRAM(S): 2 INJECTION INTRAMUSCULAR; INTRAVENOUS; SUBCUTANEOUS at 11:56

## 2023-10-14 RX ADMIN — HYDROMORPHONE HYDROCHLORIDE 4 MILLIGRAM(S): 2 INJECTION INTRAMUSCULAR; INTRAVENOUS; SUBCUTANEOUS at 10:22

## 2023-10-14 RX ADMIN — Medication 1000 MILLIGRAM(S): at 05:05

## 2023-10-14 RX ADMIN — HYDROMORPHONE HYDROCHLORIDE 6 MILLIGRAM(S): 2 INJECTION INTRAMUSCULAR; INTRAVENOUS; SUBCUTANEOUS at 05:05

## 2023-10-14 RX ADMIN — MIDODRINE HYDROCHLORIDE 20 MILLIGRAM(S): 2.5 TABLET ORAL at 13:36

## 2023-10-14 RX ADMIN — Medication 5 MILLIGRAM(S): at 13:36

## 2023-10-14 RX ADMIN — Medication 40 MILLIGRAM(S): at 11:50

## 2023-10-14 RX ADMIN — MIDODRINE HYDROCHLORIDE 20 MILLIGRAM(S): 2.5 TABLET ORAL at 05:05

## 2023-10-14 RX ADMIN — Medication 75 MILLIGRAM(S): at 05:05

## 2023-10-14 RX ADMIN — Medication 5 MILLIGRAM(S): at 05:06

## 2023-10-14 RX ADMIN — HYDROMORPHONE HYDROCHLORIDE 6 MILLIGRAM(S): 2 INJECTION INTRAMUSCULAR; INTRAVENOUS; SUBCUTANEOUS at 06:48

## 2023-10-14 RX ADMIN — ENOXAPARIN SODIUM 40 MILLIGRAM(S): 100 INJECTION SUBCUTANEOUS at 21:44

## 2023-10-14 RX ADMIN — PHENYLEPHRINE HYDROCHLORIDE 2.57 MICROGRAM(S)/KG/MIN: 10 INJECTION INTRAVENOUS at 07:11

## 2023-10-14 RX ADMIN — Medication 25 GRAM(S): at 07:36

## 2023-10-14 RX ADMIN — Medication 1000 MILLIGRAM(S): at 14:23

## 2023-10-14 RX ADMIN — Medication 5 MILLIGRAM(S): at 21:44

## 2023-10-14 RX ADMIN — Medication 75 MILLIGRAM(S): at 13:36

## 2023-10-14 RX ADMIN — HYDROMORPHONE HYDROCHLORIDE 6 MILLIGRAM(S): 2 INJECTION INTRAMUSCULAR; INTRAVENOUS; SUBCUTANEOUS at 00:00

## 2023-10-14 RX ADMIN — POLYETHYLENE GLYCOL 3350 17 GRAM(S): 17 POWDER, FOR SOLUTION ORAL at 05:04

## 2023-10-14 RX ADMIN — HYDROMORPHONE HYDROCHLORIDE 4 MILLIGRAM(S): 2 INJECTION INTRAMUSCULAR; INTRAVENOUS; SUBCUTANEOUS at 09:04

## 2023-10-14 RX ADMIN — HYDROMORPHONE HYDROCHLORIDE 6 MILLIGRAM(S): 2 INJECTION INTRAMUSCULAR; INTRAVENOUS; SUBCUTANEOUS at 11:49

## 2023-10-14 RX ADMIN — SIMETHICONE 80 MILLIGRAM(S): 80 TABLET, CHEWABLE ORAL at 13:36

## 2023-10-14 RX ADMIN — LIDOCAINE 1 PATCH: 4 CREAM TOPICAL at 22:06

## 2023-10-14 RX ADMIN — Medication 5 MILLIGRAM(S): at 21:45

## 2023-10-14 RX ADMIN — MIDODRINE HYDROCHLORIDE 20 MILLIGRAM(S): 2.5 TABLET ORAL at 21:44

## 2023-10-14 RX ADMIN — HYDROMORPHONE HYDROCHLORIDE 4 MILLIGRAM(S): 2 INJECTION INTRAMUSCULAR; INTRAVENOUS; SUBCUTANEOUS at 15:07

## 2023-10-14 RX ADMIN — HYDROMORPHONE HYDROCHLORIDE 6 MILLIGRAM(S): 2 INJECTION INTRAMUSCULAR; INTRAVENOUS; SUBCUTANEOUS at 01:00

## 2023-10-14 RX ADMIN — Medication 1000 MILLIGRAM(S): at 22:06

## 2023-10-14 RX ADMIN — Medication 1000 MILLIGRAM(S): at 06:48

## 2023-10-14 RX ADMIN — CHLORHEXIDINE GLUCONATE 1 APPLICATION(S): 213 SOLUTION TOPICAL at 05:06

## 2023-10-14 RX ADMIN — HYDROMORPHONE HYDROCHLORIDE 4 MILLIGRAM(S): 2 INJECTION INTRAMUSCULAR; INTRAVENOUS; SUBCUTANEOUS at 22:00

## 2023-10-14 RX ADMIN — Medication 20 MILLIEQUIVALENT(S): at 07:36

## 2023-10-14 RX ADMIN — HYDROMORPHONE HYDROCHLORIDE 6 MILLIGRAM(S): 2 INJECTION INTRAMUSCULAR; INTRAVENOUS; SUBCUTANEOUS at 00:22

## 2023-10-14 RX ADMIN — Medication 1000 MILLIGRAM(S): at 13:36

## 2023-10-14 RX ADMIN — SIMETHICONE 80 MILLIGRAM(S): 80 TABLET, CHEWABLE ORAL at 21:45

## 2023-10-14 RX ADMIN — LIDOCAINE 1 PATCH: 4 CREAM TOPICAL at 15:24

## 2023-10-14 RX ADMIN — LIDOCAINE 1 PATCH: 4 CREAM TOPICAL at 11:49

## 2023-10-14 RX ADMIN — Medication 75 MILLIGRAM(S): at 21:45

## 2023-10-14 NOTE — CHART NOTE - NSCHARTNOTEFT_GEN_A_CORE
POD18/8. ANNA o/n neuro stable. remains on aurora gtt. AM cortisol normal. Started lasix 40mg IV daily. Applied thigh high compression stockings.

## 2023-10-14 NOTE — PROGRESS NOTE ADULT - SUBJECTIVE AND OBJECTIVE BOX
INTERVAL HISTORY: HPI:  Taken from outpatient neurosurgery note on 9/13/2023 " The patient, a long-standing patient of my practice, reports a history of spinal issues that dates back to a childhood injury. Over the years she's experienced developing issues with walking in a straight line, overactive reflexes in her lower extremities, and unique difficulty with her lower extremities referred to as cloneness. Recent upturn in these symptoms has limited her ability to carry out her daily routines. The patient also reports no significant improvement in her condition since our last clinical encounter two years ago."    42 y/o female with no PMHx or PSHx presents for surgery today.  She reports ambulating with walker or baby stroller at home for years.  She reports intermittent falls.  She has right greater than left leg tingling, low back and mid back pain.  She denies arm or leg pain.  She reports urinary incontinence since having a baby 2 years ago where if she doesn't get to the bathroom fast enough, she has incontinence, but she is able to hold it for a short while.  She takes no pain medications.  She has taken ibuprofen in the past.  She denies saddle anesthesia, bowel incontinence.   (25 Sep 2023 06:51)    Drug Dosing Weight  Height (cm): 152.4 (06 Oct 2023 07:34)  Weight (kg): 68.6 (06 Oct 2023 07:34)  BMI (kg/m2): 29.5 (06 Oct 2023 07:34)  BSA (m2): 1.66 (06 Oct 2023 07:34)    PAST MEDICAL & SURGICAL HISTORY:  History of acute illness  childhood  No significant past surgical history    REVIEW OF SYSTEMS: [ ] Unable to Assess due to neurologic exam   [x] All ROS addressed below are non-contributory, except:  Neuro: [ ] Headache [ ] Back pain [ ] Numbness [ ] Weakness [ ] Ataxia [ ] Dizziness [ ] Aphasia [ ] Dysarthria [ ] Visual disturbance  Resp: [ ] Shortness of breath/dyspnea, [ ] Orthopnea [ ] Cough  CV: [ ] Chest pain [ ] Palpitation [ ] Lightheadedness [ ] Syncope  Renal: [ ] Thirst [ ] Edema  GI: [ ] Nausea [ ] Emesis [ ] Abdominal pain [ ] Constipation [ ] Diarrhea  Hem: [ ] Hematemesis [ ] bright red blood per rectum  ID: [ ] Fever [ ] Chills [ ] Dysuria  ENT: [ ] Rhinorrhea    PHYSICAL EXAM:    General: No Acute Distress   Neurological: aox3, follows command, 5/5 b/l UE, LE b/l  0/5, no proprioception  Pulmonary: Clear to Auscultation, No Rales, No Rhonchi, No Wheezes   Cardiovascular: S1, S2, Regular Rate and Rhythm   Gastrointestinal: Soft, Nontender, Nondistended   Extremities: No calf tenderness   Incision: CDI     ICU Vital Signs Last 24 Hrs  T(C): 36.8 (14 Oct 2023 18:11), Max: 37.3 (14 Oct 2023 13:10)  T(F): 98.2 (14 Oct 2023 18:11), Max: 99.1 (14 Oct 2023 13:10)  HR: 109 (14 Oct 2023 19:00) (75 - 138)  ABP: 113/73 (14 Oct 2023 19:00) (86/48 - 147/78)  ABP(mean): 92 (14 Oct 2023 19:00) (63 - 140)  RR: 15 (14 Oct 2023 19:00) (14 - 19)  SpO2: 100% (14 Oct 2023 19:00) (95% - 100%)      10-13-23 @ 07:01  -  10-14-23 @ 07:00  --------------------------------------------------------  IN: 1184.9 mL / OUT: 1760 mL / NET: -575.1 mL    10-14-23 @ 07:01  -  10-14-23 @ 20:11  --------------------------------------------------------  IN: 420 mL / OUT: 1140 mL / NET: -720 mL        acetaminophen     Tablet .. 1000 milliGRAM(s) Oral every 8 hours  ascorbic acid 500 milliGRAM(s) Oral <User Schedule>  bisacodyl Suppository 10 milliGRAM(s) Rectal daily  chlorhexidine 2% Cloths 1 Application(s) Topical <User Schedule>  diazepam    Tablet 5 milliGRAM(s) Oral every 8 hours  enoxaparin Injectable 40 milliGRAM(s) SubCutaneous every 24 hours  ferrous    sulfate 325 milliGRAM(s) Oral <User Schedule>  furosemide   Injectable 40 milliGRAM(s) IV Push daily  HYDROmorphone   Tablet 6 milliGRAM(s) Oral every 4 hours PRN  HYDROmorphone   Tablet 4 milliGRAM(s) Oral every 4 hours PRN  influenza   Vaccine 0.5 milliLiter(s) IntraMuscular once  lidocaine   4% Patch 1 Patch Transdermal daily  melatonin 5 milliGRAM(s) Oral at bedtime PRN  midodrine 20 milliGRAM(s) Oral every 8 hours  polyethylene glycol 3350 17 Gram(s) Oral two times a day  pregabalin 75 milliGRAM(s) Oral every 8 hours  senna 2 Tablet(s) Oral at bedtime  simethicone 80 milliGRAM(s) Chew three times a day  sodium chloride 0.9% lock flush 10 milliLiter(s) IV Push every 1 hour PRN      LABS:  Na: 136 (10-14 @ 04:59), 136 (10-13 @ 05:19), 135 (10-12 @ 15:43), 139 (10-12 @ 05:30)  K: 3.9 (10-14 @ 04:59), 4.1 (10-13 @ 05:19), 3.7 (10-12 @ 15:43), 3.9 (10-12 @ 05:30)  Cl: 96 (10-14 @ 04:59), 97 (10-13 @ 05:19), 98 (10-12 @ 15:43), 101 (10-12 @ 05:30)  CO2: 28 (10-14 @ 04:59), 28 (10-13 @ 05:19), 29 (10-12 @ 15:43), 29 (10-12 @ 05:30)  BUN: 10 (10-14 @ 04:59), 8 (10-13 @ 05:19), 8 (10-12 @ 15:43), 6 (10-12 @ 05:30)  Cr: 0.40 (10-14 @ 04:59), 0.41 (10-13 @ 05:19), 0.37 (10-12 @ 15:43), 0.38 (10-12 @ 05:30)  Glu: 107(10-14 @ 04:59), 108(10-13 @ 05:19), 132(10-12 @ 15:43), 116(10-12 @ 05:30)    Hgb: 10.0 (10-14 @ 04:59), 9.0 (10-13 @ 05:19), 8.9 (10-12 @ 05:30)  Hct: 30.9 (10-14 @ 04:59), 28.5 (10-13 @ 05:19), 27.4 (10-12 @ 05:30)  WBC: 9.60 (10-14 @ 04:59), 10.97 (10-13 @ 05:19), 10.26 (10-12 @ 05:30)  Plt: 422 (10-14 @ 04:59), 426 (10-13 @ 05:19), 461 (10-12 @ 05:30)      LIVER FUNCTIONS - ( 13 Oct 2023 05:19 )  Alb: 4.0 g/dL / Pro: 7.4 g/dL / ALK PHOS: 82 U/L / ALT: 28 U/L / AST: 23 U/L / GGT: x

## 2023-10-14 NOTE — PROGRESS NOTE ADULT - SUBJECTIVE AND OBJECTIVE BOX
=========================  NSICU ATTENDING PROGRESS NOTE  =========================    42 y/o female with no PMHx or PSHx presents for surgery today.  She reports ambulating with walker or baby stroller at home for years.  She reports intermittent falls.  She has right greater than left leg tingling, low back and mid back pain.  She denies arm or leg pain.  She reports urinary incontinence since having a baby 2 years ago where if she doesn't get to the bathroom fast enough, she has incontinence, but she is able to hold it for a short while.  She takes no pain medications.  She has taken ibuprofen in the past.  She denies saddle anesthesia, bowel incontinence.   (25 Sep 2023 06:51)      REVIEW OF SYSTEMS: [ ] Unable to Assess due to neurologic exam   [ x] All ROS addressed below are non-contributory, except:  Neuro: [ ] Headache [ x] Back pain [ ] Numbness [ x] Weakness [ ] Ataxia [ ] Dizziness [ ] Aphasia [ ] Dysarthria [ ] Visual disturbance  Resp: [ ] Shortness of breath/dyspnea, [ ] Orthopnea [ ] Cough  CV: [ ] Chest pain [ ] Palpitation [ ] Lightheadedness [ ] Syncope  Renal: [ ] Thirst [ ] Edema  GI: [ ] Nausea [ ] Emesis [ ] Abdominal pain [ ] Constipation [ ] Diarrhea  Hem: [ ] Hematemesis [ ] bright red blood per rectum  ID: [ ] Fever [ ] Chills [ ] Dysuria  ENT: [ ] Rhinorrhea      ICU Vital Signs Last 24 Hrs  T(C): 36.8 (14 Oct 2023 05:16), Max: 37.1 (13 Oct 2023 09:00)  T(F): 98.2 (14 Oct 2023 05:16), Max: 98.8 (13 Oct 2023 09:00)  HR: 75 (14 Oct 2023 07:00) (75 - 128)  ABP: 147/78 (14 Oct 2023 07:00) (79/41 - 147/78)  ABP(mean): 105 (14 Oct 2023 07:00) (56 - 140)  RR: 17 (14 Oct 2023 07:00) (14 - 20)  SpO2: 99% (14 Oct 2023 07:00) (95% - 100%)      10-13-23 @ 07:01  -  10-14-23 @ 07:00  --------------------------------------------------------  IN: 1184.9 mL / OUT: 1760 mL / NET: -575.1 mL        PHYSICAL EXAM:  Constitutional: No Acute Distress     Neurological: AOx3, Following Commands, Moving all Extremities     Motor exam:          Upper extremity                         Delt     Bicep     Tricep    HG                                                 R         5/5        5/5        5/5       5/5                                               L          5/5        5/5        5/5       5/5          Lower extremity              R and L : TF otherwise 0/5        Sensation: [] intact to light touch  [x] decreased: sensory level at T5 though at times able to sense deep touch on dorsum of B/L feet  Pulmonary: Clear to Auscultation, No rales, No rhonchi, No wheezes   Cardiovascular: S1, S2, Regular rate and rhythm   Gastrointestinal: Soft, Non-tender, distended, +BS  Extremities: No calf tenderness   Incision: CDI; drain in place       MEDICATIONS:   acetaminophen     Tablet .. 1000 milliGRAM(s) Oral every 8 hours  ascorbic acid 500 milliGRAM(s) Oral <User Schedule>  bisacodyl Suppository 10 milliGRAM(s) Rectal daily  chlorhexidine 2% Cloths 1 Application(s) Topical <User Schedule>  diazepam    Tablet 5 milliGRAM(s) Oral every 8 hours  enoxaparin Injectable 40 milliGRAM(s) SubCutaneous every 24 hours  ferrous    sulfate 325 milliGRAM(s) Oral <User Schedule>  HYDROmorphone   Tablet 6 milliGRAM(s) Oral every 4 hours PRN  HYDROmorphone   Tablet 4 milliGRAM(s) Oral every 4 hours PRN  influenza   Vaccine 0.5 milliLiter(s) IntraMuscular once  lidocaine   4% Patch 1 Patch Transdermal daily  melatonin 5 milliGRAM(s) Oral at bedtime PRN  midodrine 20 milliGRAM(s) Oral every 8 hours  phenylephrine    Infusion 0.1 MICROgram(s)/kG/Min (2.57 mL/Hr) IV Continuous <Continuous>  polyethylene glycol 3350 17 Gram(s) Oral two times a day  pregabalin 75 milliGRAM(s) Oral every 8 hours  senna 2 Tablet(s) Oral at bedtime  simethicone 80 milliGRAM(s) Chew three times a day  sodium chloride 0.9% lock flush 10 milliLiter(s) IV Push every 1 hour PRN      LABS:                       10.0   9.60  )-----------( 422      ( 14 Oct 2023 04:59 )             30.9     10-14    136  |  96  |  10  ----------------------------<  107<H>  3.9   |  28  |  0.40<L>    Ca    10.0      14 Oct 2023 04:59  Phos  3.8     10-14  Mg     1.9     10-14    TPro  7.4  /  Alb  4.0  /  TBili  0.2  /  DBili  <0.2  /  AST  23  /  ALT  28  /  AlkPhos  82  10-13    LIVER FUNCTIONS - ( 13 Oct 2023 05:19 )  Alb: 4.0 g/dL / Pro: 7.4 g/dL / ALK PHOS: 82 U/L / ALT: 28 U/L / AST: 23 U/L / GGT: x

## 2023-10-14 NOTE — PROGRESS NOTE ADULT - ASSESSMENT
43F s/p traumatic spine injury w/o neurological deficit & severe thoracic kyphosis now s/p T3-8 VCR, C7-L1 fusion, correction of deformity, placement of anterior cage.  STAGE 1. plastics closure. now s/p STAGE 2       -NCq4, VS q1- protected sleep time 10pm to 5am   -pain management w/ Dilaudid 4/6mg q4 PRN, breakthrough 0.5mg IV PRN q4,  lyrica 75mg, valium   -MAP goal >65; recently off phenylephrine c/w midodrine 20mg q8 wean as tolerated ; hypotension likely vasoplegia 2/2 spinal shock   -check cortisol level WNL  -DVT ppx w/ lovenox SQ   -regular diet; no indication for PPI at this time     refer to AM progress note for detailed plan

## 2023-10-14 NOTE — PROGRESS NOTE ADULT - SUBJECTIVE AND OBJECTIVE BOX
HPI:  Taken from outpatient neurosurgery note on 9/13/2023 " The patient, a long-standing patient of my practice, reports a history of spinal issues that dates back to a childhood injury. Over the years she's experienced developing issues with walking in a straight line, overactive reflexes in her lower extremities, and unique difficulty with her lower extremities referred to as cloneness. Recent upturn in these symptoms has limited her ability to carry out her daily routines. The patient also reports no significant improvement in her condition since our last clinical encounter two years ago."    42 y/o female with no PMHx or PSHx presents for surgery today.  She reports ambulating with walker or baby stroller at home for years.  She reports intermittent falls.  She has right greater than left leg tingling, low back and mid back pain.  She denies arm or leg pain.  She reports urinary incontinence since having a baby 2 years ago where if she doesn't get to the bathroom fast enough, she has incontinence, but she is able to hold it for a short while.  She takes no pain medications.  She has taken ibuprofen in the past.  She denies saddle anesthesia, bowel incontinence.   (25 Sep 2023 06:51)    OVERNIGHT EVENTS: [facility]    Hospital Course:   9/25: POD 0 s/p C7-L1 fusion, T3-8 vertebral column resection, placement of anterior cage.   9/26; POD1 H/H 6.8 postop and PLT 85. Given 2u PRBC and 1u PLT. Switched propofol to precedex gtt. Pt extubated to face mask. Pt noted to only be withdrawing to noxious stimuli on bilateral lower extremities with sensation decreased RLE per patient. Dr. Vieira notified and plan is to keep MAP>100 and obtain CT full spine in the morning. Phenylephrine started to maintain MAP>100. Advanced diet to regular. Passed TOV. Increased need for aurora, UO high.   9/27: POD2 C7-L1 fusion T3-8 vertebral column resection. increased pressor requirements o/n, started on levo additionally. lactate 1.5, BNP 1400 from 800, CK elevated. echo: EF 60-65%. given 250cc bolus albumin, started on midodrine 10q8 to wean off pressors. PICC placed by Alli.   9/28: POD3 s/p C7-L1 fusion, T3-8 vertebral column resection, placement of anterior cage. R radial A-line removed and replaced on the L, trops and EKG done for chest discomfort, WNL, resolved with treatment of overall pain, continues to be febrile, lyrica dc'd to help.   Lyrica restarted. Weaning phenylephrine. Serum Na uptrending, 3% saline discontinued. Pt febrile with uptrending WBC, ID consulted recommend monitoring for now off antibiotics.  9/29: POD4. started on 3% o/n for Na 132. remains on aurora and levo for MAP>100. Tachycardic, given 1L NS. Right axillary a-line placed. 3% dc'd. HMV dc'd. Aquacell dressing replaced. Passed TOV.   9/30: POD 5.  Remains on levo gtt. Increased midodrine to 15q8. Given enema. Had BM.  10/1: POD 6. MAP goal increased back to >100 due to worsened sensation loss, on levo gtt. afebrile however per ID Dr De Souza, desiree cx sent. UA+. started empirically on vanc/zosyn. b/l LE doppler and LUE doppler ordered per ID, +LUE superficial thrombophlebitis. BL LE dopplers negative for DVT.   10/2: POD 7, ANNA overnight. Maintaining MAP >100. 500cc albumin bolus, dilaudid lowered to 2/4mg, toradol [id number] added for pain, abd xray for distension shows gas pattern. aquacel dressing changed.  10/3: POD8 ANNA overnight. Remains on levophed to maintain MAP>100 in AM. Neuro exam unchanged. Tmax 100.9F in AM, given tylenol. Flexeril changed to valium 2q8 and lyrica 50q8 started per pain managment.  DC'd vanc, continue zosyn x 7 days per ID. 1u PRBC to maintain hbg >9.0. DANIELE drain removed. Salt tabs weaned to 2q8. MAP goal changed to >85 to help wean off levo gtt.   10/4: POD 9. MAP goal >100. Sodium chloride tabs discontinued.  10/5: POD 10, Given 2 L boluses o/n for tachycardia and negative fluid status. Given 1 mg IV valium in AM for muscle spasm. 1L bolus given in afternoon for high urine output. Preop for OR tomorrow.   10/6: POD 11 Pt endorsing incisional pain, given 1mg IV valium. Neuro exam stable. Plan for second stage today,  POD 0 revision of T3-T9 VCR, revision of C7-L1 posterior fusion, plastics closure. Post-op CT thoracic spine complete. 1 L bolus for soft BP and colapsable IVC on POCUS.   10/7: POD 12 C7-L1 fusion. POD 1 Revision fusion and T3-9 VCR. Pain controlled. Remains on Levo for MAP goal > 100. AXR completed in AM for abdominal distension. Dilaudid PO prn increased to 4 and 6 mg, valium increased to 2mg q6 for pain control. 1u PRBC given for Hb 8.4. Hgb elevated to 10.4. 250 cc albumin given for tachycardia.   10/8: POD 13 Fusion, POD 2 Revision. Pain controlled overnight.   +BM this morning. Right hemovac removed. MAPs liberalized to goal greater than 85. Troponin elevated this morning, now downtrending. EKG WNL.   10/9: POD 13 C7-L1 fusion, POD 3 Revision and T3-9 VCR.   MAP goals liberalized to keep >80. Right DANIELE drain removed. IV valium given x1   10/10: POD 14 C7-L1 fusion, POD 4 revision and T3-9 VCR, liberalized MAP goal to >65. Started PO iron and vit C every other day. HMV removed. Lidocaine patch given for left neck pain. Midodrine weaned to 10q8.   10/11: POD 15 C7-L1 fusion, POD 5 revision and T3-9 VCR. SBP 80s when working with PT, given 1L bolus NS. Increased midodrine 15q8. Given additional 500cc bolus NS and 250cc albumin for soft pressures. Started aurora gtt for MAP>65.Given additional 500cc NS. CTA chest neg for PE, notable for b/l pleural effusions. Lasix 10mg IV.  10/12: POD 16/6. Remains on aurora gtt. CXR showing b/l pleural effusions. Given additional 20mg IV lasix. TTE normal, EF 54%. increased lyrica to 75q8. Given additional 20 IV lasix.  10/13: POD17/7. ANNA o/n neuro stable. remains on aurora gtt. Lasix 40 IV. Midodrine 20mg q8h.   10/14: POD18/8. ANNA o/n neuro stable. remains on aurora gtt.    Vital Signs Last 24 Hrs  T(C): 36.8 (14 Oct 2023 01:32), Max: 37.1 (13 Oct 2023 09:00)  T(F): 98.2 (14 Oct 2023 01:32), Max: 98.8 (13 Oct 2023 09:00)  HR: 92 (14 Oct 2023 01:00) (84 - 128)  BP: --  BP(mean): --  RR: 18 (14 Oct 2023 01:00) (14 - 20)  SpO2: 99% (14 Oct 2023 01:00) (95% - 99%)    Parameters below as of 14 Oct 2023 01:00  Patient On (Oxygen Delivery Method): room air        I&O's Summary    12 Oct 2023 07:01  -  13 Oct 2023 07:00  --------------------------------------------------------  IN: 1232.4 mL / OUT: 2270 mL / NET: -1037.6 mL    13 Oct 2023 07:01  -  14 Oct 2023 01:41  --------------------------------------------------------  IN: 1157 mL / OUT: 1310 mL / NET: -153 mL        PHYSICAL EXAM:  GEN: laying in bed, NAD  NEURO: AOx3. FC, OE spont, speech intact, face symmetric. CNII-XII intact. PERRL, EOMI. No pronator drift. b/l UE 5/5. b/l LE 0. decreased sensation below t10 level.  CV: RRR +S1/S2  PULM: CTAB  GI: Abd soft, NT/ND  EXT: ext warm, dry, nontender  WOUND: c/t/l spine incision c/d/i    TUBES/LINES:  [] Chin  [] Lumbar Drain  [x] Wound Drains: JPx1  [] Others      DIET:  [] NPO  [x] Mechanical  [] Tube feeds    LABS:                        9.0    10.97 )-----------( 426      ( 13 Oct 2023 05:19 )             28.5     10-13    136  |  97  |  8   ----------------------------<  108<H>  4.1   |  28  |  0.41<L>    Ca    9.9      13 Oct 2023 05:19  Phos  4.2     10-13  Mg     1.9     10-13    TPro  7.4  /  Alb  4.0  /  TBili  0.2  /  DBili  <0.2  /  AST  23  /  ALT  28  /  AlkPhos  82  10-13      Urinalysis Basic - ( 13 Oct 2023 05:19 )    Color: x / Appearance: x / SG: x / pH: x  Gluc: 108 mg/dL / Ketone: x  / Bili: x / Urobili: x   Blood: x / Protein: x / Nitrite: x   Leuk Esterase: x / RBC: x / WBC x   Sq Epi: x / Non Sq Epi: x / Bacteria: x          CAPILLARY BLOOD GLUCOSE          Drug Levels: [] N/A    CSF Analysis: [] N/A      Allergies    No Known Allergies    Intolerances      MEDICATIONS:  Antibiotics:    Neuro:  acetaminophen     Tablet .. 1000 milliGRAM(s) Oral every 8 hours  diazepam    Tablet 5 milliGRAM(s) Oral every 8 hours  HYDROmorphone   Tablet 6 milliGRAM(s) Oral every 4 hours PRN  HYDROmorphone   Tablet 4 milliGRAM(s) Oral every 4 hours PRN  melatonin 5 milliGRAM(s) Oral at bedtime PRN  pregabalin 75 milliGRAM(s) Oral every 8 hours    Anticoagulation:  enoxaparin Injectable 40 milliGRAM(s) SubCutaneous every 24 hours    OTHER:  bisacodyl Suppository 10 milliGRAM(s) Rectal daily  chlorhexidine 2% Cloths 1 Application(s) Topical <User Schedule>  influenza   Vaccine 0.5 milliLiter(s) IntraMuscular once  lidocaine   4% Patch 1 Patch Transdermal daily  midodrine 20 milliGRAM(s) Oral every 8 hours  phenylephrine    Infusion 0.1 MICROgram(s)/kG/Min IV Continuous <Continuous>  polyethylene glycol 3350 17 Gram(s) Oral two times a day  senna 2 Tablet(s) Oral at bedtime  simethicone 80 milliGRAM(s) Chew three times a day    IVF:  ascorbic acid 500 milliGRAM(s) Oral <User Schedule>  ferrous    sulfate 325 milliGRAM(s) Oral <User Schedule>    CULTURES:  Culture Results:   No growth at 5 days. (10-01 @ 11:25)  Culture Results:   No growth at 5 days. (10-01 @ 11:25)    RADIOLOGY & ADDITIONAL TESTS:      ASSESSMENT:  43F no PMHx suffered childhood injury resulting in spine injury and residual gait disturbance, hyperreflexia of BL LE. She has right greater than left leg tingling, low back and mid back pain. 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 S/p C7-L1 fusion, T3-8 vertebral column resection, placement of anterior cage (9/25/23). S/p revision of T3-T9 VCR, revision of C7-L1 posterior fusion, plastics closure (10/6/23).     M40.205    S31.000A    S31.000A M40.205 S22.630TE60.009G    S31.000A M40.205 S22.009S22.009G    No pertinent family history in first degree relatives    Handoff    No pertinent past medical history    History of acute illness    Deformity of thoracic vertebra    Thoracic myelopathy    Deformity of thoracic vertebra    Thoracic myelopathy    Corpectomy, spine, lumbar, 4 levels    No significant past surgical history    Room Service Assist    Room Service Assist    [id number]        PLAN:  NEURO:  - Neuro/spine checks q4/vitals q1hrs  - Pain control: tylenol 1g q8h, dilaudid 4/6 PO, Lyrica 75q8,- stage 1 Post-op CT full spine: L1 screw not attached to the vertical yoli, epidural lipomatosis L2-3  - stage 2 post op CT T spine completed  - JPx1 (per plastics) (L HMV, R HMV and R DANIELE dc'd)    Cardio:  - MAP > 65, aurora gtt  - midodrine 20 mg q8h   - echo 9/27: EF 60-65%, 10/12 nml EF 54%     Pulm:  - room air  - incentive spirometry  - CTA chest 10/11 neg for PE, notable for b/l pleural effusions  - CXR 10/12 b/l pleural effusions     GI:  - Regular diet  - Bowel regimen, simethicone   - BM 10/13    Renal:  - IVL  - voiding    Endo:  - a1c 5.6, ISS dc'd    Heme:  - SCDs for DVT ppx/ SQL   - 2u PRBC and 1u PLT postop 9/25, 1u PRBC 10/3, 1u PRBC 10/7  - b/l LE dopplers 10/1 negative  - LUE doppler 10/1: +cephalic thrombophlebitis  - iron and vitamin c every other day started 10/10     ID:  - 10/1 panculture, +UA 10/1  - Vanc (10/1-10/3 ) dc'd, Zosyn (10/1- 10/8)    - ID signed off    Dispo: ICU status, full code, PT/OT recs AR  Discussed with Dr. Vieira and Dr. Sutton    Assessment:  Present when checked    []  GCS  E   V  M     Heart Failure: []Acute, [] acute on chronic , []chronic  Heart Failure:  [] Diastolic (HFpEF), [] Systolic (HFrEF), []Combined (HFpEF and HFrEF), [] RHF, [] Pulm HTN, [] Other    [] CELE, [] ATN, [] AIN, [] other  [] CKD1, [] CKD2, [] CKD 3, [] CKD 4, [] CKD 5, []ESRD    Encephalopathy: [] Metabolic, [] Hepatic, [] toxic, [] Neurological, [] Other    Abnormal Nurtitional Status: [] malnurtition (see nutrition note), [ ]underweight: BMI < 19, [] morbid obesity: BMI >40, [] Cachexia    [] Sepsis  [] hypovolemic shock,[] cardiogenic shock, [] hemorrhagic shock, [] neuogenic shock  [] Acute Respiratory Failure  []Cerebral edema, [] Brain compression/ herniation,   [] Functional quadriplegia  [] Acute blood loss anemia

## 2023-10-14 NOTE — PROGRESS NOTE ADULT - ASSESSMENT
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 STAGE 1. Plastics closure. POD 18  Now status post STAGE 2 POD 8    NEURO:  Neurochecks Q4  Pain management: Dilaudid 6mg/4mg PO PRN, valium standing, lyrica 75mg Q8  Pain management following  Monitor drain output ( DANIELE x1)  Insomnia- melatonin prn  Activity: Aggressive PT/OT      PULMONARY:  Saturating well on room air  Incentive spirometry 10q/hr when awake   CTPE protocol negative for PE, does show B/L pleural effusion- see cards    CARDIOVASCULAR: Troponinemia; new onset B/L pleural effusions and pulm vasc congestion; R/O takotsubo cardiomyopathy  MAP goal >65; on midodrine 20mg Q8. On neosynephrine; wean  Avoiding florinef as already with volume overload  Maintain Rosa Maria  Troponin negative, proBNP wnl  Repeat TTE: EF 54%   Lactate: 0.4; check cortisol  B/L pleural effusions. Diurescing with lasix 40mg IV. Continue**    GASTROENTEROLOGY:  Diet: Regular  LBM 10/13. Bowel regimen  KUB w/ bowel gas pattern   Continue simethicone   LFTs:    RENAL/:  IVL; diurescing with lasix  Monitor BMPs. Na goal 135-145  Replete lytes prn    ENDOCRINE:  Monitor fingersticks: Goal blood glucose 140-180mg/dL    HEME/ONC: Anemia  DVT ppx: Lovenox SQ. B/L SCDs  LE doppler negative, no PE  Reticulocyte index <2 (1.78). Continue ferrous sulfate with vitamin C    INFECTIOUS: Low grade temps  S/P Empiric abx for low grade fevers; blood culture negative, RVP negative, UA (+) likely asymptomatic bacteruria.   S/P Zosyn 4.5g Q8 ( for 7 days; ended on 10/8)   Incision site C/D/I  Culture 10/13 if febrile     MISC:    SOCIAL/FAMILY:  [x] awaiting [] updated at bedside [] family meeting    CODE STATUS:  [x] Full Code [] DNR [] DNI [] Palliative/Comfort Care    DISPOSITION:  [x] ICU [] Stroke Unit [] Floor [] EMU [] RCU [] PCU    Monitor in ICU. Remains on pressor.  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 STAGE 1. Plastics closure. POD 18  Now status post STAGE 2 POD 8    NEURO:  Neurochecks Q4  Pain management: Dilaudid 6mg/4mg PO PRN, valium standing, lyrica 75mg Q8  Pain management following  Monitor drain output ( DANIELE x1)  Insomnia- melatonin prn  Activity: Aggressive PT/OT      PULMONARY: B/L pleural effusions  Saturating well on room air  Incentive spirometry 10q/hr when awake   CTPE protocol negative for PE, does show B/L pleural effusion- see cards    CARDIOVASCULAR: Troponinemia; new onset B/L pleural effusions and pulm vasc congestion; R/O takotsubo cardiomyopathy  MAP goal >65; on midodrine 20mg Q8. On neosynephrine; wean  Avoiding florinef as already with volume overload  Maintain Rosa Maria  Troponin negative, proBNP wnl  Repeat TTE: EF 54%   Lactate: 0.4. Cortisol 9.6  B/L pleural effusions. Diurescing with lasix 40mg IV. Continue.     GASTROENTEROLOGY:  Diet: Regular  LBM 10/13. Bowel regimen  KUB w/ bowel gas pattern   Continue simethicone   LFTs wnl    RENAL/:  IVL; diurescing with lasix  Monitor BMPs. Na goal 135-145  Replete lytes prn    ENDOCRINE:  Monitor fingersticks: Goal blood glucose 140-180mg/dL    HEME/ONC: Anemia  DVT ppx: Lovenox SQ. B/L SCDs  LE doppler negative, no PE  Reticulocyte index <2 (1.78). Continue ferrous sulfate with vitamin C    INFECTIOUS:   S/P Empiric abx for low grade fevers; blood culture negative, RVP negative, UA (+) likely asymptomatic bacteruria.   S/P Zosyn 4.5g Q8 ( for 7 days; ended on 10/8)   Incision site C/D/I  Culture if febrile     MISC:    SOCIAL/FAMILY:  [x] awaiting [] updated at bedside [] family meeting    CODE STATUS:  [x] Full Code [] DNR [] DNI [] Palliative/Comfort Care    DISPOSITION:  [x] ICU [] Stroke Unit [] Floor [] EMU [] RCU [] PCU    Monitor in ICU. Remains on pressor.

## 2023-10-15 LAB
ANION GAP SERPL CALC-SCNC: 13 MMOL/L — SIGNIFICANT CHANGE UP (ref 5–17)
BUN SERPL-MCNC: 9 MG/DL — SIGNIFICANT CHANGE UP (ref 7–23)
CALCIUM SERPL-MCNC: 10.1 MG/DL — SIGNIFICANT CHANGE UP (ref 8.4–10.5)
CHLORIDE SERPL-SCNC: 94 MMOL/L — LOW (ref 96–108)
CO2 SERPL-SCNC: 28 MMOL/L — SIGNIFICANT CHANGE UP (ref 22–31)
CREAT SERPL-MCNC: 0.39 MG/DL — LOW (ref 0.5–1.3)
EGFR: 128 ML/MIN/1.73M2 — SIGNIFICANT CHANGE UP
GLUCOSE SERPL-MCNC: 102 MG/DL — HIGH (ref 70–99)
HCT VFR BLD CALC: 31.6 % — LOW (ref 34.5–45)
HGB BLD-MCNC: 9.9 G/DL — LOW (ref 11.5–15.5)
MAGNESIUM SERPL-MCNC: 2 MG/DL — SIGNIFICANT CHANGE UP (ref 1.6–2.6)
MCHC RBC-ENTMCNC: 26.8 PG — LOW (ref 27–34)
MCHC RBC-ENTMCNC: 31.3 GM/DL — LOW (ref 32–36)
MCV RBC AUTO: 85.6 FL — SIGNIFICANT CHANGE UP (ref 80–100)
NRBC # BLD: 0 /100 WBCS — SIGNIFICANT CHANGE UP (ref 0–0)
NT-PROBNP SERPL-SCNC: <36 PG/ML — SIGNIFICANT CHANGE UP (ref 0–300)
PHOSPHATE SERPL-MCNC: 3.6 MG/DL — SIGNIFICANT CHANGE UP (ref 2.5–4.5)
PLATELET # BLD AUTO: 418 K/UL — HIGH (ref 150–400)
POTASSIUM SERPL-MCNC: 3.9 MMOL/L — SIGNIFICANT CHANGE UP (ref 3.5–5.3)
POTASSIUM SERPL-SCNC: 3.9 MMOL/L — SIGNIFICANT CHANGE UP (ref 3.5–5.3)
RBC # BLD: 3.69 M/UL — LOW (ref 3.8–5.2)
RBC # FLD: 14.1 % — SIGNIFICANT CHANGE UP (ref 10.3–14.5)
SODIUM SERPL-SCNC: 135 MMOL/L — SIGNIFICANT CHANGE UP (ref 135–145)
TROPONIN T, HIGH SENSITIVITY RESULT: 12 NG/L — SIGNIFICANT CHANGE UP (ref 0–51)
WBC # BLD: 7.81 K/UL — SIGNIFICANT CHANGE UP (ref 3.8–10.5)
WBC # FLD AUTO: 7.81 K/UL — SIGNIFICANT CHANGE UP (ref 3.8–10.5)

## 2023-10-15 PROCEDURE — 99232 SBSQ HOSP IP/OBS MODERATE 35: CPT

## 2023-10-15 RX ORDER — FUROSEMIDE 40 MG
40 TABLET ORAL EVERY 24 HOURS
Refills: 0 | Status: DISCONTINUED | OUTPATIENT
Start: 2023-10-16 | End: 2023-10-17

## 2023-10-15 RX ORDER — HYDROMORPHONE HYDROCHLORIDE 2 MG/ML
6 INJECTION INTRAMUSCULAR; INTRAVENOUS; SUBCUTANEOUS EVERY 4 HOURS
Refills: 0 | Status: DISCONTINUED | OUTPATIENT
Start: 2023-10-15 | End: 2023-10-20

## 2023-10-15 RX ORDER — FUROSEMIDE 40 MG
40 TABLET ORAL EVERY 24 HOURS
Refills: 0 | Status: DISCONTINUED | OUTPATIENT
Start: 2023-10-15 | End: 2023-10-15

## 2023-10-15 RX ORDER — MIDODRINE HYDROCHLORIDE 2.5 MG/1
15 TABLET ORAL EVERY 8 HOURS
Refills: 0 | Status: DISCONTINUED | OUTPATIENT
Start: 2023-10-15 | End: 2023-10-20

## 2023-10-15 RX ORDER — HYDROMORPHONE HYDROCHLORIDE 2 MG/ML
4 INJECTION INTRAMUSCULAR; INTRAVENOUS; SUBCUTANEOUS EVERY 4 HOURS
Refills: 0 | Status: DISCONTINUED | OUTPATIENT
Start: 2023-10-15 | End: 2023-10-20

## 2023-10-15 RX ADMIN — MIDODRINE HYDROCHLORIDE 20 MILLIGRAM(S): 2.5 TABLET ORAL at 06:15

## 2023-10-15 RX ADMIN — HYDROMORPHONE HYDROCHLORIDE 6 MILLIGRAM(S): 2 INJECTION INTRAMUSCULAR; INTRAVENOUS; SUBCUTANEOUS at 09:24

## 2023-10-15 RX ADMIN — HYDROMORPHONE HYDROCHLORIDE 4 MILLIGRAM(S): 2 INJECTION INTRAMUSCULAR; INTRAVENOUS; SUBCUTANEOUS at 06:00

## 2023-10-15 RX ADMIN — ENOXAPARIN SODIUM 40 MILLIGRAM(S): 100 INJECTION SUBCUTANEOUS at 21:46

## 2023-10-15 RX ADMIN — HYDROMORPHONE HYDROCHLORIDE 4 MILLIGRAM(S): 2 INJECTION INTRAMUSCULAR; INTRAVENOUS; SUBCUTANEOUS at 11:15

## 2023-10-15 RX ADMIN — SENNA PLUS 2 TABLET(S): 8.6 TABLET ORAL at 21:46

## 2023-10-15 RX ADMIN — Medication 500 MILLIGRAM(S): at 06:15

## 2023-10-15 RX ADMIN — HYDROMORPHONE HYDROCHLORIDE 6 MILLIGRAM(S): 2 INJECTION INTRAMUSCULAR; INTRAVENOUS; SUBCUTANEOUS at 19:58

## 2023-10-15 RX ADMIN — LIDOCAINE 1 PATCH: 4 CREAM TOPICAL at 21:42

## 2023-10-15 RX ADMIN — Medication 40 MILLIGRAM(S): at 06:16

## 2023-10-15 RX ADMIN — SIMETHICONE 80 MILLIGRAM(S): 80 TABLET, CHEWABLE ORAL at 13:46

## 2023-10-15 RX ADMIN — HYDROMORPHONE HYDROCHLORIDE 6 MILLIGRAM(S): 2 INJECTION INTRAMUSCULAR; INTRAVENOUS; SUBCUTANEOUS at 16:16

## 2023-10-15 RX ADMIN — Medication 5 MILLIGRAM(S): at 06:16

## 2023-10-15 RX ADMIN — Medication 5 MILLIGRAM(S): at 21:46

## 2023-10-15 RX ADMIN — Medication 1000 MILLIGRAM(S): at 08:02

## 2023-10-15 RX ADMIN — Medication 5 MILLIGRAM(S): at 22:30

## 2023-10-15 RX ADMIN — HYDROMORPHONE HYDROCHLORIDE 4 MILLIGRAM(S): 2 INJECTION INTRAMUSCULAR; INTRAVENOUS; SUBCUTANEOUS at 06:54

## 2023-10-15 RX ADMIN — SIMETHICONE 80 MILLIGRAM(S): 80 TABLET, CHEWABLE ORAL at 06:15

## 2023-10-15 RX ADMIN — MIDODRINE HYDROCHLORIDE 20 MILLIGRAM(S): 2.5 TABLET ORAL at 13:45

## 2023-10-15 RX ADMIN — LIDOCAINE 1 PATCH: 4 CREAM TOPICAL at 11:12

## 2023-10-15 RX ADMIN — HYDROMORPHONE HYDROCHLORIDE 4 MILLIGRAM(S): 2 INJECTION INTRAMUSCULAR; INTRAVENOUS; SUBCUTANEOUS at 17:53

## 2023-10-15 RX ADMIN — HYDROMORPHONE HYDROCHLORIDE 6 MILLIGRAM(S): 2 INJECTION INTRAMUSCULAR; INTRAVENOUS; SUBCUTANEOUS at 09:08

## 2023-10-15 RX ADMIN — HYDROMORPHONE HYDROCHLORIDE 6 MILLIGRAM(S): 2 INJECTION INTRAMUSCULAR; INTRAVENOUS; SUBCUTANEOUS at 04:39

## 2023-10-15 RX ADMIN — HYDROMORPHONE HYDROCHLORIDE 6 MILLIGRAM(S): 2 INJECTION INTRAMUSCULAR; INTRAVENOUS; SUBCUTANEOUS at 21:00

## 2023-10-15 RX ADMIN — Medication 1000 MILLIGRAM(S): at 06:16

## 2023-10-15 RX ADMIN — LIDOCAINE 1 PATCH: 4 CREAM TOPICAL at 16:57

## 2023-10-15 RX ADMIN — SIMETHICONE 80 MILLIGRAM(S): 80 TABLET, CHEWABLE ORAL at 21:46

## 2023-10-15 RX ADMIN — HYDROMORPHONE HYDROCHLORIDE 4 MILLIGRAM(S): 2 INJECTION INTRAMUSCULAR; INTRAVENOUS; SUBCUTANEOUS at 11:39

## 2023-10-15 RX ADMIN — POLYETHYLENE GLYCOL 3350 17 GRAM(S): 17 POWDER, FOR SOLUTION ORAL at 06:16

## 2023-10-15 RX ADMIN — Medication 75 MILLIGRAM(S): at 13:45

## 2023-10-15 RX ADMIN — Medication 325 MILLIGRAM(S): at 06:16

## 2023-10-15 RX ADMIN — Medication 75 MILLIGRAM(S): at 21:47

## 2023-10-15 RX ADMIN — Medication 75 MILLIGRAM(S): at 06:15

## 2023-10-15 RX ADMIN — HYDROMORPHONE HYDROCHLORIDE 4 MILLIGRAM(S): 2 INJECTION INTRAMUSCULAR; INTRAVENOUS; SUBCUTANEOUS at 22:55

## 2023-10-15 RX ADMIN — HYDROMORPHONE HYDROCHLORIDE 6 MILLIGRAM(S): 2 INJECTION INTRAMUSCULAR; INTRAVENOUS; SUBCUTANEOUS at 05:00

## 2023-10-15 RX ADMIN — Medication 5 MILLIGRAM(S): at 13:46

## 2023-10-15 RX ADMIN — HYDROMORPHONE HYDROCHLORIDE 4 MILLIGRAM(S): 2 INJECTION INTRAMUSCULAR; INTRAVENOUS; SUBCUTANEOUS at 18:51

## 2023-10-15 RX ADMIN — HYDROMORPHONE HYDROCHLORIDE 4 MILLIGRAM(S): 2 INJECTION INTRAMUSCULAR; INTRAVENOUS; SUBCUTANEOUS at 21:55

## 2023-10-15 RX ADMIN — CHLORHEXIDINE GLUCONATE 1 APPLICATION(S): 213 SOLUTION TOPICAL at 06:16

## 2023-10-15 RX ADMIN — MIDODRINE HYDROCHLORIDE 15 MILLIGRAM(S): 2.5 TABLET ORAL at 21:47

## 2023-10-15 RX ADMIN — HYDROMORPHONE HYDROCHLORIDE 6 MILLIGRAM(S): 2 INJECTION INTRAMUSCULAR; INTRAVENOUS; SUBCUTANEOUS at 15:39

## 2023-10-15 NOTE — PROGRESS NOTE ADULT - SUBJECTIVE AND OBJECTIVE BOX
=========================  NSICU ATTENDING PROGRESS NOTE  =========================    42 y/o female with no PMHx or PSHx presents for surgery today.  She reports ambulating with walker or baby stroller at home for years.  She reports intermittent falls.  She has right greater than left leg tingling, low back and mid back pain.  She denies arm or leg pain.  She reports urinary incontinence since having a baby 2 years ago where if she doesn't get to the bathroom fast enough, she has incontinence, but she is able to hold it for a short while.  She takes no pain medications.  She has taken ibuprofen in the past.  She denies saddle anesthesia, bowel incontinence.   (25 Sep 2023 06:51)      REVIEW OF SYSTEMS: [ ] Unable to Assess due to neurologic exam   [ x] All ROS addressed below are non-contributory, except:  Neuro: [ ] Headache [ x] Back pain [ ] Numbness [ x] Weakness [ ] Ataxia [ ] Dizziness [ ] Aphasia [ ] Dysarthria [ ] Visual disturbance  Resp: [ ] Shortness of breath/dyspnea, [ ] Orthopnea [ ] Cough  CV: [ ] Chest pain [ ] Palpitation [ ] Lightheadedness [ ] Syncope  Renal: [ ] Thirst [ ] Edema  GI: [ ] Nausea [ ] Emesis [ ] Abdominal pain [ ] Constipation [ ] Diarrhea  Hem: [ ] Hematemesis [ ] bright red blood per rectum  ID: [ ] Fever [ ] Chills [ ] Dysuria  ENT: [ ] Rhinorrhea        ICU Vital Signs Last 24 Hrs  T(C): 37.2 (15 Oct 2023 06:01), Max: 37.5 (14 Oct 2023 22:10)  T(F): 98.9 (15 Oct 2023 06:01), Max: 99.5 (14 Oct 2023 22:10)  HR: 97 (15 Oct 2023 04:00) (87 - 138)  ABP: 118/118 (15 Oct 2023 04:00) (86/48 - 119/79)  ABP(mean): 118 (15 Oct 2023 04:00) (63 - 118)  RR: 18 (15 Oct 2023 04:00) (14 - 19)  SpO2: 98% (15 Oct 2023 04:00) (95% - 100%)      10-14-23 @ 07:01  -  10-15-23 @ 07:00  --------------------------------------------------------  IN: 660 mL / OUT: 1440 mL / NET: -780 mL        PHYSICAL EXAM:  Constitutional: No Acute Distress     Neurological: AOx3, Following Commands, Moving all Extremities     Motor exam:          Upper extremity                         Delt     Bicep     Tricep    HG                                                 R         5/5        5/5        5/5       5/5                                               L          5/5        5/5        5/5       5/5          Lower extremity              R and L : TF otherwise 0/5        Sensation: [] intact to light touch  [x] decreased: sensory level at T5 though at times able to sense deep touch on dorsum of B/L feet  Pulmonary: Clear to Auscultation, No rales, No rhonchi, No wheezes   Cardiovascular: S1, S2, Regular rate and rhythm   Gastrointestinal: Soft, Non-tender, distended, +BS  Extremities: No calf tenderness   Incision: CDI; drain in place         MEDICATIONS:   ascorbic acid 500 milliGRAM(s) Oral <User Schedule>  bisacodyl Suppository 10 milliGRAM(s) Rectal daily  chlorhexidine 2% Cloths 1 Application(s) Topical <User Schedule>  diazepam    Tablet 5 milliGRAM(s) Oral every 8 hours  enoxaparin Injectable 40 milliGRAM(s) SubCutaneous every 24 hours  ferrous    sulfate 325 milliGRAM(s) Oral <User Schedule>  furosemide   Injectable 40 milliGRAM(s) IV Push daily  HYDROmorphone   Tablet 6 milliGRAM(s) Oral every 4 hours PRN  HYDROmorphone   Tablet 4 milliGRAM(s) Oral every 4 hours PRN  influenza   Vaccine 0.5 milliLiter(s) IntraMuscular once  lidocaine   4% Patch 1 Patch Transdermal daily  melatonin 5 milliGRAM(s) Oral at bedtime PRN  midodrine 20 milliGRAM(s) Oral every 8 hours  polyethylene glycol 3350 17 Gram(s) Oral two times a day  pregabalin 75 milliGRAM(s) Oral every 8 hours  senna 2 Tablet(s) Oral at bedtime  simethicone 80 milliGRAM(s) Chew three times a day  sodium chloride 0.9% lock flush 10 milliLiter(s) IV Push every 1 hour PRN    LABS:                       9.9    7.81  )-----------( 418      ( 15 Oct 2023 05:30 )             31.6     10-15    135  |  94<L>  |  9   ----------------------------<  102<H>  3.9   |  28  |  0.39<L>    Ca    10.1      15 Oct 2023 05:30  Phos  3.6     10-15  Mg     2.0     10-15                         =========================  NSICU ATTENDING PROGRESS NOTE  =========================    44 y/o female with no PMHx or PSHx presents for surgery today.  She reports ambulating with walker or baby stroller at home for years.  She reports intermittent falls.  She has right greater than left leg tingling, low back and mid back pain.  She denies arm or leg pain.  She reports urinary incontinence since having a baby 2 years ago where if she doesn't get to the bathroom fast enough, she has incontinence, but she is able to hold it for a short while.  She takes no pain medications.  She has taken ibuprofen in the past.  She denies saddle anesthesia, bowel incontinence.   (25 Sep 2023 06:51)      REVIEW OF SYSTEMS: [ ] Unable to Assess due to neurologic exam   [ x] All ROS addressed below are non-contributory, except:  Neuro: [ ] Headache [ x] Back pain [ ] Numbness [ x] Weakness [ ] Ataxia [ ] Dizziness [ ] Aphasia [ ] Dysarthria [ ] Visual disturbance  Resp: [ ] Shortness of breath/dyspnea, [ ] Orthopnea [ ] Cough  CV: [ ] Chest pain [ ] Palpitation [ ] Lightheadedness [ ] Syncope  Renal: [ ] Thirst [ ] Edema  GI: [ ] Nausea [ ] Emesis [ ] Abdominal pain [ ] Constipation [ ] Diarrhea  Hem: [ ] Hematemesis [ ] bright red blood per rectum  ID: [ ] Fever [ ] Chills [ ] Dysuria  ENT: [ ] Rhinorrhea      ICU Vital Signs Last 24 Hrs  T(C): 37.2 (15 Oct 2023 06:01), Max: 37.5 (14 Oct 2023 22:10)  T(F): 98.9 (15 Oct 2023 06:01), Max: 99.5 (14 Oct 2023 22:10)  HR: 97 (15 Oct 2023 04:00) (87 - 138)  ABP: 118/118 (15 Oct 2023 04:00) (86/48 - 119/79)  ABP(mean): 118 (15 Oct 2023 04:00) (63 - 118)  RR: 18 (15 Oct 2023 04:00) (14 - 19)  SpO2: 98% (15 Oct 2023 04:00) (95% - 100%)      10-14-23 @ 07:01  -  10-15-23 @ 07:00  --------------------------------------------------------  IN: 660 mL / OUT: 1440 mL / NET: -780 mL        PHYSICAL EXAM:  Constitutional: No Acute Distress     Neurological: AOx3, Following Commands, Moving all Extremities     Motor exam:          Upper extremity                         Delt     Bicep     Tricep    HG                                                 R         5/5        5/5        5/5       5/5                                               L          5/5        5/5        5/5       5/5          Lower extremity              R and L : TF otherwise 0/5        Sensation: [] intact to light touch  [x] decreased: sensory level at T5 though at times able to sense deep touch on dorsum of B/L feet  Pulmonary: Clear to Auscultation, No rales, No rhonchi, No wheezes   Cardiovascular: S1, S2, Regular rate and rhythm   Gastrointestinal: Soft, Non-tender, distended, +BS  Extremities: No calf tenderness   Incision: CDI; drain in place       MEDICATIONS:   ascorbic acid 500 milliGRAM(s) Oral <User Schedule>  bisacodyl Suppository 10 milliGRAM(s) Rectal daily  chlorhexidine 2% Cloths 1 Application(s) Topical <User Schedule>  diazepam    Tablet 5 milliGRAM(s) Oral every 8 hours  enoxaparin Injectable 40 milliGRAM(s) SubCutaneous every 24 hours  ferrous    sulfate 325 milliGRAM(s) Oral <User Schedule>  furosemide   Injectable 40 milliGRAM(s) IV Push daily  HYDROmorphone   Tablet 6 milliGRAM(s) Oral every 4 hours PRN  HYDROmorphone   Tablet 4 milliGRAM(s) Oral every 4 hours PRN  influenza   Vaccine 0.5 milliLiter(s) IntraMuscular once  lidocaine   4% Patch 1 Patch Transdermal daily  melatonin 5 milliGRAM(s) Oral at bedtime PRN  midodrine 20 milliGRAM(s) Oral every 8 hours  polyethylene glycol 3350 17 Gram(s) Oral two times a day  pregabalin 75 milliGRAM(s) Oral every 8 hours  senna 2 Tablet(s) Oral at bedtime  simethicone 80 milliGRAM(s) Chew three times a day  sodium chloride 0.9% lock flush 10 milliLiter(s) IV Push every 1 hour PRN      LABS:                       9.9    7.81  )-----------( 418      ( 15 Oct 2023 05:30 )             31.6     10-15    135  |  94<L>  |  9   ----------------------------<  102<H>  3.9   |  28  |  0.39<L>    Ca    10.1      15 Oct 2023 05:30  Phos  3.6     10-15  Mg     2.0     10-15

## 2023-10-15 NOTE — PROGRESS NOTE ADULT - SUBJECTIVE AND OBJECTIVE BOX
HPI:  Taken from outpatient neurosurgery note on 9/13/2023 " The patient, a long-standing patient of my practice, reports a history of spinal issues that dates back to a childhood injury. Over the years she's experienced developing issues with walking in a straight line, overactive reflexes in her lower extremities, and unique difficulty with her lower extremities referred to as cloneness. Recent upturn in these symptoms has limited her ability to carry out her daily routines. The patient also reports no significant improvement in her condition since our last clinical encounter two years ago."    42 y/o female with no PMHx or PSHx presents for surgery today.  She reports ambulating with walker or baby stroller at home for years.  She reports intermittent falls.  She has right greater than left leg tingling, low back and mid back pain.  She denies arm or leg pain.  She reports urinary incontinence since having a baby 2 years ago where if she doesn't get to the bathroom fast enough, she has incontinence, but she is able to hold it for a short while.  She takes no pain medications.  She has taken ibuprofen in the past.  She denies saddle anesthesia, bowel incontinence.   (25 Sep 2023 06:51)    OVERNIGHT EVENTS: [facility]    Hospital Course:   9/25: POD 0 s/p C7-L1 fusion, T3-8 vertebral column resection, placement of anterior cage.   9/26; POD1 H/H 6.8 postop and PLT 85. Given 2u PRBC and 1u PLT. Switched propofol to precedex gtt. Pt extubated to face mask. Pt noted to only be withdrawing to noxious stimuli on bilateral lower extremities with sensation decreased RLE per patient. Dr. Vieira notified and plan is to keep MAP>100 and obtain CT full spine in the morning. Phenylephrine started to maintain MAP>100. Advanced diet to regular. Passed TOV. Increased need for aurora, UO high.   9/27: POD2 C7-L1 fusion T3-8 vertebral column resection. increased pressor requirements o/n, started on levo additionally. lactate 1.5, BNP 1400 from 800, CK elevated. echo: EF 60-65%. given 250cc bolus albumin, started on midodrine 10q8 to wean off pressors. PICC placed by Alli.   9/28: POD3 s/p C7-L1 fusion, T3-8 vertebral column resection, placement of anterior cage. R radial A-line removed and replaced on the L, trops and EKG done for chest discomfort, WNL, resolved with treatment of overall pain, continues to be febrile, lyrica dc'd to help.   Lyrica restarted. Weaning phenylephrine. Serum Na uptrending, 3% saline discontinued. Pt febrile with uptrending WBC, ID consulted recommend monitoring for now off antibiotics.  9/29: POD4. started on 3% o/n for Na 132. remains on aurora and levo for MAP>100. Tachycardic, given 1L NS. Right axillary a-line placed. 3% dc'd. HMV dc'd. Aquacell dressing replaced. Passed TOV.   9/30: POD 5.  Remains on levo gtt. Increased midodrine to 15q8. Given enema. Had BM.  10/1: POD 6. MAP goal increased back to >100 due to worsened sensation loss, on levo gtt. afebrile however per ID Dr De Souza, desiree cx sent. UA+. started empirically on vanc/zosyn. b/l LE doppler and LUE doppler ordered per ID, +LUE superficial thrombophlebitis. BL LE dopplers negative for DVT.   10/2: POD 7, ANNA overnight. Maintaining MAP >100. 500cc albumin bolus, dilaudid lowered to 2/4mg, toradol [id number] added for pain, abd xray for distension shows gas pattern. aquacel dressing changed.  10/3: POD8 ANNA overnight. Remains on levophed to maintain MAP>100 in AM. Neuro exam unchanged. Tmax 100.9F in AM, given tylenol. Flexeril changed to valium 2q8 and lyrica 50q8 started per pain managment.  DC'd vanc, continue zosyn x 7 days per ID. 1u PRBC to maintain hbg >9.0. DANIELE drain removed. Salt tabs weaned to 2q8. MAP goal changed to >85 to help wean off levo gtt.   10/4: POD 9. MAP goal >100. Sodium chloride tabs discontinued.  10/5: POD 10, Given 2 L boluses o/n for tachycardia and negative fluid status. Given 1 mg IV valium in AM for muscle spasm. 1L bolus given in afternoon for high urine output. Preop for OR tomorrow.   10/6: POD 11 Pt endorsing incisional pain, given 1mg IV valium. Neuro exam stable. Plan for second stage today,  POD 0 revision of T3-T9 VCR, revision of C7-L1 posterior fusion, plastics closure. Post-op CT thoracic spine complete. 1 L bolus for soft BP and colapsable IVC on POCUS.   10/7: POD 12 C7-L1 fusion. POD 1 Revision fusion and T3-9 VCR. Pain controlled. Remains on Levo for MAP goal > 100. AXR completed in AM for abdominal distension. Dilaudid PO prn increased to 4 and 6 mg, valium increased to 2mg q6 for pain control. 1u PRBC given for Hb 8.4. Hgb elevated to 10.4. 250 cc albumin given for tachycardia.   10/8: POD 13 Fusion, POD 2 Revision. Pain controlled overnight.   +BM this morning. Right hemovac removed. MAPs liberalized to goal greater than 85. Troponin elevated this morning, now downtrending. EKG WNL.   10/9: POD 13 C7-L1 fusion, POD 3 Revision and T3-9 VCR.   MAP goals liberalized to keep >80. Right DANIELE drain removed. IV valium given x1   10/10: POD 14 C7-L1 fusion, POD 4 revision and T3-9 VCR, liberalized MAP goal to >65. Started PO iron and vit C every other day. HMV removed. Lidocaine patch given for left neck pain. Midodrine weaned to 10q8.   10/11: POD 15 C7-L1 fusion, POD 5 revision and T3-9 VCR. SBP 80s when working with PT, given 1L bolus NS. Increased midodrine 15q8. Given additional 500cc bolus NS and 250cc albumin for soft pressures. Started aurora gtt for MAP>65.Given additional 500cc NS. CTA chest neg for PE, notable for b/l pleural effusions. Lasix 10mg IV.  10/12: POD 16/6. Remains on aurora gtt. CXR showing b/l pleural effusions. Given additional 20mg IV lasix. TTE normal, EF 54%. increased lyrica to 75q8. Given additional 20 IV lasix.  10/13: POD17/7. ANNA o/n neuro stable. remains on aurora gtt. Lasix 40 IV. Midodrine 20mg q8h.   10/14: POD18/8. ANNA o/n neuro stable. remains on aurora gtt. AM cortisol normal. Started lasix 40mg IV daily. Applied thigh high compression stockings.   10/15: POD19/9. ANNA o/n neuro stable. remains off pressors.     Vital Signs Last 24 Hrs  T(C): 37.5 (14 Oct 2023 22:10), Max: 37.5 (14 Oct 2023 22:10)  T(F): 99.5 (14 Oct 2023 22:10), Max: 99.5 (14 Oct 2023 22:10)  HR: 87 (15 Oct 2023 00:00) (75 - 138)  BP: --  BP(mean): --  RR: 16 (15 Oct 2023 00:00) (14 - 19)  SpO2: 97% (15 Oct 2023 00:00) (95% - 100%)    Parameters below as of 15 Oct 2023 00:00  Patient On (Oxygen Delivery Method): room air        I&O's Summary    13 Oct 2023 07:01  -  14 Oct 2023 07:00  --------------------------------------------------------  IN: 1184.9 mL / OUT: 1760 mL / NET: -575.1 mL    14 Oct 2023 07:01  -  15 Oct 2023 01:01  --------------------------------------------------------  IN: 660 mL / OUT: 1440 mL / NET: -780 mL        PHYSICAL EXAM:  GEN: laying in bed, NAD  NEURO: AOx3. FC, OE spont, speech intact, face symmetric. CNII-XII intact. PERRL, EOMI. No pronator drift. b/l UE 5/5. b/l LE 0. decreased sensation below t10 level.  CV: RRR +S1/S2  PULM: CTAB  GI: Abd soft, NT/ND  EXT: ext warm, dry, nontender  WOUND: c/t/l spine incision c/d/i    TUBES/LINES:  [] Chin  [] Lumbar Drain  [x] Wound Drains: DANIELE  [] Others      DIET:  [] NPO  [x] Mechanical  [] Tube feeds    LABS:                        10.0   9.60  )-----------( 422      ( 14 Oct 2023 04:59 )             30.9     10-14    136  |  96  |  10  ----------------------------<  107<H>  3.9   |  28  |  0.40<L>    Ca    10.0      14 Oct 2023 04:59  Phos  3.8     10-14  Mg     1.9     10-14    TPro  7.4  /  Alb  4.0  /  TBili  0.2  /  DBili  <0.2  /  AST  23  /  ALT  28  /  AlkPhos  82  10-13      Urinalysis Basic - ( 14 Oct 2023 04:59 )    Color: x / Appearance: x / SG: x / pH: x  Gluc: 107 mg/dL / Ketone: x  / Bili: x / Urobili: x   Blood: x / Protein: x / Nitrite: x   Leuk Esterase: x / RBC: x / WBC x   Sq Epi: x / Non Sq Epi: x / Bacteria: x          CAPILLARY BLOOD GLUCOSE          Drug Levels: [] N/A    CSF Analysis: [] N/A      Allergies    No Known Allergies    Intolerances      MEDICATIONS:  Antibiotics:    Neuro:  acetaminophen     Tablet .. 1000 milliGRAM(s) Oral every 8 hours  diazepam    Tablet 5 milliGRAM(s) Oral every 8 hours  HYDROmorphone   Tablet 6 milliGRAM(s) Oral every 4 hours PRN  melatonin 5 milliGRAM(s) Oral at bedtime PRN  pregabalin 75 milliGRAM(s) Oral every 8 hours    Anticoagulation:  enoxaparin Injectable 40 milliGRAM(s) SubCutaneous every 24 hours    OTHER:  bisacodyl Suppository 10 milliGRAM(s) Rectal daily  chlorhexidine 2% Cloths 1 Application(s) Topical <User Schedule>  furosemide   Injectable 40 milliGRAM(s) IV Push daily  influenza   Vaccine 0.5 milliLiter(s) IntraMuscular once  lidocaine   4% Patch 1 Patch Transdermal daily  midodrine 20 milliGRAM(s) Oral every 8 hours  polyethylene glycol 3350 17 Gram(s) Oral two times a day  senna 2 Tablet(s) Oral at bedtime  simethicone 80 milliGRAM(s) Chew three times a day    IVF:  ascorbic acid 500 milliGRAM(s) Oral <User Schedule>  ferrous    sulfate 325 milliGRAM(s) Oral <User Schedule>    CULTURES:  Culture Results:   No growth at 5 days. (10-01 @ 11:25)  Culture Results:   No growth at 5 days. (10-01 @ 11:25)    RADIOLOGY & ADDITIONAL TESTS:      ASSESSMENT:  43F no PMHx suffered childhood injury resulting in spine injury and residual gait disturbance, hyperreflexia of BL LE. She has right greater than left leg tingling, low back and mid back pain. 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 S/p C7-L1 fusion, T3-8 vertebral column resection, placement of anterior cage (9/25/23). S/p revision of T3-T9 VCR, revision of C7-L1 posterior fusion, plastics closure (10/6/23).     M40.205    S31.000A    S31.000A M40.205 S22.569PM92.009G    S31.000A M40.205 S22.009S22.009G    No pertinent family history in first degree relatives    Handoff    No pertinent past medical history    History of acute illness    Deformity of thoracic vertebra    Thoracic myelopathy    Deformity of thoracic vertebra    Thoracic myelopathy    Corpectomy, spine, lumbar, 4 levels    No significant past surgical history    Room Service Assist    Room Service Assist    [email or web address]        PLAN:  NEURO:  - Neuro/spine checks q4/vitals q1hrs  - Pain control: tylenol 1g q8h, dilaudid 4/6 PO, Lyrica 75q8,- stage 1 Post-op CT full spine: L1 screw not attached to the vertical yoli, epidural lipomatosis L2-3  - stage 2 post op CT T spine completed  - JPx1 (per plastics) (L HMV, R HMV and R DANIELE dc'd)    Cardio:  - MAP > 65, aurora gtt off since 5PM 10/14  - midodrine 20 mg q8h   - echo 9/27: EF 60-65%, 10/12 nml EF 54%   - Lasix 40mg IV daily     Pulm:  - room air  - incentive spirometry  - CTA chest 10/11 neg for PE, notable for b/l pleural effusions  - CXR 10/12 b/l pleural effusions     GI:  - Regular diet  - Bowel regimen, simethicone   - BM 10/14    Renal:  - IVL  - voiding    Endo:  - a1c 5.6, ISS dc'd    Heme:  - SCDs for DVT ppx/ SQL   - 2u PRBC and 1u PLT postop 9/25, 1u PRBC 10/3, 1u PRBC 10/7  - b/l LE dopplers 10/1 negative  - LUE doppler 10/1: +cephalic thrombophlebitis  - iron and vitamin c every other day started 10/10     ID:  - 10/1 panculture, +UA 10/1  - Vanc (10/1-10/3 ) dc'd, Zosyn (10/1- 10/8)    - ID signed off    Dispo: ICU status, full code, PT/OT recs AR  Discussed with Dr. Vieira and Dr. Sutton    Assessment:  Present when checked    []  GCS  E   V  M     Heart Failure: []Acute, [] acute on chronic , []chronic  Heart Failure:  [] Diastolic (HFpEF), [] Systolic (HFrEF), []Combined (HFpEF and HFrEF), [] RHF, [] Pulm HTN, [] Other    [] CELE, [] ATN, [] AIN, [] other  [] CKD1, [] CKD2, [] CKD 3, [] CKD 4, [] CKD 5, []ESRD    Encephalopathy: [] Metabolic, [] Hepatic, [] toxic, [] Neurological, [] Other    Abnormal Nurtitional Status: [] malnurtition (see nutrition note), [ ]underweight: BMI < 19, [] morbid obesity: BMI >40, [] Cachexia    [] Sepsis  [] hypovolemic shock,[] cardiogenic shock, [] hemorrhagic shock, [] neuogenic shock  [] Acute Respiratory Failure  []Cerebral edema, [] Brain compression/ herniation,   [] Functional quadriplegia  [] Acute blood loss anemia

## 2023-10-15 NOTE — PROGRESS NOTE ADULT - ASSESSMENT
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 STAGE 1. Plastics closure. POD 19  Now status post STAGE 2 POD 9    NEURO:  Neurochecks Q4  Pain management: Dilaudid 6mg/4mg PO PRN, valium standing, lyrica 75mg Q8  Pain management following  Monitor drain output ( DANIELE x1)  Insomnia- melatonin prn  Activity: Aggressive PT/OT      PULMONARY: B/L pleural effusions  Saturating well on room air  Incentive spirometry 10q/hr when awake   CTPE protocol negative for PE, does show B/L pleural effusion- see cards    CARDIOVASCULAR: Troponinemia; new onset B/L pleural effusions and pulm vasc congestion; R/O takotsubo cardiomyopathy  MAP goal >65; on midodrine 20mg Q8. On neosynephrine; wean  Avoiding florinef as already with volume overload  Maintain Rosa Maria  Troponin negative, proBNP wnl  Repeat TTE: EF 54%   Lactate: 0.4. Cortisol 9.6  B/L pleural effusions. Diurescing with lasix 40mg IV. Continue.     GASTROENTEROLOGY:  Diet: Regular  LBM 10/13. Bowel regimen  KUB w/ bowel gas pattern   Continue simethicone   LFTs wnl    RENAL/:  IVL; diurescing with lasix  Monitor BMPs. Na goal 135-145  Replete lytes prn    ENDOCRINE:  Monitor fingersticks: Goal blood glucose 140-180mg/dL    HEME/ONC: Anemia  DVT ppx: Lovenox SQ. B/L SCDs  LE doppler negative, no PE  Reticulocyte index <2 (1.78). Continue ferrous sulfate with vitamin C    INFECTIOUS:   S/P Empiric abx for low grade fevers; blood culture negative, RVP negative, UA (+) likely asymptomatic bacteruria.   S/P Zosyn 4.5g Q8 ( for 7 days; ended on 10/8)   Incision site C/D/I  Culture if febrile     MISC:    SOCIAL/FAMILY:  [x] awaiting [] updated at bedside [] family meeting    CODE STATUS:  [x] Full Code [] DNR [] DNI [] Palliative/Comfort Care    DISPOSITION:  [x] ICU [] Stroke Unit [] Floor [] EMU [] RCU [] PCU     ASSESSMENT:   42 y/o F status post traumatic spine injury w/o neurological deficit and severe thoracic kyphosis   Status post T3-8 VCR, C7-L1 fusion, correction of deformity, placement of anterior cage. STAGE 1. Plastics closure. POD 19  Now status post STAGE 2 POD 9    NEURO:  Neurochecks Q4  Pain management: Dilaudid 6mg/4mg PO PRN, valium standing, lyrica 75mg Q8  Pain management following  Monitor drain output (DANIELE x1)  Insomnia- melatonin prn  Activity: Aggressive PT/OT      PULMONARY: B/L pleural effusions  Saturating well on room air  Incentive spirometry 10q/hr when awake   CTPE protocol negative for PE, does show B/L pleural effusion- see cards    CARDIOVASCULAR: Troponinemia; new onset B/L pleural effusions and pulm vasc congestion; R/O takotsubo cardiomyopathy  MAP goal >65; on midodrine 20mg Q8. Off pressors ~20 hours  Avoiding florinef as already with volume overload  Maintain Rosa Maria  Troponin negative, proBNP wnl  Repeat TTE: EF 54%   Lactate: 0.4. Cortisol 9.6  B/L pleural effusions. Diurescing with lasix 40mg IV. Continue.     GASTROENTEROLOGY:  Diet: Regular  LBM 10/15. Bowel regimen  KUB w/ bowel gas pattern   Continue simethicone   LFTs wnl    RENAL/:  IVL; diurescing with lasix  Monitor BMPs. Na goal 135-145  Replete lytes prn    ENDOCRINE:  Monitor fingersticks: Goal blood glucose 140-180mg/dL    HEME/ONC: Anemia  DVT ppx: Lovenox SQ. B/L SCDs  LE doppler negative, no PE  Reticulocyte index <2 (1.78). Continue ferrous sulfate with vitamin C    INFECTIOUS:   S/P Empiric abx for low grade fevers; blood culture negative, RVP negative, UA (+) likely asymptomatic bacteruria.   S/P Zosyn 4.5g Q8 ( for 7 days; ended on 10/8)   Incision site C/D/I  Culture if febrile     MISC:    SOCIAL/FAMILY:  [x] awaiting [] updated at bedside [] family meeting    CODE STATUS:  [x] Full Code [] DNR [] DNI [] Palliative/Comfort Care    DISPOSITION:  [] ICU [] Stroke Unit [] Floor [] EMU [] RCU [] PCU    [x] Stepdown once stable off pressors x 24 hours.

## 2023-10-16 LAB
ANION GAP SERPL CALC-SCNC: 11 MMOL/L — SIGNIFICANT CHANGE UP (ref 5–17)
BUN SERPL-MCNC: 11 MG/DL — SIGNIFICANT CHANGE UP (ref 7–23)
CALCIUM SERPL-MCNC: 10 MG/DL — SIGNIFICANT CHANGE UP (ref 8.4–10.5)
CHLORIDE SERPL-SCNC: 94 MMOL/L — LOW (ref 96–108)
CO2 SERPL-SCNC: 30 MMOL/L — SIGNIFICANT CHANGE UP (ref 22–31)
CREAT SERPL-MCNC: 0.37 MG/DL — LOW (ref 0.5–1.3)
EGFR: 130 ML/MIN/1.73M2 — SIGNIFICANT CHANGE UP
GLUCOSE SERPL-MCNC: 105 MG/DL — HIGH (ref 70–99)
HCT VFR BLD CALC: 29.4 % — LOW (ref 34.5–45)
HGB BLD-MCNC: 9.3 G/DL — LOW (ref 11.5–15.5)
MAGNESIUM SERPL-MCNC: 1.9 MG/DL — SIGNIFICANT CHANGE UP (ref 1.6–2.6)
MCHC RBC-ENTMCNC: 27 PG — SIGNIFICANT CHANGE UP (ref 27–34)
MCHC RBC-ENTMCNC: 31.6 GM/DL — LOW (ref 32–36)
MCV RBC AUTO: 85.2 FL — SIGNIFICANT CHANGE UP (ref 80–100)
NRBC # BLD: 0 /100 WBCS — SIGNIFICANT CHANGE UP (ref 0–0)
PHOSPHATE SERPL-MCNC: 4.1 MG/DL — SIGNIFICANT CHANGE UP (ref 2.5–4.5)
PLATELET # BLD AUTO: 397 K/UL — SIGNIFICANT CHANGE UP (ref 150–400)
POTASSIUM SERPL-MCNC: 3.8 MMOL/L — SIGNIFICANT CHANGE UP (ref 3.5–5.3)
POTASSIUM SERPL-SCNC: 3.8 MMOL/L — SIGNIFICANT CHANGE UP (ref 3.5–5.3)
RBC # BLD: 3.45 M/UL — LOW (ref 3.8–5.2)
RBC # FLD: 14 % — SIGNIFICANT CHANGE UP (ref 10.3–14.5)
SODIUM SERPL-SCNC: 135 MMOL/L — SIGNIFICANT CHANGE UP (ref 135–145)
WBC # BLD: 7.04 K/UL — SIGNIFICANT CHANGE UP (ref 3.8–10.5)
WBC # FLD AUTO: 7.04 K/UL — SIGNIFICANT CHANGE UP (ref 3.8–10.5)

## 2023-10-16 PROCEDURE — 71045 X-RAY EXAM CHEST 1 VIEW: CPT | Mod: 26

## 2023-10-16 PROCEDURE — 99291 CRITICAL CARE FIRST HOUR: CPT

## 2023-10-16 RX ORDER — DIAZEPAM 5 MG
5 TABLET ORAL EVERY 8 HOURS
Refills: 0 | Status: DISCONTINUED | OUTPATIENT
Start: 2023-10-16 | End: 2023-10-20

## 2023-10-16 RX ORDER — ACETAMINOPHEN 500 MG
1000 TABLET ORAL EVERY 8 HOURS
Refills: 0 | Status: DISCONTINUED | OUTPATIENT
Start: 2023-10-16 | End: 2023-10-20

## 2023-10-16 RX ORDER — POTASSIUM CHLORIDE 20 MEQ
20 PACKET (EA) ORAL ONCE
Refills: 0 | Status: COMPLETED | OUTPATIENT
Start: 2023-10-16 | End: 2023-10-16

## 2023-10-16 RX ORDER — MAGNESIUM SULFATE 500 MG/ML
1 VIAL (ML) INJECTION ONCE
Refills: 0 | Status: COMPLETED | OUTPATIENT
Start: 2023-10-16 | End: 2023-10-16

## 2023-10-16 RX ADMIN — HYDROMORPHONE HYDROCHLORIDE 6 MILLIGRAM(S): 2 INJECTION INTRAMUSCULAR; INTRAVENOUS; SUBCUTANEOUS at 01:07

## 2023-10-16 RX ADMIN — SENNA PLUS 2 TABLET(S): 8.6 TABLET ORAL at 22:09

## 2023-10-16 RX ADMIN — HYDROMORPHONE HYDROCHLORIDE 4 MILLIGRAM(S): 2 INJECTION INTRAMUSCULAR; INTRAVENOUS; SUBCUTANEOUS at 11:02

## 2023-10-16 RX ADMIN — Medication 100 GRAM(S): at 07:22

## 2023-10-16 RX ADMIN — LIDOCAINE 1 PATCH: 4 CREAM TOPICAL at 11:05

## 2023-10-16 RX ADMIN — SIMETHICONE 80 MILLIGRAM(S): 80 TABLET, CHEWABLE ORAL at 05:15

## 2023-10-16 RX ADMIN — HYDROMORPHONE HYDROCHLORIDE 6 MILLIGRAM(S): 2 INJECTION INTRAMUSCULAR; INTRAVENOUS; SUBCUTANEOUS at 20:56

## 2023-10-16 RX ADMIN — HYDROMORPHONE HYDROCHLORIDE 6 MILLIGRAM(S): 2 INJECTION INTRAMUSCULAR; INTRAVENOUS; SUBCUTANEOUS at 21:50

## 2023-10-16 RX ADMIN — HYDROMORPHONE HYDROCHLORIDE 6 MILLIGRAM(S): 2 INJECTION INTRAMUSCULAR; INTRAVENOUS; SUBCUTANEOUS at 05:15

## 2023-10-16 RX ADMIN — Medication 40 MILLIGRAM(S): at 05:14

## 2023-10-16 RX ADMIN — HYDROMORPHONE HYDROCHLORIDE 4 MILLIGRAM(S): 2 INJECTION INTRAMUSCULAR; INTRAVENOUS; SUBCUTANEOUS at 03:15

## 2023-10-16 RX ADMIN — Medication 75 MILLIGRAM(S): at 14:00

## 2023-10-16 RX ADMIN — MIDODRINE HYDROCHLORIDE 15 MILLIGRAM(S): 2.5 TABLET ORAL at 22:09

## 2023-10-16 RX ADMIN — HYDROMORPHONE HYDROCHLORIDE 6 MILLIGRAM(S): 2 INJECTION INTRAMUSCULAR; INTRAVENOUS; SUBCUTANEOUS at 17:00

## 2023-10-16 RX ADMIN — Medication 75 MILLIGRAM(S): at 22:09

## 2023-10-16 RX ADMIN — ENOXAPARIN SODIUM 40 MILLIGRAM(S): 100 INJECTION SUBCUTANEOUS at 22:09

## 2023-10-16 RX ADMIN — MIDODRINE HYDROCHLORIDE 15 MILLIGRAM(S): 2.5 TABLET ORAL at 16:37

## 2023-10-16 RX ADMIN — HYDROMORPHONE HYDROCHLORIDE 4 MILLIGRAM(S): 2 INJECTION INTRAMUSCULAR; INTRAVENOUS; SUBCUTANEOUS at 02:32

## 2023-10-16 RX ADMIN — HYDROMORPHONE HYDROCHLORIDE 4 MILLIGRAM(S): 2 INJECTION INTRAMUSCULAR; INTRAVENOUS; SUBCUTANEOUS at 10:14

## 2023-10-16 RX ADMIN — Medication 5 MILLIGRAM(S): at 05:14

## 2023-10-16 RX ADMIN — Medication 75 MILLIGRAM(S): at 05:14

## 2023-10-16 RX ADMIN — HYDROMORPHONE HYDROCHLORIDE 6 MILLIGRAM(S): 2 INJECTION INTRAMUSCULAR; INTRAVENOUS; SUBCUTANEOUS at 06:15

## 2023-10-16 RX ADMIN — MIDODRINE HYDROCHLORIDE 15 MILLIGRAM(S): 2.5 TABLET ORAL at 05:15

## 2023-10-16 RX ADMIN — HYDROMORPHONE HYDROCHLORIDE 6 MILLIGRAM(S): 2 INJECTION INTRAMUSCULAR; INTRAVENOUS; SUBCUTANEOUS at 16:42

## 2023-10-16 RX ADMIN — Medication 5 MILLIGRAM(S): at 14:00

## 2023-10-16 RX ADMIN — Medication 5 MILLIGRAM(S): at 22:08

## 2023-10-16 RX ADMIN — HYDROMORPHONE HYDROCHLORIDE 6 MILLIGRAM(S): 2 INJECTION INTRAMUSCULAR; INTRAVENOUS; SUBCUTANEOUS at 00:11

## 2023-10-16 RX ADMIN — LIDOCAINE 1 PATCH: 4 CREAM TOPICAL at 23:34

## 2023-10-16 RX ADMIN — CHLORHEXIDINE GLUCONATE 1 APPLICATION(S): 213 SOLUTION TOPICAL at 05:16

## 2023-10-16 RX ADMIN — Medication 20 MILLIEQUIVALENT(S): at 07:22

## 2023-10-16 NOTE — CHART NOTE - NSCHARTNOTEFT_GEN_A_CORE
Admitting Diagnosis:   Patient is a 43y old  Female who presents with a chief complaint of leg weakness, difficulty walking and worsening back pain x years (16 Oct 2023 08:48)    PAST MEDICAL & SURGICAL HISTORY:  History of acute illness childhood  No significant past surgical history    CURRENT NUTRITION ORDER: regular with Ensure High Protein  (350 kcals, 20 g prot each) 1x/day with tray set-up     PO INTAKE:  Good (%) [  ]       Fair (50-75%) [ xx ]      Poor (<25%) [  ]      N/A [  ]               GI ISSUES: denies any nausea/vomiting/diarrhea/constipation; last bowel movement 10/15/53; distended     PAIN:  moderate to severe pain -denies it affecting her PO intake; no GI pain     SKIN INTEGRITY: Kameron scale score 11; no edema; Intact except for surgical incisions back; skin tear Rt thigh    Labs:   10-16    135  |  94<L>  |  11  ----------------------------<  105<H>  3.8   |  30  |  0.37<L>    Ca    10.0      16 Oct 2023 05:30  Phos  4.1     10-16  Mg     1.9     10-16      MEDICATIONS  (STANDING):  ascorbic acid 500 milliGRAM(s) Oral <User Schedule>  bisacodyl Suppository 10 milliGRAM(s) Rectal daily  diazepam    Tablet 5 milliGRAM(s) Oral every 8 hours  enoxaparin Injectable 40 milliGRAM(s) SubCutaneous every 24 hours  ferrous    sulfate 325 milliGRAM(s) Oral <User Schedule>  furosemide    Tablet 40 milliGRAM(s) Oral every 24 hours  influenza   Vaccine 0.5 milliLiter(s) IntraMuscular once  lidocaine   4% Patch 1 Patch Transdermal daily  midodrine 15 milliGRAM(s) Oral every 8 hours  polyethylene glycol 3350 17 Gram(s) Oral two times a day  pregabalin 75 milliGRAM(s) Oral every 8 hours  senna 2 Tablet(s) Oral at bedtime    MEDICATIONS  (PRN):  acetaminophen     Tablet .. 1000 milliGRAM(s) Oral every 8 hours PRN Temp greater or equal to 38C (100.4F), Mild Pain (1 - 3)  HYDROmorphone   Tablet 6 milliGRAM(s) Oral every 4 hours PRN Severe Pain (7 - 10)  HYDROmorphone   Tablet 4 milliGRAM(s) Oral every 4 hours PRN Moderate Pain (4 - 6)  melatonin 5 milliGRAM(s) Oral at bedtime PRN Insomnia  sodium chloride 0.9% lock flush 10 milliLiter(s) IV Push every 1 hour PRN Pre/post blood products, medications, blood draw, and to maintain line patency    ANTHROPOMETRICS   Height: 5'0"  Weight: 151 pounds ()  BMI: 29.5 kg/m^2, overweight   IBW: 100lb/45.5kg +/-10%   %IBW: 151 %     Weight Change: No new weights obtained since admission. Recommend nursing to obtain current weight and trend weights weekly. RD to continue monitoring weights as able.     Estimated energy needs:   Calories: 25-30kcal/k-1365kcal/d  Protein: 1.3-1.5g/k-68g/d  Defer fluids to team.  Estimated needs based on IBW as CBW >100% IBW in ICU setting. Needs adjusted for age, BMI, and status post OR.     SUBJECTIVE:    43F no PMHx suffered childhood injury resulting in spine injury and residual gait disturbance, hyperreflexia of BL LE. She has right greater than left leg tingling, low back and mid back pain. MRI 2023 showed 90 degree kyphosis of T3-8 and thoracic myelopathy. S/p C7-L1 fusion, T3-8 vertebral column resection, placement of anterior cage (23). S/p revision of T3-T9 VCR, revision of C7-L1 posterior fusion, plastics closure (10/6/23). Tmax 37.6 C. A&Ox4. On room air; -CXR 10/12 b/l pleural effusions     Chart, labs and meds reviewed. Labs significant for low H/H, low total iron, low iron % saturation, low transferrin; on vit C and ferrous sulfate. Glucose 105H; HgbA1c 5.6. Lasix 40mg PO 1x/day. Met with patient; reports fair-good intake of meals and good-excellent intake of oral nutrition supplements.     Previous Nutrition Diagnosis: Increased nutrient needs (protein) related to post-op healing as evidenced by increased metabolic demand for nutrients     Active [ xx  ]  Resolved [   ]    Goal: Consistently meet >75% EER     RECOMMENDATIONS:   - Recommend continue current diet order   - Monitor PO intake/appetite, diet tolerance, GI distress, labs, weights  - Honor Pt food preferences as able  - Pain and bowel regimen as per team     EDUCATION:  Pt educated on medical nutrition therapy specific to her diagnosis; she expressed understanding; all questions and concerns addressed to her satisfaction      RISK LEVEL:  High [  ]     Moderate [ xx ]     Low [  ]

## 2023-10-16 NOTE — PROGRESS NOTE ADULT - SUBJECTIVE AND OBJECTIVE BOX
NEUROCRITICAL CARE PROGRESS NOTE    NGUYEN GROVER   MRN-[id number]  Summary:  /  HPI:  Taken from outpatient neurosurgery note on 9/13/2023 " The patient, a long-standing patient of my practice, reports a history of spinal issues that dates back to a childhood injury. Over the years she's experienced developing issues with walking in a straight line, overactive reflexes in her lower extremities, and unique difficulty with her lower extremities referred to as cloneness. Recent upturn in these symptoms has limited her ability to carry out her daily routines. The patient also reports no significant improvement in her condition since our last clinical encounter two years ago."    42 y/o female with no PMHx or PSHx presents for surgery today.  She reports ambulating with walker or baby stroller at home for years.  She reports intermittent falls.  She has right greater than left leg tingling, low back and mid back pain.  She denies arm or leg pain.  She reports urinary incontinence since having a baby 2 years ago where if she doesn't get to the bathroom fast enough, she has incontinence, but she is able to hold it for a short while.  She takes no pain medications.  She has taken ibuprofen in the past.  She denies saddle anesthesia, bowel incontinence.   (25 Sep 2023 06:51)      S/Overnight events:  POD 20/10. ANNA overnight. Neuro stable.    Hospital Course:  9/25: POD 0 s/p C7-L1 fusion, T3-8 vertebral column resection, placement of anterior cage.   9/26; POD1 H/H 6.8 postop and PLT 85. Given 2u PRBC and 1u PLT. Switched propofol to precedex gtt. Pt extubated to face mask. Pt noted to only be withdrawing to noxious stimuli on bilateral lower extremities with sensation decreased RLE per patient. Dr. Vieira notified and plan is to keep MAP>100 and obtain CT full spine in the morning. Phenylephrine started to maintain MAP>100. Advanced diet to regular. Passed TOV. Increased need for aurora, UO high.   9/27: POD2 C7-L1 fusion T3-8 vertebral column resection. increased pressor requirements o/n, started on levo additionally. lactate 1.5, BNP 1400 from 800, CK elevated. echo: EF 60-65%. given 250cc bolus albumin, started on midodrine 10q8 to wean off pressors. PICC placed by Alli.   9/28: POD3 s/p C7-L1 fusion, T3-8 vertebral column resection, placement of anterior cage. R radial A-line removed and replaced on the L, trops and EKG done for chest discomfort, WNL, resolved with treatment of overall pain, continues to be febrile, lyrica dc'd to help.   Lyrica restarted. Weaning phenylephrine. Serum Na uptrending, 3% saline discontinued. Pt febrile with uptrending WBC, ID consulted recommend monitoring for now off antibiotics.  9/29: POD4. started on 3% o/n for Na 132. remains on aurora and levo for MAP>100. Tachycardic, given 1L NS. Right axillary a-line placed. 3% dc'd. HMV dc'd. Aquacell dressing replaced. Passed TOV.   9/30: POD 5.  Remains on levo gtt. Increased midodrine to 15q8. Given enema. Had BM.  10/1: POD 6. MAP goal increased back to >100 due to worsened sensation loss, on levo gtt. afebrile however per ID Dr De Souza, desiree cx sent. UA+. started empirically on vanc/zosyn. b/l LE doppler and LUE doppler ordered per ID, +LUE superficial thrombophlebitis. BL LE dopplers negative for DVT.   10/2: POD 7, ANNA overnight. Maintaining MAP >100. 500cc albumin bolus, dilaudid lowered to 2/4mg, toradol [id number] added for pain, abd xray for distension shows gas pattern. aquacel dressing changed.  10/3: POD8 ANNA overnight. Remains on levophed to maintain MAP>100 in AM. Neuro exam unchanged. Tmax 100.9F in AM, given tylenol. Flexeril changed to valium 2q8 and lyrica 50q8 started per pain managment.  DC'd vanc, continue zosyn x 7 days per ID. 1u PRBC to maintain hbg >9.0. DANIELE drain removed. Salt tabs weaned to 2q8. MAP goal changed to >85 to help wean off levo gtt.   10/4: POD 9. MAP goal >100. Sodium chloride tabs discontinued.  10/5: POD 10, Given 2 L boluses o/n for tachycardia and negative fluid status. Given 1 mg IV valium in AM for muscle spasm. 1L bolus given in afternoon for high urine output. Preop for OR tomorrow.   10/6: POD 11 Pt endorsing incisional pain, given 1mg IV valium. Neuro exam stable. Plan for second stage today,  POD 0 revision of T3-T9 VCR, revision of C7-L1 posterior fusion, plastics closure. Post-op CT thoracic spine complete. 1 L bolus for soft BP and colapsable IVC on POCUS.   10/7: POD 12 C7-L1 fusion. POD 1 Revision fusion and T3-9 VCR. Pain controlled. Remains on Levo for MAP goal > 100. AXR completed in AM for abdominal distension. Dilaudid PO prn increased to 4 and 6 mg, valium increased to 2mg q6 for pain control. 1u PRBC given for Hb 8.4. Hgb elevated to 10.4. 250 cc albumin given for tachycardia.   10/8: POD 13 Fusion, POD 2 Revision. Pain controlled overnight.   +BM this morning. Right hemovac removed. MAPs liberalized to goal greater than 85. Troponin elevated this morning, now downtrending. EKG WNL.   10/9: POD 13 C7-L1 fusion, POD 3 Revision and T3-9 VCR.   MAP goals liberalized to keep >80. Right DANIELE drain removed. IV valium given x1   10/10: POD 14 C7-L1 fusion, POD 4 revision and T3-9 VCR, liberalized MAP goal to >65. Started PO iron and vit C every other day. HMV removed. Lidocaine patch given for left neck pain. Midodrine weaned to 10q8.   10/11: POD 15 C7-L1 fusion, POD 5 revision and T3-9 VCR. SBP 80s when working with PT, given 1L bolus NS. Increased midodrine 15q8. Given additional 500cc bolus NS and 250cc albumin for soft pressures. Started aurora gtt for MAP>65.Given additional 500cc NS. CTA chest neg for PE, notable for b/l pleural effusions. Lasix 10mg IV.  10/12: POD 16/6. Remains on aurora gtt. CXR showing b/l pleural effusions. Given additional 20mg IV lasix. TTE normal, EF 54%. increased lyrica to 75q8. Given additional 20 IV lasix.  10/13: POD17/7. ANNA o/n neuro stable. remains on aurora gtt. Lasix 40 IV. Midodrine 20mg q8h.   10/14: POD18/8. ANNA o/n neuro stable. remains on aurora gtt. AM cortisol normal. Started lasix 40mg IV daily. Applied thigh high compression stockings.   10/15: POD19/9. ANNA o/n neuro stable. remains off pressors. Lasix 40 IV changed to 40 PO. Midodrine decreased to 15mg q8h.  10/15: POD 20/10. ANNA overnight. Neuro stable.    Vital Signs Last 24 Hrs  T(C): 36.9 (15 Oct 2023 22:08), Max: 37.2 (15 Oct 2023 06:01)  T(F): 98.5 (15 Oct 2023 22:08), Max: 98.9 (15 Oct 2023 06:01)  HR: 99 (15 Oct 2023 22:00) (87 - 118)  BP: 99/57 (15 Oct 2023 21:00) (99/57 - 124/73)  BP(mean): 76 (15 Oct 2023 21:00) (76 - 91)  RR: 18 (15 Oct 2023 22:00) (15 - 20)  SpO2: 98% (15 Oct 2023 22:00) (94% - 100%)    Parameters below as of 15 Oct 2023 22:00  Patient On (Oxygen Delivery Method): room air    PHYSICAL EXAM:  GEN: laying in bed, NAD  NEURO: AOx3. FC, OE spont, speech intact, face symmetric. CNII-XII intact. PERRL, EOMI. No pronator drift. b/l UE 5/5. b/l LE 0. decreased sensation below t10 level.  CV: RRR +S1/S2  PULM: CTAB  GI: Abd soft, NT/ND  EXT: ext warm, dry, nontender  WOUND: c/t/l spine incision c/d/i    TUBES/LINES:  [] Chin  [] Lumbar Drain  [x] Wound Drains: DANIELE  [] Others      DIET:  [] NPO  [x] Mechanical  [] Tube feeds        I&O's Detail    14 Oct 2023 07:01  -  15 Oct 2023 07:00  --------------------------------------------------------  IN:    Oral Fluid: 1120 mL    Phenylephrine: 20 mL  Total IN: 1140 mL    OUT:    Bulb (mL): 40 mL    Voided (mL): 2100 mL  Total OUT: 2140 mL    Total NET: -1000 mL      15 Oct 2023 07:01  -  16 Oct 2023 00:16  --------------------------------------------------------  IN:    Oral Fluid: 150 mL  Total IN: 150 mL    OUT:    Bulb (mL): 55 mL    Voided (mL): 600 mL  Total OUT: 655 mL    Total NET: -505 mL          LABS:                        9.9    7.81  )-----------( 418      ( 15 Oct 2023 05:30 )             31.6     10-15    135  |  94<L>  |  9   ----------------------------<  102<H>  3.9   |  28  |  0.39<L>    Ca    10.1      15 Oct 2023 05:30  Phos  3.6     10-15  Mg     2.0     10-15        Urinalysis Basic - ( 15 Oct 2023 05:30 )    Color: x / Appearance: x / SG: x / pH: x  Gluc: 102 mg/dL / Ketone: x  / Bili: x / Urobili: x   Blood: x / Protein: x / Nitrite: x   Leuk Esterase: x / RBC: x / WBC x   Sq Epi: x / Non Sq Epi: x / Bacteria: x          CAPILLARY BLOOD GLUCOSE          Drug Levels: [] N/A    CSF Analysis: [] N/A      Allergies    No Known Allergies    Intolerances      MEDICATIONS:  Antibiotics:    Neuro:  diazepam    Tablet 5 milliGRAM(s) Oral every 8 hours  HYDROmorphone   Tablet 6 milliGRAM(s) Oral every 4 hours PRN  HYDROmorphone   Tablet 4 milliGRAM(s) Oral every 4 hours PRN  melatonin 5 milliGRAM(s) Oral at bedtime PRN  pregabalin 75 milliGRAM(s) Oral every 8 hours    Anticoagulation:  enoxaparin Injectable 40 milliGRAM(s) SubCutaneous every 24 hours    OTHER:  bisacodyl Suppository 10 milliGRAM(s) Rectal daily  chlorhexidine 2% Cloths 1 Application(s) Topical <User Schedule>  furosemide    Tablet 40 milliGRAM(s) Oral every 24 hours  influenza   Vaccine 0.5 milliLiter(s) IntraMuscular once  lidocaine   4% Patch 1 Patch Transdermal daily  midodrine 15 milliGRAM(s) Oral every 8 hours  polyethylene glycol 3350 17 Gram(s) Oral two times a day  senna 2 Tablet(s) Oral at bedtime  simethicone 80 milliGRAM(s) Chew three times a day    IVF:  ascorbic acid 500 milliGRAM(s) Oral <User Schedule>  ferrous    sulfate 325 milliGRAM(s) Oral <User Schedule>    CULTURES:    ASSESSMENT:  43F no PMHx suffered childhood injury resulting in spine injury and residual gait disturbance, hyperreflexia of BL LE. She has right greater than left leg tingling, low back and mid back pain. 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 S/p C7-L1 fusion, T3-8 vertebral column resection, placement of anterior cage (9/25/23). S/p revision of T3-T9 VCR, revision of C7-L1 posterior fusion, plastics closure (10/6/23).     NEURO:  - Neuro/spine checks q4/vitals q1hrs  - Pain control: tylenol 1g q8h, dilaudid 4/6 PO, Lyrica 75q8,- stage 1 Post-op CT full spine: L1 screw not attached to the vertical yoli, epidural lipomatosis L2-3  - stage 2 post op CT T spine completed  - JPx1 (per plastics) (L HMV, R HMV and R DANIELE dc'd)    Cardio:  - MAP > 65, aurora gtt off since 5PM 10/14  - midodrine decreased from 20 to 15mg q8h  - echo 9/27: EF 60-65%, 10/12 nml EF 54%   - Lasix 40mg PO qd    Pulm:  - room air  - incentive spirometry  - CTA chest 10/11 neg for PE, notable for b/l pleural effusions  - CXR 10/12 b/l pleural effusions     GI:  - Regular diet  - Bowel regimen, simethicone until 10/16  - last BM 10/15    Renal:  - IVL  - voiding    Endo:  - a1c 5.6, ISS dc'd    Heme:  - SCDs for DVT ppx/ SQL   - 2u PRBC and 1u PLT postop 9/25, 1u PRBC 10/3, 1u PRBC 10/7  - b/l LE dopplers 10/1 negative  - LUE doppler 10/1: +cephalic thrombophlebitis  - iron and vitamin c every other day started 10/10     ID:  - 10/1 panculture, +UA 10/1  - Vanc (10/1-10/3 ) dc'd, Zosyn (10/1- 10/8)    - ID signed off    Dispo: ICU status, full code, PT/OT recs AR  Discussed with Dr. Vieira and Dr. Sutton

## 2023-10-16 NOTE — CONSULT NOTE ADULT - SUBJECTIVE AND OBJECTIVE BOX
NEUROSURGERY PAIN MANAGEMENT CONSULT NOTE    Chief Complaint:    HPI:  Taken from outpatient neurosurgery note on 9/13/2023 " The patient, a long-standing patient of my practice, reports a history of spinal issues that dates back to a childhood injury. Over the years she's experienced developing issues with walking in a straight line, overactive reflexes in her lower extremities, and unique difficulty with her lower extremities referred to as cloneness. Recent upturn in these symptoms has limited her ability to carry out her daily routines. The patient also reports no significant improvement in her condition since our last clinical encounter two years ago."    42 y/o female with no PMHx or PSHx presents for surgery today.  She reports ambulating with walker or baby stroller at home for years.  She reports intermittent falls.  She has right greater than left leg tingling, low back and mid back pain.  She denies arm or leg pain.  She reports urinary incontinence since having a baby 2 years ago where if she doesn't get to the bathroom fast enough, she has incontinence, but she is able to hold it for a short while.  She takes no pain medications.  She has taken ibuprofen in the past.  She denies saddle anesthesia, bowel incontinence.   (25 Sep 2023 06:51)      Today,     PAST MEDICAL & SURGICAL HISTORY:  History of acute illness  childhood      No significant past surgical history          FAMILY HISTORY:  No pertinent family history in first degree relatives        SOCIAL HISTORY:  [ ] Denies Smoking, Alcohol, or Drug Use    HOME MEDICATIONS:   Please refer to initial HNP    PAIN HOME MEDICATIONS:    Allergies    No Known Allergies    Intolerances        PAIN MEDICATIONS:  diazepam    Tablet 5 milliGRAM(s) Oral every 8 hours  HYDROmorphone   Tablet 6 milliGRAM(s) Oral every 4 hours PRN  HYDROmorphone   Tablet 4 milliGRAM(s) Oral every 4 hours PRN  melatonin 5 milliGRAM(s) Oral at bedtime PRN  pregabalin 75 milliGRAM(s) Oral every 8 hours    Heme:  enoxaparin Injectable 40 milliGRAM(s) SubCutaneous every 24 hours    Antibiotics:    Cardiovascular:  furosemide    Tablet 40 milliGRAM(s) Oral every 24 hours  midodrine 15 milliGRAM(s) Oral every 8 hours    GI:  bisacodyl Suppository 10 milliGRAM(s) Rectal daily  polyethylene glycol 3350 17 Gram(s) Oral two times a day  senna 2 Tablet(s) Oral at bedtime    Endocrine:    All Other Medications:  ascorbic acid 500 milliGRAM(s) Oral <User Schedule>  chlorhexidine 2% Cloths 1 Application(s) Topical <User Schedule>  ferrous    sulfate 325 milliGRAM(s) Oral <User Schedule>  influenza   Vaccine 0.5 milliLiter(s) IntraMuscular once  lidocaine   4% Patch 1 Patch Transdermal daily  sodium chloride 0.9% lock flush 10 milliLiter(s) IV Push every 1 hour PRN      Vital Signs Last 24 Hrs  T(C): 37.1 (16 Oct 2023 05:18), Max: 37.1 (16 Oct 2023 05:18)  T(F): 98.8 (16 Oct 2023 05:18), Max: 98.8 (16 Oct 2023 05:18)  HR: 98 (16 Oct 2023 08:00) (90 - 118)  BP: 99/57 (15 Oct 2023 21:00) (99/57 - 124/73)  BP(mean): 76 (15 Oct 2023 21:00) (76 - 91)  RR: 20 (16 Oct 2023 08:00) (15 - 20)  SpO2: 96% (16 Oct 2023 08:00) (92% - 100%)    Parameters below as of 16 Oct 2023 08:00  Patient On (Oxygen Delivery Method): room air        LABS:                        9.3    7.04  )-----------( 397      ( 16 Oct 2023 05:30 )             29.4     10-16    135  |  94<L>  |  11  ----------------------------<  105<H>  3.8   |  30  |  0.37<L>    Ca    10.0      16 Oct 2023 05:30  Phos  4.1     10-16  Mg     1.9     10-16        REVIEW OF SYSTEMS:  CONSTITUTIONAL: No fever or fatigue O/N.   EYES: No eye pain, visual disturbances  ENMT:  No difficulty hearing. No throat pain  NECK: No pain or stiffness  RESPIRATORY: No cough, wheezing; No shortness of breath  CARDIOVASCULAR: No chest pain, palpitations.   GASTROINTESTINAL: Pt reports ___ passing gas. No bowel movements. No abdominal or epigastric pain. No nausea, vomiting. GENITOURINARY: No dysuria, frequency, or incontinence  NEUROLOGICAL: No headaches, loss of strength, numbness, or tremors. No dizzinesss or lightheadedness with pain medications.   MUSCULOSKELETAL: Incisional back pain. No joint pain or swelling; No muscle, or extremity pain      PHYSICAL EXAM  GENERAL: Laying in bed, NAD  Neuro: CN II-XII PERRRL, EOMI  Cranial nerves grossly intact  Motor exam:  Sensation intact to LT in UE/LE in 3 dermatomes  CHEST/LUNG: Clear to auscultation bilaterally; No rales, rhonchi, wheezing, or rubs  HEART: Regular rate and rhythm; No murmurs, rubs, or gallops  ABDOMEN: Soft, Nontender, Nondistended; Bowel sounds present  EXTREMITIES:  2+ Peripheral Pulses, No clubbing, cyanosis, or edema  SKIN: No rashes or lesions      ASSESSMENT:   43F no PMHx suffered childhood injury resulting in spine injury and residual gait disturbance, hyperreflexia of BL LE. She has right greater than left leg tingling, low back and mid back pain. 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 S/p C7-L1 fusion, T3-8 vertebral column resection, placement of anterior cage (9/25/23). S/p revision of T3-T9 VCR, revision of C7-L1 posterior fusion, plastics closure (10/6/23).     PLAN:   - Pain:  >acetaminophen 1g PO q8h  > pregabalin 75 mg PO q8h  >diazepam 5 mg PO q8h  > hydromorphone 4mg to 6 mg PO q4h prn for moderate to severe pain      - Bowel regimen: Senna    - Nausea ppx: Zofran standing  - Functional Goals: Pt will get OOB with PT today. Pt will resume previous level of activity without impairment from surgery.   - Additional Consults: None recommended.   - Additional Labs/Imaging:  None recommended.     - Follow up, Discharge Planning:     Patient is set for discharge to:     Discharge is pending: pending medical and surgical clearance  - Pain Management follow up plan: Dr. Birmingham and pain team will continue to follow while in-patient      Plan discussed with Dr. Birmingham   NEUROSURGERY PAIN MANAGEMENT CONSULT NOTE    Chief Complaint: neck and back pain    HPI:  Taken from outpatient neurosurgery note on 9/13/2023 " The patient, a long-standing patient of my practice, reports a history of spinal issues that dates back to a childhood injury. Over the years she's experienced developing issues with walking in a straight line, overactive reflexes in her lower extremities, and unique difficulty with her lower extremities referred to as cloneness. Recent upturn in these symptoms has limited her ability to carry out her daily routines. The patient also reports no significant improvement in her condition since our last clinical encounter two years ago."    42 y/o female with no PMHx or PSHx presents for surgery today.  She reports ambulating with walker or baby stroller at home for years.  She reports intermittent falls.  She has right greater than left leg tingling, low back and mid back pain.  She denies arm or leg pain.  She reports urinary incontinence since having a baby 2 years ago where if she doesn't get to the bathroom fast enough, she has incontinence, but she is able to hold it for a short while.  She takes no pain medications.  She has taken ibuprofen in the past.  She denies saddle anesthesia, bowel incontinence.   (25 Sep 2023 06:51)    No acute events overnight. Patient slept well. Says her current pan regimen has been controlling her pain.     PAST MEDICAL & SURGICAL HISTORY:  History of acute illness  childhood      No significant past surgical history          FAMILY HISTORY:  No pertinent family history in first degree relatives        SOCIAL HISTORY:  [ ] Denies Smoking, Alcohol, or Drug Use    HOME MEDICATIONS:   Please refer to initial HNP    PAIN HOME MEDICATIONS:    Allergies    No Known Allergies    Intolerances        PAIN MEDICATIONS:  diazepam    Tablet 5 milliGRAM(s) Oral every 8 hours  HYDROmorphone   Tablet 6 milliGRAM(s) Oral every 4 hours PRN  HYDROmorphone   Tablet 4 milliGRAM(s) Oral every 4 hours PRN  melatonin 5 milliGRAM(s) Oral at bedtime PRN  pregabalin 75 milliGRAM(s) Oral every 8 hours    Heme:  enoxaparin Injectable 40 milliGRAM(s) SubCutaneous every 24 hours    Antibiotics:    Cardiovascular:  furosemide    Tablet 40 milliGRAM(s) Oral every 24 hours  midodrine 15 milliGRAM(s) Oral every 8 hours    GI:  bisacodyl Suppository 10 milliGRAM(s) Rectal daily  polyethylene glycol 3350 17 Gram(s) Oral two times a day  senna 2 Tablet(s) Oral at bedtime    Endocrine:    All Other Medications:  ascorbic acid 500 milliGRAM(s) Oral <User Schedule>  chlorhexidine 2% Cloths 1 Application(s) Topical <User Schedule>  ferrous    sulfate 325 milliGRAM(s) Oral <User Schedule>  influenza   Vaccine 0.5 milliLiter(s) IntraMuscular once  lidocaine   4% Patch 1 Patch Transdermal daily  sodium chloride 0.9% lock flush 10 milliLiter(s) IV Push every 1 hour PRN      Vital Signs Last 24 Hrs  T(C): 37.1 (16 Oct 2023 05:18), Max: 37.1 (16 Oct 2023 05:18)  T(F): 98.8 (16 Oct 2023 05:18), Max: 98.8 (16 Oct 2023 05:18)  HR: 98 (16 Oct 2023 08:00) (90 - 118)  BP: 99/57 (15 Oct 2023 21:00) (99/57 - 124/73)  BP(mean): 76 (15 Oct 2023 21:00) (76 - 91)  RR: 20 (16 Oct 2023 08:00) (15 - 20)  SpO2: 96% (16 Oct 2023 08:00) (92% - 100%)    Parameters below as of 16 Oct 2023 08:00  Patient On (Oxygen Delivery Method): room air        LABS:                        9.3    7.04  )-----------( 397      ( 16 Oct 2023 05:30 )             29.4     10-16    135  |  94<L>  |  11  ----------------------------<  105<H>  3.8   |  30  |  0.37<L>    Ca    10.0      16 Oct 2023 05:30  Phos  4.1     10-16  Mg     1.9     10-16        REVIEW OF SYSTEMS:  CONSTITUTIONAL: No fever or fatigue O/N.   EYES: No eye pain, visual disturbances  ENMT:  No difficulty hearing. No throat pain  NECK: No pain or stiffness  RESPIRATORY: No cough, wheezing; No shortness of breath  CARDIOVASCULAR: No chest pain, palpitations.   GASTROINTESTINAL: Pt reports  passing gas and bowel movements. No abdominal or epigastric pain. No nausea, vomiting. GENITOURINARY: No dysuria, frequency, or incontinence  NEUROLOGICAL: No headaches, loss of strength, numbness, or tremors. No dizzinesss or lightheadedness with pain medications.   MUSCULOSKELETAL: Incisional back pain. No joint pain or swelling; No muscle, or extremity pain      PHYSICAL EXAM  GENERAL: Laying in bed, NAD  Neuro: CN II-XII PERRRL, EOMI  Cranial nerves grossly intact  Motor exam:  Sensation intact to LT in UE/LE in 3 dermatomes  CHEST/LUNG: Clear to auscultation bilaterally; No rales, rhonchi, wheezing, or rubs  HEART: Regular rate and rhythm; No murmurs, rubs, or gallops  ABDOMEN: Soft, Nontender, Nondistended; Bowel sounds present  EXTREMITIES:  2+ Peripheral Pulses, No clubbing, cyanosis, or edema  SKIN: No rashes or lesions      ASSESSMENT:   43F no PMHx suffered childhood injury resulting in spine injury and residual gait disturbance, hyperreflexia of BL LE. She has right greater than left leg tingling, low back and mid back pain. 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 S/p C7-L1 fusion, T3-8 vertebral column resection, placement of anterior cage (9/25/23). S/p revision of T3-T9 VCR, revision of C7-L1 posterior fusion, plastics closure (10/6/23).     PLAN:   - Pain:  >acetaminophen 1g PO q8h  > pregabalin 75 mg PO q8h  >diazepam 5 mg PO q8h  > hydromorphone 4mg to 6 mg PO q4h prn for moderate to severe pain      - Bowel regimen: Senna    - Nausea ppx: Zofran standing  - Functional Goals: Pt will get OOB with PT today. Pt will resume previous level of activity without impairment from surgery.   - Additional Consults: None recommended.   - Additional Labs/Imaging:  None recommended.     - Follow up, Discharge Planning:     Patient is set for discharge to:     Discharge is pending: pending medical and surgical clearance  - Pain Management follow up plan: Dr. Birmingham and pain team will continue to follow while in-patient      Plan discussed with Dr. Birmingham

## 2023-10-16 NOTE — PROGRESS NOTE ADULT - SUBJECTIVE AND OBJECTIVE BOX
***********************************************  ADULT NSICU PROGRESS NOTE  NGUYEN GROVER [id number] [facility] [id number]  ***********************************************    24H INTERVAL EVENTS:  No acute overnight events    ROS: negative except per mentioned above in 24h interval events.      HOSPITAL COURSE CARRIED FORWARD:    VITALS:    ICU Vital Signs Last 24 Hrs  T(C): 36.9 (16 Oct 2023 14:00), Max: 37.6 (16 Oct 2023 08:51)  T(F): 98.4 (16 Oct 2023 14:00), Max: 99.6 (16 Oct 2023 08:51)  HR: 110 (16 Oct 2023 14:00) (90 - 118)  BP: 114/63 (16 Oct 2023 14:00) (99/57 - 114/63)  BP(mean): 79 (16 Oct 2023 12:18) (76 - 79)  ABP: 112/65 (16 Oct 2023 09:00) (81/49 - 118/87)  ABP(mean): 88 (16 Oct 2023 09:00) (65 - 102)  RR: 18 (16 Oct 2023 14:00) (15 - 20)  SpO2: 100% (16 Oct 2023 14:00) (92% - 100%)    O2 Parameters below as of 16 Oct 2023 14:00  Patient On (Oxygen Delivery Method): room air                I&O's Summary    15 Oct 2023 07:01  -  16 Oct 2023 07:00  --------------------------------------------------------  IN: 700 mL / OUT: 1575 mL / NET: -875 mL    16 Oct 2023 07:01  -  16 Oct 2023 16:44  --------------------------------------------------------  IN: 400 mL / OUT: 500 mL / NET: -100 mL        EXAM:     Rosemarie Coma Scale: 15    General: normocephalic, atraumatic, laying in bed, in no distress  Neuro     MS: A/Ox3, cooperative, normal attention, no neglect, comprehension intact, speech with preserved fluency, naming, and repetition    CN: PERRL, EOMI and no ptosis bilaterally, sensation intact to crude touch V1-V3, face symmetric, hearing grossly intact    Mot: bulk normal, tone normal, power (R/L) UPPER 5/5, LOWER 0/0    Sens: (+)decreased sensation to crude touch and temperature from level of T5-T6 extending inferiorly    Reflexes: diminished throughout bilateral LE, toes are mute  Chest: nonlabored respirations, no adventitious lung sounds bilaterally, heart regular rate/rhythm, present S1/S2, no murmurs or rubs  Abdomen: nondistended, soft and nontender without peritoneal signs, normoactive bowel sounds  Extremities: no clubbing, well-perfused, no edema    LABORATORY DATA:                            9.3    7.04  )-----------( 397      ( 16 Oct 2023 05:30 )             29.4     10-16    135  |  94<L>  |  11  ----------------------------<  105<H>  3.8   |  30  |  0.37<L>    Ca    10.0      16 Oct 2023 05:30  Phos  4.1     10-16  Mg     1.9     10-16            IMAGING DATA:    CARDIOLOGY DATA:    MICROBIOLOGY DATA:        MEDICATIONS  (STANDING):  ascorbic acid 500 milliGRAM(s) Oral <User Schedule>  bisacodyl Suppository 10 milliGRAM(s) Rectal daily  diazepam    Tablet 5 milliGRAM(s) Oral every 8 hours  enoxaparin Injectable 40 milliGRAM(s) SubCutaneous every 24 hours  ferrous    sulfate 325 milliGRAM(s) Oral <User Schedule>  furosemide    Tablet 40 milliGRAM(s) Oral every 24 hours  influenza   Vaccine 0.5 milliLiter(s) IntraMuscular once  lidocaine   4% Patch 1 Patch Transdermal daily  midodrine 15 milliGRAM(s) Oral every 8 hours  polyethylene glycol 3350 17 Gram(s) Oral two times a day  pregabalin 75 milliGRAM(s) Oral every 8 hours  senna 2 Tablet(s) Oral at bedtime    MEDICATIONS  (PRN):  acetaminophen     Tablet .. 1000 milliGRAM(s) Oral every 8 hours PRN Temp greater or equal to 38C (100.4F), Mild Pain (1 - 3)  HYDROmorphone   Tablet 6 milliGRAM(s) Oral every 4 hours PRN Severe Pain (7 - 10)  HYDROmorphone   Tablet 4 milliGRAM(s) Oral every 4 hours PRN Moderate Pain (4 - 6)  melatonin 5 milliGRAM(s) Oral at bedtime PRN Insomnia  sodium chloride 0.9% lock flush 10 milliLiter(s) IV Push every 1 hour PRN Pre/post blood products, medications, blood draw, and to maintain line patency      ***********************************************  ASSESSMENT AND PLAN  ***********************************************    #S/p C7-L1 fusion, T3-8 vertebral column resection w/ placement of anterior cage (9/25/23), s/p revision of T3-T9 vertebral column resection & C7-L1 posterior fusion w/ plastics closure (10/6/23)  #Bilateral pleural effusions    NEURO - stepdown today, Q4/Q8, pain control (APAP, dilaudid, lyrica), JPD x1 per plastics  PULM - SpO2 goal > 92%, supplemental O2 and pulm toileting as needed  CARDIO - BP goal NORMOTENSION, midodrine, lasix 40 daily  GI - bowel regimen, stool count, diet: regular  /RENAL - monitor UOP/volume status, BUN/SCr  HEME - maintain Hb > 7.0, PLT > 100,000, SQL for dvt chemoppx  ID - monitor for infectious s/s, fever curve, leukocytosis  ENDO - SGlu goal < 200      ICU stepdown Checklist:    [X] hemodynamically stable – VS WNL and stable x 24hours, UO adequate  [X] if  previously on HDA - off pressors x 24h with stable neuro exam   [X] no new symptoms x 24h (i.e. new fever, new-onset nausea/vomiting)  [X] stable labs: (i.e. WBC not rising, sodium not dropping)  [X] patient not at high risk for aspiration, if high risk then:                  [ ] should have definitive plans for trach/PEG (alternative option is to discharge from ICU to facilty)                  [ ] stepdown to bed close to nurse’s station  [X] low suctioning requirements (i.e. q4h or less)  [X] sign-off from primary RN*  [X] drains do not require ICU level of care  [X] if patient previously agitated or with behavioral issues – controlled  [X] pain controlled

## 2023-10-17 ENCOUNTER — TRANSCRIPTION ENCOUNTER (OUTPATIENT)
Age: 43
End: 2023-10-17

## 2023-10-17 DIAGNOSIS — R00.0 TACHYCARDIA, UNSPECIFIED: ICD-10-CM

## 2023-10-17 DIAGNOSIS — D62 ACUTE POSTHEMORRHAGIC ANEMIA: ICD-10-CM

## 2023-10-17 DIAGNOSIS — J90 PLEURAL EFFUSION, NOT ELSEWHERE CLASSIFIED: ICD-10-CM

## 2023-10-17 DIAGNOSIS — E87.1 HYPO-OSMOLALITY AND HYPONATREMIA: ICD-10-CM

## 2023-10-17 DIAGNOSIS — G95.20 UNSPECIFIED CORD COMPRESSION: ICD-10-CM

## 2023-10-17 LAB
ANION GAP SERPL CALC-SCNC: 10 MMOL/L — SIGNIFICANT CHANGE UP (ref 5–17)
ANION GAP SERPL CALC-SCNC: 10 MMOL/L — SIGNIFICANT CHANGE UP (ref 5–17)
BUN SERPL-MCNC: 11 MG/DL — SIGNIFICANT CHANGE UP (ref 7–23)
BUN SERPL-MCNC: 11 MG/DL — SIGNIFICANT CHANGE UP (ref 7–23)
CALCIUM SERPL-MCNC: 10 MG/DL — SIGNIFICANT CHANGE UP (ref 8.4–10.5)
CALCIUM SERPL-MCNC: 10 MG/DL — SIGNIFICANT CHANGE UP (ref 8.4–10.5)
CHLORIDE SERPL-SCNC: 92 MMOL/L — LOW (ref 96–108)
CHLORIDE SERPL-SCNC: 92 MMOL/L — LOW (ref 96–108)
CO2 SERPL-SCNC: 29 MMOL/L — SIGNIFICANT CHANGE UP (ref 22–31)
CO2 SERPL-SCNC: 29 MMOL/L — SIGNIFICANT CHANGE UP (ref 22–31)
CREAT ?TM UR-MCNC: 56 MG/DL — SIGNIFICANT CHANGE UP
CREAT ?TM UR-MCNC: 56 MG/DL — SIGNIFICANT CHANGE UP
CREAT SERPL-MCNC: 0.38 MG/DL — LOW (ref 0.5–1.3)
CREAT SERPL-MCNC: 0.38 MG/DL — LOW (ref 0.5–1.3)
EGFR: 129 ML/MIN/1.73M2 — SIGNIFICANT CHANGE UP
EGFR: 129 ML/MIN/1.73M2 — SIGNIFICANT CHANGE UP
GLUCOSE SERPL-MCNC: 103 MG/DL — HIGH (ref 70–99)
GLUCOSE SERPL-MCNC: 103 MG/DL — HIGH (ref 70–99)
HCT VFR BLD CALC: 30.8 % — LOW (ref 34.5–45)
HCT VFR BLD CALC: 30.8 % — LOW (ref 34.5–45)
HGB BLD-MCNC: 9.8 G/DL — LOW (ref 11.5–15.5)
HGB BLD-MCNC: 9.8 G/DL — LOW (ref 11.5–15.5)
MAGNESIUM SERPL-MCNC: 2.1 MG/DL — SIGNIFICANT CHANGE UP (ref 1.6–2.6)
MAGNESIUM SERPL-MCNC: 2.1 MG/DL — SIGNIFICANT CHANGE UP (ref 1.6–2.6)
MCHC RBC-ENTMCNC: 27.1 PG — SIGNIFICANT CHANGE UP (ref 27–34)
MCHC RBC-ENTMCNC: 27.1 PG — SIGNIFICANT CHANGE UP (ref 27–34)
MCHC RBC-ENTMCNC: 31.8 GM/DL — LOW (ref 32–36)
MCHC RBC-ENTMCNC: 31.8 GM/DL — LOW (ref 32–36)
MCV RBC AUTO: 85.3 FL — SIGNIFICANT CHANGE UP (ref 80–100)
MCV RBC AUTO: 85.3 FL — SIGNIFICANT CHANGE UP (ref 80–100)
NRBC # BLD: 0 /100 WBCS — SIGNIFICANT CHANGE UP (ref 0–0)
NRBC # BLD: 0 /100 WBCS — SIGNIFICANT CHANGE UP (ref 0–0)
OSMOLALITY UR: 304 MOSM/KG — SIGNIFICANT CHANGE UP (ref 300–900)
OSMOLALITY UR: 304 MOSM/KG — SIGNIFICANT CHANGE UP (ref 300–900)
PHOSPHATE SERPL-MCNC: 4 MG/DL — SIGNIFICANT CHANGE UP (ref 2.5–4.5)
PHOSPHATE SERPL-MCNC: 4 MG/DL — SIGNIFICANT CHANGE UP (ref 2.5–4.5)
PLATELET # BLD AUTO: 372 K/UL — SIGNIFICANT CHANGE UP (ref 150–400)
PLATELET # BLD AUTO: 372 K/UL — SIGNIFICANT CHANGE UP (ref 150–400)
POTASSIUM SERPL-MCNC: 3.7 MMOL/L — SIGNIFICANT CHANGE UP (ref 3.5–5.3)
POTASSIUM SERPL-MCNC: 3.7 MMOL/L — SIGNIFICANT CHANGE UP (ref 3.5–5.3)
POTASSIUM SERPL-SCNC: 3.7 MMOL/L — SIGNIFICANT CHANGE UP (ref 3.5–5.3)
POTASSIUM SERPL-SCNC: 3.7 MMOL/L — SIGNIFICANT CHANGE UP (ref 3.5–5.3)
RBC # BLD: 3.61 M/UL — LOW (ref 3.8–5.2)
RBC # BLD: 3.61 M/UL — LOW (ref 3.8–5.2)
RBC # FLD: 13.9 % — SIGNIFICANT CHANGE UP (ref 10.3–14.5)
RBC # FLD: 13.9 % — SIGNIFICANT CHANGE UP (ref 10.3–14.5)
SODIUM SERPL-SCNC: 131 MMOL/L — LOW (ref 135–145)
SODIUM SERPL-SCNC: 131 MMOL/L — LOW (ref 135–145)
SODIUM UR-SCNC: <20 MMOL/L — SIGNIFICANT CHANGE UP
SODIUM UR-SCNC: <20 MMOL/L — SIGNIFICANT CHANGE UP
SP GR SPEC: 1.01 — SIGNIFICANT CHANGE UP (ref 1–1.03)
SP GR SPEC: 1.01 — SIGNIFICANT CHANGE UP (ref 1–1.03)
WBC # BLD: 7.02 K/UL — SIGNIFICANT CHANGE UP (ref 3.8–10.5)
WBC # BLD: 7.02 K/UL — SIGNIFICANT CHANGE UP (ref 3.8–10.5)
WBC # FLD AUTO: 7.02 K/UL — SIGNIFICANT CHANGE UP (ref 3.8–10.5)
WBC # FLD AUTO: 7.02 K/UL — SIGNIFICANT CHANGE UP (ref 3.8–10.5)

## 2023-10-17 PROCEDURE — 99024 POSTOP FOLLOW-UP VISIT: CPT

## 2023-10-17 PROCEDURE — 99223 1ST HOSP IP/OBS HIGH 75: CPT

## 2023-10-17 RX ORDER — ACETAMINOPHEN 500 MG
1000 TABLET ORAL ONCE
Refills: 0 | Status: COMPLETED | OUTPATIENT
Start: 2023-10-17 | End: 2023-10-17

## 2023-10-17 RX ADMIN — HYDROMORPHONE HYDROCHLORIDE 6 MILLIGRAM(S): 2 INJECTION INTRAMUSCULAR; INTRAVENOUS; SUBCUTANEOUS at 17:39

## 2023-10-17 RX ADMIN — HYDROMORPHONE HYDROCHLORIDE 6 MILLIGRAM(S): 2 INJECTION INTRAMUSCULAR; INTRAVENOUS; SUBCUTANEOUS at 12:45

## 2023-10-17 RX ADMIN — SENNA PLUS 2 TABLET(S): 8.6 TABLET ORAL at 22:40

## 2023-10-17 RX ADMIN — ENOXAPARIN SODIUM 40 MILLIGRAM(S): 100 INJECTION SUBCUTANEOUS at 22:40

## 2023-10-17 RX ADMIN — Medication 75 MILLIGRAM(S): at 22:40

## 2023-10-17 RX ADMIN — Medication 75 MILLIGRAM(S): at 06:56

## 2023-10-17 RX ADMIN — HYDROMORPHONE HYDROCHLORIDE 6 MILLIGRAM(S): 2 INJECTION INTRAMUSCULAR; INTRAVENOUS; SUBCUTANEOUS at 07:39

## 2023-10-17 RX ADMIN — MIDODRINE HYDROCHLORIDE 15 MILLIGRAM(S): 2.5 TABLET ORAL at 15:43

## 2023-10-17 RX ADMIN — Medication 5 MILLIGRAM(S): at 22:40

## 2023-10-17 RX ADMIN — Medication 5 MILLIGRAM(S): at 15:43

## 2023-10-17 RX ADMIN — Medication 75 MILLIGRAM(S): at 15:43

## 2023-10-17 RX ADMIN — LIDOCAINE 1 PATCH: 4 CREAM TOPICAL at 23:30

## 2023-10-17 RX ADMIN — MIDODRINE HYDROCHLORIDE 15 MILLIGRAM(S): 2.5 TABLET ORAL at 06:59

## 2023-10-17 RX ADMIN — Medication 500 MILLIGRAM(S): at 06:54

## 2023-10-17 RX ADMIN — HYDROMORPHONE HYDROCHLORIDE 6 MILLIGRAM(S): 2 INJECTION INTRAMUSCULAR; INTRAVENOUS; SUBCUTANEOUS at 03:26

## 2023-10-17 RX ADMIN — Medication 40 MILLIGRAM(S): at 06:56

## 2023-10-17 RX ADMIN — Medication 5 MILLIGRAM(S): at 06:58

## 2023-10-17 RX ADMIN — Medication 325 MILLIGRAM(S): at 06:56

## 2023-10-17 RX ADMIN — LIDOCAINE 1 PATCH: 4 CREAM TOPICAL at 11:48

## 2023-10-17 RX ADMIN — MIDODRINE HYDROCHLORIDE 15 MILLIGRAM(S): 2.5 TABLET ORAL at 22:39

## 2023-10-17 RX ADMIN — Medication 5 MILLIGRAM(S): at 23:15

## 2023-10-17 RX ADMIN — HYDROMORPHONE HYDROCHLORIDE 6 MILLIGRAM(S): 2 INJECTION INTRAMUSCULAR; INTRAVENOUS; SUBCUTANEOUS at 18:45

## 2023-10-17 RX ADMIN — HYDROMORPHONE HYDROCHLORIDE 6 MILLIGRAM(S): 2 INJECTION INTRAMUSCULAR; INTRAVENOUS; SUBCUTANEOUS at 04:20

## 2023-10-17 RX ADMIN — Medication 400 MILLIGRAM(S): at 19:00

## 2023-10-17 RX ADMIN — HYDROMORPHONE HYDROCHLORIDE 6 MILLIGRAM(S): 2 INJECTION INTRAMUSCULAR; INTRAVENOUS; SUBCUTANEOUS at 11:48

## 2023-10-17 NOTE — CONSULT NOTE ADULT - CONSULT REQUESTED DATE/TIME
25-Sep-2023
25-Sep-2023 09:00
25-Sep-2023
10-Oct-2023 09:52
16-Oct-2023 00:00
17-Oct-2023 00:00
10-Oct-2023 09:52

## 2023-10-17 NOTE — PROGRESS NOTE ADULT - SUBJECTIVE AND OBJECTIVE BOX
HPI:  Taken from outpatient neurosurgery note on 9/13/2023 " The patient, a long-standing patient of my practice, reports a history of spinal issues that dates back to a childhood injury. Over the years she's experienced developing issues with walking in a straight line, overactive reflexes in her lower extremities, and unique difficulty with her lower extremities referred to as cloneness. Recent upturn in these symptoms has limited her ability to carry out her daily routines. The patient also reports no significant improvement in her condition since our last clinical encounter two years ago."    44 y/o female with no PMHx or PSHx presents for surgery today.  She reports ambulating with walker or baby stroller at home for years.  She reports intermittent falls.  She has right greater than left leg tingling, low back and mid back pain.  She denies arm or leg pain.  She reports urinary incontinence since having a baby 2 years ago where if she doesn't get to the bathroom fast enough, she has incontinence, but she is able to hold it for a short while.  She takes no pain medications.  She has taken ibuprofen in the past.  She denies saddle anesthesia, bowel incontinence.   (25 Sep 2023 06:51)    OVERNIGHT EVENTS: ANNA, pain well controlled, neuro stable.     Hospital Course:   9/25: POD 0 s/p C7-L1 fusion, T3-8 vertebral column resection, placement of anterior cage.   9/26; POD1 H/H 6.8 postop and PLT 85. Given 2u PRBC and 1u PLT. Switched propofol to precedex gtt. Pt extubated to face mask. Pt noted to only be withdrawing to noxious stimuli on bilateral lower extremities with sensation decreased RLE per patient. Dr. Vieira notified and plan is to keep MAP>100 and obtain CT full spine in the morning. Phenylephrine started to maintain MAP>100. Advanced diet to regular. Passed TOV. Increased need for aurora, UO high.   9/27: POD2 C7-L1 fusion T3-8 vertebral column resection. increased pressor requirements o/n, started on levo additionally. lactate 1.5, BNP 1400 from 800, CK elevated. echo: EF 60-65%. given 250cc bolus albumin, started on midodrine 10q8 to wean off pressors. PICC placed by Alli.   9/28: POD3 s/p C7-L1 fusion, T3-8 vertebral column resection, placement of anterior cage. R radial A-line removed and replaced on the L, trops and EKG done for chest discomfort, WNL, resolved with treatment of overall pain, continues to be febrile, lyrica dc'd to help.   Lyrica restarted. Weaning phenylephrine. Serum Na uptrending, 3% saline discontinued. Pt febrile with uptrending WBC, ID consulted recommend monitoring for now off antibiotics.  9/29: POD4. started on 3% o/n for Na 132. remains on aurora and levo for MAP>100. Tachycardic, given 1L NS. Right axillary a-line placed. 3% dc'd. HMV dc'd. Aquacell dressing replaced. Passed TOV.   9/30: POD 5.  Remains on levo gtt. Increased midodrine to 15q8. Given enema. Had BM.  10/1: POD 6. MAP goal increased back to >100 due to worsened sensation loss, on levo gtt. afebrile however per ID desiree Brewer cx sent. UA+. started empirically on vanc/zosyn. b/l LE doppler and LUE doppler ordered per ID, +LUE superficial thrombophlebitis. BL LE dopplers negative for DVT.   10/2: POD 7, ANNA overnight. Maintaining MAP >100. 500cc albumin bolus, dilaudid lowered to 2/4mg, toradol [id number] added for pain, abd xray for distension shows gas pattern. aquacel dressing changed.  10/3: POD8 ANNA overnight. Remains on levophed to maintain MAP>100 in AM. Neuro exam unchanged. Tmax 100.9F in AM, given tylenol. Flexeril changed to valium 2q8 and lyrica 50q8 started per pain managment.  DC'd vanc, continue zosyn x 7 days per ID. 1u PRBC to maintain hbg >9.0. DANIELE drain removed. Salt tabs weaned to 2q8. MAP goal changed to >85 to help wean off levo gtt.   10/4: POD 9. MAP goal >100. Sodium chloride tabs discontinued.  10/5: POD 10, Given 2 L boluses o/n for tachycardia and negative fluid status. Given 1 mg IV valium in AM for muscle spasm. 1L bolus given in afternoon for high urine output. Preop for OR tomorrow.   10/6: POD 11 Pt endorsing incisional pain, given 1mg IV valium. Neuro exam stable. Plan for second stage today,  POD 0 revision of T3-T9 VCR, revision of C7-L1 posterior fusion, plastics closure. Post-op CT thoracic spine complete. 1 L bolus for soft BP and colapsable IVC on POCUS.   10/7: POD 12 C7-L1 fusion. POD 1 Revision fusion and T3-9 VCR. Pain controlled. Remains on Levo for MAP goal > 100. AXR completed in AM for abdominal distension. Dilaudid PO prn increased to 4 and 6 mg, valium increased to 2mg q6 for pain control. 1u PRBC given for Hb 8.4. Hgb elevated to 10.4. 250 cc albumin given for tachycardia.   10/8: POD 13 Fusion, POD 2 Revision. Pain controlled overnight.   +BM this morning. Right hemovac removed. MAPs liberalized to goal greater than 85. Troponin elevated this morning, now downtrending. EKG WNL.   10/9: POD 13 C7-L1 fusion, POD 3 Revision and T3-9 VCR.   MAP goals liberalized to keep >80. Right DANIELE drain removed. IV valium given x1   10/10: POD 14 C7-L1 fusion, POD 4 revision and T3-9 VCR, liberalized MAP goal to >65. Started PO iron and vit C every other day. HMV removed. Lidocaine patch given for left neck pain. Midodrine weaned to 10q8.   10/11: POD 15 C7-L1 fusion, POD 5 revision and T3-9 VCR. SBP 80s when working with PT, given 1L bolus NS. Increased midodrine 15q8. Given additional 500cc bolus NS and 250cc albumin for soft pressures. Started aurora gtt for MAP>65.Given additional 500cc NS. CTA chest neg for PE, notable for b/l pleural effusions. Lasix 10mg IV.  10/12: POD 16/6. Remains on aurora gtt. CXR showing b/l pleural effusions. Given additional 20mg IV lasix. TTE normal, EF 54%. increased lyrica to 75q8. Given additional 20 IV lasix.  10/13: POD17/7. ANNA o/n neuro stable. remains on aurora gtt. Lasix 40 IV. Midodrine 20mg q8h.   10/14: POD18/8. ANNA o/n neuro stable. remains on aurora gtt. AM cortisol normal. Started lasix 40mg IV daily. Applied thigh high compression stockings.   10/15: POD19/9. ANNA o/n neuro stable. remains off pressors. Lasix 40 IV changed to 40 PO. Midodrine decreased to 15mg q8h.  10/16: POD 20/10. ANNA overnight. Neuro stable. pending SDU, CONNIE doan.   10/17: POD 21/11. ANNA overnight, neuro stable.     Vital Signs Last 24 Hrs  T(C): 37.1 (16 Oct 2023 20:38), Max: 37.6 (16 Oct 2023 08:51)  T(F): 98.7 (16 Oct 2023 20:38), Max: 99.6 (16 Oct 2023 08:51)  HR: 98 (16 Oct 2023 20:38) (90 - 118)  BP: 114/69 (16 Oct 2023 20:38) (96/59 - 114/69)  BP(mean): 79 (16 Oct 2023 12:18) (79 - 79)  RR: 19 (16 Oct 2023 20:38) (15 - 20)  SpO2: 100% (16 Oct 2023 20:38) (92% - 100%)    Parameters below as of 16 Oct 2023 20:38  Patient On (Oxygen Delivery Method): room air        I&O's Summary    15 Oct 2023 07:01  -  16 Oct 2023 07:00  --------------------------------------------------------  IN: 700 mL / OUT: 1575 mL / NET: -875 mL    16 Oct 2023 07:01  -  17 Oct 2023 00:16  --------------------------------------------------------  IN: 400 mL / OUT: 540 mL / NET: -140 mL        PHYSICAL EXAM:  General: patient seen laying supine in bed in NAD  Neuro: AAOx3, FC, strength 5/5 b/l UE and 0/5 b/l LE, b/l LE sensation inconsistent w/ any dermatomal pattern but decreased below T10  HEENT: PERRL, EOMI  Neck: supple  Cardiac: RRR, S1S2  Pulmonary: chest rise symmetric  Abdomen: soft, mild distention  Ext: perfusing well  Skin: warm, dry  Wound: c/t/l spine incision c/d/i    TUBES/LINES:  [] Chin  [] Lumbar Drain  [x] Wound Drains - DANIELE x 1  [] Others      DIET:  [] NPO  [x] Mechanical  [] Tube feeds    LABS:                        9.3    7.04  )-----------( 397      ( 16 Oct 2023 05:30 )             29.4     10-16    135  |  94<L>  |  11  ----------------------------<  105<H>  3.8   |  30  |  0.37<L>    Ca    10.0      16 Oct 2023 05:30  Phos  4.1     10-16  Mg     1.9     10-16        Urinalysis Basic - ( 16 Oct 2023 05:30 )    Color: x / Appearance: x / SG: x / pH: x  Gluc: 105 mg/dL / Ketone: x  / Bili: x / Urobili: x   Blood: x / Protein: x / Nitrite: x   Leuk Esterase: x / RBC: x / WBC x   Sq Epi: x / Non Sq Epi: x / Bacteria: x          CAPILLARY BLOOD GLUCOSE          Drug Levels: [] N/A    CSF Analysis: [] N/A      Allergies    No Known Allergies    Intolerances      MEDICATIONS:  Antibiotics:    Neuro:  acetaminophen     Tablet .. 1000 milliGRAM(s) Oral every 8 hours PRN  diazepam    Tablet 5 milliGRAM(s) Oral every 8 hours  HYDROmorphone   Tablet 6 milliGRAM(s) Oral every 4 hours PRN  HYDROmorphone   Tablet 4 milliGRAM(s) Oral every 4 hours PRN  melatonin 5 milliGRAM(s) Oral at bedtime PRN  pregabalin 75 milliGRAM(s) Oral every 8 hours    Anticoagulation:  enoxaparin Injectable 40 milliGRAM(s) SubCutaneous every 24 hours    OTHER:  bisacodyl Suppository 10 milliGRAM(s) Rectal daily  furosemide    Tablet 40 milliGRAM(s) Oral every 24 hours  influenza   Vaccine 0.5 milliLiter(s) IntraMuscular once  lidocaine   4% Patch 1 Patch Transdermal daily  midodrine 15 milliGRAM(s) Oral every 8 hours  polyethylene glycol 3350 17 Gram(s) Oral two times a day  senna 2 Tablet(s) Oral at bedtime    IVF:  ascorbic acid 500 milliGRAM(s) Oral <User Schedule>  ferrous    sulfate 325 milliGRAM(s) Oral <User Schedule>    CULTURES:    RADIOLOGY & ADDITIONAL TESTS:  < from: Xray Chest 1 View- PORTABLE-Routine (10.16.23 @ 05:21) >  IMPRESSION:   No significant change since the previous study.  Persistent bilateral pleural effusions.    --- End of Report ---    < end of copied text >      ASSESSMENT:  43F no PMHx suffered childhood injury resulting in spine injury and residual gait disturbance, hyperreflexia of BL LE. She has right greater than left leg tingling, low back and mid back pain. 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 S/p C7-L1 fusion, T3-8 vertebral column resection, placement of anterior cage (9/25/23). S/p revision of T3-T9 VCR, revision of C7-L1 posterior fusion, plastics closure (10/6/23).     NEURO:  - Neuro/spine checks q8/vitals q4hrs  - Pain control: tylenol 1g q8h, dilaudid 4/6 PO, Lyrica 75q8,  - stage 1 Post-op CT full spine: L1 screw not attached to the vertical yoli, epidural lipomatosis L2-3  - stage 2 post op CT T spine completed  - JPx1 (per plastics) (L HMV, R HMV and R DANIELE dc'd)    Cardio:  - MAP > 65  - midodrine 15mg q8h  - echo 9/27: EF 60-65%, 10/12 nml EF 54%   - Lasix 40mg PO qd    Pulm:  - room air  - incentive spirometry  - CTA chest 10/11 neg for PE, notable for b/l pleural effusions  - CXR 10/12 b/l pleural effusions     GI:  - Regular diet  - Bowel regimen  - last BM 10/15    Renal:  - IVL  - voiding    Endo:  - a1c 5.6    Heme:  - SCDs for DVT ppx/ SQL   - 2u PRBC and 1u PLT postop 9/25, 1u PRBC 10/3, 1u PRBC 10/7  - b/l LE dopplers 10/1 negative  - LUE doppler 10/1: +cephalic thrombophlebitis  - iron and vitamin c every other day     ID:  - 10/1 panculture, +UA 10/1  - Vanc (10/1-10/3 ) dc'd, Zosyn (10/1- 10/8)    - ID signed off    Dispo: SDU status, full code, PT/OT recs AR  Discussed with Dr. Vieira

## 2023-10-17 NOTE — DISCHARGE NOTE PROVIDER - NSDCCPCAREPLAN_GEN_ALL_CORE_FT
PRINCIPAL DISCHARGE DIAGNOSIS  Diagnosis: Deformity of thoracic vertebra  Assessment and Plan of Treatment: s/p T3-8 VCR, C7-L1 fusion, correction of deformity, placement of anterior cage.  STAGE 1. plastics closure on 9/25  s/p Revision of T3-T9 VCR, Revision of C7-L1 Posterior Fusion instrumentation with autograft and allograft, plastic surgery closure on 10/6     PRINCIPAL DISCHARGE DIAGNOSIS  Diagnosis: Deformity of thoracic vertebra  Assessment and Plan of Treatment: s/p T3-8 VCR, C7-L1 fusion, correction of deformity, placement of anterior cage.  STAGE 1. plastics closure on 9/25  s/p Revision of T3-T9 VCR, Revision of C7-L1 Posterior Fusion instrumentation with autograft and allograft, plastic surgery closure on 10/6      SECONDARY DISCHARGE DIAGNOSES  Diagnosis: Anemia due to acute blood loss  Assessment and Plan of Treatment: continue iron supplement    Diagnosis: Hyponatremia  Assessment and Plan of Treatment: resolved    Diagnosis: Sinus tachycardia  Assessment and Plan of Treatment: stable, intermittent HR in low 100's likely from hypovolemia and/or anxiety    Diagnosis: Compression of spinal cord with myelopathy  Assessment and Plan of Treatment:     Diagnosis: Hypotension  Assessment and Plan of Treatment: continue midodrine, wean off as tolerated for a normal blood pressure goal

## 2023-10-17 NOTE — PROGRESS NOTE ADULT - ASSESSMENT
43F no PMHx suffered childhood injury resulting in spine injury and residual gait disturbance, hyperreflexia of BL LE. She has right greater than left leg tingling, low back and mid back pain. 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 S/p C7-L1 fusion, T3-8 vertebral column resection, placement of anterior cage (9/25/23). S/p revision of T3-T9 VCR, revision of C7-L1 posterior fusion, plastics closure (10/6/23).

## 2023-10-17 NOTE — CHART NOTE - NSCHARTNOTESELECT_GEN_ALL_CORE
3 hour rehab letter/Event Note
Event Note
Event Note
Nutrition Services
Nutrition Services
Progress Note/Event Note
daily events/Event Note
daily events/Event Note
Event Note
Event Note
NSGY/Event Note
Nutrition Services
Nutrition Services
Progress Note/Event Note

## 2023-10-17 NOTE — DISCHARGE NOTE PROVIDER - CARE PROVIDERS DIRECT ADDRESSES
[email or web address],[email or web address],DirectAddress_Unknown

## 2023-10-17 NOTE — CONSULT NOTE ADULT - REASON FOR ADMISSION
leg weakness, difficulty walking and worsening back pain x years

## 2023-10-17 NOTE — DISCHARGE NOTE PROVIDER - NSDCMRMEDTOKEN_GEN_ALL_CORE_FT
acetaminophen 500 mg oral tablet: 2 tab(s) orally every 8 hours As needed Temp greater or equal to 38C (100.4F), Mild Pain (1 - 3)  ascorbic acid 500 mg oral tablet: 1 tab(s) orally every other day at 6AM with iron supplement  bisacodyl 10 mg rectal suppository: 1 suppository(ies) rectal once a day as needed for  constipation  diazePAM 5 mg oral tablet: 1 tab(s) orally every 8 hours as needed for  muscle spasm  enoxaparin: 40 milligram(s) subcutaneous once a day (at bedtime) for DVT prevention while at rehab  ferrous sulfate 325 mg (65 mg elemental iron) oral tablet: 1 tab(s) orally every other day at 6AM  HYDROmorphone 2 mg oral tablet: 3 tab(s) orally every 4 hours As needed Severe Pain (7 - 10)  HYDROmorphone 4 mg oral tablet: 1 tab(s) orally every 4 hours As needed Moderate Pain (4 - 6)  melatonin 5 mg oral tablet: 1 tab(s) orally once a day (at bedtime) As needed Insomnia  midodrine 5 mg oral tablet: 3 tab(s) orally every 8 hours wean off as tolerated for a normal blood pressure goal  polyethylene glycol 3350 oral powder for reconstitution: 17 gram(s) orally 2 times a day  pregabalin 75 mg oral capsule: 1 cap(s) orally every 8 hours WEAN OFF as tolerated  senna leaf extract oral tablet: 2 tab(s) orally once a day (at bedtime)

## 2023-10-17 NOTE — PROGRESS NOTE ADULT - SUBJECTIVE AND OBJECTIVE BOX
HOSPITALIST INITIAL CONSULT NOTE    CHIEF COMPLAINT:      HPI:  Taken from outpatient neurosurgery note on 9/13/2023 " The patient, a long-standing patient of my practice, reports a history of spinal issues that dates back to a childhood injury. Over the years she's experienced developing issues with walking in a straight line, overactive reflexes in her lower extremities, and unique difficulty with her lower extremities referred to as cloneness. Recent upturn in these symptoms has limited her ability to carry out her daily routines. The patient also reports no significant improvement in her condition since our last clinical encounter two years ago."    44 y/o female with no PMHx or PSHx presents for surgery today.  She reports ambulating with walker or baby stroller at home for years.  She reports intermittent falls.  She has right greater than left leg tingling, low back and mid back pain.  She denies arm or leg pain.  She reports urinary incontinence since having a baby 2 years ago where if she doesn't get to the bathroom fast enough, she has incontinence, but she is able to hold it for a short while.  She takes no pain medications.  She has taken ibuprofen in the past.  She denies saddle anesthesia, bowel incontinence.   (25 Sep 2023 06:51)      PAST MEDICAL & SURGICAL HISTORY:  History of acute illness  childhood      No significant past surgical history          REVIEW OF SYSTEMS:  As per HPI, otherwise negative for Constitutional, Eyes, Ears/Nose/Mouth/Throat, Neck, Cardiovascular, Respiratory, Gastrointestinal, Genitourinary, Skin, Endocrine, Musculoskeletal, Psychiatric, and Hematologic/Lymphatic.    MEDICATIONS  (STANDING):  ascorbic acid 500 milliGRAM(s) Oral <User Schedule>  bisacodyl Suppository 10 milliGRAM(s) Rectal daily  diazepam    Tablet 5 milliGRAM(s) Oral every 8 hours  enoxaparin Injectable 40 milliGRAM(s) SubCutaneous every 24 hours  ferrous    sulfate 325 milliGRAM(s) Oral <User Schedule>  furosemide    Tablet 40 milliGRAM(s) Oral every 24 hours  influenza   Vaccine 0.5 milliLiter(s) IntraMuscular once  lidocaine   4% Patch 1 Patch Transdermal daily  midodrine 15 milliGRAM(s) Oral every 8 hours  polyethylene glycol 3350 17 Gram(s) Oral two times a day  pregabalin 75 milliGRAM(s) Oral every 8 hours  senna 2 Tablet(s) Oral at bedtime    MEDICATIONS  (PRN):  acetaminophen     Tablet .. 1000 milliGRAM(s) Oral every 8 hours PRN Temp greater or equal to 38C (100.4F), Mild Pain (1 - 3)  HYDROmorphone   Tablet 6 milliGRAM(s) Oral every 4 hours PRN Severe Pain (7 - 10)  HYDROmorphone   Tablet 4 milliGRAM(s) Oral every 4 hours PRN Moderate Pain (4 - 6)  melatonin 5 milliGRAM(s) Oral at bedtime PRN Insomnia  sodium chloride 0.9% lock flush 10 milliLiter(s) IV Push every 1 hour PRN Pre/post blood products, medications, blood draw, and to maintain line patency      Allergies    No Known Allergies    Intolerances        FAMILY HISTORY:  No pertinent family history in first degree relatives        Social History:  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  steve lorenzana is her ex- and healthcare proxy  (25 Sep 2023 06:51)      VITALS  Vital Signs Last 24 Hrs  T(C): 36.8 (17 Oct 2023 13:30), Max: 37.6 (17 Oct 2023 09:08)  T(F): 98.2 (17 Oct 2023 13:30), Max: 99.7 (17 Oct 2023 09:08)  HR: 104 (17 Oct 2023 13:30) (84 - 118)  BP: 101/64 (17 Oct 2023 13:30) (96/59 - 135/59)  BP(mean): --  RR: 18 (17 Oct 2023 13:30) (18 - 19)  SpO2: 98% (17 Oct 2023 13:30) (95% - 100%)    Parameters below as of 17 Oct 2023 13:30  Patient On (Oxygen Delivery Method): room air        I&O's Summary    16 Oct 2023 07:01  -  17 Oct 2023 07:00  --------------------------------------------------------  IN: 400 mL / OUT: 555 mL / NET: -155 mL      CAPILLARY BLOOD GLUCOSE    PHYSICAL EXAM  General: A&Ox3; NAD  Head: NC/AT; PERRL; EOMI; anicteric sclera  Neck: Supple; no JVD  Respiratory: CTA B/L; no wheezes/crackles/rales auscultated w/ good air movement  Cardiovascular: Regular rhythm/rate; S1/S2; no gallops or murmurs auscultated  Gastrointestinal: Soft; NTND w/out rebound tenderness or guarding; bowel sounds normal  Extremities: WWP; no edema or cyanosis; radial/pedal pulses palpable  Neurological:  CNII-XII grossly intact; 0/5 b/l lower extremity  Skin: No rashes noted  Vasc: +2 DP/PT pulses b/l   Psych: Appropriate Affect    LABS:                        9.8    7.02  )-----------( 372      ( 17 Oct 2023 05:30 )             30.8     10-17    131<L>  |  92<L>  |  11  ----------------------------<  103<H>  3.7   |  29  |  0.38<L>    Ca    10.0      17 Oct 2023 05:30  Phos  4.0     10-17  Mg     2.1     10-17        Urinalysis Basic - ( 17 Oct 2023 05:30 )    Color: x / Appearance: x / SG: x / pH: x  Gluc: 103 mg/dL / Ketone: x  / Bili: x / Urobili: x   Blood: x / Protein: x / Nitrite: x   Leuk Esterase: x / RBC: x / WBC x   Sq Epi: x / Non Sq Epi: x / Bacteria: x        RADIOLOGY & ADDITIONAL STUDIES:  reviewed

## 2023-10-17 NOTE — CONSULT NOTE ADULT - SUBJECTIVE AND OBJECTIVE BOX
NEUROSURGERY PAIN MANAGEMENT CONSULT NOTE    Chief Complaint: back and neck pain    HPI:  Taken from outpatient neurosurgery note on 9/13/2023 " The patient, a long-standing patient of my practice, reports a history of spinal issues that dates back to a childhood injury. Over the years she's experienced developing issues with walking in a straight line, overactive reflexes in her lower extremities, and unique difficulty with her lower extremities referred to as cloneness. Recent upturn in these symptoms has limited her ability to carry out her daily routines. The patient also reports no significant improvement in her condition since our last clinical encounter two years ago."    44 y/o female with no PMHx or PSHx presents for surgery today.  She reports ambulating with walker or baby stroller at home for years.  She reports intermittent falls.  She has right greater than left leg tingling, low back and mid back pain.  She denies arm or leg pain.  She reports urinary incontinence since having a baby 2 years ago where if she doesn't get to the bathroom fast enough, she has incontinence, but she is able to hold it for a short while.  She takes no pain medications.  She has taken ibuprofen in the past.  She denies saddle anesthesia, bowel incontinence.   (25 Sep 2023 06:51)    Patient is feeling well today. Pain is controlled on current regimen    PAST MEDICAL & SURGICAL HISTORY:  History of acute illness  childhood      No significant past surgical history          FAMILY HISTORY:  No pertinent family history in first degree relatives        SOCIAL HISTORY:  [ ] Denies Smoking, Alcohol, or Drug Use    HOME MEDICATIONS:   Please refer to initial HNP    PAIN HOME MEDICATIONS:    Allergies    No Known Allergies    Intolerances        PAIN MEDICATIONS:  acetaminophen     Tablet .. 1000 milliGRAM(s) Oral every 8 hours PRN  diazepam    Tablet 5 milliGRAM(s) Oral every 8 hours  HYDROmorphone   Tablet 6 milliGRAM(s) Oral every 4 hours PRN  HYDROmorphone   Tablet 4 milliGRAM(s) Oral every 4 hours PRN  melatonin 5 milliGRAM(s) Oral at bedtime PRN  pregabalin 75 milliGRAM(s) Oral every 8 hours    Heme:  enoxaparin Injectable 40 milliGRAM(s) SubCutaneous every 24 hours    Antibiotics:    Cardiovascular:  furosemide    Tablet 40 milliGRAM(s) Oral every 24 hours  midodrine 15 milliGRAM(s) Oral every 8 hours    GI:  bisacodyl Suppository 10 milliGRAM(s) Rectal daily  polyethylene glycol 3350 17 Gram(s) Oral two times a day  senna 2 Tablet(s) Oral at bedtime    Endocrine:    All Other Medications:  ascorbic acid 500 milliGRAM(s) Oral <User Schedule>  ferrous    sulfate 325 milliGRAM(s) Oral <User Schedule>  influenza   Vaccine 0.5 milliLiter(s) IntraMuscular once  lidocaine   4% Patch 1 Patch Transdermal daily  sodium chloride 0.9% lock flush 10 milliLiter(s) IV Push every 1 hour PRN      Vital Signs Last 24 Hrs  T(C): 37.6 (17 Oct 2023 09:08), Max: 37.6 (17 Oct 2023 09:08)  T(F): 99.7 (17 Oct 2023 09:08), Max: 99.7 (17 Oct 2023 09:08)  HR: 107 (17 Oct 2023 09:08) (84 - 118)  BP: 111/70 (17 Oct 2023 09:08) (96/59 - 135/59)  BP(mean): 79 (16 Oct 2023 12:18) (79 - 79)  RR: 18 (17 Oct 2023 09:08) (18 - 19)  SpO2: 98% (17 Oct 2023 09:08) (95% - 100%)    Parameters below as of 17 Oct 2023 09:08  Patient On (Oxygen Delivery Method): room air        LABS:                        9.8    7.02  )-----------( 372      ( 17 Oct 2023 05:30 )             30.8     10-17    131<L>  |  92<L>  |  11  ----------------------------<  103<H>  3.7   |  29  |  0.38<L>    Ca    10.0      17 Oct 2023 05:30  Phos  4.0     10-17  Mg     2.1     10-17        Urinalysis Basic - ( 17 Oct 2023 05:30 )    Color: x / Appearance: x / SG: x / pH: x  Gluc: 103 mg/dL / Ketone: x  / Bili: x / Urobili: x   Blood: x / Protein: x / Nitrite: x   Leuk Esterase: x / RBC: x / WBC x   Sq Epi: x / Non Sq Epi: x / Bacteria: x    REVIEW OF SYSTEMS:  CONSTITUTIONAL: No fever or fatigue O/N.   EYES: No eye pain, visual disturbances  ENMT:  No difficulty hearing. No throat pain  NECK: No pain or stiffness  RESPIRATORY: No cough, wheezing; No shortness of breath  CARDIOVASCULAR: No chest pain, palpitations.   GASTROINTESTINAL: Pt reports  passing gas and having bowel movements. No abdominal or epigastric pain. No nausea, vomiting. GENITOURINARY: urinary  incontinence  NEUROLOGICAL: No headaches, loss of strength, numbness, or tremors. No dizzinesss or lightheadedness with pain medications.   MUSCULOSKELETAL: Incisional back pain. No joint pain or swelling; No muscle, or extremity pain      PHYSICAL EXAM  GENERAL: Laying in bed, NAD  Neuro: CN II-XII PERRRL, EOMI  Cranial nerves grossly intact  Motor exam:  Sensation intact to LT in UE/LE in 3 dermatomes  CHEST/LUNG: Clear to auscultation bilaterally; No rales, rhonchi, wheezing, or rubs  HEART: Regular rate and rhythm; No murmurs, rubs, or gallops  ABDOMEN: Soft, Nontender, Nondistended; Bowel sounds present  EXTREMITIES:  2+ Peripheral Pulses, No clubbing, cyanosis, or edema  SKIN: No rashes or lesions      ASSESSMENT:   43F no PMHx suffered childhood injury resulting in spine injury and residual gait disturbance, hyperreflexia of BL LE. She has right greater than left leg tingling, low back and mid back pain. 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 S/p C7-L1 fusion, T3-8 vertebral column resection, placement of anterior cage (9/25/23). S/p revision of T3-T9 VCR, revision of C7-L1 posterior fusion, plastics closure (10/6/23).     PLAN:   - Pain:  >acetaminophen 1g PO q8h  > pregabalin 75 mg PO q8h  >diazepam 5 mg PO q8h  > hydromorphone 4mg to 6 mg PO q4h prn for moderate to severe pain      - Bowel regimen: Senna    - Nausea ppx: Zofran standing  - Functional Goals: Pt will get OOB with PT today. Pt will resume previous level of activity without impairment from surgery.   - Additional Consults: None recommended.   - Additional Labs/Imaging:  None recommended.     - Follow up, Discharge Planning:     Patient is set for discharge to:     Discharge is pending: pending medical and surgical clearance  - Pain Management follow up plan: Dr. Birmingham and pain team will continue to follow while in-patient      Plan discussed with Dr. Birmingham

## 2023-10-17 NOTE — DISCHARGE NOTE PROVIDER - PROVIDER TOKENS
PROVIDER:[TOKEN:[[id number]]],PROVIDER:[TOKEN:[[id number]]],PROVIDER:[TOKEN:[[id number]]]

## 2023-10-17 NOTE — DISCHARGE NOTE PROVIDER - NSDCFUADDINST_GEN_ALL_CORE_FT
Neurosurgery follow up appointment date/time:  - follow up in the office for a wound check and suture? removal   - please call the office to confirm appointment: [phone]    Wound Care:  - shower / sponge bath in bed daily and let soapy water run down your back  - pat dry incision with a clean towel after showering  - leave incision uncovered, open to air   - no picking at incision  - pressure ulcer?     Devices/Drains/Lines:  -    Activity:  - fatigue is common after surgery, rest if you feel tired   - no bending, lifting, twisting or heavy lifting   - walking is recommended, ambulate as tolerated  - you may shower when you get home, keep your incision dry  - no soaking in a tub/pool/hot tub   - no driving within 24 hours of anesthesia or while taking prescription pain medications   - keep hydrated, drink plenty of water     Inpatient consults:  - Pain management:     Please also follow up with your primary care doctor.     Pain Expectations:  - pain after surgery is expected  - please take pain meds as prescribed     Medications:  - changes to home meds (ex. AED's)?  - new meds?  - pain meds?  - when can antiplatelets or anticoagulants be restarted?  - were adverse affects of meds discussed with patients?   - pain medications can cause constipation, you should eat a high fiber diet and may take a stool softener while on pain meds   - Avoid taking Advil (ibuprofen), Motrin (naproxen), or Aspirin for pain as they can cause bleeding     Call the office or come to ED if:  - wound has drainage or bleeding, increased redness or pain at incision site, neurological change, fever (>101), chills, night sweats, syncope, nausea/vomiting, chest pain, shortness of breath      Playback:  - see [facility] for a copy of your discharge paperwork     WITHIN 24 HOURS OF DISCHARGE, PLEASE CONTACT NEURO PA  WITH ANY QUESTIONS OR CONCERNS: [phone]   OTHERWISE, PLEASE CALL THE OFFICE WITH ANY QUESTIONS OR CONCERNS: [phone] Neurosurgery follow up appointment date/time: 11/1 at 10:45AM  - follow up in the office for a wound check and suture? removal   - please call the office to confirm appointment: [phone]    Wound Care:  - shower / sponge bath in bed daily and let soapy water run down your back  - pat dry incision with a clean towel after showering  - leave incision uncovered, open to air   - no picking at incision  - apply dry dressing to areas of small skin tears inside right thigh     Activity:  - fatigue is common after surgery, rest if you feel tired   - no bending, lifting, twisting or heavy lifting   - walking is recommended, ambulate as tolerated  - you may shower/have your body washed at rehab, keep your incision dry  - no soaking in a tub/pool/hot tub   - no driving within 24 hours of anesthesia or while taking prescription pain medications   - keep hydrated, drink plenty of water     Inpatient consults:  - Pain management: Follow up with Dr. Birmingham outpatient if needed    Please also follow up with your primary care doctor.     Pain Expectations:  - pain after surgery is expected  - please take pain meds as prescribed     Medications:  - new meds:  Lovenox while at rehab for DVT prevention (can cause bruising)  Iron supplement for anemia (can cause constipation)  Vitamin C to help with iron absorption  Melatonin as needed for insomnia  Midodrine for low blood pressure (wean off as tolerated)  - pain meds:  Lyrica 75mg every 8 hours for nerve wean (WEAN OFF as tolerated, can cause sleepiness)  Valium 5mg every 8 hours as needed for muscle spasm (can cause sleepiness)  Tylenol 1000mg every 8 hours as needed for mild pain  Dilaudid 4mg OR 6mg every 4 hours as needed for moderate or severe pain (can cause sleepiness, constipation)   - adverse affects of meds discussed with patients?   - pain medications can cause constipation, you should eat a high fiber diet and may take a stool softener while on pain meds   - Avoid taking Advil (ibuprofen), Motrin (naproxen), or Aspirin for pain as they can cause bleeding     Call the office or come to ED if:  - wound has drainage or bleeding, increased redness or pain at incision site, neurological change, fever (>101), chills, night sweats, syncope, nausea/vomiting, chest pain, shortness of breath      Playback:  - see [facility] for a copy of your discharge paperwork     WITHIN 24 HOURS OF DISCHARGE, PLEASE CONTACT NEURO PA  WITH ANY QUESTIONS OR CONCERNS: [phone]   OTHERWISE, PLEASE CALL THE OFFICE WITH ANY QUESTIONS OR CONCERNS: [phone] Neurosurgery follow up appointment date/time: 11/1 at 10:45AM  - follow up in the office for a wound check and suture removal   - please call the office to confirm appointment: [phone]    Wound Care:  - shower / sponge bath in bed daily and let soapy water run down your back  - pat dry incision with a clean towel after showering  - leave incision uncovered, open to air   - no picking at incision  - apply dry dressing to areas of small skin tears inside right thigh     Activity:  - fatigue is common after surgery, rest if you feel tired   - no bending, lifting, twisting or heavy lifting   - walking is recommended, ambulate as tolerated  - you may shower/have your body washed at rehab, keep your incision dry  - no soaking in a tub/pool/hot tub   - no driving within 24 hours of anesthesia or while taking prescription pain medications   - keep hydrated, drink plenty of water     Inpatient consults:  - Pain management: Follow up with Dr. Birmingham outpatient if needed    Please also follow up with your primary care doctor.     Pain Expectations:  - pain after surgery is expected  - please take pain meds as prescribed     Medications:  - new meds:  Lovenox while at rehab for DVT prevention (can cause bruising)  Iron supplement for anemia (can cause constipation)  Vitamin C to help with iron absorption  Melatonin as needed for insomnia  Midodrine for low blood pressure (wean off as tolerated)  - pain meds:  Lyrica 75mg every 8 hours for nerve wean (WEAN OFF as tolerated, can cause sleepiness)  Valium 5mg every 8 hours as needed for muscle spasm (can cause sleepiness)  Tylenol 1000mg every 8 hours as needed for mild pain  Dilaudid 4mg OR 6mg every 4 hours as needed for moderate or severe pain (can cause sleepiness, constipation)   - adverse affects of meds discussed with patient  - pain medications can cause constipation, you should eat a high fiber diet and may take a stool softener while on pain meds   - Avoid taking Advil (ibuprofen), Motrin (naproxen), or Aspirin for pain as they can cause bleeding     Call the office or come to ED if:  - wound has drainage or bleeding, increased redness or pain at incision site, neurological change, fever (>101), chills, night sweats, syncope, nausea/vomiting, chest pain, shortness of breath      Playback:  - see [facility] for a copy of your discharge paperwork     WITHIN 24 HOURS OF DISCHARGE, PLEASE CONTACT NEURO PA  WITH ANY QUESTIONS OR CONCERNS: [phone]   OTHERWISE, PLEASE CALL THE OFFICE WITH ANY QUESTIONS OR CONCERNS: [phone] Neurosurgery follow up appointment date/time: 11/1 at 10:45AM  - follow up in the office for a wound check   - sutures removed prior to discharge  - please call the office to confirm appointment: [phone]    Wound Care:  - shower / sponge bath in bed daily and let soapy water run down your back  - pat dry incision with a clean towel after showering  - leave incision uncovered, open to air   - no picking at incision  - apply dry dressing to areas of small skin tears inside right thigh     Activity:  - fatigue is common after surgery, rest if you feel tired   - no bending, lifting, twisting or heavy lifting   - walking is recommended, ambulate as tolerated  - you may shower/have your body washed at rehab, keep your incision dry  - no soaking in a tub/pool/hot tub   - no driving within 24 hours of anesthesia or while taking prescription pain medications   - keep hydrated, drink plenty of water     Inpatient consults:  - Pain management: Follow up with Dr. Birmingham outpatient if needed    Please also follow up with your primary care doctor.     Pain Expectations:  - pain after surgery is expected  - please take pain meds as prescribed     Medications:  - new meds:  Lovenox while at rehab for DVT prevention (can cause bruising)  Iron supplement for anemia (can cause constipation)  Vitamin C to help with iron absorption  Melatonin as needed for insomnia  Midodrine for low blood pressure (wean off as tolerated)  - pain meds:  Lyrica 75mg every 8 hours for nerve wean (WEAN OFF as tolerated, can cause sleepiness)  Valium 5mg every 8 hours as needed for muscle spasm (can cause sleepiness)  Tylenol 1000mg every 8 hours as needed for mild pain  Dilaudid 4mg OR 6mg every 4 hours as needed for moderate or severe pain (can cause sleepiness, constipation)   - adverse affects of meds discussed with patient  - pain medications can cause constipation, you should eat a high fiber diet and may take a stool softener while on pain meds   - Avoid taking Advil (ibuprofen), Motrin (naproxen), or Aspirin for pain as they can cause bleeding     Call the office or come to ED if:  - wound has drainage or bleeding, increased redness or pain at incision site, neurological change, fever (>101), chills, night sweats, syncope, nausea/vomiting, chest pain, shortness of breath      Playback:  - see [facility] for a copy of your discharge paperwork     WITHIN 24 HOURS OF DISCHARGE, PLEASE CONTACT NEURO PA  WITH ANY QUESTIONS OR CONCERNS: [phone]   OTHERWISE, PLEASE CALL THE OFFICE WITH ANY QUESTIONS OR CONCERNS: [phone]

## 2023-10-17 NOTE — DISCHARGE NOTE PROVIDER - NSDCFUADDAPPT_GEN_ALL_CORE_FT
Please follow up with Dr. Vieira, call the office to make/confirm appointment at [phone]    Please follow up with Dr. Batres from Plastic Surgery    Please follow up with Dr. Birmingham if needed for pain management    Please follow up with your primary care doctor  Please follow up with Geremias (NP with Dr. Vieira) on 11/1 at 10:45AM via telehealth, call the office to make/confirm appointment at [phone]    Please follow up with Dr. Batres from Plastic Surgery    Please follow up with Dr. Birmingham if needed for pain management    Please follow up with your primary care doctor  Please follow up with Geremias (NP with Dr. Vieira) on 11/1 at 10:45AM via telehealth, call the office to make/confirm appointment at [phone]    Please follow up with Dr. Batres from Plastic Surgery    Please follow up with Dr. Birmingham if needed for pain management    Please follow up with your primary care doctor

## 2023-10-17 NOTE — CONSULT NOTE ADULT - PROVIDER SPECIALTY LIST ADULT
Pain Medicine

## 2023-10-17 NOTE — DISCHARGE NOTE PROVIDER - NSDCCPTREATMENT_GEN_ALL_CORE_FT
PRINCIPAL PROCEDURE  Procedure: Corpectomy, spine, lumbar, 4 levels  Findings and Treatment:       SECONDARY PROCEDURE  Procedure: Corpectomy, spine, lumbar, 4 levels  Findings and Treatment:

## 2023-10-17 NOTE — DISCHARGE NOTE PROVIDER - NSDCFUSCHEDAPPT_GEN_ALL_CORE_FT
Geremias Martin  [facility]  [facility] [address]  Scheduled Appointment: 11/01/2023

## 2023-10-17 NOTE — DISCHARGE NOTE PROVIDER - HOSPITAL COURSE
HPI:  Taken from outpatient neurosurgery note on 9/13/2023 " The patient, a long-standing patient of my practice, reports a history of spinal issues that dates back to a childhood injury. Over the years she's experienced developing issues with walking in a straight line, overactive reflexes in her lower extremities, and unique difficulty with her lower extremities referred to as cloneness. Recent upturn in these symptoms has limited her ability to carry out her daily routines. The patient also reports no significant improvement in her condition since our last clinical encounter two years ago."    42 y/o female with no PMHx or PSHx presents for surgery today.  She reports ambulating with walker or baby stroller at home for years.  She reports intermittent falls.  She has right greater than left leg tingling, low back and mid back pain.  She denies arm or leg pain.  She reports urinary incontinence since having a baby 2 years ago where if she doesn't get to the bathroom fast enough, she has incontinence, but she is able to hold it for a short while.  She takes no pain medications.  She has taken ibuprofen in the past.  She denies saddle anesthesia, bowel incontinence.      Hospital Course:  9/25: POD 0 s/p C7-L1 fusion, T3-8 vertebral column resection, placement of anterior cage.   9/26; POD1 H/H 6.8 postop and PLT 85. Given 2u PRBC and 1u PLT. Switched propofol to precedex gtt. Pt extubated to face mask. Pt noted to only be withdrawing to noxious stimuli on bilateral lower extremities with sensation decreased RLE per patient. Dr. Vieira notified and plan is to keep MAP>100 and obtain CT full spine in the morning. Phenylephrine started to maintain MAP>100. Advanced diet to regular. Passed TOV. Increased need for aurora, UO high.   9/27: POD2 C7-L1 fusion T3-8 vertebral column resection. increased pressor requirements o/n, started on levo additionally. lactate 1.5, BNP 1400 from 800, CK elevated. echo: EF 60-65%. given 250cc bolus albumin, started on midodrine 10q8 to wean off pressors. PICC placed by Alli.   9/28: POD3 s/p C7-L1 fusion, T3-8 vertebral column resection, placement of anterior cage. R radial A-line removed and replaced on the L, trops and EKG done for chest discomfort, WNL, resolved with treatment of overall pain, continues to be febrile, lyrica dc'd to help.   Lyrica restarted. Weaning phenylephrine. Serum Na uptrending, 3% saline discontinued. Pt febrile with uptrending WBC, ID consulted recommend monitoring for now off antibiotics.  9/29: POD4. started on 3% o/n for Na 132. remains on aurora and levo for MAP>100. Tachycardic, given 1L NS. Right axillary a-line placed. 3% dc'd. HMV dc'd. Aquacell dressing replaced. Passed TOV.   9/30: POD 5.  Remains on levo gtt. Increased midodrine to 15q8. Given enema. Had BM.  10/1: POD 6. MAP goal increased back to >100 due to worsened sensation loss, on levo gtt. afebrile however per ID Dr De Souza, desiree cx sent. UA+. started empirically on vanc/zosyn. b/l LE doppler and LUE doppler ordered per ID, +LUE superficial thrombophlebitis. BL LE dopplers negative for DVT.   10/2: POD 7, ANNA overnight. Maintaining MAP >100. 500cc albumin bolus, dilaudid lowered to 2/4mg, toradol [id number] added for pain, abd xray for distension shows gas pattern. aquacel dressing changed.  10/3: POD8 ANNA overnight. Remains on levophed to maintain MAP>100 in AM. Neuro exam unchanged. Tmax 100.9F in AM, given tylenol. Flexeril changed to valium 2q8 and lyrica 50q8 started per pain managment.  DC'd vanc, continue zosyn x 7 days per ID. 1u PRBC to maintain hbg >9.0. DANIELE drain removed. Salt tabs weaned to 2q8. MAP goal changed to >85 to help wean off levo gtt.   10/4: POD 9. MAP goal >100. Sodium chloride tabs discontinued.  10/5: POD 10, Given 2 L boluses o/n for tachycardia and negative fluid status. Given 1 mg IV valium in AM for muscle spasm. 1L bolus given in afternoon for high urine output. Preop for OR tomorrow.   10/6: POD 11 Pt endorsing incisional pain, given 1mg IV valium. Neuro exam stable. Plan for second stage today,  POD 0 revision of T3-T9 VCR, revision of C7-L1 posterior fusion, plastics closure. Post-op CT thoracic spine complete. 1 L bolus for soft BP and colapsable IVC on POCUS.   10/7: POD 12 C7-L1 fusion. POD 1 Revision fusion and T3-9 VCR. Pain controlled. Remains on Levo for MAP goal > 100. AXR completed in AM for abdominal distension. Dilaudid PO prn increased to 4 and 6 mg, valium increased to 2mg q6 for pain control. 1u PRBC given for Hb 8.4. Hgb elevated to 10.4. 250 cc albumin given for tachycardia.   10/8: POD 13 Fusion, POD 2 Revision. Pain controlled overnight.   +BM this morning. Right hemovac removed. MAPs liberalized to goal greater than 85. Troponin elevated this morning, now downtrending. EKG WNL.   10/9: POD 13 C7-L1 fusion, POD 3 Revision and T3-9 VCR.   MAP goals liberalized to keep >80. Right DANIELE drain removed. IV valium given x1   10/10: POD 14 C7-L1 fusion, POD 4 revision and T3-9 VCR, liberalized MAP goal to >65. Started PO iron and vit C every other day. HMV removed. Lidocaine patch given for left neck pain. Midodrine weaned to 10q8.   10/11: POD 15 C7-L1 fusion, POD 5 revision and T3-9 VCR. SBP 80s when working with PT, given 1L bolus NS. Increased midodrine 15q8. Given additional 500cc bolus NS and 250cc albumin for soft pressures. Started aurora gtt for MAP>65.Given additional 500cc NS. CTA chest neg for PE, notable for b/l pleural effusions. Lasix 10mg IV.  10/12: POD 16/6. Remains on aurora gtt. CXR showing b/l pleural effusions. Given additional 20mg IV lasix. TTE normal, EF 54%. increased lyrica to 75q8. Given additional 20 IV lasix.  10/13: POD17/7. ANNA o/n neuro stable. remains on aurora gtt. Lasix 40 IV. Midodrine 20mg q8h.   10/14: POD18/8. ANNA o/n neuro stable. remains on aurora gtt. AM cortisol normal. Started lasix 40mg IV daily. Applied thigh high compression stockings.   10/15: POD19/9. ANNA o/n neuro stable. remains off pressors. Lasix 40 IV changed to 40 PO. Midodrine decreased to 15mg q8h.  10/16: POD 20/10. ANNA overnight. Neuro stable. pending SDU, CONNIE doan.   10/17: POD 21/11. ANNA overnight, neuro stable.     Patient evaluated by PT/OT who recommended: acute rehab  Patient is going home? rehab? hospice? Facility Name:     Hospital course c/b:     Exam on day of discharge:    Checklist:   - Obtained follow up appointment from NP  - Reviewed final recommendations of inpatient consults  - review discharge planning on provider handoff  - post op imaging completed  - Neurologically stable for discharge  - Vitals stable for discharge   - Afebrile for discharge  - WBC is stable  - Sodium level is normal  - Pain is adequately controlled  - Pt has PICC/walker/brace/collar   - LACE score (10 or > needs PCP apt)   - stroke patient? Discharge NIHSS score   HPI:  Taken from outpatient neurosurgery note on 9/13/2023 " The patient, a long-standing patient of my practice, reports a history of spinal issues that dates back to a childhood injury. Over the years she's experienced developing issues with walking in a straight line, overactive reflexes in her lower extremities, and unique difficulty with her lower extremities referred to as cloneness. Recent upturn in these symptoms has limited her ability to carry out her daily routines. The patient also reports no significant improvement in her condition since our last clinical encounter two years ago."    42 y/o female with no PMHx or PSHx presents for surgery today.  She reports ambulating with walker or baby stroller at home for years.  She reports intermittent falls.  She has right greater than left leg tingling, low back and mid back pain.  She denies arm or leg pain.  She reports urinary incontinence since having a baby 2 years ago where if she doesn't get to the bathroom fast enough, she has incontinence, but she is able to hold it for a short while.  She takes no pain medications.  She has taken ibuprofen in the past.  She denies saddle anesthesia, bowel incontinence.      Hospital Course:  9/25: POD 0 s/p C7-L1 fusion, T3-8 vertebral column resection, placement of anterior cage.   9/26; POD1 H/H 6.8 postop and PLT 85. Given 2u PRBC and 1u PLT. Switched propofol to precedex gtt. Pt extubated to face mask. Pt noted to only be withdrawing to noxious stimuli on bilateral lower extremities with sensation decreased RLE per patient. Dr. Vieira notified and plan is to keep MAP>100 and obtain CT full spine in the morning. Phenylephrine started to maintain MAP>100. Advanced diet to regular. Passed TOV. Increased need for aurora, UO high.   9/27: POD2 C7-L1 fusion T3-8 vertebral column resection. increased pressor requirements o/n, started on levo additionally. lactate 1.5, BNP 1400 from 800, CK elevated. echo: EF 60-65%. given 250cc bolus albumin, started on midodrine 10q8 to wean off pressors. PICC placed by Alli.   9/28: POD3 s/p C7-L1 fusion, T3-8 vertebral column resection, placement of anterior cage. R radial A-line removed and replaced on the L, trops and EKG done for chest discomfort, WNL, resolved with treatment of overall pain, continues to be febrile, lyrica dc'd to help.   Lyrica restarted. Weaning phenylephrine. Serum Na uptrending, 3% saline discontinued. Pt febrile with uptrending WBC, ID consulted recommend monitoring for now off antibiotics.  9/29: POD4. started on 3% o/n for Na 132. remains on aurora and levo for MAP>100. Tachycardic, given 1L NS. Right axillary a-line placed. 3% dc'd. HMV dc'd. Aquacell dressing replaced. Passed TOV.   9/30: POD 5.  Remains on levo gtt. Increased midodrine to 15q8. Given enema. Had BM.  10/1: POD 6. MAP goal increased back to >100 due to worsened sensation loss, on levo gtt. afebrile however per ID Dr De Souza, desiree cx sent. UA+. started empirically on vanc/zosyn. b/l LE doppler and LUE doppler ordered per ID, +LUE superficial thrombophlebitis. BL LE dopplers negative for DVT.   10/2: POD 7, ANNA overnight. Maintaining MAP >100. 500cc albumin bolus, dilaudid lowered to 2/4mg, toradol [id number] added for pain, abd xray for distension shows gas pattern. aquacel dressing changed.  10/3: POD8 ANNA overnight. Remains on levophed to maintain MAP>100 in AM. Neuro exam unchanged. Tmax 100.9F in AM, given tylenol. Flexeril changed to valium 2q8 and lyrica 50q8 started per pain managment.  DC'd vanc, continue zosyn x 7 days per ID. 1u PRBC to maintain hbg >9.0. DANIELE drain removed. Salt tabs weaned to 2q8. MAP goal changed to >85 to help wean off levo gtt.   10/4: POD 9. MAP goal >100. Sodium chloride tabs discontinued.  10/5: POD 10, Given 2 L boluses o/n for tachycardia and negative fluid status. Given 1 mg IV valium in AM for muscle spasm. 1L bolus given in afternoon for high urine output. Preop for OR tomorrow.   10/6: POD 11 Pt endorsing incisional pain, given 1mg IV valium. Neuro exam stable. Plan for second stage today,  POD 0 revision of T3-T9 VCR, revision of C7-L1 posterior fusion, plastics closure. Post-op CT thoracic spine complete. 1 L bolus for soft BP and colapsable IVC on POCUS.   10/7: POD 12 C7-L1 fusion. POD 1 Revision fusion and T3-9 VCR. Pain controlled. Remains on Levo for MAP goal > 100. AXR completed in AM for abdominal distension. Dilaudid PO prn increased to 4 and 6 mg, valium increased to 2mg q6 for pain control. 1u PRBC given for Hb 8.4. Hgb elevated to 10.4. 250 cc albumin given for tachycardia.   10/8: POD 13 Fusion, POD 2 Revision. Pain controlled overnight.   +BM this morning. Right hemovac removed. MAPs liberalized to goal greater than 85. Troponin elevated this morning, now downtrending. EKG WNL.   10/9: POD 13 C7-L1 fusion, POD 3 Revision and T3-9 VCR.   MAP goals liberalized to keep >80. Right DANIELE drain removed. IV valium given x1   10/10: POD 14 C7-L1 fusion, POD 4 revision and T3-9 VCR, liberalized MAP goal to >65. Started PO iron and vit C every other day. HMV removed. Lidocaine patch given for left neck pain. Midodrine weaned to 10q8.   10/11: POD 15 C7-L1 fusion, POD 5 revision and T3-9 VCR. SBP 80s when working with PT, given 1L bolus NS. Increased midodrine 15q8. Given additional 500cc bolus NS and 250cc albumin for soft pressures. Started aurora gtt for MAP>65.Given additional 500cc NS. CTA chest neg for PE, notable for b/l pleural effusions. Lasix 10mg IV.  10/12: POD 16/6. Remains on aurora gtt. CXR showing b/l pleural effusions. Given additional 20mg IV lasix. TTE normal, EF 54%. increased lyrica to 75q8. Given additional 20 IV lasix.  10/13: POD17/7. ANNA o/n neuro stable. remains on aurora gtt. Lasix 40 IV. Midodrine 20mg q8h.   10/14: POD18/8. ANNA o/n neuro stable. remains on aurora gtt. AM cortisol normal. Started lasix 40mg IV daily. Applied thigh high compression stockings.   10/15: POD19/9. ANNA o/n neuro stable. remains off pressors. Lasix 40 IV changed to 40 PO. Midodrine decreased to 15mg q8h.  10/16: POD 20/10. ANNA overnight. Neuro stable. pending SDU, CONNIE doan.   10/17: POD 21/11. ANNA overnight, neuro stable.     Patient evaluated by PT/OT who recommended: acute rehab  Patient is going [facility] acute [facility]hab    Hospital course c/b: hypotension (continue midodrine), bowel and bladder incontinence      Exam on day of discharge:  AAOx3, FC, strength 5/5 b/l UE and 0/5 b/l LE, b/l LE sensation inconsistent w/ any dermatomal pattern but decreased below T10  Wound: c/t/l spine incision c/d/i, sutures in place    Patient is neuro stable, vitals stable, afebrile, medically ready for discharge   HPI:  Taken from outpatient neurosurgery note on 9/13/2023 " The patient, a long-standing patient of my practice, reports a history of spinal issues that dates back to a childhood injury. Over the years she's experienced developing issues with walking in a straight line, overactive reflexes in her lower extremities, and unique difficulty with her lower extremities referred to as cloneness. Recent upturn in these symptoms has limited her ability to carry out her daily routines. The patient also reports no significant improvement in her condition since our last clinical encounter two years ago."    42 y/o female with no PMHx or PSHx presents for surgery today.  She reports ambulating with walker or baby stroller at home for years.  She reports intermittent falls.  She has right greater than left leg tingling, low back and mid back pain.  She denies arm or leg pain.  She reports urinary incontinence since having a baby 2 years ago where if she doesn't get to the bathroom fast enough, she has incontinence, but she is able to hold it for a short while.  She takes no pain medications.  She has taken ibuprofen in the past.  She denies saddle anesthesia, bowel incontinence.      Hospital Course:  9/25: POD 0 s/p C7-L1 fusion, T3-8 vertebral column resection, placement of anterior cage.   9/26; POD1 H/H 6.8 postop and PLT 85. Given 2u PRBC and 1u PLT. Switched propofol to precedex gtt. Pt extubated to face mask. Pt noted to only be withdrawing to noxious stimuli on bilateral lower extremities with sensation decreased RLE per patient. Dr. Vieira notified and plan is to keep MAP>100 and obtain CT full spine in the morning. Phenylephrine started to maintain MAP>100. Advanced diet to regular. Passed TOV. Increased need for aurora, UO high.   9/27: POD2 C7-L1 fusion T3-8 vertebral column resection. increased pressor requirements o/n, started on levo additionally. lactate 1.5, BNP 1400 from 800, CK elevated. echo: EF 60-65%. given 250cc bolus albumin, started on midodrine 10q8 to wean off pressors. PICC placed by Alli.   9/28: POD3 s/p C7-L1 fusion, T3-8 vertebral column resection, placement of anterior cage. R radial A-line removed and replaced on the L, trops and EKG done for chest discomfort, WNL, resolved with treatment of overall pain, continues to be febrile, lyrica dc'd to help.   Lyrica restarted. Weaning phenylephrine. Serum Na uptrending, 3% saline discontinued. Pt febrile with uptrending WBC, ID consulted recommend monitoring for now off antibiotics.  9/29: POD4. started on 3% o/n for Na 132. remains on aurora and levo for MAP>100. Tachycardic, given 1L NS. Right axillary a-line placed. 3% dc'd. HMV dc'd. Aquacell dressing replaced. Passed TOV.   9/30: POD 5.  Remains on levo gtt. Increased midodrine to 15q8. Given enema. Had BM.  10/1: POD 6. MAP goal increased back to >100 due to worsened sensation loss, on levo gtt. afebrile however per ID Dr De Souza, desiree cx sent. UA+. started empirically on vanc/zosyn. b/l LE doppler and LUE doppler ordered per ID, +LUE superficial thrombophlebitis. BL LE dopplers negative for DVT.   10/2: POD 7, ANNA overnight. Maintaining MAP >100. 500cc albumin bolus, dilaudid lowered to 2/4mg, toradol [id number] added for pain, abd xray for distension shows gas pattern. aquacel dressing changed.  10/3: POD8 ANNA overnight. Remains on levophed to maintain MAP>100 in AM. Neuro exam unchanged. Tmax 100.9F in AM, given tylenol. Flexeril changed to valium 2q8 and lyrica 50q8 started per pain managment.  DC'd vanc, continue zosyn x 7 days per ID. 1u PRBC to maintain hbg >9.0. DANIELE drain removed. Salt tabs weaned to 2q8. MAP goal changed to >85 to help wean off levo gtt.   10/4: POD 9. MAP goal >100. Sodium chloride tabs discontinued.  10/5: POD 10, Given 2 L boluses o/n for tachycardia and negative fluid status. Given 1 mg IV valium in AM for muscle spasm. 1L bolus given in afternoon for high urine output. Preop for OR tomorrow.   10/6: POD 11 Pt endorsing incisional pain, given 1mg IV valium. Neuro exam stable. Plan for second stage today,  POD 0 revision of T3-T9 VCR, revision of C7-L1 posterior fusion, plastics closure. Post-op CT thoracic spine complete. 1 L bolus for soft BP and colapsable IVC on POCUS.   10/7: POD 12 C7-L1 fusion. POD 1 Revision fusion and T3-9 VCR. Pain controlled. Remains on Levo for MAP goal > 100. AXR completed in AM for abdominal distension. Dilaudid PO prn increased to 4 and 6 mg, valium increased to 2mg q6 for pain control. 1u PRBC given for Hb 8.4. Hgb elevated to 10.4. 250 cc albumin given for tachycardia.   10/8: POD 13 Fusion, POD 2 Revision. Pain controlled overnight.   +BM this morning. Right hemovac removed. MAPs liberalized to goal greater than 85. Troponin elevated this morning, now downtrending. EKG WNL.   10/9: POD 13 C7-L1 fusion, POD 3 Revision and T3-9 VCR.   MAP goals liberalized to keep >80. Right DANIELE drain removed. IV valium given x1   10/10: POD 14 C7-L1 fusion, POD 4 revision and T3-9 VCR, liberalized MAP goal to >65. Started PO iron and vit C every other day. HMV removed. Lidocaine patch given for left neck pain. Midodrine weaned to 10q8.   10/11: POD 15 C7-L1 fusion, POD 5 revision and T3-9 VCR. SBP 80s when working with PT, given 1L bolus NS. Increased midodrine 15q8. Given additional 500cc bolus NS and 250cc albumin for soft pressures. Started aurora gtt for MAP>65.Given additional 500cc NS. CTA chest neg for PE, notable for b/l pleural effusions. Lasix 10mg IV.  10/12: POD 16/6. Remains on aurora gtt. CXR showing b/l pleural effusions. Given additional 20mg IV lasix. TTE normal, EF 54%. increased lyrica to 75q8. Given additional 20 IV lasix.  10/13: POD17/7. ANNA o/n neuro stable. remains on aurora gtt. Lasix 40 IV. Midodrine 20mg q8h.   10/14: POD18/8. ANNA o/n neuro stable. remains on aurora gtt. AM cortisol normal. Started lasix 40mg IV daily. Applied thigh high compression stockings.   10/15: POD19/9. ANNA o/n neuro stable. remains off pressors. Lasix 40 IV changed to 40 PO. Midodrine decreased to 15mg q8h.  10/16: POD 20/10. ANNA overnight. Neuro stable. pending SDU, CONNIE doan.   10/17: POD 21/11. ANNA overnight, neuro stable. urine lytes oredered for hypoNa, likely from hypovolemia iso lasix administration, 500cc bolus ordered  10/18: POD 22/12, ANNA ovn  10/19: POD23/13, ANNA ovn, neuro stable, pending AR    Patient evaluated by PT/OT who recommended: acute rehab  Patient is going to [facility] acute rehab    Hospital course c/b: hypotension (continue midodrine), bowel and bladder incontinence      Exam on day of discharge:  AAOx3, FC, strength 5/5 b/l UE and 0/5 b/l LE, b/l LE sensation inconsistent w/ any dermatomal pattern but decreased below T10  Wound: c/t/l spine incision c/d/i, sutures in place    Patient is neuro stable, vitals stable, afebrile, medically ready for discharge

## 2023-10-18 LAB
ANION GAP SERPL CALC-SCNC: 12 MMOL/L — SIGNIFICANT CHANGE UP (ref 5–17)
ANION GAP SERPL CALC-SCNC: 12 MMOL/L — SIGNIFICANT CHANGE UP (ref 5–17)
BUN SERPL-MCNC: 11 MG/DL — SIGNIFICANT CHANGE UP (ref 7–23)
BUN SERPL-MCNC: 11 MG/DL — SIGNIFICANT CHANGE UP (ref 7–23)
CALCIUM SERPL-MCNC: 9.8 MG/DL — SIGNIFICANT CHANGE UP (ref 8.4–10.5)
CALCIUM SERPL-MCNC: 9.8 MG/DL — SIGNIFICANT CHANGE UP (ref 8.4–10.5)
CHLORIDE SERPL-SCNC: 99 MMOL/L — SIGNIFICANT CHANGE UP (ref 96–108)
CHLORIDE SERPL-SCNC: 99 MMOL/L — SIGNIFICANT CHANGE UP (ref 96–108)
CO2 SERPL-SCNC: 27 MMOL/L — SIGNIFICANT CHANGE UP (ref 22–31)
CO2 SERPL-SCNC: 27 MMOL/L — SIGNIFICANT CHANGE UP (ref 22–31)
CREAT SERPL-MCNC: 0.38 MG/DL — LOW (ref 0.5–1.3)
CREAT SERPL-MCNC: 0.38 MG/DL — LOW (ref 0.5–1.3)
EGFR: 129 ML/MIN/1.73M2 — SIGNIFICANT CHANGE UP
EGFR: 129 ML/MIN/1.73M2 — SIGNIFICANT CHANGE UP
GLUCOSE SERPL-MCNC: 96 MG/DL — SIGNIFICANT CHANGE UP (ref 70–99)
GLUCOSE SERPL-MCNC: 96 MG/DL — SIGNIFICANT CHANGE UP (ref 70–99)
HCT VFR BLD CALC: 31.1 % — LOW (ref 34.5–45)
HCT VFR BLD CALC: 31.1 % — LOW (ref 34.5–45)
HGB BLD-MCNC: 9.8 G/DL — LOW (ref 11.5–15.5)
HGB BLD-MCNC: 9.8 G/DL — LOW (ref 11.5–15.5)
MAGNESIUM SERPL-MCNC: 1.9 MG/DL — SIGNIFICANT CHANGE UP (ref 1.6–2.6)
MAGNESIUM SERPL-MCNC: 1.9 MG/DL — SIGNIFICANT CHANGE UP (ref 1.6–2.6)
MCHC RBC-ENTMCNC: 26.8 PG — LOW (ref 27–34)
MCHC RBC-ENTMCNC: 26.8 PG — LOW (ref 27–34)
MCHC RBC-ENTMCNC: 31.5 GM/DL — LOW (ref 32–36)
MCHC RBC-ENTMCNC: 31.5 GM/DL — LOW (ref 32–36)
MCV RBC AUTO: 85 FL — SIGNIFICANT CHANGE UP (ref 80–100)
MCV RBC AUTO: 85 FL — SIGNIFICANT CHANGE UP (ref 80–100)
NRBC # BLD: 0 /100 WBCS — SIGNIFICANT CHANGE UP (ref 0–0)
NRBC # BLD: 0 /100 WBCS — SIGNIFICANT CHANGE UP (ref 0–0)
PHOSPHATE SERPL-MCNC: 3.8 MG/DL — SIGNIFICANT CHANGE UP (ref 2.5–4.5)
PHOSPHATE SERPL-MCNC: 3.8 MG/DL — SIGNIFICANT CHANGE UP (ref 2.5–4.5)
PLATELET # BLD AUTO: 348 K/UL — SIGNIFICANT CHANGE UP (ref 150–400)
PLATELET # BLD AUTO: 348 K/UL — SIGNIFICANT CHANGE UP (ref 150–400)
POTASSIUM SERPL-MCNC: 3.9 MMOL/L — SIGNIFICANT CHANGE UP (ref 3.5–5.3)
POTASSIUM SERPL-MCNC: 3.9 MMOL/L — SIGNIFICANT CHANGE UP (ref 3.5–5.3)
POTASSIUM SERPL-SCNC: 3.9 MMOL/L — SIGNIFICANT CHANGE UP (ref 3.5–5.3)
POTASSIUM SERPL-SCNC: 3.9 MMOL/L — SIGNIFICANT CHANGE UP (ref 3.5–5.3)
RBC # BLD: 3.66 M/UL — LOW (ref 3.8–5.2)
RBC # BLD: 3.66 M/UL — LOW (ref 3.8–5.2)
RBC # FLD: 13.6 % — SIGNIFICANT CHANGE UP (ref 10.3–14.5)
RBC # FLD: 13.6 % — SIGNIFICANT CHANGE UP (ref 10.3–14.5)
SARS-COV-2 RNA SPEC QL NAA+PROBE: SIGNIFICANT CHANGE UP
SARS-COV-2 RNA SPEC QL NAA+PROBE: SIGNIFICANT CHANGE UP
SODIUM SERPL-SCNC: 138 MMOL/L — SIGNIFICANT CHANGE UP (ref 135–145)
SODIUM SERPL-SCNC: 138 MMOL/L — SIGNIFICANT CHANGE UP (ref 135–145)
SURGICAL PATHOLOGY STUDY: SIGNIFICANT CHANGE UP
SURGICAL PATHOLOGY STUDY: SIGNIFICANT CHANGE UP
TROPONIN T, HIGH SENSITIVITY RESULT: 13 NG/L — SIGNIFICANT CHANGE UP (ref 0–51)
TROPONIN T, HIGH SENSITIVITY RESULT: 13 NG/L — SIGNIFICANT CHANGE UP (ref 0–51)
WBC # BLD: 6.62 K/UL — SIGNIFICANT CHANGE UP (ref 3.8–10.5)
WBC # BLD: 6.62 K/UL — SIGNIFICANT CHANGE UP (ref 3.8–10.5)
WBC # FLD AUTO: 6.62 K/UL — SIGNIFICANT CHANGE UP (ref 3.8–10.5)
WBC # FLD AUTO: 6.62 K/UL — SIGNIFICANT CHANGE UP (ref 3.8–10.5)

## 2023-10-18 PROCEDURE — 99232 SBSQ HOSP IP/OBS MODERATE 35: CPT

## 2023-10-18 PROCEDURE — 99233 SBSQ HOSP IP/OBS HIGH 50: CPT

## 2023-10-18 RX ORDER — SODIUM CHLORIDE 9 MG/ML
500 INJECTION INTRAMUSCULAR; INTRAVENOUS; SUBCUTANEOUS ONCE
Refills: 0 | Status: COMPLETED | OUTPATIENT
Start: 2023-10-18 | End: 2023-10-18

## 2023-10-18 RX ORDER — MAGNESIUM SULFATE 500 MG/ML
1 VIAL (ML) INJECTION ONCE
Refills: 0 | Status: COMPLETED | OUTPATIENT
Start: 2023-10-18 | End: 2023-10-18

## 2023-10-18 RX ADMIN — Medication 5 MILLIGRAM(S): at 21:55

## 2023-10-18 RX ADMIN — MIDODRINE HYDROCHLORIDE 15 MILLIGRAM(S): 2.5 TABLET ORAL at 05:53

## 2023-10-18 RX ADMIN — Medication 75 MILLIGRAM(S): at 13:40

## 2023-10-18 RX ADMIN — POLYETHYLENE GLYCOL 3350 17 GRAM(S): 17 POWDER, FOR SOLUTION ORAL at 17:08

## 2023-10-18 RX ADMIN — HYDROMORPHONE HYDROCHLORIDE 6 MILLIGRAM(S): 2 INJECTION INTRAMUSCULAR; INTRAVENOUS; SUBCUTANEOUS at 17:43

## 2023-10-18 RX ADMIN — HYDROMORPHONE HYDROCHLORIDE 4 MILLIGRAM(S): 2 INJECTION INTRAMUSCULAR; INTRAVENOUS; SUBCUTANEOUS at 09:07

## 2023-10-18 RX ADMIN — MIDODRINE HYDROCHLORIDE 15 MILLIGRAM(S): 2.5 TABLET ORAL at 21:55

## 2023-10-18 RX ADMIN — SODIUM CHLORIDE 500 MILLILITER(S): 9 INJECTION INTRAMUSCULAR; INTRAVENOUS; SUBCUTANEOUS at 00:49

## 2023-10-18 RX ADMIN — ENOXAPARIN SODIUM 40 MILLIGRAM(S): 100 INJECTION SUBCUTANEOUS at 21:55

## 2023-10-18 RX ADMIN — Medication 5 MILLIGRAM(S): at 05:53

## 2023-10-18 RX ADMIN — HYDROMORPHONE HYDROCHLORIDE 6 MILLIGRAM(S): 2 INJECTION INTRAMUSCULAR; INTRAVENOUS; SUBCUTANEOUS at 17:08

## 2023-10-18 RX ADMIN — Medication 75 MILLIGRAM(S): at 05:53

## 2023-10-18 RX ADMIN — HYDROMORPHONE HYDROCHLORIDE 6 MILLIGRAM(S): 2 INJECTION INTRAMUSCULAR; INTRAVENOUS; SUBCUTANEOUS at 01:09

## 2023-10-18 RX ADMIN — Medication 5 MILLIGRAM(S): at 13:40

## 2023-10-18 RX ADMIN — HYDROMORPHONE HYDROCHLORIDE 6 MILLIGRAM(S): 2 INJECTION INTRAMUSCULAR; INTRAVENOUS; SUBCUTANEOUS at 09:47

## 2023-10-18 RX ADMIN — MIDODRINE HYDROCHLORIDE 15 MILLIGRAM(S): 2.5 TABLET ORAL at 13:40

## 2023-10-18 RX ADMIN — Medication 75 MILLIGRAM(S): at 22:40

## 2023-10-18 RX ADMIN — HYDROMORPHONE HYDROCHLORIDE 6 MILLIGRAM(S): 2 INJECTION INTRAMUSCULAR; INTRAVENOUS; SUBCUTANEOUS at 02:09

## 2023-10-18 RX ADMIN — Medication 100 GRAM(S): at 10:59

## 2023-10-18 NOTE — PROGRESS NOTE ADULT - SUBJECTIVE AND OBJECTIVE BOX
HPI:  Taken from outpatient neurosurgery note on 2023 " The patient, a long-standing patient of my practice, reports a history of spinal issues that dates back to a childhood injury. Over the years she's experienced developing issues with walking in a straight line, overactive reflexes in her lower extremities, and unique difficulty with her lower extremities referred to as cloneness. Recent upturn in these symptoms has limited her ability to carry out her daily routines. The patient also reports no significant improvement in her condition since our last clinical encounter two years ago."    42 y/o female with no PMHx or PSHx presents for surgery today.  She reports ambulating with walker or baby stroller at home for years.  She reports intermittent falls.  She has right greater than left leg tingling, low back and mid back pain.  She denies arm or leg pain.  She reports urinary incontinence since having a baby 2 years ago where if she doesn't get to the bathroom fast enough, she has incontinence, but she is able to hold it for a short while.  She takes no pain medications.  She has taken ibuprofen in the past.  She denies saddle anesthesia, bowel incontinence.   (25 Sep 2023 06:51)    OVERNIGHT EVENTS: POD 22/12, ANNA madisonn    Hospital course:   : POD 0 s/p C7-L1 fusion, T3-8 vertebral column resection, placement of anterior cage.   ; POD1 H/H 6.8 postop and PLT 85. Given 2u PRBC and 1u PLT. Switched propofol to precedex gtt. Pt extubated to face mask. Pt noted to only be withdrawing to noxious stimuli on bilateral lower extremities with sensation decreased RLE per patient. Dr. Vieira notified and plan is to keep MAP>100 and obtain CT full spine in the morning. Phenylephrine started to maintain MAP>100. Advanced diet to regular. Passed TOV. Increased need for aurora, UO high.   : POD2 C7-L1 fusion T3-8 vertebral column resection. increased pressor requirements o/n, started on levo additionally. lactate 1.5, BNP 1400 from 800, CK elevated. echo: EF 60-65%. given 250cc bolus albumin, started on midodrine 10q8 to wean off pressors. PICC placed by Alli.   : POD3 s/p C7-L1 fusion, T3-8 vertebral column resection, placement of anterior cage. R radial A-line removed and replaced on the L, trops and EKG done for chest discomfort, WNL, resolved with treatment of overall pain, continues to be febrile, lyrica dc'd to help.   Lyrica restarted. Weaning phenylephrine. Serum Na uptrending, 3% saline discontinued. Pt febrile with uptrending WBC, ID consulted recommend monitoring for now off antibiotics.  : POD4. started on 3% o/n for Na 132. remains on aurora and levo for MAP>100. Tachycardic, given 1L NS. Right axillary a-line placed. 3% dc'd. HMV dc'd. Aquacell dressing replaced. Passed TOV.   : POD 5.  Remains on levo gtt. Increased midodrine to 15q8. Given enema. Had BM.  10/1: POD 6. MAP goal increased back to >100 due to worsened sensation loss, on levo gtt. afebrile however per ID Dr De Souza, desiree cx sent. UA+. started empirically on vanc/zosyn. b/l LE doppler and LUE doppler ordered per ID, +LUE superficial thrombophlebitis. BL LE dopplers negative for DVT.   10/2: POD 7, ANNA overnight. Maintaining MAP >100. 500cc albumin bolus, dilaudid lowered to 2/4mg, toradol [id number] added for pain, abd xray for distension shows gas pattern. aquacel dressing changed.  10/3: POD8 ANNA overnight. Remains on levophed to maintain MAP>100 in AM. Neuro exam unchanged. Tmax 100.9F in AM, given tylenol. Flexeril changed to valium 2q8 and lyrica 50q8 started per pain managment.  DC'd vanc, continue zosyn x 7 days per ID. 1u PRBC to maintain hbg >9.0. DANIELE drain removed. Salt tabs weaned to 2q8. MAP goal changed to >85 to help wean off levo gtt.   10/4: POD 9. MAP goal >100. Sodium chloride tabs discontinued.  10/5: POD 10, Given 2 L boluses o/n for tachycardia and negative fluid status. Given 1 mg IV valium in AM for muscle spasm. 1L bolus given in afternoon for high urine output. Preop for OR tomorrow.   10/6: POD 11 Pt endorsing incisional pain, given 1mg IV valium. Neuro exam stable. Plan for second stage today,  POD 0 revision of T3-T9 VCR, revision of C7-L1 posterior fusion, plastics closure. Post-op CT thoracic spine complete. 1 L bolus for soft BP and colapsable IVC on POCUS.   10/7: POD 12 C7-L1 fusion. POD 1 Revision fusion and T3-9 VCR. Pain controlled. Remains on Levo for MAP goal > 100. AXR completed in AM for abdominal distension. Dilaudid PO prn increased to 4 and 6 mg, valium increased to 2mg q6 for pain control. 1u PRBC given for Hb 8.4. Hgb elevated to 10.4. 250 cc albumin given for tachycardia.   10/8: POD 13 Fusion, POD 2 Revision. Pain controlled overnight.   +BM this morning. Right hemovac removed. MAPs liberalized to goal greater than 85. Troponin elevated this morning, now downtrending. EKG WNL.   10/9: POD 13 C7-L1 fusion, POD 3 Revision and T3-9 VCR.   MAP goals liberalized to keep >80. Right DANIELE drain removed. IV valium given x1   10/10: POD 14 C7-L1 fusion, POD 4 revision and T3-9 VCR, liberalized MAP goal to >65. Started PO iron and vit C every other day. HMV removed. Lidocaine patch given for left neck pain. Midodrine weaned to 10q8.   10/11: POD 15 C7-L1 fusion, POD 5 revision and T3-9 VCR. SBP 80s when working with PT, given 1L bolus NS. Increased midodrine 15q8. Given additional 500cc bolus NS and 250cc albumin for soft pressures. Started aurora gtt for MAP>65.Given additional 500cc NS. CTA chest neg for PE, notable for b/l pleural effusions. Lasix 10mg IV.  10/12: POD 16/6. Remains on aurora gtt. CXR showing b/l pleural effusions. Given additional 20mg IV lasix. TTE normal, EF 54%. increased lyrica to 75q8. Given additional 20 IV lasix.  10/13: POD17/7. ANNA o/n neuro stable. remains on aurora gtt. Lasix 40 IV. Midodrine 20mg q8h.   10/14: POD18/8. ANNA o/n neuro stable. remains on aurora gtt. AM cortisol normal. Started lasix 40mg IV daily. Applied thigh high compression stockings.   10/15: POD19/9. ANNA o/n neuro stable. remains off pressors. Lasix 40 IV changed to 40 PO. Midodrine decreased to 15mg q8h.  10/16: POD 20/10. ANNA overnight. Neuro stable. pending SDU, CONNIE doan.   10/17: POD 21/11. ANNA overnight, neuro stable. urine lytes oredered for hypoNa, likely from hypovolemia iso lasix administration, 500cc bolus ordered  10/18: POD 22, ANNA ovn    Vital Signs Last 24 Hrs  T(C): 36.7 (18 Oct 2023 00:48), Max: 37.6 (17 Oct 2023 09:08)  T(F): 98.1 (18 Oct 2023 00:48), Max: 99.7 (17 Oct 2023 09:08)  HR: 82 (18 Oct 2023 00:48) (82 - 116)  BP: 106/69 (18 Oct 2023 00:48) (94/70 - 135/59)  BP(mean): --  RR: 18 (18 Oct 2023 00:48) (18 - 18)  SpO2: 100% (18 Oct 2023 00:48) (97% - 100%)    Parameters below as of 18 Oct 2023 00:48  Patient On (Oxygen Delivery Method): room air        I&O's Summary    16 Oct 2023 07:01  -  17 Oct 2023 07:00  --------------------------------------------------------  IN: 400 mL / OUT: 555 mL / NET: -155 mL    17 Oct 2023 07:01  -  18 Oct 2023 02:46  --------------------------------------------------------  IN: 0 mL / OUT: 421 mL / NET: -421 mL        PHYSICAL EXAM:  General: patient seen laying supine in bed in NAD  Neuro: AAOx3, FC, strength 5/5 b/l UE and 0/5 b/l LE, b/l LE sensation inconsistent w/ any dermatomal pattern but decreased below T10  HEENT: PERRL, EOMI  Neck: supple  Cardiac: RRR, S1S2  Pulmonary: chest rise symmetric  Abdomen: soft, mild distention  Ext: perfusing well  Skin: warm, dry  Wound: c/t/l spine incision c/d/i    TUBES/LINES:  [] Chin  [] A-line  [] Lumbar Drain  [x] Wound Drains - JPx1  [] NGT   [] EVD   [] CVC  [] Other      DIET:  [] NPO  [x] Mechanical  [] Tube feeds    LABS:                        9.8    7.02  )-----------( 372      ( 17 Oct 2023 05:30 )             30.8     10-17    131<L>  |  92<L>  |  11  ----------------------------<  103<H>  3.7   |  29  |  0.38<L>    Ca    10.0      17 Oct 2023 05:30  Phos  4.0     10-17  Mg     2.1     10-17        Urinalysis Basic - ( 17 Oct 2023 21:59 )    Color: x / Appearance: x / S.010 / pH: x  Gluc: x / Ketone: x  / Bili: x / Urobili: x   Blood: x / Protein: x / Nitrite: x   Leuk Esterase: x / RBC: x / WBC x   Sq Epi: x / Non Sq Epi: x / Bacteria: x          CAPILLARY BLOOD GLUCOSE          Drug Levels: [] N/A    CSF Analysis: [] N/A      Allergies    No Known Allergies    Intolerances        Home Medications:      MEDICATIONS:  MEDICATIONS  (STANDING):  ascorbic acid 500 milliGRAM(s) Oral <User Schedule>  bisacodyl Suppository 10 milliGRAM(s) Rectal daily  diazepam    Tablet 5 milliGRAM(s) Oral every 8 hours  enoxaparin Injectable 40 milliGRAM(s) SubCutaneous every 24 hours  ferrous    sulfate 325 milliGRAM(s) Oral <User Schedule>  influenza   Vaccine 0.5 milliLiter(s) IntraMuscular once  lidocaine   4% Patch 1 Patch Transdermal daily  midodrine 15 milliGRAM(s) Oral every 8 hours  polyethylene glycol 3350 17 Gram(s) Oral two times a day  pregabalin 75 milliGRAM(s) Oral every 8 hours  senna 2 Tablet(s) Oral at bedtime    MEDICATIONS  (PRN):  acetaminophen     Tablet .. 1000 milliGRAM(s) Oral every 8 hours PRN Temp greater or equal to 38C (100.4F), Mild Pain (1 - 3)  HYDROmorphone   Tablet 6 milliGRAM(s) Oral every 4 hours PRN Severe Pain (7 - 10)  HYDROmorphone   Tablet 4 milliGRAM(s) Oral every 4 hours PRN Moderate Pain (4 - 6)  melatonin 5 milliGRAM(s) Oral at bedtime PRN Insomnia  sodium chloride 0.9% lock flush 10 milliLiter(s) IV Push every 1 hour PRN Pre/post blood products, medications, blood draw, and to maintain line patency      CULTURES:      RADIOLOGY & ADDITIONAL TESTS:      ASSESSMENT:  43F no PMHx suffered childhood injury resulting in spine injury and residual gait disturbance, hyperreflexia of BL LE. She has right greater than left leg tingling, low back and mid back pain. MRI 2023 showed 90 degree kyphosis of T3-8 and thoracic myelopathy. S/p C7-L1 fusion, T3-8 vertebral column resection, placement of anterior cage (23). S/p revision of T3-T9 VCR, revision of C7-L1 posterior fusion, plastics closure (10/6/23).     NEURO:  - Neuro/spine checks q8/vitals q4hrs  - Pain control: tylenol 1g q8h, dilaudid 4/6 PO, Lyrica 75q8,  - stage 1 Post-op CT full spine: L1 screw not attached to the vertical yoli, epidural lipomatosis L2-3  - stage 2 post op CT T spine completed  - JPx1 (per plastics) (L HMV, R HMV and R DANIELE dc'd)    Cardio:  - MAP > 65  - midodrine 15mg q8h  - echo : EF 60-65%, 10/12 nml EF 54%   - Lasix 40mg PO qd d/c 10/17    Pulm:  - room air  - incentive spirometry  - CTA chest 10/11 neg for PE, notable for b/l pleural effusions  - CXR 10/12 b/l pleural effusions     GI:  - Regular diet  - Bowel regimen  - last BM 10/15    Renal:  - IVL  - voiding via primafit    Endo:  - a1c 5.6    Heme:  - SCDs for DVT ppx/ SQL   - 2u PRBC and 1u PLT postop , 1u PRBC 10/3, 1u PRBC 10/7  - b/l LE dopplers 10/1 negative  - LUE doppler 10/1: +cephalic thrombophlebitis  - iron and vitamin c every other day     ID:  - 10/1 panculture, +UA 10/1  - Vanc (10/1-10/3 ) dc'd, Zosyn (10/1- 10/8)    - ID signed off    Dispo: SDU status, full code, PT/OT recs AR  Discussed with Dr. Vieira     DVT PROPHYLAXIS:  [x] Venodynes                                [x] Heparin/Lovenox    Assessment: present when checked     [] GCS   E   V   M     Heart Failure: [] Acute, [] acute on chronic, [] chronic   Heart Failure: [] Diastolic (HFpEF), [] Systolic (HRrEF), [] Combined (HFpEF and HFrEF), [] RHF, [] Pulm HTN, [] Other     [] CELE, [] ATN, [] AIN, [] other   [] CKD1, [] CKD2, [] CKD3, [] CKD4, [] CKD5, [] ESRD     Encephalopathy: [] Metabolic, [] Hepatic, [] Toxic, [] Neurological, [] Other     Abnormal Nutritional Status: [] malnutrition (see nutrition note), []underweight: BMI <19, [] morbid obesity: BMI >40, [] Cachexia     [] Sepsis   [] Hypovolemic shock, [] Cardiogenic shock, [] Hemorrhagic shock, [] Neurogenic shock   [] Acute respiratory failure   [] Cerebral edema, [] Brain compression / herniation   [] Functional quadriplegia   [] Acute blood loss anemia

## 2023-10-18 NOTE — PROGRESS NOTE ADULT - SUBJECTIVE AND OBJECTIVE BOX
NEUROSURGERY PAIN MANAGEMENT CONSULT NOTE    Chief Complaint: neck and back pain    HPI:  Taken from outpatient neurosurgery note on 9/13/2023 " The patient, a long-standing patient of my practice, reports a history of spinal issues that dates back to a childhood injury. Over the years she's experienced developing issues with walking in a straight line, overactive reflexes in her lower extremities, and unique difficulty with her lower extremities referred to as cloneness. Recent upturn in these symptoms has limited her ability to carry out her daily routines. The patient also reports no significant improvement in her condition since our last clinical encounter two years ago."    42 y/o female with no PMHx or PSHx presents for surgery today.  She reports ambulating with walker or baby stroller at home for years.  She reports intermittent falls.  She has right greater than left leg tingling, low back and mid back pain.  She denies arm or leg pain.  She reports urinary incontinence since having a baby 2 years ago where if she doesn't get to the bathroom fast enough, she has incontinence, but she is able to hold it for a short while.  She takes no pain medications.  She has taken ibuprofen in the past.  She denies saddle anesthesia, bowel incontinence.   (25 Sep 2023 06:51)      Patient feels well today and in good spirits. Pain is controlled on current regimen    PAST MEDICAL & SURGICAL HISTORY:  History of acute illness  childhood      No significant past surgical history          FAMILY HISTORY:  No pertinent family history in first degree relatives        SOCIAL HISTORY:  [ ] Denies Smoking, Alcohol, or Drug Use    HOME MEDICATIONS:   Please refer to initial HNP    PAIN HOME MEDICATIONS:    Allergies    No Known Allergies    Intolerances        PAIN MEDICATIONS:  acetaminophen     Tablet .. 1000 milliGRAM(s) Oral every 8 hours PRN  diazepam    Tablet 5 milliGRAM(s) Oral every 8 hours  HYDROmorphone   Tablet 6 milliGRAM(s) Oral every 4 hours PRN  HYDROmorphone   Tablet 4 milliGRAM(s) Oral every 4 hours PRN  melatonin 5 milliGRAM(s) Oral at bedtime PRN  pregabalin 75 milliGRAM(s) Oral every 8 hours    Heme:  enoxaparin Injectable 40 milliGRAM(s) SubCutaneous every 24 hours    Antibiotics:    Cardiovascular:  midodrine 15 milliGRAM(s) Oral every 8 hours    GI:  bisacodyl Suppository 10 milliGRAM(s) Rectal daily  polyethylene glycol 3350 17 Gram(s) Oral two times a day  senna 2 Tablet(s) Oral at bedtime    Endocrine:    All Other Medications:  ascorbic acid 500 milliGRAM(s) Oral <User Schedule>  ferrous    sulfate 325 milliGRAM(s) Oral <User Schedule>  influenza   Vaccine 0.5 milliLiter(s) IntraMuscular once  lidocaine   4% Patch 1 Patch Transdermal daily  sodium chloride 0.9% lock flush 10 milliLiter(s) IV Push every 1 hour PRN      Vital Signs Last 24 Hrs  T(C): 37 (18 Oct 2023 13:45), Max: 37 (18 Oct 2023 13:45)  T(F): 98.6 (18 Oct 2023 13:45), Max: 98.6 (18 Oct 2023 13:45)  HR: 117 (18 Oct 2023 13:45) (82 - 117)  BP: 121/69 (18 Oct 2023 13:45) (95/59 - 121/69)  BP(mean): --  RR: 18 (18 Oct 2023 13:45) (18 - 19)  SpO2: 99% (18 Oct 2023 13:45) (97% - 100%)    Parameters below as of 18 Oct 2023 13:45  Patient On (Oxygen Delivery Method): room air        LABS:                        9.8    6.62  )-----------( 348      ( 18 Oct 2023 05:30 )             31.1     10-18    138  |  99  |  11  ----------------------------<  96  3.9   |  27  |  0.38<L>    Ca    9.8      18 Oct 2023 05:30  Phos  3.8     10-18  Mg     1.9     10-18          REVIEW OF SYSTEMS:  CONSTITUTIONAL: No fever or fatigue O/N.   EYES: No eye pain, visual disturbances  ENMT:  No difficulty hearing. No throat pain  NECK: No pain or stiffness  RESPIRATORY: No cough, wheezing; No shortness of breath  CARDIOVASCULAR: No chest pain, palpitations.   GASTROINTESTINAL: Pt reports passing gas and having bowel movements. No abdominal or epigastric pain. No nausea, vomiting. GENITOURINARY: No dysuria, frequency, or incontinence  NEUROLOGICAL: No headaches, loss of strength, numbness, or tremors. No dizzinesss or lightheadedness with pain medications.   MUSCULOSKELETAL: Incisional back pain. No joint pain or swelling; No muscle, or extremity pain      PHYSICAL EXAM  GENERAL: Laying in bed, NAD  Neuro: CN II-XII PERRRL, EOMI  Cranial nerves grossly intact  Motor exam: grossly intatc  Sensation intact to LT in UE/LE in 3 dermatomes  CHEST/LUNG: Clear to auscultation bilaterally; No rales, rhonchi, wheezing, or rubs  HEART: Regular rate and rhythm; No murmurs, rubs, or gallops  ABDOMEN: Soft, Nontender, Nondistended; Bowel sounds present  EXTREMITIES:  2+ Peripheral Pulses, No clubbing, cyanosis, or edema  SKIN: No rashes or lesions          ASSESSMENT:   43F no PMHx suffered childhood injury resulting in spine injury and residual gait disturbance, hyperreflexia of BL LE. She has right greater than left leg tingling, low back and mid back pain. 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 S/p C7-L1 fusion, T3-8 vertebral column resection, placement of anterior cage (9/25/23). S/p revision of T3-T9 VCR, revision of C7-L1 posterior fusion, plastics closure (10/6/23).     PLAN:   - Pain:  >acetaminophen 1g PO q8h  > pregabalin 75 mg PO q8h  >diazepam 5 mg PO q8h  > Discontinue hydromorphone 6mg. Continue hydromorphone 4mg PO q4h prn severe pain      - Bowel regimen: Senna    - Nausea ppx: Zofran standing  - Functional Goals: Pt will get OOB with PT today. Pt will resume previous level of activity without impairment from surgery.   - Additional Consults: None recommended.   - Additional Labs/Imaging:  None recommended.     - Follow up, Discharge Planning:     Patient is set for discharge to:     Discharge is pending: pending medical and surgical clearance  - Pain Management follow up plan: Dr. Birmingham and pain team will continue to follow while in-patient      Plan discussed with Dr. Birmingham

## 2023-10-18 NOTE — PROGRESS NOTE ADULT - SUBJECTIVE AND OBJECTIVE BOX
O/N Events: ANNA  Subjective/ROS: No complaints. Denies HA, CP, SOB, n/v, changes in bowel/urinary habits.  12pt ROS otherwise negative.    VITALS  Vital Signs Last 24 Hrs  T(C): 36.9 (18 Oct 2023 16:32), Max: 37 (18 Oct 2023 13:45)  T(F): 98.4 (18 Oct 2023 16:32), Max: 98.6 (18 Oct 2023 13:45)  HR: 92 (18 Oct 2023 16:32) (82 - 117)  BP: 112/76 (18 Oct 2023 16:32) (95/59 - 121/69)  BP(mean): --  RR: 18 (18 Oct 2023 16:32) (18 - 19)  SpO2: 100% (18 Oct 2023 16:32) (97% - 100%)    Parameters below as of 18 Oct 2023 16:32  Patient On (Oxygen Delivery Method): room air        I&O's Summary    17 Oct 2023 07:01  -  18 Oct 2023 07:00  --------------------------------------------------------  IN: 0 mL / OUT: 421 mL / NET: -421 mL    18 Oct 2023 07:01  -  18 Oct 2023 17:03  --------------------------------------------------------  IN: 900 mL / OUT: 950 mL / NET: -50 mL        CAPILLARY BLOOD GLUCOSE          PHYSICAL EXAM  General: A&Ox3; NAD  Head: NC/AT; PERRL; EOMI; anicteric sclera  Neck: Supple; no JVD  Respiratory: CTA B/L; no wheezes/crackles/rales auscultated w/ good air movement  Cardiovascular: Regular rhythm/rate; S1/S2; no gallops or murmurs auscultated  Gastrointestinal: Soft; NTND w/out rebound tenderness or guarding; bowel sounds normal  Extremities: WWP; no edema or cyanosis; radial/pedal pulses palpable  Neurological:  CNII-XII grossly intact; 0/5 b/l lower extremity  Skin: No rashes noted  Vasc: +2 DP/PT pulses b/l   Psych: Appropriate Affect    MEDICATIONS  (STANDING):  ascorbic acid 500 milliGRAM(s) Oral <User Schedule>  bisacodyl Suppository 10 milliGRAM(s) Rectal daily  diazepam    Tablet 5 milliGRAM(s) Oral every 8 hours  enoxaparin Injectable 40 milliGRAM(s) SubCutaneous every 24 hours  ferrous    sulfate 325 milliGRAM(s) Oral <User Schedule>  influenza   Vaccine 0.5 milliLiter(s) IntraMuscular once  lidocaine   4% Patch 1 Patch Transdermal daily  midodrine 15 milliGRAM(s) Oral every 8 hours  polyethylene glycol 3350 17 Gram(s) Oral two times a day  pregabalin 75 milliGRAM(s) Oral every 8 hours  senna 2 Tablet(s) Oral at bedtime    MEDICATIONS  (PRN):  acetaminophen     Tablet .. 1000 milliGRAM(s) Oral every 8 hours PRN Temp greater or equal to 38C (100.4F), Mild Pain (1 - 3)  HYDROmorphone   Tablet 6 milliGRAM(s) Oral every 4 hours PRN Severe Pain (7 - 10)  HYDROmorphone   Tablet 4 milliGRAM(s) Oral every 4 hours PRN Moderate Pain (4 - 6)  melatonin 5 milliGRAM(s) Oral at bedtime PRN Insomnia  sodium chloride 0.9% lock flush 10 milliLiter(s) IV Push every 1 hour PRN Pre/post blood products, medications, blood draw, and to maintain line patency      No Known Allergies      LABS                        9.8    6.62  )-----------( 348      ( 18 Oct 2023 05:30 )             31.1     10-18    138  |  99  |  11  ----------------------------<  96  3.9   |  27  |  0.38<L>    Ca    9.8      18 Oct 2023 05:30  Phos  3.8     10-18  Mg     1.9     10-18        Urinalysis Basic - ( 18 Oct 2023 05:30 )    Color: x / Appearance: x / SG: x / pH: x  Gluc: 96 mg/dL / Ketone: x  / Bili: x / Urobili: x   Blood: x / Protein: x / Nitrite: x   Leuk Esterase: x / RBC: x / WBC x   Sq Epi: x / Non Sq Epi: x / Bacteria: x            IMAGING/EKG/ETC: reviewed

## 2023-10-19 ENCOUNTER — TRANSCRIPTION ENCOUNTER (OUTPATIENT)
Age: 43
End: 2023-10-19

## 2023-10-19 PROBLEM — Z87.898 PERSONAL HISTORY OF OTHER SPECIFIED CONDITIONS: Chronic | Status: ACTIVE | Noted: 2023-09-25

## 2023-10-19 LAB
ANION GAP SERPL CALC-SCNC: 12 MMOL/L — SIGNIFICANT CHANGE UP (ref 5–17)
ANION GAP SERPL CALC-SCNC: 12 MMOL/L — SIGNIFICANT CHANGE UP (ref 5–17)
BUN SERPL-MCNC: 8 MG/DL — SIGNIFICANT CHANGE UP (ref 7–23)
BUN SERPL-MCNC: 8 MG/DL — SIGNIFICANT CHANGE UP (ref 7–23)
CALCIUM SERPL-MCNC: 10.1 MG/DL — SIGNIFICANT CHANGE UP (ref 8.4–10.5)
CALCIUM SERPL-MCNC: 10.1 MG/DL — SIGNIFICANT CHANGE UP (ref 8.4–10.5)
CHLORIDE SERPL-SCNC: 97 MMOL/L — SIGNIFICANT CHANGE UP (ref 96–108)
CHLORIDE SERPL-SCNC: 97 MMOL/L — SIGNIFICANT CHANGE UP (ref 96–108)
CO2 SERPL-SCNC: 27 MMOL/L — SIGNIFICANT CHANGE UP (ref 22–31)
CO2 SERPL-SCNC: 27 MMOL/L — SIGNIFICANT CHANGE UP (ref 22–31)
CREAT SERPL-MCNC: 0.4 MG/DL — LOW (ref 0.5–1.3)
CREAT SERPL-MCNC: 0.4 MG/DL — LOW (ref 0.5–1.3)
EGFR: 127 ML/MIN/1.73M2 — SIGNIFICANT CHANGE UP
EGFR: 127 ML/MIN/1.73M2 — SIGNIFICANT CHANGE UP
GLUCOSE SERPL-MCNC: 102 MG/DL — HIGH (ref 70–99)
GLUCOSE SERPL-MCNC: 102 MG/DL — HIGH (ref 70–99)
HCT VFR BLD CALC: 34 % — LOW (ref 34.5–45)
HCT VFR BLD CALC: 34 % — LOW (ref 34.5–45)
HGB BLD-MCNC: 10.5 G/DL — LOW (ref 11.5–15.5)
HGB BLD-MCNC: 10.5 G/DL — LOW (ref 11.5–15.5)
MAGNESIUM SERPL-MCNC: 2 MG/DL — SIGNIFICANT CHANGE UP (ref 1.6–2.6)
MAGNESIUM SERPL-MCNC: 2 MG/DL — SIGNIFICANT CHANGE UP (ref 1.6–2.6)
MCHC RBC-ENTMCNC: 26.7 PG — LOW (ref 27–34)
MCHC RBC-ENTMCNC: 26.7 PG — LOW (ref 27–34)
MCHC RBC-ENTMCNC: 30.9 GM/DL — LOW (ref 32–36)
MCHC RBC-ENTMCNC: 30.9 GM/DL — LOW (ref 32–36)
MCV RBC AUTO: 86.5 FL — SIGNIFICANT CHANGE UP (ref 80–100)
MCV RBC AUTO: 86.5 FL — SIGNIFICANT CHANGE UP (ref 80–100)
NRBC # BLD: 0 /100 WBCS — SIGNIFICANT CHANGE UP (ref 0–0)
NRBC # BLD: 0 /100 WBCS — SIGNIFICANT CHANGE UP (ref 0–0)
PHOSPHATE SERPL-MCNC: 3.8 MG/DL — SIGNIFICANT CHANGE UP (ref 2.5–4.5)
PHOSPHATE SERPL-MCNC: 3.8 MG/DL — SIGNIFICANT CHANGE UP (ref 2.5–4.5)
PLATELET # BLD AUTO: 371 K/UL — SIGNIFICANT CHANGE UP (ref 150–400)
PLATELET # BLD AUTO: 371 K/UL — SIGNIFICANT CHANGE UP (ref 150–400)
POTASSIUM SERPL-MCNC: 3.8 MMOL/L — SIGNIFICANT CHANGE UP (ref 3.5–5.3)
POTASSIUM SERPL-MCNC: 3.8 MMOL/L — SIGNIFICANT CHANGE UP (ref 3.5–5.3)
POTASSIUM SERPL-SCNC: 3.8 MMOL/L — SIGNIFICANT CHANGE UP (ref 3.5–5.3)
POTASSIUM SERPL-SCNC: 3.8 MMOL/L — SIGNIFICANT CHANGE UP (ref 3.5–5.3)
RBC # BLD: 3.93 M/UL — SIGNIFICANT CHANGE UP (ref 3.8–5.2)
RBC # BLD: 3.93 M/UL — SIGNIFICANT CHANGE UP (ref 3.8–5.2)
RBC # FLD: 13.9 % — SIGNIFICANT CHANGE UP (ref 10.3–14.5)
RBC # FLD: 13.9 % — SIGNIFICANT CHANGE UP (ref 10.3–14.5)
SODIUM SERPL-SCNC: 136 MMOL/L — SIGNIFICANT CHANGE UP (ref 135–145)
SODIUM SERPL-SCNC: 136 MMOL/L — SIGNIFICANT CHANGE UP (ref 135–145)
WBC # BLD: 9.86 K/UL — SIGNIFICANT CHANGE UP (ref 3.8–10.5)
WBC # BLD: 9.86 K/UL — SIGNIFICANT CHANGE UP (ref 3.8–10.5)
WBC # FLD AUTO: 9.86 K/UL — SIGNIFICANT CHANGE UP (ref 3.8–10.5)
WBC # FLD AUTO: 9.86 K/UL — SIGNIFICANT CHANGE UP (ref 3.8–10.5)

## 2023-10-19 PROCEDURE — 99232 SBSQ HOSP IP/OBS MODERATE 35: CPT

## 2023-10-19 RX ORDER — HYDROMORPHONE HYDROCHLORIDE 2 MG/ML
3 INJECTION INTRAMUSCULAR; INTRAVENOUS; SUBCUTANEOUS
Qty: 0 | Refills: 0 | DISCHARGE
Start: 2023-10-19

## 2023-10-19 RX ORDER — ASCORBIC ACID 60 MG
1 TABLET,CHEWABLE ORAL
Qty: 0 | Refills: 0 | DISCHARGE
Start: 2023-10-19

## 2023-10-19 RX ORDER — POTASSIUM CHLORIDE 20 MEQ
20 PACKET (EA) ORAL ONCE
Refills: 0 | Status: COMPLETED | OUTPATIENT
Start: 2023-10-19 | End: 2023-10-19

## 2023-10-19 RX ORDER — FERROUS SULFATE 325(65) MG
1 TABLET ORAL
Qty: 0 | Refills: 0 | DISCHARGE
Start: 2023-10-19

## 2023-10-19 RX ORDER — ENOXAPARIN SODIUM 100 MG/ML
40 INJECTION SUBCUTANEOUS
Qty: 0 | Refills: 0 | DISCHARGE
Start: 2023-10-19

## 2023-10-19 RX ORDER — HYDROMORPHONE HYDROCHLORIDE 2 MG/ML
1 INJECTION INTRAMUSCULAR; INTRAVENOUS; SUBCUTANEOUS
Qty: 0 | Refills: 0 | DISCHARGE
Start: 2023-10-19

## 2023-10-19 RX ORDER — ACETAMINOPHEN 500 MG
2 TABLET ORAL
Qty: 0 | Refills: 0 | DISCHARGE
Start: 2023-10-19

## 2023-10-19 RX ORDER — LANOLIN ALCOHOL/MO/W.PET/CERES
1 CREAM (GRAM) TOPICAL
Qty: 0 | Refills: 0 | DISCHARGE
Start: 2023-10-19

## 2023-10-19 RX ORDER — DIAZEPAM 5 MG
1 TABLET ORAL
Qty: 0 | Refills: 0 | DISCHARGE
Start: 2023-10-19

## 2023-10-19 RX ORDER — SENNA PLUS 8.6 MG/1
2 TABLET ORAL
Qty: 0 | Refills: 0 | DISCHARGE
Start: 2023-10-19

## 2023-10-19 RX ORDER — MIDODRINE HYDROCHLORIDE 2.5 MG/1
3 TABLET ORAL
Qty: 0 | Refills: 0 | DISCHARGE
Start: 2023-10-19

## 2023-10-19 RX ORDER — POLYETHYLENE GLYCOL 3350 17 G/17G
17 POWDER, FOR SOLUTION ORAL
Qty: 0 | Refills: 0 | DISCHARGE
Start: 2023-10-19

## 2023-10-19 RX ADMIN — Medication 75 MILLIGRAM(S): at 16:43

## 2023-10-19 RX ADMIN — ENOXAPARIN SODIUM 40 MILLIGRAM(S): 100 INJECTION SUBCUTANEOUS at 21:58

## 2023-10-19 RX ADMIN — HYDROMORPHONE HYDROCHLORIDE 6 MILLIGRAM(S): 2 INJECTION INTRAMUSCULAR; INTRAVENOUS; SUBCUTANEOUS at 22:58

## 2023-10-19 RX ADMIN — MIDODRINE HYDROCHLORIDE 15 MILLIGRAM(S): 2.5 TABLET ORAL at 06:21

## 2023-10-19 RX ADMIN — HYDROMORPHONE HYDROCHLORIDE 6 MILLIGRAM(S): 2 INJECTION INTRAMUSCULAR; INTRAVENOUS; SUBCUTANEOUS at 09:48

## 2023-10-19 RX ADMIN — HYDROMORPHONE HYDROCHLORIDE 6 MILLIGRAM(S): 2 INJECTION INTRAMUSCULAR; INTRAVENOUS; SUBCUTANEOUS at 21:58

## 2023-10-19 RX ADMIN — Medication 75 MILLIGRAM(S): at 07:44

## 2023-10-19 RX ADMIN — HYDROMORPHONE HYDROCHLORIDE 6 MILLIGRAM(S): 2 INJECTION INTRAMUSCULAR; INTRAVENOUS; SUBCUTANEOUS at 10:24

## 2023-10-19 RX ADMIN — MIDODRINE HYDROCHLORIDE 15 MILLIGRAM(S): 2.5 TABLET ORAL at 21:58

## 2023-10-19 RX ADMIN — Medication 20 MILLIEQUIVALENT(S): at 16:43

## 2023-10-19 RX ADMIN — Medication 325 MILLIGRAM(S): at 06:21

## 2023-10-19 RX ADMIN — Medication 5 MILLIGRAM(S): at 06:45

## 2023-10-19 RX ADMIN — HYDROMORPHONE HYDROCHLORIDE 6 MILLIGRAM(S): 2 INJECTION INTRAMUSCULAR; INTRAVENOUS; SUBCUTANEOUS at 03:08

## 2023-10-19 RX ADMIN — Medication 500 MILLIGRAM(S): at 06:22

## 2023-10-19 RX ADMIN — HYDROMORPHONE HYDROCHLORIDE 6 MILLIGRAM(S): 2 INJECTION INTRAMUSCULAR; INTRAVENOUS; SUBCUTANEOUS at 14:55

## 2023-10-19 RX ADMIN — Medication 5 MILLIGRAM(S): at 16:47

## 2023-10-19 RX ADMIN — HYDROMORPHONE HYDROCHLORIDE 6 MILLIGRAM(S): 2 INJECTION INTRAMUSCULAR; INTRAVENOUS; SUBCUTANEOUS at 14:03

## 2023-10-19 RX ADMIN — HYDROMORPHONE HYDROCHLORIDE 6 MILLIGRAM(S): 2 INJECTION INTRAMUSCULAR; INTRAVENOUS; SUBCUTANEOUS at 04:08

## 2023-10-19 NOTE — PROCEDURE NOTE - NSPOSTCAREGUIDE_GEN_A_CORE
Care for catheter as per unit/ICU protocols
Verbal/written post procedure instructions were given to patient/caregiver
Verbal/written post procedure instructions were given to patient/caregiver/Instructed patient/caregiver regarding signs and symptoms of infection
Verbal/written post procedure instructions were given to patient/caregiver/Instructed patient/caregiver regarding signs and symptoms of infection/Keep the cast/splint/dressing clean and dry

## 2023-10-19 NOTE — PROCEDURE NOTE - ADDITIONAL PROCEDURE DETAILS
Site was prepped and draped in sterile fashion. Existing arterial line was exposed and cleaned with chlorhexidine. Guide wire was threaded through without resistance and old catheter was withdrawn. New catheter was introduced over the wire and wire was withdrawn. Blood return was achieved and line was attached to pressure bag system. Good waveform was visualized on monitor and arterial line was secured with a single non-absorbable suture. Patient tolerated procedure well.
Drain was first taken off suction. Site was cleaned with chlorhexidene. Sutures were cut and the drain was removed. Steristrips were put on incision and dressed with gauze. Patient tolerated procedure well.
R back DANIELE was taken off suction. DANIELE exit site was prepped w/ chlorhexidine, anchoring suture removed, DANIELE drain pulled w no resistance, tip of drain visualized. Exit site closed w/ steri strips. Pt tolerated well, no bleeding, no complications.
R axillary arterial line placed in a sterile fashion with ultrasound. Patient tolerated procedure well.
Ultrasound guided peripheral IV placement.
Tip of the PICC line is confirmed to be in SVC-RA junction, easily flushes, brisk blood return

## 2023-10-19 NOTE — DISCHARGE NOTE NURSING/CASE MANAGEMENT/SOCIAL WORK - PATIENT PORTAL LINK FT
You can access the FollowMyHealth Patient Portal offered by [facility] by registering at the following website: [email or web address]. By joining [employer]’s FollowMyHealth portal, you will also be able to view your health information using other applications (apps) compatible with our system.

## 2023-10-19 NOTE — PROGRESS NOTE ADULT - SUBJECTIVE AND OBJECTIVE BOX
O/N Events: ANNA  Subjective/ROS: No complaints. Denies HA, CP, SOB, n/v, changes in bowel/urinary habits.  12pt ROS otherwise negative.    VITALS  Vital Signs Last 24 Hrs  T(C): 36.9 (18 Oct 2023 16:32), Max: 37 (18 Oct 2023 13:45)  T(F): 98.4 (18 Oct 2023 16:32), Max: 98.6 (18 Oct 2023 13:45)  HR: 92 (18 Oct 2023 16:32) (82 - 117)  BP: 112/76 (18 Oct 2023 16:32) (95/59 - 121/69)  BP(mean): --  RR: 18 (18 Oct 2023 16:32) (18 - 19)  SpO2: 100% (18 Oct 2023 16:32) (97% - 100%)    Parameters below as of 18 Oct 2023 16:32  Patient On (Oxygen Delivery Method): room air                CAPILLARY BLOOD GLUCOSE          PHYSICAL EXAM  General: A&Ox3; NAD  Head: NC/AT; PERRL; EOMI; anicteric sclera  Neck: Supple; no JVD  Respiratory: CTA B/L; no wheezes/crackles/rales auscultated w/ good air movement  Cardiovascular: Regular rhythm/rate; S1/S2; no gallops or murmurs auscultated  Gastrointestinal: Soft; NTND w/out rebound tenderness or guarding; bowel sounds normal  Extremities: WWP; no edema or cyanosis; radial/pedal pulses palpable  Neurological:  CNII-XII grossly intact; 0/5 b/l lower extremity  Skin: No rashes noted  Vasc: +2 DP/PT pulses b/l   Psych: Appropriate Affect    MEDICATIONS  (STANDING):  ascorbic acid 500 milliGRAM(s) Oral <User Schedule>  bisacodyl Suppository 10 milliGRAM(s) Rectal daily  diazepam    Tablet 5 milliGRAM(s) Oral every 8 hours  enoxaparin Injectable 40 milliGRAM(s) SubCutaneous every 24 hours  ferrous    sulfate 325 milliGRAM(s) Oral <User Schedule>  influenza   Vaccine 0.5 milliLiter(s) IntraMuscular once  lidocaine   4% Patch 1 Patch Transdermal daily  midodrine 15 milliGRAM(s) Oral every 8 hours  polyethylene glycol 3350 17 Gram(s) Oral two times a day  pregabalin 75 milliGRAM(s) Oral every 8 hours  senna 2 Tablet(s) Oral at bedtime    MEDICATIONS  (PRN):  acetaminophen     Tablet .. 1000 milliGRAM(s) Oral every 8 hours PRN Temp greater or equal to 38C (100.4F), Mild Pain (1 - 3)  HYDROmorphone   Tablet 6 milliGRAM(s) Oral every 4 hours PRN Severe Pain (7 - 10)  HYDROmorphone   Tablet 4 milliGRAM(s) Oral every 4 hours PRN Moderate Pain (4 - 6)  melatonin 5 milliGRAM(s) Oral at bedtime PRN Insomnia  sodium chloride 0.9% lock flush 10 milliLiter(s) IV Push every 1 hour PRN Pre/post blood products, medications, blood draw, and to maintain line patency      No Known Allergies      LABS                                   10.5   9.86  )-----------( 371      ( 19 Oct 2023 05:30 )             34.0   10-19    136  |  97  |  8   ----------------------------<  102<H>  3.8   |  27  |  0.40<L>    Ca    10.1      19 Oct 2023 05:30  Phos  3.8     10-19  Mg     2.0     10-19        Color: x / Appearance: x / SG: x / pH: x  Gluc: 96 mg/dL / Ketone: x  / Bili: x / Urobili: x   Blood: x / Protein: x / Nitrite: x   Leuk Esterase: x / RBC: x / WBC x   Sq Epi: x / Non Sq Epi: x / Bacteria: x            IMAGING/EKG/ETC: reviewed

## 2023-10-19 NOTE — DISCHARGE NOTE NURSING/CASE MANAGEMENT/SOCIAL WORK - NSDCPEFALRISK_GEN_ALL_CORE
For information on Fall & Injury Prevention, visit: [email or web address] OR  [email or web address] OR  https://www.cdc.gov/steadi/patient.html

## 2023-10-19 NOTE — PROCEDURE NOTE - NSINFORMCONSENT_GEN_A_CORE
Benefits, risks, and possible complications of procedure explained to patient/caregiver who verbalized understanding and gave verbal consent.
Benefits, risks, and possible complications of procedure explained to patient/caregiver who verbalized understanding and gave written consent.
Benefits, risks, and possible complications of procedure explained to patient/caregiver who verbalized understanding and gave verbal consent.
Benefits, risks, and possible complications of procedure explained to patient/caregiver who verbalized understanding and gave written consent.
Benefits, risks, and possible complications of procedure explained to patient/caregiver who verbalized understanding and gave verbal consent.
Benefits, risks, and possible complications of procedure explained to patient/caregiver who verbalized understanding and gave verbal consent.

## 2023-10-19 NOTE — PROCEDURE NOTE - NSANATOMICLLOCATION_GEN_A_CORE
left/brachial vein
lumbar spine/left
posterior thoracic/right
[place]IN sotelo
left/right
upper back/left
right
right/axillae
right/radial artery
Thoracic/left
right/antecubital fossa
left

## 2023-10-19 NOTE — PROCEDURE NOTE - NSPROCNAME_GEN_A_CORE
General
General
Arterial Puncture/Cannulation
General
General
Peripheral Line Insertion
General
Arterial Puncture/Cannulation
Arterial Puncture/Cannulation
PICC Line Insertion

## 2023-10-19 NOTE — PROCEDURE NOTE - PROCEDURE DATE TIME, MLM
19-Oct-2023 11:17
28-Sep-2023 02:37
09-Oct-2023 11:16
28-Sep-2023 02:35
29-Sep-2023 17:29
03-Oct-2023 16:30
27-Sep-2023 14:35
29-Sep-2023 14:34
08-Oct-2023 11:40
07-Oct-2023 00:50
27-Sep-2023 17:25
10-Oct-2023 16:22

## 2023-10-19 NOTE — DISCHARGE NOTE NURSING/CASE MANAGEMENT/SOCIAL WORK - NSDCFUADDAPPT_GEN_ALL_CORE_FT
Please follow up with Geremias (NP with Dr. Vieira) on 11/1 at 10:45AM via telehealth, call the office to make/confirm appointment at [phone]    Please follow up with Dr. Batres from Plastic Surgery    Please follow up with Dr. Birmingham if needed for pain management    Please follow up with your primary care doctor

## 2023-10-19 NOTE — PROGRESS NOTE ADULT - SUBJECTIVE AND OBJECTIVE BOX
HPI:  Taken from outpatient neurosurgery note on 9/13/2023 " The patient, a long-standing patient of my practice, reports a history of spinal issues that dates back to a childhood injury. Over the years she's experienced developing issues with walking in a straight line, overactive reflexes in her lower extremities, and unique difficulty with her lower extremities referred to as cloneness. Recent upturn in these symptoms has limited her ability to carry out her daily routines. The patient also reports no significant improvement in her condition since our last clinical encounter two years ago."    44 y/o female with no PMHx or PSHx presents for surgery today.  She reports ambulating with walker or baby stroller at home for years.  She reports intermittent falls.  She has right greater than left leg tingling, low back and mid back pain.  She denies arm or leg pain.  She reports urinary incontinence since having a baby 2 years ago where if she doesn't get to the bathroom fast enough, she has incontinence, but she is able to hold it for a short while.  She takes no pain medications.  She has taken ibuprofen in the past.  She denies saddle anesthesia, bowel incontinence.   (25 Sep 2023 06:51)    OVERNIGHT EVENTS: POD23/13, ANNA ovn, neuro stable, pending AR    Hospital course:   9/25: POD 0 s/p C7-L1 fusion, T3-8 vertebral column resection, placement of anterior cage.   9/26; POD1 H/H 6.8 postop and PLT 85. Given 2u PRBC and 1u PLT. Switched propofol to precedex gtt. Pt extubated to face mask. Pt noted to only be withdrawing to noxious stimuli on bilateral lower extremities with sensation decreased RLE per patient. Dr. Vieira notified and plan is to keep MAP>100 and obtain CT full spine in the morning. Phenylephrine started to maintain MAP>100. Advanced diet to regular. Passed TOV. Increased need for aurora, UO high.   9/27: POD2 C7-L1 fusion T3-8 vertebral column resection. increased pressor requirements o/n, started on levo additionally. lactate 1.5, BNP 1400 from 800, CK elevated. echo: EF 60-65%. given 250cc bolus albumin, started on midodrine 10q8 to wean off pressors. PICC placed by Alli.   9/28: POD3 s/p C7-L1 fusion, T3-8 vertebral column resection, placement of anterior cage. R radial A-line removed and replaced on the L, trops and EKG done for chest discomfort, WNL, resolved with treatment of overall pain, continues to be febrile, lyrica dc'd to help.   Lyrica restarted. Weaning phenylephrine. Serum Na uptrending, 3% saline discontinued. Pt febrile with uptrending WBC, ID consulted recommend monitoring for now off antibiotics.  9/29: POD4. started on 3% o/n for Na 132. remains on aurora and levo for MAP>100. Tachycardic, given 1L NS. Right axillary a-line placed. 3% dc'd. HMV dc'd. Aquacell dressing replaced. Passed TOV.   9/30: POD 5.  Remains on levo gtt. Increased midodrine to 15q8. Given enema. Had BM.  10/1: POD 6. MAP goal increased back to >100 due to worsened sensation loss, on levo gtt. afebrile however per ID Dr De Souza, desiree cx sent. UA+. started empirically on vanc/zosyn. b/l LE doppler and LUE doppler ordered per ID, +LUE superficial thrombophlebitis. BL LE dopplers negative for DVT.   10/2: POD 7, ANNA overnight. Maintaining MAP >100. 500cc albumin bolus, dilaudid lowered to 2/4mg, toradol [id number] added for pain, abd xray for distension shows gas pattern. aquacel dressing changed.  10/3: POD8 ANNA overnight. Remains on levophed to maintain MAP>100 in AM. Neuro exam unchanged. Tmax 100.9F in AM, given tylenol. Flexeril changed to valium 2q8 and lyrica 50q8 started per pain managment.  DC'd vanc, continue zosyn x 7 days per ID. 1u PRBC to maintain hbg >9.0. DANIELE drain removed. Salt tabs weaned to 2q8. MAP goal changed to >85 to help wean off levo gtt.   10/4: POD 9. MAP goal >100. Sodium chloride tabs discontinued.  10/5: POD 10, Given 2 L boluses o/n for tachycardia and negative fluid status. Given 1 mg IV valium in AM for muscle spasm. 1L bolus given in afternoon for high urine output. Preop for OR tomorrow.   10/6: POD 11 Pt endorsing incisional pain, given 1mg IV valium. Neuro exam stable. Plan for second stage today,  POD 0 revision of T3-T9 VCR, revision of C7-L1 posterior fusion, plastics closure. Post-op CT thoracic spine complete. 1 L bolus for soft BP and colapsable IVC on POCUS.   10/7: POD 12 C7-L1 fusion. POD 1 Revision fusion and T3-9 VCR. Pain controlled. Remains on Levo for MAP goal > 100. AXR completed in AM for abdominal distension. Dilaudid PO prn increased to 4 and 6 mg, valium increased to 2mg q6 for pain control. 1u PRBC given for Hb 8.4. Hgb elevated to 10.4. 250 cc albumin given for tachycardia.   10/8: POD 13 Fusion, POD 2 Revision. Pain controlled overnight.   +BM this morning. Right hemovac removed. MAPs liberalized to goal greater than 85. Troponin elevated this morning, now downtrending. EKG WNL.   10/9: POD 13 C7-L1 fusion, POD 3 Revision and T3-9 VCR.   MAP goals liberalized to keep >80. Right DANIELE drain removed. IV valium given x1   10/10: POD 14 C7-L1 fusion, POD 4 revision and T3-9 VCR, liberalized MAP goal to >65. Started PO iron and vit C every other day. HMV removed. Lidocaine patch given for left neck pain. Midodrine weaned to 10q8.   10/11: POD 15 C7-L1 fusion, POD 5 revision and T3-9 VCR. SBP 80s when working with PT, given 1L bolus NS. Increased midodrine 15q8. Given additional 500cc bolus NS and 250cc albumin for soft pressures. Started aurora gtt for MAP>65.Given additional 500cc NS. CTA chest neg for PE, notable for b/l pleural effusions. Lasix 10mg IV.  10/12: POD 16/6. Remains on aurora gtt. CXR showing b/l pleural effusions. Given additional 20mg IV lasix. TTE normal, EF 54%. increased lyrica to 75q8. Given additional 20 IV lasix.  10/13: POD17/7. ANNA o/n neuro stable. remains on aurora gtt. Lasix 40 IV. Midodrine 20mg q8h.   10/14: POD18/8. ANNA o/n neuro stable. remains on aurora gtt. AM cortisol normal. Started lasix 40mg IV daily. Applied thigh high compression stockings.   10/15: POD19/9. ANNA o/n neuro stable. remains off pressors. Lasix 40 IV changed to 40 PO. Midodrine decreased to 15mg q8h.  10/16: POD 20/10. ANNA overnight. Neuro stable. pending SDU, CONNIE doan.   10/17: POD 21/11. ANNA overnight, neuro stable. urine lytes oredered for hypoNa, likely from hypovolemia iso lasix administration, 500cc bolus ordered  10/18: POD 22/12, ANNA ovn  10/19: POD23/13, ANNA ovn, neuro stable, pending AR    Vital Signs Last 24 Hrs  T(C): 37 (18 Oct 2023 20:40), Max: 37 (18 Oct 2023 13:45)  T(F): 98.6 (18 Oct 2023 20:40), Max: 98.6 (18 Oct 2023 13:45)  HR: 95 (19 Oct 2023 03:08) (92 - 117)  BP: 118/664 (19 Oct 2023 03:08) (108/66 - 121/69)  BP(mean): --  RR: 18 (19 Oct 2023 03:08) (18 - 19)  SpO2: 99% (19 Oct 2023 03:08) (98% - 100%)    Parameters below as of 19 Oct 2023 03:08  Patient On (Oxygen Delivery Method): room air        I&O's Summary    17 Oct 2023 07:01  -  18 Oct 2023 07:00  --------------------------------------------------------  IN: 0 mL / OUT: 421 mL / NET: -421 mL    18 Oct 2023 07:01  -  19 Oct 2023 03:40  --------------------------------------------------------  IN: 1340 mL / OUT: 1590 mL / NET: -250 mL        PHYSICAL EXAM:  General: patient seen laying supine in bed in NAD  Neuro: AAOx3, FC, strength 5/5 b/l UE and 0/5 b/l LE, b/l LE sensation inconsistent w/ any dermatomal pattern but decreased below T10  HEENT: PERRL, EOMI  Neck: supple  Cardiac: RRR, S1S2  Pulmonary: chest rise symmetric  Abdomen: soft, mild distention  Ext: perfusing well  Skin: warm, dry  Wound: c/t/l spine incision c/d/i, JPx1    TUBES/LINES:  [] Chin  [] A-line  [] Lumbar Drain  [x] Wound Drains - JPx1  [] NGT   [] EVD   [] CVC  [] Other      DIET:  [] NPO  [x] Mechanical  [] Tube feeds    LABS:                        9.8    6.62  )-----------( 348      ( 18 Oct 2023 05:30 )             31.1     10-18    138  |  99  |  11  ----------------------------<  96  3.9   |  27  |  0.38<L>    Ca    9.8      18 Oct 2023 05:30  Phos  3.8     10-18  Mg     1.9     10-18        Urinalysis Basic - ( 18 Oct 2023 05:30 )    Color: x / Appearance: x / SG: x / pH: x  Gluc: 96 mg/dL / Ketone: x  / Bili: x / Urobili: x   Blood: x / Protein: x / Nitrite: x   Leuk Esterase: x / RBC: x / WBC x   Sq Epi: x / Non Sq Epi: x / Bacteria: x          CAPILLARY BLOOD GLUCOSE          Drug Levels: [] N/A    CSF Analysis: [] N/A      Allergies    No Known Allergies    Intolerances        Home Medications:      MEDICATIONS:  MEDICATIONS  (STANDING):  ascorbic acid 500 milliGRAM(s) Oral <User Schedule>  bisacodyl Suppository 10 milliGRAM(s) Rectal daily  diazepam    Tablet 5 milliGRAM(s) Oral every 8 hours  enoxaparin Injectable 40 milliGRAM(s) SubCutaneous every 24 hours  ferrous    sulfate 325 milliGRAM(s) Oral <User Schedule>  influenza   Vaccine 0.5 milliLiter(s) IntraMuscular once  lidocaine   4% Patch 1 Patch Transdermal daily  midodrine 15 milliGRAM(s) Oral every 8 hours  polyethylene glycol 3350 17 Gram(s) Oral two times a day  pregabalin 75 milliGRAM(s) Oral every 8 hours  senna 2 Tablet(s) Oral at bedtime    MEDICATIONS  (PRN):  acetaminophen     Tablet .. 1000 milliGRAM(s) Oral every 8 hours PRN Temp greater or equal to 38C (100.4F), Mild Pain (1 - 3)  HYDROmorphone   Tablet 6 milliGRAM(s) Oral every 4 hours PRN Severe Pain (7 - 10)  HYDROmorphone   Tablet 4 milliGRAM(s) Oral every 4 hours PRN Moderate Pain (4 - 6)  melatonin 5 milliGRAM(s) Oral at bedtime PRN Insomnia  sodium chloride 0.9% lock flush 10 milliLiter(s) IV Push every 1 hour PRN Pre/post blood products, medications, blood draw, and to maintain line patency      CULTURES:      RADIOLOGY & ADDITIONAL TESTS:      ASSESSMENT:  43F no PMHx suffered childhood injury resulting in spine injury and residual gait disturbance, hyperreflexia of BL LE. She has right greater than left leg tingling, low back and mid back pain. 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 S/p C7-L1 fusion, T3-8 vertebral column resection, placement of anterior cage (9/25/23). S/p revision of T3-T9 VCR, revision of C7-L1 posterior fusion, plastics closure (10/6/23).     NEURO:  - Neuro/spine checks q8/vitals q4hrs  - Pain control: tylenol 1g q8h, dilaudid 4/6 PO, Lyrica 75q8,  - stage 1 Post-op CT full spine: L1 screw not attached to the vertical yoli, epidural lipomatosis L2-3  - stage 2 post op CT T spine completed  - JPx1 (per plastics) (L HMV, R HMV and R DANIELE dc'd)    Cardio:  - MAP > 65  - midodrine 15mg q8h  - echo 9/27: EF 60-65%, 10/12 nml EF 54%   - Lasix 40mg PO qd d/c 10/17    Pulm:  - room air  - incentive spirometry  - CTA chest 10/11 neg for PE, notable for b/l pleural effusions  - CXR 10/12 b/l pleural effusions     GI:  - Regular diet  - Bowel regimen  - last BM 10/15    Renal:  - voiding via primafit    Endo:  - a1c 5.6    Heme:  - SCDs for DVT ppx/ SQL   - 2u PRBC and 1u PLT postop 9/25, 1u PRBC 10/3, 1u PRBC 10/7  - b/l LE dopplers 10/1 negative  - LUE doppler 10/1: +cephalic thrombophlebitis  - iron and vitamin c every other day     ID:  - 10/1 panculture, +UA 10/1  - Vanc (10/1-10/3 ) dc'd, Zosyn (10/1- 10/8)    - ID signed off    Dispo: SDU status, full code, PT/OT recs AR  Discussed with Dr. Vieira     DVT PROPHYLAXIS:  [x] Venodynes                                [x] Heparin/Lovenox    Assessment: present when checked     [] GCS   E   V   M     Heart Failure: [] Acute, [] acute on chronic, [] chronic   Heart Failure: [] Diastolic (HFpEF), [] Systolic (HRrEF), [] Combined (HFpEF and HFrEF), [] RHF, [] Pulm HTN, [] Other     [] CELE, [] ATN, [] AIN, [] other   [] CKD1, [] CKD2, [] CKD3, [] CKD4, [] CKD5, [] ESRD     Encephalopathy: [] Metabolic, [] Hepatic, [] Toxic, [] Neurological, [] Other     Abnormal Nutritional Status: [] malnutrition (see nutrition note), []underweight: BMI <19, [] morbid obesity: BMI >40, [] Cachexia     [] Sepsis   [] Hypovolemic shock, [] Cardiogenic shock, [] Hemorrhagic shock, [] Neurogenic shock   [] Acute respiratory failure   [] Cerebral edema, [] Brain compression / herniation   [] Functional quadriplegia   [] Acute blood loss anemia

## 2023-10-19 NOTE — PROCEDURE NOTE - NSSITEPREP_SKIN_A_CORE
chlorhexidine
chlorhexidine
Adherence to aseptic technique: hand hygiene prior to donning barriers (gown, gloves), don cap and mask, sterile drape over patient
chlorhexidine

## 2023-10-20 VITALS
DIASTOLIC BLOOD PRESSURE: 84 MMHG | TEMPERATURE: 98 F | OXYGEN SATURATION: 96 % | SYSTOLIC BLOOD PRESSURE: 110 MMHG | HEART RATE: 88 BPM | RESPIRATION RATE: 18 BRPM

## 2023-10-20 PROCEDURE — 82550 ASSAY OF CK (CPK): CPT

## 2023-10-20 PROCEDURE — P9037: CPT

## 2023-10-20 PROCEDURE — 86900 BLOOD TYPING SEROLOGIC ABO: CPT

## 2023-10-20 PROCEDURE — 97530 THERAPEUTIC ACTIVITIES: CPT

## 2023-10-20 PROCEDURE — 85045 AUTOMATED RETICULOCYTE COUNT: CPT

## 2023-10-20 PROCEDURE — 85730 THROMBOPLASTIN TIME PARTIAL: CPT

## 2023-10-20 PROCEDURE — 87640 STAPH A DNA AMP PROBE: CPT

## 2023-10-20 PROCEDURE — 82746 ASSAY OF FOLIC ACID SERUM: CPT

## 2023-10-20 PROCEDURE — 94002 VENT MGMT INPAT INIT DAY: CPT

## 2023-10-20 PROCEDURE — 86850 RBC ANTIBODY SCREEN: CPT

## 2023-10-20 PROCEDURE — 82947 ASSAY GLUCOSE BLOOD QUANT: CPT

## 2023-10-20 PROCEDURE — 72020 X-RAY EXAM OF SPINE 1 VIEW: CPT

## 2023-10-20 PROCEDURE — 71275 CT ANGIOGRAPHY CHEST: CPT

## 2023-10-20 PROCEDURE — 84484 ASSAY OF TROPONIN QUANT: CPT

## 2023-10-20 PROCEDURE — 82570 ASSAY OF URINE CREATININE: CPT

## 2023-10-20 PROCEDURE — 76000 FLUOROSCOPY <1 HR PHYS/QHP: CPT

## 2023-10-20 PROCEDURE — 88300 SURGICAL PATH GROSS: CPT

## 2023-10-20 PROCEDURE — 87086 URINE CULTURE/COLONY COUNT: CPT

## 2023-10-20 PROCEDURE — 80053 COMPREHEN METABOLIC PANEL: CPT

## 2023-10-20 PROCEDURE — 83605 ASSAY OF LACTIC ACID: CPT

## 2023-10-20 PROCEDURE — 84100 ASSAY OF PHOSPHORUS: CPT

## 2023-10-20 PROCEDURE — 97112 NEUROMUSCULAR REEDUCATION: CPT

## 2023-10-20 PROCEDURE — 82330 ASSAY OF CALCIUM: CPT

## 2023-10-20 PROCEDURE — P9016: CPT

## 2023-10-20 PROCEDURE — 74018 RADEX ABDOMEN 1 VIEW: CPT

## 2023-10-20 PROCEDURE — 97535 SELF CARE MNGMENT TRAINING: CPT

## 2023-10-20 PROCEDURE — 82607 VITAMIN B-12: CPT

## 2023-10-20 PROCEDURE — 36430 TRANSFUSION BLD/BLD COMPNT: CPT

## 2023-10-20 PROCEDURE — 97110 THERAPEUTIC EXERCISES: CPT

## 2023-10-20 PROCEDURE — 85025 COMPLETE CBC W/AUTO DIFF WBC: CPT

## 2023-10-20 PROCEDURE — 85610 PROTHROMBIN TIME: CPT

## 2023-10-20 PROCEDURE — 80048 BASIC METABOLIC PNL TOTAL CA: CPT

## 2023-10-20 PROCEDURE — 0225U NFCT DS DNA&RNA 21 SARSCOV2: CPT

## 2023-10-20 PROCEDURE — 83735 ASSAY OF MAGNESIUM: CPT

## 2023-10-20 PROCEDURE — 83540 ASSAY OF IRON: CPT

## 2023-10-20 PROCEDURE — 84702 CHORIONIC GONADOTROPIN TEST: CPT

## 2023-10-20 PROCEDURE — 83010 ASSAY OF HAPTOGLOBIN QUANT: CPT

## 2023-10-20 PROCEDURE — C8929: CPT

## 2023-10-20 PROCEDURE — 84436 ASSAY OF TOTAL THYROXINE: CPT

## 2023-10-20 PROCEDURE — 82728 ASSAY OF FERRITIN: CPT

## 2023-10-20 PROCEDURE — 84295 ASSAY OF SERUM SODIUM: CPT

## 2023-10-20 PROCEDURE — 83036 HEMOGLOBIN GLYCOSYLATED A1C: CPT

## 2023-10-20 PROCEDURE — 83550 IRON BINDING TEST: CPT

## 2023-10-20 PROCEDURE — 97140 MANUAL THERAPY 1/> REGIONS: CPT

## 2023-10-20 PROCEDURE — 83930 ASSAY OF BLOOD OSMOLALITY: CPT

## 2023-10-20 PROCEDURE — 83935 ASSAY OF URINE OSMOLALITY: CPT

## 2023-10-20 PROCEDURE — P9045: CPT

## 2023-10-20 PROCEDURE — 97165 OT EVAL LOW COMPLEX 30 MIN: CPT

## 2023-10-20 PROCEDURE — 84443 ASSAY THYROID STIM HORMONE: CPT

## 2023-10-20 PROCEDURE — 81001 URINALYSIS AUTO W/SCOPE: CPT

## 2023-10-20 PROCEDURE — 82533 TOTAL CORTISOL: CPT

## 2023-10-20 PROCEDURE — 83880 ASSAY OF NATRIURETIC PEPTIDE: CPT

## 2023-10-20 PROCEDURE — 84466 ASSAY OF TRANSFERRIN: CPT

## 2023-10-20 PROCEDURE — 87635 SARS-COV-2 COVID-19 AMP PRB: CPT

## 2023-10-20 PROCEDURE — 87641 MR-STAPH DNA AMP PROBE: CPT

## 2023-10-20 PROCEDURE — 72131 CT LUMBAR SPINE W/O DYE: CPT

## 2023-10-20 PROCEDURE — 80074 ACUTE HEPATITIS PANEL: CPT

## 2023-10-20 PROCEDURE — 99232 SBSQ HOSP IP/OBS MODERATE 35: CPT

## 2023-10-20 PROCEDURE — P9100: CPT

## 2023-10-20 PROCEDURE — C1713: CPT

## 2023-10-20 PROCEDURE — 72128 CT CHEST SPINE W/O DYE: CPT

## 2023-10-20 PROCEDURE — 93971 EXTREMITY STUDY: CPT

## 2023-10-20 PROCEDURE — 80202 ASSAY OF VANCOMYCIN: CPT

## 2023-10-20 PROCEDURE — C1889: CPT

## 2023-10-20 PROCEDURE — 72125 CT NECK SPINE W/O DYE: CPT

## 2023-10-20 PROCEDURE — 97161 PT EVAL LOW COMPLEX 20 MIN: CPT

## 2023-10-20 PROCEDURE — 71045 X-RAY EXAM CHEST 1 VIEW: CPT

## 2023-10-20 PROCEDURE — 84145 PROCALCITONIN (PCT): CPT

## 2023-10-20 PROCEDURE — 86923 COMPATIBILITY TEST ELECTRIC: CPT

## 2023-10-20 PROCEDURE — 36415 COLL VENOUS BLD VENIPUNCTURE: CPT

## 2023-10-20 PROCEDURE — 93970 EXTREMITY STUDY: CPT

## 2023-10-20 PROCEDURE — 74019 RADEX ABDOMEN 2 VIEWS: CPT

## 2023-10-20 PROCEDURE — 84300 ASSAY OF URINE SODIUM: CPT

## 2023-10-20 PROCEDURE — 85027 COMPLETE CBC AUTOMATED: CPT

## 2023-10-20 PROCEDURE — 87040 BLOOD CULTURE FOR BACTERIA: CPT

## 2023-10-20 PROCEDURE — 81003 URINALYSIS AUTO W/O SCOPE: CPT

## 2023-10-20 PROCEDURE — 86901 BLOOD TYPING SEROLOGIC RH(D): CPT

## 2023-10-20 PROCEDURE — 80061 LIPID PANEL: CPT

## 2023-10-20 PROCEDURE — 84132 ASSAY OF SERUM POTASSIUM: CPT

## 2023-10-20 PROCEDURE — 82962 GLUCOSE BLOOD TEST: CPT

## 2023-10-20 PROCEDURE — 80076 HEPATIC FUNCTION PANEL: CPT

## 2023-10-20 PROCEDURE — 85018 HEMOGLOBIN: CPT

## 2023-10-20 RX ORDER — CHLORHEXIDINE GLUCONATE 213 G/1000ML
1 SOLUTION TOPICAL DAILY
Refills: 0 | Status: DISCONTINUED | OUTPATIENT
Start: 2023-10-20 | End: 2023-10-20

## 2023-10-20 RX ADMIN — Medication 75 MILLIGRAM(S): at 07:07

## 2023-10-20 RX ADMIN — Medication 5 MILLIGRAM(S): at 05:58

## 2023-10-20 RX ADMIN — HYDROMORPHONE HYDROCHLORIDE 6 MILLIGRAM(S): 2 INJECTION INTRAMUSCULAR; INTRAVENOUS; SUBCUTANEOUS at 15:20

## 2023-10-20 RX ADMIN — Medication 5 MILLIGRAM(S): at 13:46

## 2023-10-20 RX ADMIN — MIDODRINE HYDROCHLORIDE 15 MILLIGRAM(S): 2.5 TABLET ORAL at 05:58

## 2023-10-20 RX ADMIN — Medication 75 MILLIGRAM(S): at 13:46

## 2023-10-20 RX ADMIN — HYDROMORPHONE HYDROCHLORIDE 6 MILLIGRAM(S): 2 INJECTION INTRAMUSCULAR; INTRAVENOUS; SUBCUTANEOUS at 04:50

## 2023-10-20 RX ADMIN — HYDROMORPHONE HYDROCHLORIDE 6 MILLIGRAM(S): 2 INJECTION INTRAMUSCULAR; INTRAVENOUS; SUBCUTANEOUS at 03:52

## 2023-10-20 RX ADMIN — Medication 5 MILLIGRAM(S): at 00:13

## 2023-10-20 RX ADMIN — HYDROMORPHONE HYDROCHLORIDE 6 MILLIGRAM(S): 2 INJECTION INTRAMUSCULAR; INTRAVENOUS; SUBCUTANEOUS at 14:41

## 2023-10-20 RX ADMIN — HYDROMORPHONE HYDROCHLORIDE 6 MILLIGRAM(S): 2 INJECTION INTRAMUSCULAR; INTRAVENOUS; SUBCUTANEOUS at 08:57

## 2023-10-20 RX ADMIN — HYDROMORPHONE HYDROCHLORIDE 6 MILLIGRAM(S): 2 INJECTION INTRAMUSCULAR; INTRAVENOUS; SUBCUTANEOUS at 09:30

## 2023-10-20 RX ADMIN — MIDODRINE HYDROCHLORIDE 15 MILLIGRAM(S): 2.5 TABLET ORAL at 13:46

## 2023-10-20 NOTE — PROGRESS NOTE ADULT - PROBLEM SELECTOR PLAN 4
-likely related to volume and anxiety, would recommend holding lasix and providing fluid
-likely related to volume and anxiety, would recommend holding lasix and providing fluid
-likely related to volume and anxiety, resolved , continue to hold lasix
-likely related to volume and anxiety, resolved , continue to hold lasix

## 2023-10-20 NOTE — PROGRESS NOTE ADULT - PROBLEM SELECTOR PLAN 5
-unclear etiology, given bilateral nature would likely suggest cardiac.   -would recommend bedside ultrasound of lungs to confirm presence of pleural effusion   -would stop lasix
-unclear etiology, given bilateral nature would likely suggest cardiac.   -would recommend bedside ultrasound of lungs to confirm presence of pleural effusion   -would stop lasix  -would recommend if presence of pleural effusions confirmed with ultrasound to consult Pulm, if no member is available for bedside U/S will perform tomorrow at bedside with team.
-unclear etiology, given bilateral nature would likely suggest cardiac.   -would recommend bedside ultrasound of lungs to confirm presence of pleural effusion   -would stop lasix
-unclear etiology, given bilateral nature would likely suggest cardiac.   -would recommend bedside ultrasound of lungs to confirm presence of pleural effusion   -would stop lasix  -would recommend if presence of pleural effusions confirmed with ultrasound to consult Pulm, if no member is available for bedside U/S will perform tomorrow at bedside with team.

## 2023-10-20 NOTE — PROGRESS NOTE ADULT - PROBLEM SELECTOR PLAN 3
Appears to be from hypovolemia, would also explain hypotension and tachycardia.  -after holding lasix and administering 500 ML bolus , tachycardia has resolved but patients blood pressures still in the 95-11- range , she is asymptomatic
Appears to be from hypovolemia, would also explain hypotension and tachycardia.  -after holding lasix and administering 500 ML bolus , tachycardia has resolved but patients blood pressures still in the 95-11- range , she is asymptomatic
Appears to be from hypovolemia, would also explain hypotension and tachycardia.  -Give 500cc bolus  -Hold Lasix
Appears to be from hypovolemia, would also explain hypotension and tachycardia.  -Give 500cc bolus  -Hold Lasix

## 2023-10-20 NOTE — PROGRESS NOTE ADULT - THIS PATIENT HAS THE FOLLOWING CONDITION(S)/DIAGNOSES ON THIS ADMISSION:
None

## 2023-10-20 NOTE — PROGRESS NOTE ADULT - SUBJECTIVE AND OBJECTIVE BOX
O/N Events:  Subjective/ROS: Denies HA, CP, SOB, n/v, changes in bowel/urinary habits.  12pt ROS otherwise negative.    VITALS  Vital Signs Last 24 Hrs  T(C): 36.7 (20 Oct 2023 08:20), Max: 37.1 (19 Oct 2023 16:57)  T(F): 98 (20 Oct 2023 08:20), Max: 98.8 (19 Oct 2023 16:57)  HR: 90 (20 Oct 2023 08:20) (76 - 105)  BP: 117/76 (20 Oct 2023 08:20) (95/66 - 117/76)  BP(mean): --  RR: 18 (20 Oct 2023 08:20) (18 - 18)  SpO2: 97% (20 Oct 2023 08:20) (96% - 99%)    Parameters below as of 20 Oct 2023 08:20  Patient On (Oxygen Delivery Method): room air    I&O's Summary    19 Oct 2023 07:01  -  20 Oct 2023 07:00  --------------------------------------------------------  IN: 1590 mL / OUT: 2800 mL / NET: -1210 mL    CAPILLARY BLOOD GLUCOSE    PHYSICAL EXAM  General: A&Ox3; NAD  Head: NC/AT; PERRL; EOMI; anicteric sclera  Neck: Supple; no JVD  Respiratory: CTA B/L; no wheezes/crackles/rales auscultated w/ good air movement  Cardiovascular: Regular rhythm/rate; S1/S2; no gallops or murmurs auscultated  Gastrointestinal: Soft; NTND w/out rebound tenderness or guarding; bowel sounds normal  Extremities: WWP; no edema or cyanosis; radial/pedal pulses palpable  Neurological:  CNII-XII grossly intact; 0/5 b/l lower extremity  Skin: No rashes noted  Vasc: +2 DP/PT pulses b/l   Psych: Appropriate Affect    MEDICATIONS  (STANDING):  ascorbic acid 500 milliGRAM(s) Oral <User Schedule>  bisacodyl Suppository 10 milliGRAM(s) Rectal daily  chlorhexidine 2% Cloths 1 Application(s) Topical daily  diazepam    Tablet 5 milliGRAM(s) Oral every 8 hours  enoxaparin Injectable 40 milliGRAM(s) SubCutaneous every 24 hours  ferrous    sulfate 325 milliGRAM(s) Oral <User Schedule>  influenza   Vaccine 0.5 milliLiter(s) IntraMuscular once  lidocaine   4% Patch 1 Patch Transdermal daily  midodrine 15 milliGRAM(s) Oral every 8 hours  polyethylene glycol 3350 17 Gram(s) Oral two times a day  pregabalin 75 milliGRAM(s) Oral every 8 hours  senna 2 Tablet(s) Oral at bedtime    MEDICATIONS  (PRN):  acetaminophen     Tablet .. 1000 milliGRAM(s) Oral every 8 hours PRN Temp greater or equal to 38C (100.4F), Mild Pain (1 - 3)  HYDROmorphone   Tablet 6 milliGRAM(s) Oral every 4 hours PRN Severe Pain (7 - 10)  HYDROmorphone   Tablet 4 milliGRAM(s) Oral every 4 hours PRN Moderate Pain (4 - 6)  melatonin 5 milliGRAM(s) Oral at bedtime PRN Insomnia  sodium chloride 0.9% lock flush 10 milliLiter(s) IV Push every 1 hour PRN Pre/post blood products, medications, blood draw, and to maintain line patency      No Known Allergies      LABS                        10.5   9.86  )-----------( 371      ( 19 Oct 2023 05:30 )             34.0     10-19    136  |  97  |  8   ----------------------------<  102<H>  3.8   |  27  |  0.40<L>    Ca    10.1      19 Oct 2023 05:30  Phos  3.8     10-19  Mg     2.0     10-19    Urinalysis Basic - ( 19 Oct 2023 05:30 )    Color: x / Appearance: x / SG: x / pH: x  Gluc: 102 mg/dL / Ketone: x  / Bili: x / Urobili: x   Blood: x / Protein: x / Nitrite: x   Leuk Esterase: x / RBC: x / WBC x   Sq Epi: x / Non Sq Epi: x / Bacteria: x        IMAGING/EKG/ETC: reviewed

## 2023-10-20 NOTE — PROGRESS NOTE ADULT - PROVIDER SPECIALTY LIST ADULT
Infectious Disease
Infectious Disease
NSICU
Neurosurgery
Pain Medicine
Infectious Disease
Infectious Disease
NSICU
Neurosurgery
Pain Medicine
Infectious Disease
NSICU
Neurosurgery
Pain Medicine
Infectious Disease
NSICU
Neurosurgery
Infectious Disease
NSICU
Hospitalist
NSICU
Hospitalist

## 2023-10-20 NOTE — PROGRESS NOTE ADULT - PROBLEM SELECTOR PLAN 2
Stable, most likely from intraop blood loss.   Continue Iron supplementation

## 2023-10-20 NOTE — PROGRESS NOTE ADULT - PROBLEM SELECTOR PLAN 1
s/p multiple extensive surgeries   Drains in place  Pain Control  PT/OT  Patient would benefit from spinal rehab

## 2023-10-20 NOTE — PROGRESS NOTE ADULT - SUBJECTIVE AND OBJECTIVE BOX
HPI:  Taken from outpatient neurosurgery note on 9/13/2023 " The patient, a long-standing patient of my practice, reports a history of spinal issues that dates back to a childhood injury. Over the years she's experienced developing issues with walking in a straight line, overactive reflexes in her lower extremities, and unique difficulty with her lower extremities referred to as cloneness. Recent upturn in these symptoms has limited her ability to carry out her daily routines. The patient also reports no significant improvement in her condition since our last clinical encounter two years ago."    42 y/o female with no PMHx or PSHx presents for surgery today.  She reports ambulating with walker or baby stroller at home for years.  She reports intermittent falls.  She has right greater than left leg tingling, low back and mid back pain.  She denies arm or leg pain.  She reports urinary incontinence since having a baby 2 years ago where if she doesn't get to the bathroom fast enough, she has incontinence, but she is able to hold it for a short while.  She takes no pain medications.  She has taken ibuprofen in the past.  She denies saddle anesthesia, bowel incontinence.   (25 Sep 2023 06:51)    OVERNIGHT EVENTS: POD24/14, ANNA ovn, AR today    Hospital course:   9/25: POD 0 s/p C7-L1 fusion, T3-8 vertebral column resection, placement of anterior cage.   9/26; POD1 H/H 6.8 postop and PLT 85. Given 2u PRBC and 1u PLT. Switched propofol to precedex gtt. Pt extubated to face mask. Pt noted to only be withdrawing to noxious stimuli on bilateral lower extremities with sensation decreased RLE per patient. Dr. Vieira notified and plan is to keep MAP>100 and obtain CT full spine in the morning. Phenylephrine started to maintain MAP>100. Advanced diet to regular. Passed TOV. Increased need for aurora, UO high.   9/27: POD2 C7-L1 fusion T3-8 vertebral column resection. increased pressor requirements o/n, started on levo additionally. lactate 1.5, BNP 1400 from 800, CK elevated. echo: EF 60-65%. given 250cc bolus albumin, started on midodrine 10q8 to wean off pressors. PICC placed by Alli.   9/28: POD3 s/p C7-L1 fusion, T3-8 vertebral column resection, placement of anterior cage. R radial A-line removed and replaced on the L, trops and EKG done for chest discomfort, WNL, resolved with treatment of overall pain, continues to be febrile, lyrica dc'd to help.   Lyrica restarted. Weaning phenylephrine. Serum Na uptrending, 3% saline discontinued. Pt febrile with uptrending WBC, ID consulted recommend monitoring for now off antibiotics.  9/29: POD4. started on 3% o/n for Na 132. remains on aurora and levo for MAP>100. Tachycardic, given 1L NS. Right axillary a-line placed. 3% dc'd. HMV dc'd. Aquacell dressing replaced. Passed TOV.   9/30: POD 5.  Remains on levo gtt. Increased midodrine to 15q8. Given enema. Had BM.  10/1: POD 6. MAP goal increased back to >100 due to worsened sensation loss, on levo gtt. afebrile however per ID desiree Brewer cx sent. UA+. started empirically on vanc/zosyn. b/l LE doppler and LUE doppler ordered per ID, +LUE superficial thrombophlebitis. BL LE dopplers negative for DVT.   10/2: POD 7, ANNA overnight. Maintaining MAP >100. 500cc albumin bolus, dilaudid lowered to 2/4mg, toradol [id number] added for pain, abd xray for distension shows gas pattern. aquacel dressing changed.  10/3: POD8 ANNA overnight. Remains on levophed to maintain MAP>100 in AM. Neuro exam unchanged. Tmax 100.9F in AM, given tylenol. Flexeril changed to valium 2q8 and lyrica 50q8 started per pain managment.  DC'd vanc, continue zosyn x 7 days per ID. 1u PRBC to maintain hbg >9.0. DANIELE drain removed. Salt tabs weaned to 2q8. MAP goal changed to >85 to help wean off levo gtt.   10/4: POD 9. MAP goal >100. Sodium chloride tabs discontinued.  10/5: POD 10, Given 2 L boluses o/n for tachycardia and negative fluid status. Given 1 mg IV valium in AM for muscle spasm. 1L bolus given in afternoon for high urine output. Preop for OR tomorrow.   10/6: POD 11 Pt endorsing incisional pain, given 1mg IV valium. Neuro exam stable. Plan for second stage today,  POD 0 revision of T3-T9 VCR, revision of C7-L1 posterior fusion, plastics closure. Post-op CT thoracic spine complete. 1 L bolus for soft BP and colapsable IVC on POCUS.   10/7: POD 12 C7-L1 fusion. POD 1 Revision fusion and T3-9 VCR. Pain controlled. Remains on Levo for MAP goal > 100. AXR completed in AM for abdominal distension. Dilaudid PO prn increased to 4 and 6 mg, valium increased to 2mg q6 for pain control. 1u PRBC given for Hb 8.4. Hgb elevated to 10.4. 250 cc albumin given for tachycardia.   10/8: POD 13 Fusion, POD 2 Revision. Pain controlled overnight.   +BM this morning. Right hemovac removed. MAPs liberalized to goal greater than 85. Troponin elevated this morning, now downtrending. EKG WNL.   10/9: POD 13 C7-L1 fusion, POD 3 Revision and T3-9 VCR.   MAP goals liberalized to keep >80. Right DANIELE drain removed. IV valium given x1   10/10: POD 14 C7-L1 fusion, POD 4 revision and T3-9 VCR, liberalized MAP goal to >65. Started PO iron and vit C every other day. HMV removed. Lidocaine patch given for left neck pain. Midodrine weaned to 10q8.   10/11: POD 15 C7-L1 fusion, POD 5 revision and T3-9 VCR. SBP 80s when working with PT, given 1L bolus NS. Increased midodrine 15q8. Given additional 500cc bolus NS and 250cc albumin for soft pressures. Started aurora gtt for MAP>65.Given additional 500cc NS. CTA chest neg for PE, notable for b/l pleural effusions. Lasix 10mg IV.  10/12: POD 16/6. Remains on aurora gtt. CXR showing b/l pleural effusions. Given additional 20mg IV lasix. TTE normal, EF 54%. increased lyrica to 75q8. Given additional 20 IV lasix.  10/13: POD17/7. ANNA o/n neuro stable. remains on aurora gtt. Lasix 40 IV. Midodrine 20mg q8h.   10/14: POD18/8. ANNA o/n neuro stable. remains on aurora gtt. AM cortisol normal. Started lasix 40mg IV daily. Applied thigh high compression stockings.   10/15: POD19/9. ANNA o/n neuro stable. remains off pressors. Lasix 40 IV changed to 40 PO. Midodrine decreased to 15mg q8h.  10/16: POD 20/10. ANNA overnight. Neuro stable. pending SDU, CONNIE doan.   10/17: POD 21/11. ANNA overnight, neuro stable. urine lytes oredered for hypoNa, likely from hypovolemia iso lasix administration, 500cc bolus ordered  10/18: POD 22/12, ANNA ovn  10/19: POD23/13, ANNA ovn, neuro stable, pending AR [place] today at 1pm, DANIELE drain removed, transport to rehab delayed until tomorrow  10/20: POD24/14, ANNA ovn, AR today    Vital Signs Last 24 Hrs  T(C): 36.7 (19 Oct 2023 20:29), Max: 37.2 (19 Oct 2023 06:22)  T(F): 98.1 (19 Oct 2023 20:29), Max: 98.9 (19 Oct 2023 06:22)  HR: 85 (19 Oct 2023 20:29) (85 - 105)  BP: 95/68 (19 Oct 2023 20:29) (95/58 - 118/664)  BP(mean): 71 (19 Oct 2023 06:22) (71 - 71)  RR: 18 (19 Oct 2023 20:29) (18 - 18)  SpO2: 98% (19 Oct 2023 20:29) (98% - 99%)    Parameters below as of 19 Oct 2023 20:29  Patient On (Oxygen Delivery Method): room air        I&O's Summary    18 Oct 2023 07:01  -  19 Oct 2023 07:00  --------------------------------------------------------  IN: 1820 mL / OUT: 2045 mL / NET: -225 mL    19 Oct 2023 07:01  -  20 Oct 2023 02:53  --------------------------------------------------------  IN: 1590 mL / OUT: 2400 mL / NET: -810 mL        PHYSICAL EXAM:  General: patient seen laying supine in bed in NAD  Neuro: AAOx3, FC, strength 5/5 b/l UE and 0/5 b/l LE, b/l LE sensation inconsistent w/ any dermatomal pattern but decreased below T10  HEENT: PERRL, EOMI  Neck: supple  Cardiac: RRR, S1S2  Pulmonary: chest rise symmetric  Abdomen: soft, mild distention  Ext: perfusing well  Skin: warm, dry  Wound: c/t/l spine incision c/d/i      DIET:  [] NPO  [x] Mechanical  [] Tube feeds    LABS:                        10.5   9.86  )-----------( 371      ( 19 Oct 2023 05:30 )             34.0     10-19    136  |  97  |  8   ----------------------------<  102<H>  3.8   |  27  |  0.40<L>    Ca    10.1      19 Oct 2023 05:30  Phos  3.8     10-19  Mg     2.0     10-19        Urinalysis Basic - ( 19 Oct 2023 05:30 )    Color: x / Appearance: x / SG: x / pH: x  Gluc: 102 mg/dL / Ketone: x  / Bili: x / Urobili: x   Blood: x / Protein: x / Nitrite: x   Leuk Esterase: x / RBC: x / WBC x   Sq Epi: x / Non Sq Epi: x / Bacteria: x          CAPILLARY BLOOD GLUCOSE          Drug Levels: [] N/A    CSF Analysis: [] N/A      Allergies    No Known Allergies    Intolerances        Home Medications:  acetaminophen 500 mg oral tablet: 2 tab(s) orally every 8 hours As needed Temp greater or equal to 38C (100.4F), Mild Pain (1 - 3) (19 Oct 2023 10:20)  ascorbic acid 500 mg oral tablet: 1 tab(s) orally every other day at 6AM with iron supplement (19 Oct 2023 10:20)  bisacodyl 10 mg rectal suppository: 1 suppository(ies) rectal once a day as needed for  constipation (19 Oct 2023 10:20)  diazePAM 5 mg oral tablet: 1 tab(s) orally every 8 hours as needed for  muscle spasm (19 Oct 2023 10:20)  enoxaparin: 40 milligram(s) subcutaneous once a day (at bedtime) for DVT prevention while at rehab (19 Oct 2023 10:20)  ferrous sulfate 325 mg (65 mg elemental iron) oral tablet: 1 tab(s) orally every other day at 6AM (19 Oct 2023 10:20)  HYDROmorphone 2 mg oral tablet: 3 tab(s) orally every 4 hours As needed Severe Pain (7 - 10) (19 Oct 2023 10:20)  HYDROmorphone 4 mg oral tablet: 1 tab(s) orally every 4 hours As needed Moderate Pain (4 - 6) (19 Oct 2023 10:20)  melatonin 5 mg oral tablet: 1 tab(s) orally once a day (at bedtime) As needed Insomnia (19 Oct 2023 10:20)  midodrine 5 mg oral tablet: 3 tab(s) orally every 8 hours wean off as tolerated for a normal blood pressure goal (19 Oct 2023 10:20)  polyethylene glycol 3350 oral powder for reconstitution: 17 gram(s) orally 2 times a day (19 Oct 2023 10:20)  pregabalin 75 mg oral capsule: 1 cap(s) orally every 8 hours WEAN OFF as tolerated (19 Oct 2023 10:20)  senna leaf extract oral tablet: 2 tab(s) orally once a day (at bedtime) (19 Oct 2023 10:20)      MEDICATIONS:  MEDICATIONS  (STANDING):  ascorbic acid 500 milliGRAM(s) Oral <User Schedule>  bisacodyl Suppository 10 milliGRAM(s) Rectal daily  diazepam    Tablet 5 milliGRAM(s) Oral every 8 hours  enoxaparin Injectable 40 milliGRAM(s) SubCutaneous every 24 hours  ferrous    sulfate 325 milliGRAM(s) Oral <User Schedule>  influenza   Vaccine 0.5 milliLiter(s) IntraMuscular once  lidocaine   4% Patch 1 Patch Transdermal daily  midodrine 15 milliGRAM(s) Oral every 8 hours  polyethylene glycol 3350 17 Gram(s) Oral two times a day  pregabalin 75 milliGRAM(s) Oral every 8 hours  senna 2 Tablet(s) Oral at bedtime    MEDICATIONS  (PRN):  acetaminophen     Tablet .. 1000 milliGRAM(s) Oral every 8 hours PRN Temp greater or equal to 38C (100.4F), Mild Pain (1 - 3)  HYDROmorphone   Tablet 6 milliGRAM(s) Oral every 4 hours PRN Severe Pain (7 - 10)  HYDROmorphone   Tablet 4 milliGRAM(s) Oral every 4 hours PRN Moderate Pain (4 - 6)  melatonin 5 milliGRAM(s) Oral at bedtime PRN Insomnia  sodium chloride 0.9% lock flush 10 milliLiter(s) IV Push every 1 hour PRN Pre/post blood products, medications, blood draw, and to maintain line patency      CULTURES:      RADIOLOGY & ADDITIONAL TESTS:      ASSESSMENT:  43F no PMHx suffered childhood injury resulting in spine injury and residual gait disturbance, hyperreflexia of BL LE. She has right greater than left leg tingling, low back and mid back pain. 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 S/p C7-L1 fusion, T3-8 vertebral column resection, placement of anterior cage (9/25/23). S/p revision of T3-T9 VCR, revision of C7-L1 posterior fusion, plastics closure (10/6/23).     NEURO:  - Neuro/spine checks q8/vitals q4hrs  - Pain control: tylenol 1g q8h, dilaudid 4/6 PO, Lyrica 75q8,  - stage 1 Post-op CT full spine: L1 screw not attached to the vertical yoli, epidural lipomatosis L2-3  - stage 2 post op CT T spine completed  - JPx1 (per plastics) (L HMV, R HMV and R DANIELE dc'd)    Cardio:  - MAP > 65  - midodrine 15mg q8h  - echo 9/27: EF 60-65%, 10/12 nml EF 54%   - Lasix 40mg PO qd d/c 10/17    Pulm:  - room air  - incentive spirometry  - CTA chest 10/11 neg for PE, notable for b/l pleural effusions  - CXR 10/12 b/l pleural effusions     GI:  - Regular diet  - Bowel regimen  - last BM 10/15    Renal:  - voiding via primafit    Endo:  - a1c 5.6    Heme:  - SCDs for DVT ppx/ SQL   - 2u PRBC and 1u PLT postop 9/25, 1u PRBC 10/3, 1u PRBC 10/7  - b/l LE dopplers 10/1 negative  - LUE doppler 10/1: +cephalic thrombophlebitis  - iron and vitamin c every other day     ID:  - 10/1 panculture, +UA 10/1  - Vanc (10/1-10/3 ) dc'd, Zosyn (10/1- 10/8)    - ID signed off    Dispo: [facility], full code, PT/OT recs AR    D/w with Dr. Vieira     DVT PROPHYLAXIS:  [x] Venodynes                                [x] Heparin/Lovenox      Assessment: present when checked     [] GCS   E   V   M     Heart Failure: [] Acute, [] acute on chronic, [] chronic   Heart Failure: [] Diastolic (HFpEF), [] Systolic (HRrEF), [] Combined (HFpEF and HFrEF), [] RHF, [] Pulm HTN, [] Other     [] CELE, [] ATN, [] AIN, [] other   [] CKD1, [] CKD2, [] CKD3, [] CKD4, [] CKD5, [] ESRD     Encephalopathy: [] Metabolic, [] Hepatic, [] Toxic, [] Neurological, [] Other     Abnormal Nutritional Status: [] malnutrition (see nutrition note), []underweight: BMI <19, [] morbid obesity: BMI >40, [] Cachexia     [] Sepsis   [] Hypovolemic shock, [] Cardiogenic shock, [] Hemorrhagic shock, [] Neurogenic shock   [] Acute respiratory failure   [] Cerebral edema, [] Brain compression / herniation   [] Functional quadriplegia   [] Acute blood loss anemia

## 2023-10-20 NOTE — PROGRESS NOTE ADULT - PROBLEM SELECTOR PROBLEM 1
Compression of spinal cord with myelopathy

## 2023-10-20 NOTE — PROGRESS NOTE ADULT - SUBJECTIVE AND OBJECTIVE BOX
NEUROSURGERY PAIN MANAGEMENT CONSULT NOTE    Chief Complaint:    HPI:  Taken from outpatient neurosurgery note on 9/13/2023 " The patient, a long-standing patient of my practice, reports a history of spinal issues that dates back to a childhood injury. Over the years she's experienced developing issues with walking in a straight line, overactive reflexes in her lower extremities, and unique difficulty with her lower extremities referred to as cloneness. Recent upturn in these symptoms has limited her ability to carry out her daily routines. The patient also reports no significant improvement in her condition since our last clinical encounter two years ago."    42 y/o female with no PMHx or PSHx presents for surgery today.  She reports ambulating with walker or baby stroller at home for years.  She reports intermittent falls.  She has right greater than left leg tingling, low back and mid back pain.  She denies arm or leg pain.  She reports urinary incontinence since having a baby 2 years ago where if she doesn't get to the bathroom fast enough, she has incontinence, but she is able to hold it for a short while.  She takes no pain medications.  She has taken ibuprofen in the past.  She denies saddle anesthesia, bowel incontinence.   (25 Sep 2023 06:51)      PAST MEDICAL & SURGICAL HISTORY:  History of acute illness  childhood      No significant past surgical history          FAMILY HISTORY:  No pertinent family history in first degree relatives        SOCIAL HISTORY:  [ ] Denies Smoking, Alcohol, or Drug Use    HOME MEDICATIONS:   Please refer to initial HNP    PAIN HOME MEDICATIONS:    Allergies    No Known Allergies    Intolerances        PAIN MEDICATIONS:  acetaminophen     Tablet .. 1000 milliGRAM(s) Oral every 8 hours PRN  diazepam    Tablet 5 milliGRAM(s) Oral every 8 hours  HYDROmorphone   Tablet 6 milliGRAM(s) Oral every 4 hours PRN  HYDROmorphone   Tablet 4 milliGRAM(s) Oral every 4 hours PRN  melatonin 5 milliGRAM(s) Oral at bedtime PRN  pregabalin 75 milliGRAM(s) Oral every 8 hours    Heme:  enoxaparin Injectable 40 milliGRAM(s) SubCutaneous every 24 hours    Antibiotics:    Cardiovascular:  midodrine 15 milliGRAM(s) Oral every 8 hours    GI:  bisacodyl Suppository 10 milliGRAM(s) Rectal daily  polyethylene glycol 3350 17 Gram(s) Oral two times a day  senna 2 Tablet(s) Oral at bedtime    Endocrine:    All Other Medications:  ascorbic acid 500 milliGRAM(s) Oral <User Schedule>  ferrous    sulfate 325 milliGRAM(s) Oral <User Schedule>  influenza   Vaccine 0.5 milliLiter(s) IntraMuscular once  lidocaine   4% Patch 1 Patch Transdermal daily  sodium chloride 0.9% lock flush 10 milliLiter(s) IV Push every 1 hour PRN      Vital Signs Last 24 Hrs  T(C): 36.7 (20 Oct 2023 08:20), Max: 37.1 (19 Oct 2023 16:57)  T(F): 98 (20 Oct 2023 08:20), Max: 98.8 (19 Oct 2023 16:57)  HR: 90 (20 Oct 2023 08:20) (76 - 105)  BP: 117/76 (20 Oct 2023 08:20) (95/66 - 117/76)  BP(mean): --  RR: 18 (20 Oct 2023 08:20) (18 - 18)  SpO2: 97% (20 Oct 2023 08:20) (96% - 99%)    Parameters below as of 20 Oct 2023 08:20  Patient On (Oxygen Delivery Method): room air        LABS:                        10.5   9.86  )-----------( 371      ( 19 Oct 2023 05:30 )             34.0     10-19    136  |  97  |  8   ----------------------------<  102<H>  3.8   |  27  |  0.40<L>    Ca    10.1      19 Oct 2023 05:30  Phos  3.8     10-19  Mg     2.0     10-19        Urinalysis Basic - ( 19 Oct 2023 05:30 )    Color: x / Appearance: x / SG: x / pH: x  Gluc: 102 mg/dL / Ketone: x  / Bili: x / Urobili: x   Blood: x / Protein: x / Nitrite: x   Leuk Esterase: x / RBC: x / WBC x   Sq Epi: x / Non Sq Epi: x / Bacteria: x        RADIOLOGY:    Drug Screen:        REVIEW OF SYSTEMS:  CONSTITUTIONAL: No fever or fatigue O/N.   EYES: No eye pain, visual disturbances  ENMT:  No difficulty hearing. No throat pain  NECK: No pain or stiffness  RESPIRATORY: No cough, wheezing; No shortness of breath  CARDIOVASCULAR: No chest pain, palpitations.   GASTROINTESTINAL: Pt reports ___ passing gas. No bowel movements. No abdominal or epigastric pain. No nausea, vomiting. GENITOURINARY: No dysuria, frequency, or incontinence  NEUROLOGICAL: No headaches, loss of strength, numbness, or tremors. No dizzinesss or lightheadedness with pain medications.   MUSCULOSKELETAL: Incisional back pain. No joint pain or swelling; No muscle, or extremity pain      FUNCTIONAL ASSESSMENT:  PAIN SCORE AT REST:         SCALE USED: (1-10 VNRS)  PAIN SCORE WITH ACTIVITY:         SCALE USED: (1-10 VNRS)    PAIN ASSESSMENT:    PHYSICAL EXAM  GENERAL: Laying in bed, NAD  Neuro: CN II-XII PERRRL, EOMI  Cranial nerves grossly intact  Motor exam:  Sensation intact to LT in UE/LE in 3 dermatomes  CHEST/LUNG: Clear to auscultation bilaterally; No rales, rhonchi, wheezing, or rubs  HEART: Regular rate and rhythm; No murmurs, rubs, or gallops  ABDOMEN: Soft, Nontender, Nondistended; Bowel sounds present  EXTREMITIES:  2+ Peripheral Pulses, No clubbing, cyanosis, or edema  SKIN: No rashes or lesions      ASSESSMENT:       PLAN:   - Pain:    Since yesterday 6am, pt has required:     PCA Setting:   - Opioids:   - Initial Bolus:   - Initial Demand:   - Lockout:   - Continuous Rate:   - 4 hour limit:     - Transition to . LA not required. Pt receiving adequete coverage. First step. For rescue dosing, will order .      - Home pain regiment:    - Bowel regimen: Senna    - Nausea ppx: Zofran standing  - Functional Goals: Pt will get OOB with PT today. Pt will resume previous level of activity without impairment from surgery.   - Additional Consults: None recommended.   - Additional Labs/Imaging:  None recommended.     - Follow up, Discharge Planning:     Patient is set for discharge to:     Discharge is pending:   - Pain Management follow up plan:    NEUROSURGERY PAIN MANAGEMENT CONSULT NOTE    Chief Complaint: back and neck pain    HPI:  Taken from outpatient neurosurgery note on 9/13/2023 " The patient, a long-standing patient of my practice, reports a history of spinal issues that dates back to a childhood injury. Over the years she's experienced developing issues with walking in a straight line, overactive reflexes in her lower extremities, and unique difficulty with her lower extremities referred to as cloneness. Recent upturn in these symptoms has limited her ability to carry out her daily routines. The patient also reports no significant improvement in her condition since our last clinical encounter two years ago."    44 y/o female with no PMHx or PSHx presents for surgery today.  She reports ambulating with walker or baby stroller at home for years.  She reports intermittent falls.  She has right greater than left leg tingling, low back and mid back pain.  She denies arm or leg pain.  She reports urinary incontinence since having a baby 2 years ago where if she doesn't get to the bathroom fast enough, she has incontinence, but she is able to hold it for a short while.  She takes no pain medications.  She has taken ibuprofen in the past.  She denies saddle anesthesia, bowel incontinence.   (25 Sep 2023 06:51)      PAST MEDICAL & SURGICAL HISTORY:  History of acute illness  childhood      No significant past surgical history          FAMILY HISTORY:  No pertinent family history in first degree relatives        SOCIAL HISTORY:  [ ] Denies Smoking, Alcohol, or Drug Use    HOME MEDICATIONS:   Please refer to initial HNP    PAIN HOME MEDICATIONS:    Allergies    No Known Allergies    Intolerances        PAIN MEDICATIONS:  acetaminophen     Tablet .. 1000 milliGRAM(s) Oral every 8 hours PRN  diazepam    Tablet 5 milliGRAM(s) Oral every 8 hours  HYDROmorphone   Tablet 6 milliGRAM(s) Oral every 4 hours PRN  HYDROmorphone   Tablet 4 milliGRAM(s) Oral every 4 hours PRN  melatonin 5 milliGRAM(s) Oral at bedtime PRN  pregabalin 75 milliGRAM(s) Oral every 8 hours    Heme:  enoxaparin Injectable 40 milliGRAM(s) SubCutaneous every 24 hours    Antibiotics:    Cardiovascular:  midodrine 15 milliGRAM(s) Oral every 8 hours    GI:  bisacodyl Suppository 10 milliGRAM(s) Rectal daily  polyethylene glycol 3350 17 Gram(s) Oral two times a day  senna 2 Tablet(s) Oral at bedtime    Endocrine:    All Other Medications:  ascorbic acid 500 milliGRAM(s) Oral <User Schedule>  ferrous    sulfate 325 milliGRAM(s) Oral <User Schedule>  influenza   Vaccine 0.5 milliLiter(s) IntraMuscular once  lidocaine   4% Patch 1 Patch Transdermal daily  sodium chloride 0.9% lock flush 10 milliLiter(s) IV Push every 1 hour PRN      Vital Signs Last 24 Hrs  T(C): 36.7 (20 Oct 2023 08:20), Max: 37.1 (19 Oct 2023 16:57)  T(F): 98 (20 Oct 2023 08:20), Max: 98.8 (19 Oct 2023 16:57)  HR: 90 (20 Oct 2023 08:20) (76 - 105)  BP: 117/76 (20 Oct 2023 08:20) (95/66 - 117/76)  BP(mean): --  RR: 18 (20 Oct 2023 08:20) (18 - 18)  SpO2: 97% (20 Oct 2023 08:20) (96% - 99%)    Parameters below as of 20 Oct 2023 08:20  Patient On (Oxygen Delivery Method): room air        LABS:                        10.5   9.86  )-----------( 371      ( 19 Oct 2023 05:30 )             34.0     10-19    136  |  97  |  8   ----------------------------<  102<H>  3.8   |  27  |  0.40<L>    Ca    10.1      19 Oct 2023 05:30  Phos  3.8     10-19  Mg     2.0     10-19      REVIEW OF SYSTEMS:  CONSTITUTIONAL: No fever or fatigue O/N.   EYES: No eye pain, visual disturbances  ENMT:  No difficulty hearing. No throat pain  NECK: No pain or stiffness  RESPIRATORY: No cough, wheezing; No shortness of breath  CARDIOVASCULAR: No chest pain, palpitations.   GASTROINTESTINAL: Pt reports passing gas. No bowel movements. No abdominal or epigastric pain. No nausea, vomiting. GENITOURINARY: No dysuria, frequency, or incontinence  NEUROLOGICAL: No headaches, loss of strength, numbness, or tremors. No dizzinesss or lightheadedness with pain medications.   MUSCULOSKELETAL: Incisional back pain. No joint pain or swelling; No muscle, or extremity pain    PHYSICAL EXAM  GENERAL: Laying in bed, NAD  Neuro: CN II-XII PERRRL, EOMI  Cranial nerves grossly intact  Motor exam:  Sensation intact to LT in UE/LE in 3 dermatomes  CHEST/LUNG: Clear to auscultation bilaterally; No rales, rhonchi, wheezing, or rubs  HEART: Regular rate and rhythm; No murmurs, rubs, or gallops  ABDOMEN: Soft, Nontender, Nondistended; Bowel sounds present  EXTREMITIES:  2+ Peripheral Pulses, No clubbing, cyanosis, or edema  SKIN: No rashes or lesions      ASSESSMENT:   43F no PMHx suffered childhood injury resulting in spine injury and residual gait disturbance, hyperreflexia of BL LE. She has right greater than left leg tingling, low back and mid back pain. 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 S/p C7-L1 fusion, T3-8 vertebral column resection, placement of anterior cage (9/25/23). S/p revision of T3-T9 VCR, revision of C7-L1 posterior fusion, plastics closure (10/6/23).    PLAN:   - Pain:  1) acetaminophen 1000mg PO q8h  2) diazepam 5 mg PO q8h  3) pregabalin 75 mg PO q8h  4) hydromorphone 4mg to 6 mg for moderate to severe pain      - Bowel regimen: Senna    - Nausea ppx: Zofran standing  - Functional Goals: Pt will get OOB with PT today. Pt will resume previous level of activity without impairment from surgery.      Plan discussed with Dr. Birmingham

## 2023-11-01 ENCOUNTER — APPOINTMENT (OUTPATIENT)
Dept: SPINE | Facility: CLINIC | Age: 43
End: 2023-11-01
Payer: MEDICAID

## 2023-11-01 DIAGNOSIS — M40.205 UNSPECIFIED KYPHOSIS, THORACOLUMBAR REGION: ICD-10-CM

## 2023-11-01 DIAGNOSIS — M40.204 UNSPECIFIED KYPHOSIS, THORACIC REGION: ICD-10-CM

## 2023-11-01 DIAGNOSIS — Z78.9 OTHER SPECIFIED HEALTH STATUS: ICD-10-CM

## 2023-11-01 PROCEDURE — 99024 POSTOP FOLLOW-UP VISIT: CPT

## 2023-11-16 ENCOUNTER — APPOINTMENT (OUTPATIENT)
Dept: SPINE | Facility: CLINIC | Age: 43
End: 2023-11-16
Payer: MEDICAID

## 2023-11-16 PROCEDURE — 99024 POSTOP FOLLOW-UP VISIT: CPT

## 2024-03-05 ENCOUNTER — APPOINTMENT (OUTPATIENT)
Dept: SPINE | Facility: CLINIC | Age: 44
End: 2024-03-05
Payer: MEDICAID

## 2024-03-05 PROCEDURE — 99214 OFFICE O/P EST MOD 30 MIN: CPT | Mod: 95

## 2024-03-05 RX ORDER — TRAMADOL HYDROCHLORIDE 50 MG/1
50 TABLET, COATED ORAL
Qty: 60 | Refills: 0 | Status: ACTIVE | COMMUNITY
Start: 2023-12-21 | End: 1900-01-01

## 2024-03-05 RX ORDER — PREGABALIN 50 MG/1
50 CAPSULE ORAL TWICE DAILY
Qty: 60 | Refills: 0 | Status: ACTIVE | COMMUNITY
Start: 2024-03-05 | End: 1900-01-01

## 2024-03-05 NOTE — REASON FOR VISIT
[de-identified] : 10/6/23 [de-identified] : Posterior fusion C7-T1, Revision anterior fusion T3-T9, T9 corpectomy,

## 2024-03-05 NOTE — HISTORY OF PRESENT ILLNESS
[Home] : at home, [unfilled] , at the time of the visit. [Medical Office: ([facility])___] : at the medical office located in  [FreeTextEntry1] : 42 yo F known in the practice since 2021 for thoracic kyphosis/cord compression/myelopathy. Her symptoms were progressively worsening gait/balance problem causing moderate difficulty performing ADLs. She has been ambulating with walker or baby stroller. She underwent extensive cervical/thoracic and lumbar decompression/corpectomy/fusion. Hospital Course:  9/25: POD 0 s/p C7-L1 fusion, T3-8 vertebral column resection, placement of anterior cage. 10/6: Revision of T3-T9 VCR, revision of C7-L1 posterior fusion, plastics closure.   10/19: Patient evaluated by PT/OT who recommended: acute rehab. Patient is going to [facility] acute rehab  11/1/23 she reports persistent LE weakness continue PT but denies new/worsening focal neuro deficits.  Today 3/5/24

## 2024-05-01 PROBLEM — Z98.1 S/P FUSION OF THORACIC SPINE: Status: ACTIVE | Noted: 2023-11-01

## 2024-05-01 PROBLEM — Z98.1 S/P LUMBAR SPINAL FUSION: Status: ACTIVE | Noted: 2023-11-01

## 2024-05-01 PROBLEM — Z98.1 S/P CERVICAL SPINAL FUSION: Status: ACTIVE | Noted: 2023-11-01

## 2024-05-01 NOTE — REASON FOR VISIT
[Home] : at home, [unfilled] , at the time of the visit. [Medical Office: ([facility])___] : at the medical office located in  [Patient] : the patient [Self] : self [New Patient Visit] : a new patient visit

## 2024-05-01 NOTE — PHYSICAL EXAM
[General Appearance - Alert] : alert [Oriented To Time, Place, And Person] : oriented to person, place, and time [Person] : oriented to person [Place] : oriented to place [Time] : oriented to time

## 2024-05-03 NOTE — PHYSICAL EXAM
[Ataxic] : ataxic [Wide-Based] : wide-based [] : Motor: [UE Motor Strength NL] : Motor strength of the bilateral upper extremities is normal [LE Motor Strength NL] : Motor strength of the bilateral lower extremities was normal [___/5] : left ([unfilled]/5) [2+] : left brachioradialis 2+ [4+] : left ankle jerk 4+ [de-identified] : MRI cervical/thoracic spine wo on 8/12/2021 which showed cord compression @ T304 as well as severe kyphosis  Be able to state 3 reasons for living

## 2024-05-08 ENCOUNTER — APPOINTMENT (OUTPATIENT)
Dept: SPINE | Facility: CLINIC | Age: 44
End: 2024-05-08
Payer: MEDICAID

## 2024-05-08 DIAGNOSIS — Z98.1 ARTHRODESIS STATUS: ICD-10-CM

## 2024-05-08 PROCEDURE — 99024 POSTOP FOLLOW-UP VISIT: CPT

## 2024-05-09 NOTE — ADDENDUM
[FreeTextEntry1] : Patient s/p multilevel thoracic corpectomy for severe kyphosis by Dr. Vieira.  Still with minimal movement of the lower extremities.  Getting home PT, but not able to leave apartment.  Complains of severe back pain.  X-rays show adequate placement of instrumentation and kyphosis correction.  Will continue PT.  Follow up in 2-3 months.

## 2024-05-09 NOTE — HISTORY OF PRESENT ILLNESS
[FreeTextEntry1] : Initial Visit: 42 yo F known in the practice since 2021 for thoracic kyphosis/cord compression/myelopathy. Her symptoms were progressively worsening gait/balance problem causing moderate difficulty performing ADLs. She has been ambulating with walker or baby stroller. She underwent extensive cervical/thoracic and lumbar decompression/corpectomy/fusion. Hospital Course:  Surgery with Dr. Vieira 9/25: POD 0 s/p C7-L1 fusion, T3-8 vertebral column resection, placement of anterior cage. 10/6: Revision of T3-T9 VCR, revision of C7-L1 posterior fusion, plastics closure.   10/19: Patient evaluated by PT/OT who recommended: acute rehab. Patient is going to [facility] acute rehab  Today 05/08/2024 visit with Dr. Roth. Patient continues to work with physical therapy at home. She has noticed movement in her toes. XRAYS were completed at home and hardware appears to be intact.  Rifampin Pregnancy And Lactation Text: This medication is Pregnancy Category C and it isn't know if it is safe during pregnancy. It is also excreted in breast milk and should not be used if you are breast feeding.

## 2024-07-10 NOTE — PATIENT PROFILE ADULT - DOES PATIENT HAVE ADVANCE DIRECTIVE
Western Reserve No

## 2024-11-12 ENCOUNTER — NON-APPOINTMENT (OUTPATIENT)
Age: 44
End: 2024-11-12

## 2024-11-26 ENCOUNTER — APPOINTMENT (OUTPATIENT)
Dept: SPINE | Facility: CLINIC | Age: 44
End: 2024-11-26

## 2024-11-26 DIAGNOSIS — Z98.1 ARTHRODESIS STATUS: ICD-10-CM

## 2024-11-26 PROCEDURE — 99441: CPT

## 2024-11-29 NOTE — HISTORY OF PRESENT ILLNESS
[de-identified] : The patient is a 41-year-old woman who recently gave birth to her 1st child.  She is originally from [place].  When she was 7 years old she was very sick and could not walk and was able to rehabilitate.  She is noticed over the last 9 to 10 months of worsening in her balance and her gait. myself

## 2024-12-18 ENCOUNTER — APPOINTMENT (OUTPATIENT)
Dept: CT IMAGING | Facility: HOSPITAL | Age: 44
End: 2024-12-18

## 2024-12-18 ENCOUNTER — OUTPATIENT (OUTPATIENT)
Dept: OUTPATIENT SERVICES | Facility: HOSPITAL | Age: 44
LOS: 1 days | End: 2024-12-18
Payer: MEDICAID

## 2024-12-18 ENCOUNTER — APPOINTMENT (OUTPATIENT)
Dept: SPINE | Facility: CLINIC | Age: 44
End: 2024-12-18
Payer: MEDICAID

## 2024-12-18 DIAGNOSIS — Z98.1 ARTHRODESIS STATUS: ICD-10-CM

## 2024-12-18 PROCEDURE — 99211 OFF/OP EST MAY X REQ PHY/QHP: CPT

## 2024-12-18 PROCEDURE — 72125 CT NECK SPINE W/O DYE: CPT

## 2024-12-18 PROCEDURE — 72128 CT CHEST SPINE W/O DYE: CPT

## 2024-12-18 PROCEDURE — 72125 CT NECK SPINE W/O DYE: CPT | Mod: 26

## 2024-12-18 PROCEDURE — 72128 CT CHEST SPINE W/O DYE: CPT | Mod: 26

## 2024-12-18 PROCEDURE — 72131 CT LUMBAR SPINE W/O DYE: CPT

## 2024-12-18 PROCEDURE — 72131 CT LUMBAR SPINE W/O DYE: CPT | Mod: 26

## (undated) DEVICE — GLV 7.5 PROTEXIS (WHITE)

## (undated) DEVICE — WOUND IRR SURGIPHOR

## (undated) DEVICE — DRSG TEGADERM 3.5X6"

## (undated) DEVICE — NDL IMBIBE

## (undated) DEVICE — DRAPE GENERAL ENDOSCOPY

## (undated) DEVICE — NDL HYPO SAFE 22G X 1.5" (BLACK)

## (undated) DEVICE — MISONIX BONESCALPEL STANDARD BLUNT BLADE 10MM

## (undated) DEVICE — DRSG MASTISOL

## (undated) DEVICE — IMBIBE NEEDLE 8GAX15CM BEVELLED TIP

## (undated) DEVICE — PACK SPINE

## (undated) DEVICE — BUR STRYKER MULTI FLUTE ROUND 5MM

## (undated) DEVICE — SUT VICRYL 2-0 18" MO-6 (POP-OFF)

## (undated) DEVICE — STRYKER BONE MILL BLADE FINE 3.2MM

## (undated) DEVICE — DRAPE SURGICAL #1010

## (undated) DEVICE — ELCTR BOVIE PENCIL SMOKE EVACUATION

## (undated) DEVICE — Device

## (undated) DEVICE — SPONGE SURGICAL STRIP 1/2 X 6"

## (undated) DEVICE — DRSG BIOPATCH DISK W CHG 1" W 4.0MM HOLE

## (undated) DEVICE — GLV 8 PROTEXIS ORTHO (CREAM)

## (undated) DEVICE — STRYKER SONOPET IQ TUBING SET

## (undated) DEVICE — SUT POLYSORB 2-0 30" V-20 UNDYED

## (undated) DEVICE — SUT VICRYL PLUS 0 36" CT-1

## (undated) DEVICE — SET BOWL XTRA 225

## (undated) DEVICE — VENODYNE/SCD SLEEVE CALF MEDIUM

## (undated) DEVICE — SUT GORETEX CV-5 (4-0) 30" THC-13

## (undated) DEVICE — GLV 7 PROTEXIS (WHITE)

## (undated) DEVICE — STRYKER SONOPET IQ TIP 10CM BROAD KNIFE

## (undated) DEVICE — LAP PAD 18 X 18"

## (undated) DEVICE — TUBING SMOKE EVAC 3/8" X 10FT FOR NEPTUNE

## (undated) DEVICE — DRSG AQUACEL 3.5 X 6"

## (undated) DEVICE — ELCTR BOVIE TIP BLADE INSULATED 2.75" EDGE

## (undated) DEVICE — GLV 8.5 PROTEXIS ORTHO (CREAM)

## (undated) DEVICE — DRSG TEGADERM 4X4.75"

## (undated) DEVICE — DRAPE INSTRUMENT POUCH 6.75" X 11"

## (undated) DEVICE — DRSG STERISTRIPS 0.5 X 4"

## (undated) DEVICE — SUT MONOCRYL 3-0 18" PS-1

## (undated) DEVICE — DRAPE MICROSCOPE EXOSCOPE 12" X 79"

## (undated) DEVICE — SUT SILK 0 30" PSL

## (undated) DEVICE — SPONGE PEANUT AUTO COUNT

## (undated) DEVICE — DRSG DERMABOND PRINEO 22CM

## (undated) DEVICE — SUT SILK 2-0 30" PSL

## (undated) DEVICE — DRAPE TOP SHEET 53" X 101"

## (undated) DEVICE — MISONIX BONESCALPEL IRRIGATION TUBE SET

## (undated) DEVICE — FEEDING TUBE ENTERAL KANGAROO CONNECT ENPLUS SPIKE SET

## (undated) DEVICE — DRAPE 3/4 SHEET 52X76"

## (undated) DEVICE — DRAPE LIGHT HANDLE COVER (GREEN)

## (undated) DEVICE — DRAPE C ARM 41X74"

## (undated) DEVICE — POSITIONER FOAM EGG CRATE ULNAR 2PCS (PINK)

## (undated) DEVICE — BIPOLAR FORCEP SYMMETRY BAYONET 7" X 1.5MM SMOOTH (SILVER)

## (undated) DEVICE — ELCTR STRYKER NEPTUNE SMOKE EVACUATION PENCIL (GREEN)

## (undated) DEVICE — SUT PDS II 0 27" CT-1

## (undated) DEVICE — RESERVOIR XTRA B BLD COLLECT

## (undated) DEVICE — SUT MONOCRYL 3-0 27" PS-2 UNDYED

## (undated) DEVICE — PACK MINOR

## (undated) DEVICE — SUT ETHILON 2-0 18" PS-2

## (undated) DEVICE — SYR LUER LOK 20CC

## (undated) DEVICE — MARKING PEN W RULER

## (undated) DEVICE — MIDAS REX MR8 MATCH HEAD FLUTED SM BORE 2.2MM X 10CM

## (undated) DEVICE — SUT PDS II 2-0 27" CT-2

## (undated) DEVICE — ELCTR REM POLYHESIVE ADULT PT RETURN 15FT

## (undated) DEVICE — DRAPE 1/2 SHEET 40X57"

## (undated) DEVICE — DRAPE VARI-LENS2 MICROSCPOPE 68MM

## (undated) DEVICE — BLADE SCALPEL SAFETYLOCK #15

## (undated) DEVICE — SOL IRR POUR H2O 250ML

## (undated) DEVICE — MIDAS REX MR8 MATCH HEAD FLUTED SM BORE 3MM X 10CM

## (undated) DEVICE — BIPOLAR FORCEP SYMMETRY BAYONET STR 8.25" X 1.5MM (BLUE)

## (undated) DEVICE — ELCTR AQUAMANTYS BIPOLAR SEALER 6.0

## (undated) DEVICE — STRYKER SONOPET IQ TIP 12CM APEX 360

## (undated) DEVICE — SUT ETHILON 3-0 18" PS-1

## (undated) DEVICE — MISONIX BONESCALPEL MICRO HOOK STANDARD SHAVER

## (undated) DEVICE — ELCTR BOVIE TIP BLADE INSULATED 4" EDGE

## (undated) DEVICE — POLISHER OR CAUTERY TIP

## (undated) DEVICE — SYR CONTROL LUER LOK 10CC

## (undated) DEVICE — WARMING BLANKET LOWER ADULT

## (undated) DEVICE — NDL SPINAL 18G X 3.5" (PINK)

## (undated) DEVICE — MEDICATION LABELS W MARKER

## (undated) DEVICE — SPONGE SURGICAL STRIP 1/4 X 6"

## (undated) DEVICE — STRYKER BONE MILL BLADE MEDIUM 5.0MM

## (undated) DEVICE — DRAIN JACKSON PRATT 10MM FLAT 3/4 NO TROCAR

## (undated) DEVICE — PREP CHLORAPREP HI-LITE ORANGE 26ML

## (undated) DEVICE — STAPLER SKIN PROXIMATE

## (undated) DEVICE — SUT VICRYL 0 18" CT-1 UNDYED (POP-OFF)

## (undated) DEVICE — STRYKER INSTRUMENT BATTERY

## (undated) DEVICE — ELCTR GROUNDING PAD ADULT COVIDIEN

## (undated) DEVICE — SUT VICRYL PLUS 2-0 18" CT-2 (POP-OFF)

## (undated) DEVICE — DRAIN JACKSON PRATT 3 SPRING RESERVOIR W 10FR PVC DRAIN

## (undated) DEVICE — SOL IRR POUR NS 0.9% 500ML

## (undated) DEVICE — DRSG AQUACEL 3.5 X 14"

## (undated) DEVICE — DRAPE FOR 2 TIER TABLE W/ 8" BACK 72" LONG

## (undated) DEVICE — SUT POLYSORB 3-0 18" P-12 UNDYED

## (undated) DEVICE — MIDAS REX MR8 MATCH HEAD FLUTED LG BORE 3MM X 14CM

## (undated) DEVICE — TUBING XTRA AAL ASPIRATION AND ANTICOAGULATION LINE 1/4"

## (undated) DEVICE — CATH IV SAFE INSYTE 16G X 1.16" (GRAY)

## (undated) DEVICE — MIDAS REX MR8 BALL FLUTED SM BORE 3MM X 10CM

## (undated) DEVICE — FOLEY TRAY 16FR 5CC LF UMETER CLOSED

## (undated) DEVICE — SUT POLYSORB 0 36" GS-21 UNDYED

## (undated) DEVICE — SUT MONOCRYL 4-0 27" PS-2 UNDYED